# Patient Record
Sex: MALE | Race: WHITE | NOT HISPANIC OR LATINO | Employment: OTHER | URBAN - METROPOLITAN AREA
[De-identification: names, ages, dates, MRNs, and addresses within clinical notes are randomized per-mention and may not be internally consistent; named-entity substitution may affect disease eponyms.]

---

## 2017-01-26 ENCOUNTER — TELEPHONE (OUTPATIENT)
Dept: HEMATOLOGY/ONCOLOGY | Facility: CLINIC | Age: 52
End: 2017-01-26

## 2017-01-26 NOTE — TELEPHONE ENCOUNTER
Talked to John Heron this morning regarding following up with his primary oncologist at [facility]. He's been receiving his chemotherapy at the hospital there. He had no other questions at this time.

## 2017-01-26 NOTE — TELEPHONE ENCOUNTER
----- Message from Blake Buck MD sent at 1/25/2017  5:17 PM CST -----  Contact: self  Please find out if this patient is still following up with his primary oncologist.  He had been treated there.    Thanks,    -Onur  ----- Message -----     From: Britni Mishra RN     Sent: 1/25/2017   9:42 AM       To: Blake Buck MD    I do not see any future appointments with this patient. There are no currently plan for chemotherapy. I read your last note that they were to follow up with possible bone marrow transplant but that is where it was left off (8/2016). Please let me know what you want to do.     Britni  ----- Message -----     From: Nanci Bryant     Sent: 1/24/2017   1:05 PM       To: Cielo Lozano Staff    Pt states he has 2 rounds of chemo remaining and would like to know what  next plan of care will be .  Contact number [phone]

## 2017-05-11 ENCOUNTER — TELEPHONE (OUTPATIENT)
Dept: HEMATOLOGY/ONCOLOGY | Facility: CLINIC | Age: 52
End: 2017-05-11

## 2017-05-22 ENCOUNTER — EDUCATION (OUTPATIENT)
Dept: HEMATOLOGY/ONCOLOGY | Facility: CLINIC | Age: 52
End: 2017-05-22

## 2017-05-22 ENCOUNTER — LAB VISIT (OUTPATIENT)
Dept: LAB | Facility: HOSPITAL | Age: 52
End: 2017-05-22
Attending: INTERNAL MEDICINE
Payer: MEDICARE

## 2017-05-22 ENCOUNTER — OFFICE VISIT (OUTPATIENT)
Dept: HEMATOLOGY/ONCOLOGY | Facility: CLINIC | Age: 52
End: 2017-05-22
Payer: MEDICARE

## 2017-05-22 VITALS
TEMPERATURE: 98 F | BODY MASS INDEX: 39.13 KG/M2 | HEIGHT: 68 IN | WEIGHT: 258.19 LBS | HEART RATE: 55 BPM | SYSTOLIC BLOOD PRESSURE: 134 MMHG | DIASTOLIC BLOOD PRESSURE: 77 MMHG

## 2017-05-22 DIAGNOSIS — C90.00 MULTIPLE MYELOMA, REMISSION STATUS UNSPECIFIED: Primary | ICD-10-CM

## 2017-05-22 DIAGNOSIS — C90.00 MULTIPLE MYELOMA, REMISSION STATUS UNSPECIFIED: ICD-10-CM

## 2017-05-22 LAB
ALBUMIN SERPL BCP-MCNC: 3.6 G/DL
ALP SERPL-CCNC: 83 U/L
ALT SERPL W/O P-5'-P-CCNC: 23 U/L
ANION GAP SERPL CALC-SCNC: 7 MMOL/L
AST SERPL-CCNC: 16 U/L
BASOPHILS # BLD AUTO: 0.01 K/UL
BASOPHILS NFR BLD: 0.3 %
BILIRUB SERPL-MCNC: 0.5 MG/DL
BUN SERPL-MCNC: 13 MG/DL
CALCIUM SERPL-MCNC: 9 MG/DL
CHLORIDE SERPL-SCNC: 103 MMOL/L
CO2 SERPL-SCNC: 27 MMOL/L
CREAT SERPL-MCNC: 1 MG/DL
DIFFERENTIAL METHOD: ABNORMAL
EOSINOPHIL # BLD AUTO: 0.1 K/UL
EOSINOPHIL NFR BLD: 2.4 %
ERYTHROCYTE [DISTWIDTH] IN BLOOD BY AUTOMATED COUNT: 14.4 %
EST. GFR  (AFRICAN AMERICAN): >60 ML/MIN/1.73 M^2
EST. GFR  (NON AFRICAN AMERICAN): >60 ML/MIN/1.73 M^2
GLUCOSE SERPL-MCNC: 91 MG/DL
HCT VFR BLD AUTO: 36.4 %
HGB BLD-MCNC: 12.4 G/DL
LYMPHOCYTES # BLD AUTO: 0.8 K/UL
LYMPHOCYTES NFR BLD: 25.1 %
MCH RBC QN AUTO: 31.6 PG
MCHC RBC AUTO-ENTMCNC: 34.1 %
MCV RBC AUTO: 93 FL
MONOCYTES # BLD AUTO: 0.2 K/UL
MONOCYTES NFR BLD: 6.7 %
NEUTROPHILS # BLD AUTO: 2.1 K/UL
NEUTROPHILS NFR BLD: 64.6 %
PLATELET # BLD AUTO: 165 K/UL
PMV BLD AUTO: 10 FL
POTASSIUM SERPL-SCNC: 4.3 MMOL/L
PROT SERPL-MCNC: 7.5 G/DL
RBC # BLD AUTO: 3.92 M/UL
SODIUM SERPL-SCNC: 137 MMOL/L
WBC # BLD AUTO: 3.27 K/UL

## 2017-05-22 PROCEDURE — 36415 COLL VENOUS BLD VENIPUNCTURE: CPT

## 2017-05-22 PROCEDURE — 84165 PROTEIN E-PHORESIS SERUM: CPT

## 2017-05-22 PROCEDURE — 80053 COMPREHEN METABOLIC PANEL: CPT

## 2017-05-22 PROCEDURE — 83520 IMMUNOASSAY QUANT NOS NONAB: CPT

## 2017-05-22 PROCEDURE — 99213 OFFICE O/P EST LOW 20 MIN: CPT | Mod: S$PBB,,, | Performed by: INTERNAL MEDICINE

## 2017-05-22 PROCEDURE — 99999 PR PBB SHADOW E&M-EST. PATIENT-LVL II: CPT | Mod: PBBFAC,,, | Performed by: INTERNAL MEDICINE

## 2017-05-22 PROCEDURE — 85025 COMPLETE CBC W/AUTO DIFF WBC: CPT

## 2017-05-22 PROCEDURE — 84165 PROTEIN E-PHORESIS SERUM: CPT | Mod: 26,,, | Performed by: PATHOLOGY

## 2017-05-22 RX ORDER — LISINOPRIL 20 MG/1
40 TABLET ORAL DAILY
Status: ON HOLD | COMMUNITY
End: 2017-10-10

## 2017-05-22 RX ORDER — AMLODIPINE BESYLATE 5 MG/1
10 TABLET ORAL DAILY
COMMUNITY
End: 2021-03-03

## 2017-05-22 NOTE — PROGRESS NOTES
Subjective:       Patient ID: Arik Bobo is a 51 y.o. male.    Chief Complaint: No chief complaint on file.    HPI  Mr. Bobo is here with his wife for follow up of IgG kappa smoldering multiple and 2 weeks out from left hip replacement for arthritis first known in 2008.   Since his last visit in 8/2016, he has been in therapy with Dr. Zepeda with steady improvement.  He is now here for a discussion about PBSCT consolidation.    Myeloma History: He was noted to develop a progressive anemia in early 2015.  In retrospect, his total protein has been at the upper end of normal with hematocrit of 39 in 10/2013.  At the time of a total hip arthroplasty 11/2014 the marrow resected from his right femoral head did not show abnormalities. During that hospital stay he underwent an anemia workup showing iron 20, TIBC 234 with 12% saturation and normal ferritin at 103.  Subsequent colonoscopy showed multiple benign polyps and one at the appejndiceal lumen that resulted in appendectomy in 1/2015.    SPEP showed a monoclonal protein 2.9 g/dL with IgG elevated at 5677 and suppressed IgA and IgM, calcium 9, creatinine 0.2.  Bone survey did not find lytic lesions and kappa:lambda light chain ratio was over 100.  Albumin 3.2 (5/1) and beta-2 microglobin was elevated, though I do not know the value.  Marrow 4/14/15 at which time his hemoglobin was 9.6 with MCV 87 showed a 50% cellular marrow with 10% plasma cell infiltrated with atypical forms and a left shifted granulocyte population.   staining showed up to 50% plasma cells in some areas but kappa and lambda staining showed a mixture of cells without clear clonality.  Plasma cells were kappa restricted on flow cytometry (5.7% population).  No stainable iron was present.  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  He was tried on IV iron with some improvement but not resolution of his anemia.    Given elevated light  chain ratio over 100 he was formally diagnosed with myeloma and started on lenalidomide/dexamethasone in late 6/2015 which he continued until 12/2015 when a repeat bone marrow 12/3/15 showed a 60-70% cellular marrow with 20% kappa restricted plasma cells.  His protein levels were under good control with free kappa light chain 2.98 mg/dL and IgG kappa M-spike 1 g/dL.  Repeat marrow done 4/5/16 showed a 30-40% cellular marrow with a 17% kappa-restricted plasma cell population and no storage iron.  Hence, he was started on iron with resolution of his anemia, which had been the only feature qualifying him for the diagnosis of myeloma.  Hence, he was reclassified smoldering myeloma and has been followed since that time.    He developed two episodes of DVT in 7/2015 and 2/2017 and is now on apixaban.    Review of Systems   Constitutional: Positive for fatigue (improved). Negative for activity change, appetite change, diaphoresis, fever and unexpected weight change.   HENT: Negative for mouth sores, postnasal drip and sore throat.    Eyes: Negative for visual disturbance.   Respiratory: Negative for cough, chest tightness and shortness of breath.    Cardiovascular: Negative for chest pain and leg swelling.   Gastrointestinal: Negative for anal bleeding, blood in stool, constipation, diarrhea and rectal pain.   Genitourinary: Negative for dysuria, frequency and hematuria.   Musculoskeletal: Positive for arthralgias (Left hip pain, degenerative disease). Negative for back pain and neck pain.   Skin: Negative for rash.   Neurological: Positive for light-headedness (twice weekly). Negative for tremors, weakness and numbness.   Hematological: Negative for adenopathy.   Psychiatric/Behavioral: Positive for sleep disturbance. Negative for confusion.       Objective:      Physical Exam   Constitutional: He is oriented to person, place, and time. He appears well-developed and well-nourished. No distress.   HENT:   Head:  Normocephalic and atraumatic.   Mouth/Throat: Oropharynx is clear and moist. No oropharyngeal exudate.   Eyes: Conjunctivae are normal. Pupils are equal, round, and reactive to light.   Neck: Neck supple.   Cardiovascular: Normal rate and regular rhythm.    Pulmonary/Chest: Effort normal and breath sounds normal. He has no rales.   Abdominal: Soft. Bowel sounds are normal. He exhibits no mass. There is no splenomegaly. There is no tenderness.   Musculoskeletal: Normal range of motion. He exhibits no edema.   No spinal or rib tenderness   Lymphadenopathy:     He has no cervical adenopathy.     He has no axillary adenopathy.        Right: No inguinal adenopathy present.        Left: No inguinal adenopathy present.   Neurological: He is alert and oriented to person, place, and time.   Skin: Skin is warm and dry. No rash noted.   Psychiatric: He has a normal mood and affect. Thought content normal.       Assessment:       1. Multiple myeloma, remission status unspecified        Plan:       Mr. Bobo had and abnormal plasma cell population in his marrow along with anemia without lytic bony lesions, hypercalcemia or renal disease.  Though iron resolved his previous anemia, his anemia has recurred and worsened with hemoglobin 9.7 today.  In addition, his albumin had fallen to 3.0 with total protein elevated to 8.9 and his SPEP showed a rising M-spike to 2.2 g/dL with significant rise in his kappa light chain to 33 mg/dL and ratio of 301.      Hence, he had evidence of progressive myeloma and started on CyBorD with improvement.  His hemoglobin has improved to 12.4 with normal creatinine, calcium and albumin.  SPEP and light chains pending.  I discussed the logistics of PBSCT with the patient again today and he also met with our transplant coordinator to discuss logistics.  As he is only 50 years old, there is certainly a possibility that he will live beyond the ability of current therapies and autologous transplant to  control his disease for the long term.  Hence, if we look at the longer event horizon there is also a possibility that we may need to consider an allogeneic transplant which could offer the potential for cure.  This is not an immediate consideration but since he has 4 siblings there is a 68% chance of finding a sibling match.     He also suffers from gout and is not currently on uric acid suppression medication.  This may have contributed to his hip degeneration.    All of his questions were answered in the clinic today and we will plan for restaging marrow soon and likely pursuit of PBSCT.

## 2017-05-22 NOTE — LETTER
May 22, 2017        Avinash Lal MD  [address]  [place] [address]             [facility]  [address] [facility] [address]  Phone: [phone]   Patient: Arik Bobo   MR Number: [id number]   YOB: 1965   Date of Visit: 5/22/2017       Dear Dr. Lal:    Thank you for referring Arik Bobo to me for evaluation. Below are the relevant portions of my assessment and plan of care.        1. Multiple myeloma, remission status unspecified          Mr. Bobo had and abnormal plasma cell population in his marrow along with anemia without lytic bony lesions, hypercalcemia or renal disease.  Though iron resolved his previous anemia, his anemia has recurred and worsened with hemoglobin 9.7 today.  In addition, his albumin had fallen to 3.0 with total protein elevated to 8.9 and his SPEP showed a rising M-spike to 2.2 g/dL with significant rise in his kappa light chain to 33 mg/dL and ratio of 301.      Hence, he had evidence of progressive myeloma and started on CyBorD with improvement.  His hemoglobin has improved to 12.4 with normal creatinine, calcium and albumin.  SPEP and light chains pending.  I discussed the logistics of PBSCT with the patient again today and he also met with our transplant coordinator to discuss logistics.  As he is only 50 years old, there is certainly a possibility that he will live beyond the ability of current therapies and autologous transplant to control his disease for the long term.  Hence, if we look at the longer event horizon there is also a possibility that we may need to consider an allogeneic transplant which could offer the potential for cure.  This is not an immediate consideration but since he has 4 siblings there is a 68% chance of finding a sibling match.     He also suffers from gout and is not currently on uric acid suppression medication.  This may have contributed to his hip degeneration.    All of his  questions were answered in the clinic today and we will plan for restaging marrow soon and likely pursuit of PBSCT.     If you have questions, please do not hesitate to call me. I look forward to following Arik along with you.    Sincerely,      Blake Buck MD           CC  No Recipients

## 2017-05-22 NOTE — PROGRESS NOTES
Mary Ann Mr. Bobo,    It was nice meeting you and your sister today.  Please see attached handouts regarding food safety, visitor guidelines, discharge instructions, and pre transplant eval (I gave you a hard copy of this last one).  Also, the link below is for the video we discussed.        [email or web address]    Just let me know when your dental appointment is scheduled, if you still have your 24 hour urine container, and when your last week of treatment will be.  I look forward to working with you.      Thank you for allowing me to participate in your transplant process.         Your Transplant Coordinator,       Ivone Zarco RN, BMTCN   Sr Clinical Stem Cell Transplant Coordinator   [facility]  [facility] [address], [address] [place] [address]  o: [phone]  f: [phone]  [email or web address]

## 2017-05-23 LAB
ALBUMIN SERPL ELPH-MCNC: 3.9 G/DL
ALPHA1 GLOB SERPL ELPH-MCNC: 0.33 G/DL
ALPHA2 GLOB SERPL ELPH-MCNC: 0.77 G/DL
B-GLOBULIN SERPL ELPH-MCNC: 0.73 G/DL
GAMMA GLOB SERPL ELPH-MCNC: 1.38 G/DL
KAPPA LC SER QL IA: 4.36 MG/DL
KAPPA LC/LAMBDA SER IA: 54.5
LAMBDA LC SER QL IA: 0.08 MG/DL
PATHOLOGIST INTERPRETATION SPE: NORMAL
PROT SERPL-MCNC: 7.1 G/DL

## 2017-05-26 ENCOUNTER — TELEPHONE (OUTPATIENT)
Dept: HEMATOLOGY/ONCOLOGY | Facility: CLINIC | Age: 52
End: 2017-05-26

## 2017-05-26 DIAGNOSIS — D69.6 THROMBOCYTOPENIA: ICD-10-CM

## 2017-05-26 DIAGNOSIS — C90.00 MULTIPLE MYELOMA: Primary | ICD-10-CM

## 2017-05-26 DIAGNOSIS — R06.02 SHORTNESS OF BREATH ON EXERTION: ICD-10-CM

## 2017-05-26 DIAGNOSIS — Z76.82 STEM CELL TRANSPLANT CANDIDATE: ICD-10-CM

## 2017-05-29 DIAGNOSIS — C90.00 MULTIPLE MYELOMA: Primary | ICD-10-CM

## 2017-05-29 DIAGNOSIS — Z76.82 STEM CELL TRANSPLANT CANDIDATE: ICD-10-CM

## 2017-06-05 ENCOUNTER — HOSPITAL ENCOUNTER (OUTPATIENT)
Facility: HOSPITAL | Age: 52
Discharge: HOME | End: 2017-06-05
Attending: INTERNAL MEDICINE | Admitting: INTERNAL MEDICINE
Payer: COMMERCIAL

## 2017-06-05 ENCOUNTER — SURGERY (OUTPATIENT)
Age: 52
End: 2017-06-05

## 2017-06-05 VITALS
WEIGHT: 260 LBS | DIASTOLIC BLOOD PRESSURE: 75 MMHG | BODY MASS INDEX: 39.4 KG/M2 | HEART RATE: 55 BPM | HEIGHT: 68 IN | OXYGEN SATURATION: 97 % | TEMPERATURE: 99 F | SYSTOLIC BLOOD PRESSURE: 120 MMHG | RESPIRATION RATE: 20 BRPM

## 2017-06-05 DIAGNOSIS — C90.00 MULTIPLE MYELOMA: ICD-10-CM

## 2017-06-05 DIAGNOSIS — C90.00 MULTIPLE MYELOMA, REMISSION STATUS UNSPECIFIED: Primary | ICD-10-CM

## 2017-06-05 LAB — BONE MARROW IRON STAIN COMMENT: NORMAL

## 2017-06-05 PROCEDURE — 88271 CYTOGENETICS DNA PROBE: CPT | Mod: 59

## 2017-06-05 PROCEDURE — 88184 FLOWCYTOMETRY/ TC 1 MARKER: CPT | Performed by: PATHOLOGY

## 2017-06-05 PROCEDURE — 85097 BONE MARROW INTERPRETATION: CPT | Mod: ,,, | Performed by: PATHOLOGY

## 2017-06-05 PROCEDURE — 88311 DECALCIFY TISSUE: CPT | Mod: 26,,, | Performed by: PATHOLOGY

## 2017-06-05 PROCEDURE — 88271 CYTOGENETICS DNA PROBE: CPT | Mod: 91

## 2017-06-05 PROCEDURE — 88342 IMHCHEM/IMCYTCHM 1ST ANTB: CPT | Mod: 26,59,, | Performed by: PATHOLOGY

## 2017-06-05 PROCEDURE — 36000704 HC OR TIME LEV I 1ST 15 MIN: Performed by: INTERNAL MEDICINE

## 2017-06-05 PROCEDURE — 88274 CYTOGENETICS 25-99: CPT

## 2017-06-05 PROCEDURE — 88305 TISSUE EXAM BY PATHOLOGIST: CPT | Performed by: PATHOLOGY

## 2017-06-05 PROCEDURE — 25000003 PHARM REV CODE 250: Performed by: INTERNAL MEDICINE

## 2017-06-05 PROCEDURE — 88313 SPECIAL STAINS GROUP 2: CPT | Mod: 26,,, | Performed by: PATHOLOGY

## 2017-06-05 PROCEDURE — 88305 TISSUE EXAM BY PATHOLOGIST: CPT | Mod: 26,,, | Performed by: PATHOLOGY

## 2017-06-05 PROCEDURE — 88274 CYTOGENETICS 25-99: CPT | Mod: 59

## 2017-06-05 PROCEDURE — 37000008 HC ANESTHESIA 1ST 15 MINUTES: Performed by: INTERNAL MEDICINE

## 2017-06-05 PROCEDURE — 88237 TISSUE CULTURE BONE MARROW: CPT

## 2017-06-05 PROCEDURE — 88189 FLOWCYTOMETRY/READ 16 & >: CPT | Mod: ,,, | Performed by: PATHOLOGY

## 2017-06-05 PROCEDURE — 38221 DX BONE MARROW BIOPSIES: CPT | Mod: RT,,, | Performed by: NURSE PRACTITIONER

## 2017-06-05 PROCEDURE — 71000015 HC POSTOP RECOV 1ST HR: Performed by: INTERNAL MEDICINE

## 2017-06-05 PROCEDURE — 88185 FLOWCYTOMETRY/TC ADD-ON: CPT | Mod: 59 | Performed by: PATHOLOGY

## 2017-06-05 PROCEDURE — 88264 CHROMOSOME ANALYSIS 20-25: CPT

## 2017-06-05 PROCEDURE — 88313 SPECIAL STAINS GROUP 2: CPT

## 2017-06-05 RX ORDER — ENOXAPARIN SODIUM 150 MG/ML
120 INJECTION SUBCUTANEOUS
COMMUNITY
End: 2017-08-29 | Stop reason: ALTCHOICE

## 2017-06-05 RX ORDER — LIDOCAINE HYDROCHLORIDE 20 MG/ML
INJECTION, SOLUTION EPIDURAL; INFILTRATION; INTRACAUDAL; PERINEURAL
Status: DISCONTINUED | OUTPATIENT
Start: 2017-06-05 | End: 2017-06-05 | Stop reason: HOSPADM

## 2017-06-05 RX ADMIN — LIDOCAINE HYDROCHLORIDE 5 ML: 20 INJECTION, SOLUTION EPIDURAL; INFILTRATION; INTRACAUDAL; PERINEURAL at 10:06

## 2017-06-05 NOTE — PLAN OF CARE
Patient states they are ready to be discharged. Instructions  given to patient and family. Both verbalize understanding. Patient tolerating PO liquids with no difficulty. Patient states pain is at a tolerable level for them. Anesthesia and surgical consent in chart upon discharge.

## 2017-06-05 NOTE — INTERVAL H&P NOTE
The patient has been examined and the H&P has been reviewed:    I concur with the findings and no changes have occurred since H&P was written. okay to proceed with BMBx.    Anesthesia/Surgery risks, benefits and alternative options discussed and understood by patient/family.          Active Hospital Problems    Diagnosis  POA    Multiple myeloma [C90.00]  Yes      Resolved Hospital Problems    Diagnosis Date Resolved POA   No resolved problems to display.

## 2017-06-05 NOTE — DISCHARGE SUMMARY
[facility]  Hematology/Oncology  Discharge Summary      Patient Name: Arik Bobo  MRN: [id number]  Admission Date: 6/5/2017  Hospital Length of Stay: 0 days  Discharge Date and Time:  06/05/2017 9:58 AM  Attending Physician: Blake Buck MD   Discharging Provider: Kassidy Barba NP  Primary Care Provider: Avinash Lal MD    HPI: BMBx for pre transplant for MM    Procedure(s) (LRB):  BIOPSY-BONE MARROW (Left)     Hospital Course: Patient admitted to pre op today for a bone marrow biopsy. Confirmed that consent was done for a bone marrow biopsy. Patient was not sedated per anesthesia and a bone marrow biopsy and aspiration was performed in the OR. Patient was then transferred to post op and discharged home when after applying pressure to back for 20 minutes.     Pending Diagnostic Studies:     Procedure Component Value Units Date/Time    Bone Marrow Prep and Stain [[id number]] Collected:  06/05/17 0955    Order Status:  Sent Lab Status:  In process Updated:  06/05/17 0956    Specimen:  Bone Marrow from Bone Marrow     Chromosome Analysis, Bone Marrow [[id number]] Collected:  06/05/17 0955    Order Status:  Sent Lab Status:  In process Updated:  06/05/17 0956    Specimen:  Bone Marrow from Bone Marrow     Iron Stain, Bone Marrow [[id number]] Collected:  06/05/17 0955    Order Status:  Sent Lab Status:  In process Updated:  06/05/17 0956    Specimen:  Bone Marrow from Bone Marrow     Leukemia/Lymphoma Screen - Bone Marrow Left Posterior Iliac Crest [[id number]] Collected:  06/05/17 0955    Order Status:  Sent Lab Status:  In process Updated:  06/05/17 0956    Specimen:  Bone Marrow         Final Active Diagnoses:    Diagnosis Date Noted POA    Multiple myeloma [C90.00] 05/18/2015 Yes      Problems Resolved During this Admission:    Diagnosis Date Noted Date Resolved POA      Discharged Condition: stable    Disposition: Home or Self Care    Follow Up: With Dr. Buck in BMT clinic      Patient Instructions:     Diet general     Remove dressing in 24 hours     Call MD for:  increased confusion or weakness     Call MD for:  persistent dizziness, light-headedness, or visual disturbances     Call MD for:  worsening rash     Call MD for:  severe persistent headache     Call MD for:  difficulty breathing or increased cough     Call MD for:  redness, tenderness, or signs of infection (pain, swelling, redness, odor or green/yellow discharge around incision site)     Call MD for:  severe uncontrolled pain     Call MD for:  persistent nausea and vomiting or diarrhea     Call MD for:  temperature >100.4     Activity as tolerated       Medications:  Reconciled Home Medications:   Current Discharge Medication List      CONTINUE these medications which have NOT CHANGED    Details   amlodipine (NORVASC) 5 MG tablet Take 5 mg by mouth once daily.      apixaban (ELIQUIS) 2.5 mg Tab Take 2.5 mg by mouth 2 (two) times daily.      BYSTOLIC 10 mg Tab Refills: 2      citalopram (CELEXA) 40 MG tablet Refills: 6      lisinopril (PRINIVIL,ZESTRIL) 20 MG tablet Take 20 mg by mouth once daily.      lisinopril-hydrochlorothiazide (PRINZIDE,ZESTORETIC) 20-12.5 mg per tablet Refills: 3      simvastatin (ZOCOR) 40 MG tablet Refills: 6             Kassidy Barba NP  Hematology/Oncology  [facility]

## 2017-06-05 NOTE — PROCEDURES
PROCEDURE NOTE:  Bone Marrow Biopsy  Date: 6/5/1Pre transplant for MM  Consent: Informed consent was obtained from patient.  Timeout: Done and documented.  Site: Right posterior illiac crest.  Prep: Betadine.  Needle used: 11 gauge Jamshidi needle.  Anesthetic: 1% lidocaine 5 cc.  Biopsy: The biopsy needle was introduced into the marrow cavity and an aspirate was obtained without complications. Core biopsy obtained and sent for routine histologic examination and cytogenetics.  Complications: None.  Disposition: The patient was discharged home when patient had laid 20 minutes on his back.  Minimal blood loss  TIMOTEO Barba NP

## 2017-06-05 NOTE — DISCHARGE INSTRUCTIONS
Discharge instructions for having a Bone Marrow Aspiration / Biopsy    Keep Bandage in place for 24 hours.  - Do not shower or take a tube bath during this time. (you may sponge bathe).  - Call the nurse or physician for excessive bleeding or pain at biopsy site.  - You may take Tylenol as needed for pain.    You have received medication to sedate you.  - Do not drive a car or operate heavy machinery for the rest of the day.  - You may resume other activities as tolerated.    You can call [phone] for any problems during the hours of 8:00 AM-5:00PM.    For an emergency after 5:00 PM you can call [phone] and have the  page the Hematologist / Oncologist on call.

## 2017-06-06 LAB
BODY SITE - BONE MARROW: NORMAL
BONE MARROW WRIGHT STAIN COMMENT: NORMAL
CLINICAL DIAGNOSIS - BONE MARROW: NORMAL
FLOW CYTOMETRY ANTIBODIES ANALYZED - BONE MARROW: NORMAL
FLOW CYTOMETRY COMMENT - BONE MARROW: NORMAL
FLOW CYTOMETRY INTERPRETATION - BONE MARROW: NORMAL

## 2017-06-08 ENCOUNTER — TELEPHONE (OUTPATIENT)
Dept: HEMATOLOGY/ONCOLOGY | Facility: CLINIC | Age: 52
End: 2017-06-08

## 2017-06-08 NOTE — TELEPHONE ENCOUNTER
Phoned patient to let him know that his bone marrow results were not conducive to transplant at this time.  I let him know that Dr. Buck will be calling Dr. Lal regarding additional therapy.  I also let Mr. Bobo know that we are cancelling eval appts for Monday and Tuesday.  I informed him that I will meet with him again when he returns to Dr. Buck for re staging after his next courses of chemotherapy.    Dr. Buck,  Please call the patient and Dr. Lal.    Thank you    Ivone

## 2017-06-09 LAB
CHROM BANDING METHOD: NORMAL
CHROMOSOME ANALYSIS BM ADDITIONAL INFORMATION: NORMAL
CHROMOSOME ANALYSIS BM RELEASED BY: NORMAL
CHROMOSOME ANALYSIS BM RESULT SUMMARY: NORMAL
CLINICAL CYTOGENETICIST REVIEW: NORMAL
KARYOTYP MAR: NORMAL
REASON FOR REFERRAL (NARRATIVE): NORMAL
REF LAB TEST METHOD: NORMAL
SPECIMEN SOURCE: NORMAL
SPECIMEN: NORMAL

## 2017-06-16 LAB
GENETICIST REVIEW: NORMAL
PLASMA CELL PROLIF RELEASED BY: NORMAL
PLASMA CELL PROLIF RESULT SUMMARY: NORMAL
PLASMA CELL PROLIF RESULT TABLE: NORMAL
REASON FOR REFERRAL, PLASMA CELL PROLIF (PCPD), FISH: NORMAL
REF LAB TEST METHOD: NORMAL
RESULTS, PLASMA CELL PROLIF (PCPD), FISH: NORMAL
SERVICE CMNT-IMP: NORMAL
SERVICE CMNT-IMP: NORMAL
SPECIMEN SOURCE: NORMAL
SPECIMEN, PLASMA CELL PROLIF (PCPD), FISH: NORMAL

## 2017-07-14 ENCOUNTER — TELEPHONE (OUTPATIENT)
Dept: HEMATOLOGY/ONCOLOGY | Facility: CLINIC | Age: 52
End: 2017-07-14

## 2017-07-14 NOTE — TELEPHONE ENCOUNTER
Phone call to patient today to see how he is doing with his chemo in [place].  Patient states he is getting course #2 starting next week.  We discussed that when he has completed therapy he will be re staged and if clear we will move forward to evaluation for transplant.  Patient states he understands.  He is currently seeing his dentist for filling of cavities and 2 crowns.  He is working towards having this complete before his eval here.  I asked the patient to let his MD know that he spoke with me today and to call us if he has any questions.  OKSANA Zarco RN, BMTCN

## 2017-08-07 DIAGNOSIS — C90.00 MULTIPLE MYELOMA: Primary | ICD-10-CM

## 2017-08-07 DIAGNOSIS — Z76.82 STEM CELL TRANSPLANT CANDIDATE: ICD-10-CM

## 2017-08-08 ENCOUNTER — TELEPHONE (OUTPATIENT)
Dept: HEMATOLOGY/ONCOLOGY | Facility: CLINIC | Age: 52
End: 2017-08-08

## 2017-08-08 DIAGNOSIS — C90.00 MULTIPLE MYELOMA, REMISSION STATUS UNSPECIFIED: Primary | ICD-10-CM

## 2017-08-08 NOTE — TELEPHONE ENCOUNTER
Called pt to schedule his BMBX , pt was told to report to the second floor in the main hospital Same Day Surgery Dept. Pt was told to be NPO starting at midnight the day prior to surgery. Pt was advised to hold all blood thinning medications prior to his BMBX & was advised to have a ride home. Pt voiced verbal understanding of these directions. Will also mail instruction sheet to pt's home.  Case request pended and routed to Lea Arevalo.    Leona Aburto

## 2017-08-09 ENCOUNTER — TELEPHONE (OUTPATIENT)
Dept: HEMATOLOGY/ONCOLOGY | Facility: CLINIC | Age: 52
End: 2017-08-09

## 2017-08-09 NOTE — TELEPHONE ENCOUNTER
Spoke with patient via telephone call.  States he has finished his chemo with Dr. Lal.  Discussed upcoming bm bx. And appt with Dr. Buck follow up and to start pre transplant evaluation.  Patient verbalized understand and all of his questions were answered to his satisfaction.  A bone marrow bx and appt with Dr. Buck was set up for 8/21.  Informed the patient that someone will call him on Friday the 18th to instruct him on the prep for the biopsy and where and when to report to the department.  I also explained to the patient that we will do the remainder of his eval on Mon. 8/28 and Tues. 8/29.    OKSANA Zarco RN, BMTCN

## 2017-08-16 ENCOUNTER — TELEPHONE (OUTPATIENT)
Dept: HEMATOLOGY/ONCOLOGY | Facility: CLINIC | Age: 52
End: 2017-08-16

## 2017-08-18 ENCOUNTER — TELEPHONE (OUTPATIENT)
Dept: HEMATOLOGY/ONCOLOGY | Facility: CLINIC | Age: 52
End: 2017-08-18

## 2017-08-18 NOTE — TELEPHONE ENCOUNTER
Returned call to patient. Discussed that if patient wants to do procedure without sedation, he can tell them in the procedure that he does not wish to have sedation. Patient verbalizes understanding.

## 2017-08-18 NOTE — TELEPHONE ENCOUNTER
----- Message from Nicole Villagomez sent at 8/18/2017 11:07 AM CDT -----  Contact: Pt can be reached at [phone]  Pt is calling to speak with the nurse concerning his biopsy procedure. Pt is requesting NO Sedation for the procedure and would like to speak with someone.    Thank you!

## 2017-08-21 ENCOUNTER — HOSPITAL ENCOUNTER (OUTPATIENT)
Facility: HOSPITAL | Age: 52
Discharge: HOME | End: 2017-08-21
Attending: INTERNAL MEDICINE | Admitting: INTERNAL MEDICINE
Payer: COMMERCIAL

## 2017-08-21 ENCOUNTER — SURGERY (OUTPATIENT)
Age: 52
End: 2017-08-21

## 2017-08-21 ENCOUNTER — OFFICE VISIT (OUTPATIENT)
Dept: HEMATOLOGY/ONCOLOGY | Facility: CLINIC | Age: 52
End: 2017-08-21
Payer: COMMERCIAL

## 2017-08-21 VITALS
HEIGHT: 68 IN | TEMPERATURE: 99 F | SYSTOLIC BLOOD PRESSURE: 120 MMHG | OXYGEN SATURATION: 98 % | RESPIRATION RATE: 20 BRPM | WEIGHT: 260 LBS | DIASTOLIC BLOOD PRESSURE: 69 MMHG | HEART RATE: 53 BPM | BODY MASS INDEX: 39.4 KG/M2

## 2017-08-21 VITALS
HEIGHT: 68 IN | TEMPERATURE: 99 F | HEART RATE: 54 BPM | BODY MASS INDEX: 39.92 KG/M2 | SYSTOLIC BLOOD PRESSURE: 122 MMHG | DIASTOLIC BLOOD PRESSURE: 75 MMHG | WEIGHT: 263.44 LBS

## 2017-08-21 DIAGNOSIS — C90.00 MULTIPLE MYELOMA: ICD-10-CM

## 2017-08-21 DIAGNOSIS — C90.00 MULTIPLE MYELOMA, REMISSION STATUS UNSPECIFIED: Primary | ICD-10-CM

## 2017-08-21 PROCEDURE — 88341 IMHCHEM/IMCYTCHM EA ADD ANTB: CPT | Mod: 26,59,, | Performed by: PATHOLOGY

## 2017-08-21 PROCEDURE — 99213 OFFICE O/P EST LOW 20 MIN: CPT | Mod: PBBFAC,25 | Performed by: INTERNAL MEDICINE

## 2017-08-21 PROCEDURE — 88189 FLOWCYTOMETRY/READ 16 & >: CPT | Mod: ,,, | Performed by: PATHOLOGY

## 2017-08-21 PROCEDURE — 88305 TISSUE EXAM BY PATHOLOGIST: CPT | Performed by: PATHOLOGY

## 2017-08-21 PROCEDURE — 88313 SPECIAL STAINS GROUP 2: CPT | Mod: 26,,, | Performed by: PATHOLOGY

## 2017-08-21 PROCEDURE — 99999 PR PBB SHADOW E&M-EST. PATIENT-LVL III: CPT | Mod: PBBFAC,,, | Performed by: INTERNAL MEDICINE

## 2017-08-21 PROCEDURE — 88311 DECALCIFY TISSUE: CPT | Mod: 26,,, | Performed by: PATHOLOGY

## 2017-08-21 PROCEDURE — 38221 DX BONE MARROW BIOPSIES: CPT | Mod: LT,,, | Performed by: NURSE PRACTITIONER

## 2017-08-21 PROCEDURE — 88264 CHROMOSOME ANALYSIS 20-25: CPT

## 2017-08-21 PROCEDURE — 85097 BONE MARROW INTERPRETATION: CPT | Mod: ,,, | Performed by: PATHOLOGY

## 2017-08-21 PROCEDURE — 88342 IMHCHEM/IMCYTCHM 1ST ANTB: CPT | Mod: 26,59,, | Performed by: PATHOLOGY

## 2017-08-21 PROCEDURE — 99214 OFFICE O/P EST MOD 30 MIN: CPT | Mod: S$GLB,,, | Performed by: INTERNAL MEDICINE

## 2017-08-21 PROCEDURE — 88305 TISSUE EXAM BY PATHOLOGIST: CPT | Mod: 26,,, | Performed by: PATHOLOGY

## 2017-08-21 RX ORDER — LIDOCAINE HCL/PF 100 MG/5ML
SYRINGE (ML) INTRAVENOUS
Status: DISCONTINUED | OUTPATIENT
Start: 2017-08-21 | End: 2017-08-21 | Stop reason: HOSPADM

## 2017-08-21 RX ADMIN — LIDOCAINE HYDROCHLORIDE 20 MG: 20 INJECTION, SOLUTION INTRAVENOUS at 07:08

## 2017-08-21 NOTE — LETTER
August 26, 2017        Avinash Lal MD  [address]  [facility]  [facility] [address]             [facility]  [address]  [facility] [address]  Phone: [phone]   Patient: Arik Bobo   MR Number: [id number]   YOB: 1965   Date of Visit: 8/21/2017       Dear Dr. Lal:    Thank you for referring Arik Bobo to me for evaluation. Below are the relevant portions of my assessment and plan of care.        1. Multiple myeloma, remission status unspecified          Mr. Bobo had and abnormal plasma cell population in his marrow along with anemia without lytic bony lesions, hypercalcemia or renal disease.  Though iron resolved his previous anemia, his anemia has recurred and worsened with hemoglobin 9.7 today.  In addition, his albumin had fallen to 3.0 with total protein elevated to 8.9 and his SPEP showed a rising M-spike to 2.2 g/dL with significant rise in his kappa light chain to 33 mg/dL and ratio of 301.      Hence, he had evidence of progressive myeloma and started on CyBorD and has completed 4 cycles with improvement.       His hemoglobin is stable at 11.2 with normal creatinine and calcium.  His albumin is 3.4.  SPEP shows an M-spike of 0.41 g/dL and kappa light chain is minimally elevated at 2.3 mg/dL.  Restaging marrow this morning showed a 10-50% cellular marrow with trilineage hematopoiesis and 5% residual  kappa-restricted plasma cells (6% by morphology).  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  Hence, he has achieved a partial remission (PR1) and is ready to proceed with transplant.  I discussed the logistics of PBSCT with the patient again today and he also met with our transplant coordinator to discuss logistics.      As he is only 52 years old, there is certainly a possibility that he will live beyond the ability of current therapies and autologous transplant to control his disease for the long term.  Hence, if we look at the longer  event horizon there is also a possibility that we may need to consider an allogeneic transplant which could offer the potential for cure.  This is not an immediate consideration but since he has 4 siblings there is a 68% chance of finding a sibling match.     He also suffers from gout and is not currently on uric acid suppression medication.  This may have contributed to his hip degeneration.    All of his questions were answered in the clinic today and we plan for stem cell mobilization soon after further workup for PBSCT.     If you have questions, please do not hesitate to call me. I look forward to following Arik along with you.    Sincerely,      Blake Buck MD           CC  No Recipients

## 2017-08-21 NOTE — H&P
[facility]  Hematology/Oncology  H&P    Patient Name: Arik Bobo  MRN: [id number]  Admission Date: 8/21/2017  Code Status: No Order   Attending Provider: Blake Buck MD  Primary Care Physician: Avinash Lal MD  Principal Problem:<principal problem not specified>    Subjective:     HPI: Pt reports no changes since last visit and that he is done with chemotherapy. He is here for a bone marrow bx without sedation.    From Dr. Buck's last note:  Mr. Bobo is here with his wife for follow up of IgG kappa smoldering multiple and 2 weeks out from left hip replacement for arthritis first known in 2008.   Since his last visit in 8/2016, he has been in therapy with Dr. Zepeda with steady improvement.  He is now here for a discussion about PBSCT consolidation.     Myeloma History: He was noted to develop a progressive anemia in early 2015.  In retrospect, his total protein has been at the upper end of normal with hematocrit of 39 in 10/2013.  At the time of a total hip arthroplasty 11/2014 the marrow resected from his right femoral head did not show abnormalities. During that hospital stay he underwent an anemia workup showing iron 20, TIBC 234 with 12% saturation and normal ferritin at 103.  Subsequent colonoscopy showed multiple benign polyps and one at the appejndiceal lumen that resulted in appendectomy in 1/2015.     SPEP showed a monoclonal protein 2.9 g/dL with IgG elevated at 5677 and suppressed IgA and IgM, calcium 9, creatinine 0.2.  Bone survey did not find lytic lesions and kappa:lambda light chain ratio was over 100.  Albumin 3.2 (5/1) and beta-2 microglobin was elevated, though I do not know the value.  Marrow 4/14/15 at which time his hemoglobin was 9.6 with MCV 87 showed a 50% cellular marrow with 10% plasma cell infiltrated with atypical forms and a left shifted granulocyte population.   staining showed up to 50% plasma cells in some areas but kappa and lambda  staining showed a mixture of cells without clear clonality.  Plasma cells were kappa restricted on flow cytometry (5.7% population).  No stainable iron was present.  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  He was tried on IV iron with some improvement but not resolution of his anemia.     Given elevated light chain ratio over 100 he was formally diagnosed with myeloma and started on lenalidomide/dexamethasone in late 6/2015 which he continued until 12/2015 when a repeat bone marrow 12/3/15 showed a 60-70% cellular marrow with 20% kappa restricted plasma cells.  His protein levels were under good control with free kappa light chain 2.98 mg/dL and IgG kappa M-spike 1 g/dL.  Repeat marrow done 4/5/16 showed a 30-40% cellular marrow with a 17% kappa-restricted plasma cell population and no storage iron.  Hence, he was started on iron with resolution of his anemia, which had been the only feature qualifying him for the diagnosis of myeloma.  Hence, he was reclassified smoldering myeloma and has been followed since that time.     He developed two episodes of DVT in 7/2015 and 2/2017 and is now on apixaban.       Oncology Treatment Plan:   [No treatment plan]    Medications:  Continuous Infusions:  Scheduled Meds:  PRN Meds:     Review of patient's allergies indicates:   Allergen Reactions    Pcn [penicillins]      Unknown last dose as an infant        Past Medical History:   Diagnosis Date    History of hip surgery     Hyperlipidemia     Hypertension     Multiple myeloma      Past Surgical History:   Procedure Laterality Date    HEMORRHOID SURGERY      TOTAL HIP ARTHROPLASTY       Family History     None        Social History Main Topics    Smoking status: Never Smoker    Smokeless tobacco: Current User    Alcohol use No    Drug use: No    Sexual activity: Yes     Partners: Female       Review of Systems   Constitutional: Positive for fatigue  (improved). Negative for activity change, appetite change, diaphoresis, fever and unexpected weight change.   HENT: Negative for mouth sores, postnasal drip and sore throat.    Eyes: Negative for visual disturbance.   Respiratory: Negative for cough, chest tightness and shortness of breath.    Cardiovascular: Negative for chest pain and leg swelling.   Gastrointestinal: Negative for anal bleeding, blood in stool, constipation, diarrhea and rectal pain.   Genitourinary: Negative for dysuria, frequency and hematuria.   Musculoskeletal: Positive for arthralgias (Left hip pain, degenerative disease). Negative for back pain and neck pain.   Skin: Negative for rash.   Neurological: Positive for light-headedness (twice weekly). Negative for tremors, weakness and numbness.   Hematological: Negative for adenopathy.   Psychiatric/Behavioral: Positive for sleep disturbance. Negative for confusion.   Objective:     Vital Signs (Most Recent):  Temp: 97.4 °F (36.3 °C) (08/21/17 0625)  Pulse: (!) 56 (08/21/17 0625)  Resp: 20 (08/21/17 0625)  BP: 133/77 (08/21/17 0630)  SpO2: 96 % (08/21/17 0625) Vital Signs (24h Range):  Temp:  [97.4 °F (36.3 °C)] 97.4 °F (36.3 °C)  Pulse:  [56] 56  Resp:  [20] 20  SpO2:  [96 %] 96 %  BP: (133)/(77) 133/77     Weight: 117.9 kg (260 lb)  Body mass index is 39.53 kg/m².  Body surface area is 2.38 meters squared.    No intake or output data in the 24 hours ending 08/21/17 0649    Physical Exam  Constitutional: He is oriented to person, place, and time. He appears well-developed and well-nourished. No distress.   HENT:   Head: Normocephalic and atraumatic.   Mouth/Throat: Oropharynx is clear and moist. No oropharyngeal exudate.   Eyes: Conjunctivae are normal. Pupils are equal, round, and reactive to light.   Neck: Neck supple.   Cardiovascular: Normal rate and regular rhythm.    Pulmonary/Chest: Effort normal and breath sounds normal. He has no rales.   Abdominal: Soft. Bowel sounds are normal. He  exhibits no mass. There is no splenomegaly. There is no tenderness.   Musculoskeletal: Normal range of motion. He exhibits no edema.   No spinal or rib tenderness   Lymphadenopathy:     He has no cervical adenopathy.     He has no axillary adenopathy.        Right: No inguinal adenopathy present.        Left: No inguinal adenopathy present.   Neurological: He is alert and oriented to person, place, and time.   Skin: Skin is warm and dry. No rash noted.   Psychiatric: He has a normal mood and affect. Thought content normal.       Assessment/Plan:     Active Diagnoses:    Diagnosis Date Noted POA    Multiple myeloma [C90.00] 05/18/2015 Yes      Problems Resolved During this Admission:    Diagnosis Date Noted Date Resolved POA     Pt with multiple myeloma. Okay to proceed with bone marrow bx and aspiration today without sedation.    Magdalena Miller NP  Hematology/Oncology  [facility]

## 2017-08-21 NOTE — DISCHARGE SUMMARY
[facility]  Hematology/Oncology  Discharge Summary      Patient Name: Arik Bobo  MRN: [id number]  Admission Date: 8/21/2017  Hospital Length of Stay: 0 days  Discharge Date and Time:  08/21/2017 7:26 AM  Attending Physician: Blake Buck MD   Discharging Provider: Magdalena Miller NP  Primary Care Provider: Avinash Lal MD    Procedure(s) (LRB):  BIOPSY-BONE MARROW (Left)     Hospital Course: Patient admitted to pre op today for a bone marrow aspiration and biopsy. Pt was consented for a bone marrow biopsy. Patient opted out of sedation and was given local lidocaine, and a bone marrow biopsy and aspiration was performed in the OR (see procedure note). Patient was then transferred to post op and discharged home after laying on back for 15-20 minutes.     Pending Diagnostic Studies:     Procedure Component Value Units Date/Time    Bone Marrow Prep and Stain [[id number]] Collected:  08/21/17 0619    Order Status:  Sent Lab Status:  In process Updated:  08/21/17 0620    Specimen:  Bone Marrow from Bone Marrow     Chromosome Analysis, Bone Marrow [[id number]] Collected:  08/21/17 0619    Order Status:  Sent Lab Status:  In process Updated:  08/21/17 0620    Specimen:  Bone Marrow from Bone Marrow     Iron Stain, Bone Marrow [[id number]] Collected:  08/21/17 0619    Order Status:  Sent Lab Status:  In process Updated:  08/21/17 0620    Specimen:  Bone Marrow from Bone Marrow     Leukemia/Lymphoma Screen - Bone Marrow Left Posterior Iliac Crest [[id number]] Collected:  08/21/17 0619    Order Status:  Sent Lab Status:  In process Updated:  08/21/17 0620    Specimen:  Bone Marrow         Final Active Diagnoses:    Diagnosis Date Noted POA    Multiple myeloma [C90.00] 05/18/2015 Yes      Problems Resolved During this Admission:    Diagnosis Date Noted Date Resolved POA      Discharged Condition: good    Disposition: Home or Self Care    Follow Up: with Dr. Buck in BMT clinic  today.    Patient Instructions:     Diet general     Activity as tolerated     Call MD for:  temperature >100.4     Call MD for:  severe uncontrolled pain     Call MD for:  redness, tenderness, or signs of infection (pain, swelling, redness, odor or green/yellow discharge around incision site)     Remove dressing in 24 hours       Medications:  Reconciled Home Medications:   Current Discharge Medication List      CONTINUE these medications which have NOT CHANGED    Details   amlodipine (NORVASC) 5 MG tablet Take 5 mg by mouth once daily.      apixaban (ELIQUIS) 2.5 mg Tab Take 2.5 mg by mouth 2 (two) times daily.      BYSTOLIC 10 mg Tab Refills: 2      citalopram (CELEXA) 40 MG tablet Refills: 6      enoxaparin (LOVENOX) 150 mg/mL Syrg Inject 120 mg into the skin every 12 (twelve) hours.      lisinopril (PRINIVIL,ZESTRIL) 20 MG tablet Take 40 mg by mouth once daily.       simvastatin (ZOCOR) 40 MG tablet Refills: 6      lisinopril-hydrochlorothiazide (PRINZIDE,ZESTORETIC) 20-12.5 mg per tablet Refills: 3             aMgdalena Miller NP  Hematology/Oncology  [facility]

## 2017-08-21 NOTE — PROCEDURES
PROCEDURE NOTE:  Date of Procedure: 8/21/17  Bone Marrow Biopsy and Aspiration  Indication: MM  Consent: Informed consent was obtained from patient.  Timeout: Done and documented.  Position: Prone  Site: Left posterior illiac crest.  Prep: Betadine.  Needle used: 11 gauge Jamshidi needle.  Anesthetic: 2% lidocaine 5 cc.  Biopsy: The biopsy needle was introduced into the marrow cavity and an aspirate was obtained without complications and sent for flow cytometry and cytogenetics. Core biopsy obtained without difficulty and sent for routine histologic examination.  Complications: None.  Disposition: The patient was discharged home after laying on back for 15-20 minutes.  Blood loss: Minimal.     Magdalena Miller DNP, NP  Hematology/Oncology

## 2017-08-21 NOTE — PLAN OF CARE
Problem: Patient Care Overview  Goal: Plan of Care Review  Outcome: Outcome(s) achieved Date Met: 08/21/17    Discharge instructions  given to patient and sister. Verbalized understanding. Patient stable, tolerating fluids. No complaints at this time. Patient adequate for discharge.

## 2017-08-21 NOTE — PROGRESS NOTES
Subjective:       Patient ID: Arik Bobo is a 52 y.o. male.    Chief Complaint: No chief complaint on file.    HPI  Mr. Bobo is here with his wife for follow up of IgG kappa multiple in preparation for PBSCT consolidation.  He continues to follow up with Dr. Zepeda who has been treating him primarily.  He has completed hip replacement surgery and has healed well.  He is feeling well overall without new complaints.    Myeloma History: He was noted to develop a progressive anemia in early 2015.  In retrospect, his total protein has been at the upper end of normal with hematocrit of 39 in 10/2013.  At the time of a total hip arthroplasty 11/2014 the marrow resected from his right femoral head did not show abnormalities. During that hospital stay he underwent an anemia workup showing iron 20, TIBC 234 with 12% saturation and normal ferritin at 103.  Subsequent colonoscopy showed multiple benign polyps and one at the appejndiceal lumen that resulted in appendectomy in 1/2015.    SPEP showed a monoclonal protein 2.9 g/dL with IgG elevated at 5677 and suppressed IgA and IgM, calcium 9, creatinine 0.2.  Bone survey did not find lytic lesions and kappa:lambda light chain ratio was over 100.  Albumin 3.2 (5/1) and beta-2 microglobin was elevated, though I do not know the value.  Marrow 4/14/15 at which time his hemoglobin was 9.6 with MCV 87 showed a 50% cellular marrow with 10% plasma cell infiltrated with atypical forms and a left shifted granulocyte population.   staining showed up to 50% plasma cells in some areas but kappa and lambda staining showed a mixture of cells without clear clonality.  Plasma cells were kappa restricted on flow cytometry (5.7% population).  No stainable iron was present.  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  He was tried on IV iron with some improvement but not resolution of his anemia.    Given elevated light chain  ratio over 100 he was formally diagnosed with myeloma and started on lenalidomide/dexamethasone in late 6/2015 which he continued until 12/2015 when a repeat bone marrow 12/3/15 showed a 60-70% cellular marrow with 20% kappa restricted plasma cells.  His protein levels were under good control with free kappa light chain 2.98 mg/dL and IgG kappa M-spike 1 g/dL.  Repeat marrow done 4/5/16 showed a 30-40% cellular marrow with a 17% kappa-restricted plasma cell population and no storage iron.  Hence, he was started on iron with resolution of his anemia, which had been the only feature qualifying him for the diagnosis of myeloma.  Hence, he was reclassified smoldering myeloma and has been followed since that time.    He developed two episodes of DVT in 7/2015 and 2/2017 and is now on apixaban.    Review of Systems   Constitutional: Negative for activity change, appetite change, diaphoresis, fatigue (improved), fever and unexpected weight change.   HENT: Negative for mouth sores, postnasal drip and sore throat.    Eyes: Negative for visual disturbance.   Respiratory: Negative for cough, chest tightness and shortness of breath.    Cardiovascular: Negative for chest pain and leg swelling.   Gastrointestinal: Negative for anal bleeding, blood in stool, constipation, diarrhea and rectal pain.   Genitourinary: Negative for dysuria, frequency and hematuria.   Musculoskeletal: Negative for arthralgias, back pain and neck pain.   Skin: Negative for rash.   Neurological: Negative for tremors, weakness, light-headedness and numbness.   Hematological: Negative for adenopathy.   Psychiatric/Behavioral: Positive for sleep disturbance. Negative for confusion.       Objective:      Physical Exam   Constitutional: He is oriented to person, place, and time. He appears well-developed and well-nourished. No distress.   HENT:   Head: Normocephalic and atraumatic.   Mouth/Throat: Oropharynx is clear and moist. No oropharyngeal exudate.    Eyes: Conjunctivae are normal. Pupils are equal, round, and reactive to light.   Neck: Neck supple.   Cardiovascular: Normal rate and regular rhythm.    Pulmonary/Chest: Effort normal and breath sounds normal. He has no rales.   Abdominal: Soft. Bowel sounds are normal. He exhibits no mass. There is no splenomegaly. There is no tenderness.   Musculoskeletal: Normal range of motion. He exhibits no edema.   No spinal or rib tenderness   Lymphadenopathy:     He has no cervical adenopathy.     He has no axillary adenopathy.        Right: No inguinal adenopathy present.        Left: No inguinal adenopathy present.   Neurological: He is alert and oriented to person, place, and time.   Skin: Skin is warm and dry. No rash noted.   Psychiatric: He has a normal mood and affect. Thought content normal.       Assessment:       1. Multiple myeloma, remission status unspecified        Plan:       Mr. Bobo had and abnormal plasma cell population in his marrow along with anemia without lytic bony lesions, hypercalcemia or renal disease.  Though iron resolved his previous anemia, his anemia has recurred and worsened with hemoglobin 9.7 today.  In addition, his albumin had fallen to 3.0 with total protein elevated to 8.9 and his SPEP showed a rising M-spike to 2.2 g/dL with significant rise in his kappa light chain to 33 mg/dL and ratio of 301.      Hence, he had evidence of progressive myeloma and started on CyBorD and has completed 4 cycles with improvement.       His hemoglobin is stable at 11.2 with normal creatinine and calcium.  His albumin is 3.4.  SPEP shows an M-spike of 0.41 g/dL and kappa light chain is minimally elevated at 2.3 mg/dL.  Restaging marrow this morning showed a 10-50% cellular marrow with trilineage hematopoiesis and 5% residual  kappa-restricted plasma cells (6% by morphology).  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  Hence, he has achieved a partial remission (PR1) and is ready to proceed with transplant.  I  discussed the logistics of PBSCT with the patient again today and he also met with our transplant coordinator to discuss logistics.      As he is only 52 years old, there is certainly a possibility that he will live beyond the ability of current therapies and autologous transplant to control his disease for the long term.  Hence, if we look at the longer event horizon there is also a possibility that we may need to consider an allogeneic transplant which could offer the potential for cure.  This is not an immediate consideration but since he has 4 siblings there is a 68% chance of finding a sibling match.     He also suffers from gout and is not currently on uric acid suppression medication.  This may have contributed to his hip degeneration.    All of his questions were answered in the clinic today and we plan for stem cell mobilization soon after further workup for PBSCT.

## 2017-08-21 NOTE — DISCHARGE INSTRUCTIONS
Discharge instructions for having a Bone Marrow Aspiration / Biopsy:    Keep Bandage in place for 24 hours.  - Do not shower or take a tube bath during this time (you may sponge bathe).  - Call the nurse or physician for excessive bleeding or pain at biopsy site.  - You may take Tylenol as needed for pain.    You have received medication to sedate you.  - Do not drive a car or operate heavy machinery for the rest of the day.  - You may resume other activities as tolerated.    You can call [phone] for any problems during the hours of 8:00 AM-5:00PM.    For an emergency after 5:00 PM you can call [phone] and have the  page the Hematologist / Oncologist on call.

## 2017-08-22 LAB — BONE MARROW IRON STAIN COMMENT: NORMAL

## 2017-08-28 ENCOUNTER — OFFICE VISIT (OUTPATIENT)
Dept: PSYCHIATRY | Facility: CLINIC | Age: 52
End: 2017-08-28
Payer: COMMERCIAL

## 2017-08-28 ENCOUNTER — LAB VISIT (OUTPATIENT)
Dept: LAB | Facility: HOSPITAL | Age: 52
End: 2017-08-28
Payer: COMMERCIAL

## 2017-08-28 DIAGNOSIS — Z01.818 PRE-TRANSPLANT EVALUATION FOR STEM CELL TRANSPLANT: Primary | ICD-10-CM

## 2017-08-28 DIAGNOSIS — C90.00 MULTIPLE MYELOMA, REMISSION STATUS UNSPECIFIED: ICD-10-CM

## 2017-08-28 DIAGNOSIS — Z76.82 STEM CELL TRANSPLANT CANDIDATE: ICD-10-CM

## 2017-08-28 DIAGNOSIS — D69.6 THROMBOCYTOPENIA: ICD-10-CM

## 2017-08-28 DIAGNOSIS — C90.00 MULTIPLE MYELOMA: ICD-10-CM

## 2017-08-28 DIAGNOSIS — F32.5 DEPRESSION, MAJOR, IN REMISSION: ICD-10-CM

## 2017-08-28 LAB
ABO + RH BLD: NORMAL
ABO + RH BLD: NORMAL
ALBUMIN SERPL BCP-MCNC: 3.2 G/DL
ALP SERPL-CCNC: 84 U/L
ALT SERPL W/O P-5'-P-CCNC: 15 U/L
ANION GAP SERPL CALC-SCNC: 6 MMOL/L
APTT BLDCRRT: 21 SEC
AST SERPL-CCNC: 14 U/L
B2 MICROGLOB SERPL-MCNC: 2.7 UG/ML
BASOPHILS # BLD AUTO: 0.04 K/UL
BASOPHILS NFR BLD: 0.7 %
BILIRUB SERPL-MCNC: 0.5 MG/DL
BLD GP AB SCN CELLS X3 SERPL QL: NORMAL
BUN SERPL-MCNC: 12 MG/DL
CALCIUM SERPL-MCNC: 8.3 MG/DL
CHLORIDE SERPL-SCNC: 108 MMOL/L
CO2 SERPL-SCNC: 27 MMOL/L
CREAT SERPL-MCNC: 0.9 MG/DL
CREAT SERPL-MCNC: 0.9 MG/DL
DIFFERENTIAL METHOD: ABNORMAL
EOSINOPHIL # BLD AUTO: 0.2 K/UL
EOSINOPHIL NFR BLD: 3.5 %
ERYTHROCYTE [DISTWIDTH] IN BLOOD BY AUTOMATED COUNT: 14.7 %
EST. GFR  (AFRICAN AMERICAN): >60 ML/MIN/1.73 M^2
EST. GFR  (AFRICAN AMERICAN): >60 ML/MIN/1.73 M^2
EST. GFR  (NON AFRICAN AMERICAN): >60 ML/MIN/1.73 M^2
EST. GFR  (NON AFRICAN AMERICAN): >60 ML/MIN/1.73 M^2
GLUCOSE SERPL-MCNC: 122 MG/DL
HCT VFR BLD AUTO: 34.1 %
HGB BLD-MCNC: 11.5 G/DL
IGA SERPL-MCNC: 34 MG/DL
IGG SERPL-MCNC: 728 MG/DL
IGM SERPL-MCNC: 10 MG/DL
INR PPP: 0.9
LDH SERPL L TO P-CCNC: 186 U/L
LYMPHOCYTES # BLD AUTO: 1.9 K/UL
LYMPHOCYTES NFR BLD: 32.5 %
MAGNESIUM SERPL-MCNC: 2.3 MG/DL
MCH RBC QN AUTO: 32.3 PG
MCHC RBC AUTO-ENTMCNC: 33.7 G/DL
MCV RBC AUTO: 96 FL
MONOCYTES # BLD AUTO: 0.4 K/UL
MONOCYTES NFR BLD: 6.2 %
NEUTROPHILS # BLD AUTO: 3.3 K/UL
NEUTROPHILS NFR BLD: 56.8 %
PHOSPHATE SERPL-MCNC: 2.1 MG/DL
PLATELET # BLD AUTO: 171 K/UL
PMV BLD AUTO: 8.7 FL
POTASSIUM SERPL-SCNC: 3.8 MMOL/L
PREALB SERPL-MCNC: 32 MG/DL
PROT SERPL-MCNC: 6.5 G/DL
PROTHROMBIN TIME: 10.2 SEC
RBC # BLD AUTO: 3.56 M/UL
RETICS/RBC NFR AUTO: 4.8 %
SODIUM SERPL-SCNC: 141 MMOL/L
URATE SERPL-MCNC: 7.1 MG/DL
WBC # BLD AUTO: 5.78 K/UL

## 2017-08-28 PROCEDURE — 86334 IMMUNOFIX E-PHORESIS SERUM: CPT

## 2017-08-28 PROCEDURE — 86900 BLOOD TYPING SEROLOGIC ABO: CPT | Mod: 91

## 2017-08-28 PROCEDURE — 84550 ASSAY OF BLOOD/URIC ACID: CPT

## 2017-08-28 PROCEDURE — 90791 PSYCH DIAGNOSTIC EVALUATION: CPT | Mod: S$GLB,,, | Performed by: PSYCHOLOGIST

## 2017-08-28 PROCEDURE — 86334 IMMUNOFIX E-PHORESIS SERUM: CPT | Mod: 26,,, | Performed by: PATHOLOGY

## 2017-08-28 PROCEDURE — 86592 SYPHILIS TEST NON-TREP QUAL: CPT

## 2017-08-28 PROCEDURE — 85610 PROTHROMBIN TIME: CPT

## 2017-08-28 PROCEDURE — 99999 PR PBB SHADOW E&M-EST. PATIENT-LVL II: CPT | Mod: PBBFAC,,, | Performed by: PSYCHOLOGIST

## 2017-08-28 PROCEDURE — 84165 PROTEIN E-PHORESIS SERUM: CPT | Mod: 26,,, | Performed by: PATHOLOGY

## 2017-08-28 PROCEDURE — 85730 THROMBOPLASTIN TIME PARTIAL: CPT

## 2017-08-28 PROCEDURE — 82784 ASSAY IGA/IGD/IGG/IGM EACH: CPT | Mod: 59

## 2017-08-28 PROCEDURE — 83735 ASSAY OF MAGNESIUM: CPT

## 2017-08-28 PROCEDURE — 86901 BLOOD TYPING SEROLOGIC RH(D): CPT | Mod: 91

## 2017-08-28 PROCEDURE — 86787 VARICELLA-ZOSTER ANTIBODY: CPT

## 2017-08-28 PROCEDURE — 85025 COMPLETE CBC W/AUTO DIFF WBC: CPT

## 2017-08-28 PROCEDURE — 83520 IMMUNOASSAY QUANT NOS NONAB: CPT

## 2017-08-28 PROCEDURE — 86665 EPSTEIN-BARR CAPSID VCA: CPT

## 2017-08-28 PROCEDURE — 85045 AUTOMATED RETICULOCYTE COUNT: CPT

## 2017-08-28 PROCEDURE — 36415 COLL VENOUS BLD VENIPUNCTURE: CPT

## 2017-08-28 PROCEDURE — 86900 BLOOD TYPING SEROLOGIC ABO: CPT

## 2017-08-28 PROCEDURE — 86901 BLOOD TYPING SEROLOGIC RH(D): CPT

## 2017-08-28 PROCEDURE — 86695 HERPES SIMPLEX TYPE 1 TEST: CPT

## 2017-08-28 PROCEDURE — 82955 ASSAY OF G6PD ENZYME: CPT

## 2017-08-28 PROCEDURE — 84165 PROTEIN E-PHORESIS SERUM: CPT

## 2017-08-28 PROCEDURE — 80053 COMPREHEN METABOLIC PANEL: CPT

## 2017-08-28 PROCEDURE — 83615 LACTATE (LD) (LDH) ENZYME: CPT

## 2017-08-28 PROCEDURE — 82232 ASSAY OF BETA-2 PROTEIN: CPT

## 2017-08-28 PROCEDURE — 84100 ASSAY OF PHOSPHORUS: CPT

## 2017-08-28 PROCEDURE — 84134 ASSAY OF PREALBUMIN: CPT

## 2017-08-28 NOTE — LETTER
August 28, 2017        Blake Buck MD  [address] [facility] [address]             [place] [facility]  [address] [facility] [address]  Phone: [phone]  Fax: [phone]   Patient: Arik Bobo   MR Number: [id number]   YOB: 1965   Date of Visit: 8/28/2017       Dear Dr. Buck:    Thank you for referring Arik Bobo to me for evaluation. Below are the relevant portions of my assessment and plan of care.     Arik Bobo is a  52 y.o. male referred by Dr. Buck for psychological evaluation prior to stem cell transplantation.   The patient appears absent of disabling psychopathology or disabilities which would prevent understanding and compliance with medical treatment.  There is no evidence of suicidality. His history of affective disturbance is well-managed on his current antidepressant regimen.   The patient has adequate knowledge about PBSCT, appropriate expectations for health and illness following transplantation, moderate understanding of the possible risks and complications of this treatment option (with additional teaching to be conducted later today), and a high willingness to sustain effort for lifestyle changes and health adaptations which will be required of him. He is aware of the 30 day 1 hour residence requirement.   He reports adequate compliance with recent medical treatment.   Arik Bobo has good social support from his wife, sister, and best freind. Caregivers are engaged and aware of post-PBSCT demands.  He was asked to bring all of his potential caregivers to the appointment with CLAYTON Vega LCSW to sign caregiver contracts.   The patient exhibits a high degree of social stability.   The patient has experience using coping mechanisms to deal with stress.  The patient acknowledges no stressors expected to limit his ability to cope with the demands of PBSCT and recovery.     The patient reports  ongoing efforts to abstain from tobacco use, long-term abstinence from alcohol use, no illicit drug use, and minimal caffeine consumption     Impressions:  Arik Bobo is an acceptable PBSCT candidate from a psychological perspective. The patient has reasonable expectations for the procedure, good social support, and has already begun making appropriate life plans in anticipation of the procedure. The patient has verbalized appropriate awareness and commitment to the necessary behavioral changes associated with PBSCT (although he is still working on tobacco cessation) and appears willing to adjust to long-term lifestyle challenges and medical follow-up. There are no overt psychological contraindications for proceeding with the procedure. His history of affective disturbance is well-managed on his current antidepressant regimen. His clinicians should be alert to possible mood decline if GI distress or mucositis prevent him from continuing this regimen. There are no recommendations for psychological treatment at this time. The patient is aware of resources available should his needs change in the future.    If you have questions, please do not hesitate to call me. I look forward to following Arik along with you.    Sincerely,      Venkata Dean, PhD           CC  VERONICA Padron, Margot Zarco, TANISHA
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
## 2017-08-29 ENCOUNTER — HOSPITAL ENCOUNTER (OUTPATIENT)
Dept: RADIOLOGY | Facility: HOSPITAL | Age: 52
Discharge: HOME | End: 2017-08-29
Attending: INTERNAL MEDICINE
Payer: COMMERCIAL

## 2017-08-29 ENCOUNTER — HOSPITAL ENCOUNTER (OUTPATIENT)
Dept: PULMONOLOGY | Facility: CLINIC | Age: 52
Discharge: HOME | End: 2017-08-29
Payer: COMMERCIAL

## 2017-08-29 ENCOUNTER — HOSPITAL ENCOUNTER (OUTPATIENT)
Dept: CARDIOLOGY | Facility: CLINIC | Age: 52
Discharge: HOME | End: 2017-08-29
Payer: COMMERCIAL

## 2017-08-29 ENCOUNTER — CLINICAL SUPPORT (OUTPATIENT)
Dept: HEMATOLOGY/ONCOLOGY | Facility: CLINIC | Age: 52
End: 2017-08-29
Payer: COMMERCIAL

## 2017-08-29 ENCOUNTER — CLINICAL SUPPORT (OUTPATIENT)
Dept: HEMATOLOGY/ONCOLOGY | Facility: CLINIC | Age: 52
End: 2017-08-29
Payer: MEDICARE

## 2017-08-29 ENCOUNTER — SOCIAL WORK (OUTPATIENT)
Dept: HEMATOLOGY/ONCOLOGY | Facility: CLINIC | Age: 52
End: 2017-08-29
Payer: COMMERCIAL

## 2017-08-29 VITALS — WEIGHT: 263.44 LBS | BODY MASS INDEX: 39.92 KG/M2 | HEIGHT: 68 IN

## 2017-08-29 DIAGNOSIS — Z76.82 STEM CELL TRANSPLANT CANDIDATE: ICD-10-CM

## 2017-08-29 DIAGNOSIS — C90.00 MULTIPLE MYELOMA: ICD-10-CM

## 2017-08-29 DIAGNOSIS — Z76.82 STEM CELL TRANSPLANT CANDIDATE: Primary | ICD-10-CM

## 2017-08-29 DIAGNOSIS — C90.00 MULTIPLE MYELOMA, REMISSION STATUS UNSPECIFIED: ICD-10-CM

## 2017-08-29 DIAGNOSIS — I51.7 CARDIOMEGALY: ICD-10-CM

## 2017-08-29 DIAGNOSIS — Z71.3 NUTRITIONAL COUNSELING: Primary | ICD-10-CM

## 2017-08-29 LAB
ALBUMIN SERPL ELPH-MCNC: 3.81 G/DL
ALPHA1 GLOB SERPL ELPH-MCNC: 0.33 G/DL
ALPHA2 GLOB SERPL ELPH-MCNC: 0.68 G/DL
B-GLOBULIN SERPL ELPH-MCNC: 0.63 G/DL
ESTIMATED PA SYSTOLIC PRESSURE: 39.24
GAMMA GLOB SERPL ELPH-MCNC: 0.65 G/DL
HSV1 IGG SERPL QL IA: POSITIVE
HSV2 IGG SERPL QL IA: NEGATIVE
INTERPRETATION SERPL IFE-IMP: NORMAL
KAPPA LC SER QL IA: 2.23 MG/DL
KAPPA LC/LAMBDA SER IA: 13.94
LAMBDA LC SER QL IA: 0.16 MG/DL
PATHOLOGIST INTERPRETATION IFE: NORMAL
PATHOLOGIST INTERPRETATION SPE: NORMAL
PRE FEV1 FVC: 83
PRE FEV1: 3.69
PRE FVC: 4.45
PREDICTED FEV1 FVC: 81
PREDICTED FEV1: 3.43
PREDICTED FVC: 4.21
PROT SERPL-MCNC: 6.1 G/DL
RETIRED EF AND QEF - SEE NOTES: 65 (ref 55–65)
RPR SER QL: NORMAL
TRICUSPID VALVE REGURGITATION: NORMAL

## 2017-08-29 PROCEDURE — 99212 OFFICE O/P EST SF 10 MIN: CPT | Mod: PBBFAC,25

## 2017-08-29 PROCEDURE — 93005 ELECTROCARDIOGRAM TRACING: CPT | Mod: PBBFAC | Performed by: INTERNAL MEDICINE

## 2017-08-29 PROCEDURE — 94729 DIFFUSING CAPACITY: CPT | Mod: S$GLB,,, | Performed by: INTERNAL MEDICINE

## 2017-08-29 PROCEDURE — 0399T 2D ECHO WITH COLOR FLOW DOPPLER: CPT | Mod: S$GLB,,, | Performed by: INTERNAL MEDICINE

## 2017-08-29 PROCEDURE — 77075 RADEX OSSEOUS SURVEY COMPL: CPT | Mod: TC

## 2017-08-29 PROCEDURE — 97802 MEDICAL NUTRITION INDIV IN: CPT | Mod: PBBFAC | Performed by: DIETITIAN, REGISTERED

## 2017-08-29 PROCEDURE — 71020 XR CHEST PA AND LATERAL: CPT | Mod: TC,59

## 2017-08-29 PROCEDURE — 99999 PR PBB SHADOW E&M-EST. PATIENT-LVL I: CPT | Mod: PBBFAC,,,

## 2017-08-29 PROCEDURE — 99999 PR PBB SHADOW E&M-EST. PATIENT-LVL II: CPT | Mod: PBBFAC,,,

## 2017-08-29 PROCEDURE — 77075 RADEX OSSEOUS SURVEY COMPL: CPT | Mod: 26,,, | Performed by: RADIOLOGY

## 2017-08-29 PROCEDURE — 94010 BREATHING CAPACITY TEST: CPT | Mod: PBBFAC | Performed by: INTERNAL MEDICINE

## 2017-08-29 PROCEDURE — 71020 XR CHEST PA AND LATERAL: CPT | Mod: 26,,, | Performed by: RADIOLOGY

## 2017-08-29 PROCEDURE — 93306 TTE W/DOPPLER COMPLETE: CPT | Mod: PBBFAC | Performed by: INTERNAL MEDICINE

## 2017-08-29 PROCEDURE — 94010 BREATHING CAPACITY TEST: CPT | Mod: S$GLB,,, | Performed by: INTERNAL MEDICINE

## 2017-08-29 PROCEDURE — 99211 OFF/OP EST MAY X REQ PHY/QHP: CPT | Mod: PBBFAC,25,27

## 2017-08-29 PROCEDURE — 93000 ELECTROCARDIOGRAM COMPLETE: CPT | Mod: S$GLB,,, | Performed by: INTERNAL MEDICINE

## 2017-08-29 PROCEDURE — 94729 DIFFUSING CAPACITY: CPT | Mod: PBBFAC | Performed by: INTERNAL MEDICINE

## 2017-08-29 NOTE — PROGRESS NOTES
"Referring Physician:Blake Buck MD     Reason for visit:  Chief Complaint   Patient presents with    Multiple Myeloma    Nutrition Counseling        :1965     Allergies Reviewed  Meds Reviewed    Anthropometrics  Weight:119.5 kg (263 lb 7.2 oz)  Height:5' 8" (1.727 m)  BMI:Body mass index is 40.06 kg/m².   IBW: 154#    Meds:  Outpatient Medications Prior to Visit   Medication Sig Dispense Refill    amlodipine (NORVASC) 5 MG tablet Take 5 mg by mouth once daily.      BYSTOLIC 10 mg Tab   2    citalopram (CELEXA) 40 MG tablet   6    enoxaparin (LOVENOX) 150 mg/mL Syrg Inject 120 mg into the skin every 12 (twelve) hours.      lisinopril (PRINIVIL,ZESTRIL) 20 MG tablet Take 40 mg by mouth once daily.       simvastatin (ZOCOR) 40 MG tablet   6     No facility-administered medications prior to visit.         Estimated Nutrition Needs: 1214-1117 Kcals/day( 30-34kcal/kg),  120g protein( 1.5g/kg)     Diet Hx: Pt here with wife for pre-transplant eval. Pt reports good appetite and stable weight. Pt eats 2 meals/day with snacks. Pt staying at [facility] or [facility] at discharge.      Assessment: Provided pt with handout on nutrition therapy for bone marrow transplant patients. Reviewed foods recommended and not recommended in each food group. Reviewed food safety and proper cooking temperatures. Discussed importance of adequate intake during admit for transplant. Answered questions.    Nutrition Diagnosis: Increased nutrient needs related to increased demand for nutrients as evidenced by bone marrow transplant    Recommendations: Encouraged pt to eat 6 small meals/day with a variety of fruits, vegetables, whole grains, lean meat, and dairy following BMT nutrition therapy and safety guidelines. Encouraged adequate intake to maintain weight throughout treatment.      Consultation Time:15 minutes.    Follow Up: PRN        "

## 2017-08-29 NOTE — PROGRESS NOTES
BMT Pharmacist Evaluation    Current Outpatient Prescriptions   Medication Sig    amlodipine (NORVASC) 5 MG tablet Take 5 mg by mouth once daily.    BYSTOLIC 10 mg Tab 10 mg once daily.     citalopram (CELEXA) 40 MG tablet 40 mg once daily.     lisinopril (PRINIVIL,ZESTRIL) 20 MG tablet Take 40 mg by mouth once daily.     simvastatin (ZOCOR) 40 MG tablet Take 40 mg by mouth every evening.      No current facility-administered medications for this visit.        Review of patient's allergies indicates:   Allergen Reactions    Pcn [penicillins]      Unknown last dose as an infant           CrCl =  101 ml/min (based on 24 hour urine collection from 8/28/17)    Medication Adherence: good      Medication-Related Problem(s) Identified:  None    Pharmacy: [facility] (post transplant [facility])     Post-Transplant Discharge Plan: Local stay through Day + 30 (pt and family aware)    Planned Conditioning Regimen: Melphalan        Notes:   Reviewed medication list with patient and his sister- patient was able to list medications from memory (with schedules, indications). Patient demonstrated excellent grasp of medication schedule and indications; demonstrates excellent medication adherence. We thoroughly reviewed and reconciled the medication regimen.      Reviewed planned high-dose chemotherapy regimen including schedule and possible side effects. Provided patient with written schedule of chemotherapy and drug information handouts. Reviewed possible side effects of transplant including:  infection, infusion reactions, nausea/vomiting, mucositis, loss of appetite, diarrhea, hair loss,liver and/or renal failure. Reviewed prophylactic antibiotics and  antiemetics, both prophylactic and prn; encouraged patient to report all possible side effects/new symptoms and to ask for supportive care medication if needed.  Patient verbalized understanding of plan and asked appropriate and insightful  questions.    Proposed Recommendations:   Patient demonstrates  good medication adherence and understanding of the chemotherapy/transplant plan. Inpatient PharmD will continue to follow the patient while admitted to the inpatient unit.    Lea Arevalo PharmD, [facility]  BMT Clinical   Department of Hematology and Stem Cell Transplant

## 2017-08-30 LAB
EBV EA AB TITR SER: <5 U/ML
EBV NA IGG SER QL: <3 U/ML
EBV VCA IGG SER QL: <10 U/ML
EBV VCA IGM SER-ACNC: <10 U/ML
G6PD RBC-CCNT: 14.4 U/G HGB (ref 7–20.5)
VARICELLA INTERPRETATION: POSITIVE
VARICELLA ZOSTER IGG: 2.93 ISR

## 2017-08-30 NOTE — PROGRESS NOTES
[facility]   Bone Marrow Transplant Psychosocial Assessment   Date: 2017       Demographic Information     Name: Arik Bobo    : 1965    Age: 52 y.o.    Sex: male    Race: White    Marital Status:    SS #:     Phone Number(s): [phone] (home)     Home Address: [address] [place] [address]    Mailing Address: [address] [place] [address]    Are you a [place] Citizen? Yes   If no, please explain:        Contact Information     Next of Kin: Anna Bobo   Relationship: spouse   Phone Number(s): [phone]   Emergency Contact: Extended Emergency Contact Information  Primary Emergency Contact: Anna Bobo  Address: [address] [place] [address] [facility]  Home Phone: [phone]  Relation: Spouse        Living Arrangements   Household Composition:  Patient currently resides with: Spouse and 3 children   If patient resides with spouse, please explain the marital relationship: Unable to assess, wife did not attend assessment.   Does the patient currently own his/her own home? Yes   Does the patient currently rent home/apartment: No   Are current living arrangements permanent? Yes   If no, please explain:        Children's Names     Name Sex Age   1. Young (away in college) male 19   2. Adalberto female 16   3. Chaley female 13   4. Kaitlynn female 13   5.       Who will be the primary caregiver for the children when patient is admitted to the hospital?    Name:    Phone Number:         Support System   Primary Caregiver:     Name: Anna Bobo   Relationship: spouse   Cell #: [phone]   Address: Same as the patient   Home #:    City:    Street:    ZIP:        Secondary Caregiver:     Name: Talia Dwyer   Relationship: sister   Cell #: [phone]   Address:    Home #:    City:    Street:    ZIP:      Will patient's caregiver be available full-time? Yes   What is the patient's Uatsdin? Rastafarian   Is patient  currently practicing or non-practicing? No      Does patient have any other sources for support? Yes      If yes, please explain: Family and Friends       Post BMT Plans      Does patient have full understanding of recovery from BMT? Yes   Does patient understand risk associated with BMT? Yes   What are patient's housing plans post BMT? [facility]   Does patient have a Living Will? No   Does patient have a Power of ?  No   If yes, please give name of POA:  Name:     Phone #:        Employment Information     Is patient currently employed? No   Employer: [employer] reference to record EEP    Phone #: Data Unavailable   Position: disabled   Full Time/Part Time?    YRS:        Secondary Employment Information     Employer:    Phone #:    Position:    Full Time/Part Time?    YRS:        Significant Other Employment Information     Employer: Teacher   Phone #:    Position:    Full Time/Part Time? full time   YRS:          Financial Information     Monthly Income: $1800 + wife's income (Pt. Did not disclose wife's income)   Yearly Income:    Source of Income:  salary   Do you have any financial concerns? No   If yes, please explain:         Insurance Information      Do you have health insurance?  Yes   Insurance Carrier BCBS   Policy #: [id number]   Group #: [id number]   Policy Calder[place]: spouse   Medicare: Yes   Medicare Part D: Yes   Medicaid: No   Do you have Disability Insurance? No      Do you have a Cancer Policy? No   Do you have medication/prescription coverage? Yes   Are you a ? No       Medical Information     Diagnosis: Multiple Myeloma   Date of Diagnosis: 1/2015   Is this a new diagnosis? No         If no, please explain: Pt. Previously treated by Dr. Winston in [place]   Past Medical History: Past Medical History:   Diagnosis Date    Anxiety     Depression     History of hip surgery     Hx of psychiatric care     Hyperlipidemia     Hypertension     Multiple myeloma      Psychiatric problem       Infusion Services: none   Home Health: none   Durable Medical Equipment: none   Activities of Daily Living: independent   Patient's Family Cancer History: Cancer-related family history is not on file.       Cognitive Functioning     Cognitive State: alert, oriented   Does patient have any concerns that may affect medical follow up and full understanding of treatment? No   Does patient have any concerns that may impact medication compliance? No   Education Level: technical college   Does the patient have any learning disabilities? No   If yes, please explain:    Can the patient read English? Yes   Can the patient write in English? Yes      Is the patient Literate? Yes   What is the patient's primary language? English   Does the patient need interpretation services? No        Psychosocial History     Does patient have any emotional issues? No   If, yes please explain:     Does patient have a psychiatric history? Yes   If, yes please explain:  depression   Is patient currently taking any psychiatric medication? Yes   If yes, please list medications: celeteprem (prescribed by PCP   Is patient currently in therapy or attending support groups? No   Where is the patient currently in therapy?    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 Pt. States that he is doing well. He is very ready to move forward with transplant, he states that he has waited along time to get to transplant and he is very optimistic about recovery. Pt. With very supportive family.    Is the patient open/receptive to psychosocial intervention? Yes   Has  the patient experienced any significant losses in his/her life? Yes      If yes, please explain: Mom passed away in May 2017   What are the patient's identified needs? None noted   Goals: Successful transplant and hopefully back to work    Interview Behavior: appropriate   Suitability for Transplant: yes   Additional Comments: Discussed need for 24 hr caregiver with pt.and his sister (Talia). Pt. Reports that his wife and sister will be caregivers post transplant with his best friend helping as needed. Pt. And sister express understanding.  Caregiver agreement signed.

## 2017-09-06 ENCOUNTER — TELEPHONE (OUTPATIENT)
Dept: HEMATOLOGY/ONCOLOGY | Facility: CLINIC | Age: 52
End: 2017-09-06

## 2017-09-06 NOTE — TELEPHONE ENCOUNTER
Phoned patient to discuss moving forward with Dr. portillo cell transplant per DPciarra Buck.  Patient verbalized understanding.  Discussed upcoming appt for consent signing next week.  Patient given the opportunity to ask questions.    OKSANA Zarco RN, BMTCN

## 2017-09-12 ENCOUNTER — DOCUMENTATION ONLY (OUTPATIENT)
Dept: HEMATOLOGY/ONCOLOGY | Facility: CLINIC | Age: 52
End: 2017-09-12

## 2017-09-12 ENCOUNTER — OFFICE VISIT (OUTPATIENT)
Dept: HEMATOLOGY/ONCOLOGY | Facility: CLINIC | Age: 52
End: 2017-09-12
Payer: COMMERCIAL

## 2017-09-12 VITALS
OXYGEN SATURATION: 98 % | WEIGHT: 265 LBS | SYSTOLIC BLOOD PRESSURE: 169 MMHG | HEART RATE: 51 BPM | BODY MASS INDEX: 40.16 KG/M2 | DIASTOLIC BLOOD PRESSURE: 90 MMHG | TEMPERATURE: 99 F | HEIGHT: 68 IN | RESPIRATION RATE: 16 BRPM

## 2017-09-12 DIAGNOSIS — Z76.82 STEM CELL TRANSPLANT CANDIDATE: ICD-10-CM

## 2017-09-12 DIAGNOSIS — C90.01 MULTIPLE MYELOMA IN REMISSION: Primary | ICD-10-CM

## 2017-09-12 PROCEDURE — 99213 OFFICE O/P EST LOW 20 MIN: CPT | Mod: PBBFAC | Performed by: INTERNAL MEDICINE

## 2017-09-12 PROCEDURE — 99214 OFFICE O/P EST MOD 30 MIN: CPT | Mod: S$GLB,,, | Performed by: INTERNAL MEDICINE

## 2017-09-12 PROCEDURE — 99999 PR PBB SHADOW E&M-EST. PATIENT-LVL III: CPT | Mod: PBBFAC,,, | Performed by: INTERNAL MEDICINE

## 2017-09-12 NOTE — LETTER
September 12, 2017        Avinash Lal MD  [address]  [facility]  [facility] [address]             [facility]  [address]  [facility] [address]  Phone: [phone]   Patient: Arik Bobo   MR Number: [id number]   YOB: 1965   Date of Visit: 9/12/2017       Dear Dr. Lal:    Thank you for referring Arik Bobo to me for evaluation. Below are the relevant portions of my assessment and plan of care.        1. Multiple myeloma in remission    2. Stem cell transplant candidate          Mr. Bobo had and abnormal plasma cell population in his marrow along with anemia without lytic bony lesions, hypercalcemia or renal disease.  Though iron resolved his previous anemia, his anemia has recurred and worsened evolving anemia, M-spike to 2.2 g/dL with significant rise in his kappa light chain to 33 mg/dL and ratio of 301.  Hence, he had evidence of progressive myeloma and started on CyBorD and has completed 4 cycles with PR1.       His hemoglobin is stable at 11.5 with normal creatinine and calcium.  His albumin is 3.2.  Restaging marrow this morning showed a 10-50% cellular marrow with trilineage hematopoiesis and 5% residual  kappa-restricted plasma cells (6% by morphology).  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  Hence, he has achieved a partial remission (PR1) and is ready to proceed with transplant.  I discussed the logistics of PBSCT with the patient and his wife and reviewed all the risks of stem cell collection and transplant.  He has no major organ system issues that increase his risk and I estimate an under 3% of major morbidity or mortality.  He signed all documents pertaining to his transplant today.    As he is only 52 years old, there is certainly a possibility that he will live beyond the ability of current therapies and autologous transplant to control his disease for the long term.  Hence, if we look at the longer event horizon there is  also a possibility that we may need to consider an allogeneic transplant which could offer the potential for cure.  This is not an immediate consideration but since he has 4 siblings there is a 68% chance of finding a sibling match.     All of his questions were answered in the clinic today and we plan for stem cell mobilization soon.     If you have questions, please do not hesitate to call me. I look forward to following Arik along with you.    Sincerely,      Blake Buck MD           CC  No Recipients

## 2017-09-12 NOTE — PROGRESS NOTES
Arranged for stay at [facility] from 9/19-9/22/17. Conf # [id number]. Pt. Aware

## 2017-09-12 NOTE — PROGRESS NOTES
Subjective:       Patient ID: Arik Bobo is a 52 y.o. male.    Chief Complaint: Multiple myeloma, remission status unspecified    HPI  KPS: 90%  Mr. Bobo is here with his wife to sign consent for PBSCT to treat IgG kappa multiple in remission. Dr. Zepeda who has been treating him primarily.  He is feeling well overall without new complaints.    Restaging studies from 8/28/17 include SPEP with IgG kappa M-spike of 0.4 g/dL, kappa light chain level 2.2 mg/dL with ration 14 and marrow done 8/21/17 showing a 10-50% cellular marrow with about 5%  staining plasma cells (6% by morphology).  This is down from 17% as of 4/2016.  Hence, he has achieved a OH (PR1) and is considered low risk by CIBMTR criteria.    Myeloma History: He was noted to develop a progressive anemia in early 2015.  In retrospect, his total protein has been at the upper end of normal with hematocrit of 39 in 10/2013.  At the time of a total hip arthroplasty 11/2014 the marrow resected from his right femoral head did not show abnormalities. During that hospital stay he underwent an anemia workup showing iron 20, TIBC 234 with 12% saturation and normal ferritin at 103.  Subsequent colonoscopy showed multiple benign polyps and one at the appejndiceal lumen that resulted in appendectomy in 1/2015.    SPEP showed a monoclonal protein 2.9 g/dL with IgG elevated at 5677 and suppressed IgA and IgM, calcium 9, creatinine 0.2.  Bone survey did not find lytic lesions and kappa:lambda light chain ratio was over 100.  Albumin 3.2 (5/1) and beta-2 microglobin was elevated, though I do not know the value.  Marrow 4/14/15 at which time his hemoglobin was 9.6 with MCV 87 showed a 50% cellular marrow with 10% plasma cell infiltrated with atypical forms and a left shifted granulocyte population.   staining showed up to 50% plasma cells in some areas but kappa and lambda staining showed a mixture of cells without clear clonality.  Plasma cells were  kappa restricted on flow cytometry (5.7% population).  No stainable iron was present.  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  He was tried on IV iron with some improvement but not resolution of his anemia.    Given elevated light chain ratio over 100 he was formally diagnosed with myeloma and started on lenalidomide/dexamethasone in late 6/2015 which he continued until 12/2015 when a repeat bone marrow 12/3/15 showed a 60-70% cellular marrow with 20% kappa restricted plasma cells.  His protein levels were under good control with free kappa light chain 2.98 mg/dL and IgG kappa M-spike 1 g/dL.  Repeat marrow done 4/5/16 showed a 30-40% cellular marrow with a 17% kappa-restricted plasma cell population and no storage iron.  Hence, he was started on iron with resolution of his anemia, which had been the only feature qualifying him for the diagnosis of myeloma.  Hence, he was reclassified smoldering myeloma and has been followed since that time.    He developed two episodes of DVT in 7/2015 and 2/2017 and is now on apixaban.    Review of Systems   Constitutional: Negative for activity change, appetite change, diaphoresis, fatigue (improved), fever and unexpected weight change.   HENT: Negative for mouth sores, postnasal drip and sore throat.    Eyes: Negative for visual disturbance.   Respiratory: Negative for cough, chest tightness and shortness of breath.    Cardiovascular: Negative for chest pain and leg swelling.   Gastrointestinal: Negative for anal bleeding, blood in stool, constipation, diarrhea and rectal pain.   Genitourinary: Negative for dysuria, frequency and hematuria.   Musculoskeletal: Negative for arthralgias, back pain and neck pain.   Skin: Negative for rash.   Neurological: Negative for tremors, weakness, light-headedness and numbness.   Hematological: Negative for adenopathy.   Psychiatric/Behavioral: Positive for sleep disturbance. Negative  for confusion.       Objective:      Physical Exam   Constitutional: He is oriented to person, place, and time. He appears well-developed and well-nourished. No distress.   HENT:   Head: Normocephalic and atraumatic.   Mouth/Throat: Oropharynx is clear and moist. No oropharyngeal exudate.   Eyes: Conjunctivae are normal. Pupils are equal, round, and reactive to light.   Neck: Neck supple.   Cardiovascular: Normal rate and regular rhythm.    Pulmonary/Chest: Effort normal and breath sounds normal. He has no rales.   Abdominal: Soft. Bowel sounds are normal. He exhibits no mass. There is no splenomegaly. There is no tenderness.   Musculoskeletal: Normal range of motion. He exhibits no edema.   No spinal or rib tenderness   Lymphadenopathy:     He has no cervical adenopathy.     He has no axillary adenopathy.        Right: No inguinal adenopathy present.        Left: No inguinal adenopathy present.   Neurological: He is alert and oriented to person, place, and time.   Skin: Skin is warm and dry. No rash noted.   Psychiatric: He has a normal mood and affect. Thought content normal.       Assessment:       1. Multiple myeloma in remission    2. Stem cell transplant candidate        Plan:       Mr. Bobo had and abnormal plasma cell population in his marrow along with anemia without lytic bony lesions, hypercalcemia or renal disease.  Though iron resolved his previous anemia, his anemia has recurred and worsened evolving anemia, M-spike to 2.2 g/dL with significant rise in his kappa light chain to 33 mg/dL and ratio of 301.  Hence, he had evidence of progressive myeloma and started on CyBorD and has completed 4 cycles with PR1.       His hemoglobin is stable at 11.5 with normal creatinine and calcium.  His albumin is 3.2.  Restaging marrow this morning showed a 10-50% cellular marrow with trilineage hematopoiesis and 5% residual  kappa-restricted plasma cells (6% by morphology).  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  Hence, he  has achieved a partial remission (PR1) and is ready to proceed with transplant.  I discussed the logistics of PBSCT with the patient and his wife and reviewed all the risks of stem cell collection and transplant.  He has no major organ system issues that increase his risk and I estimate an under 3% of major morbidity or mortality.  He signed all documents pertaining to his transplant today.    As he is only 52 years old, there is certainly a possibility that he will live beyond the ability of current therapies and autologous transplant to control his disease for the long term.  Hence, if we look at the longer event horizon there is also a possibility that we may need to consider an allogeneic transplant which could offer the potential for cure.  This is not an immediate consideration but since he has 4 siblings there is a 68% chance of finding a sibling match.     All of his questions were answered in the clinic today and we plan for stem cell mobilization soon.

## 2017-09-17 ENCOUNTER — INFUSION (OUTPATIENT)
Dept: INFUSION THERAPY | Facility: HOSPITAL | Age: 52
End: 2017-09-17
Attending: INTERNAL MEDICINE
Payer: COMMERCIAL

## 2017-09-17 VITALS
TEMPERATURE: 99 F | DIASTOLIC BLOOD PRESSURE: 80 MMHG | SYSTOLIC BLOOD PRESSURE: 152 MMHG | HEART RATE: 52 BPM | RESPIRATION RATE: 17 BRPM

## 2017-09-17 DIAGNOSIS — C90.00 MULTIPLE MYELOMA, REMISSION STATUS UNSPECIFIED: ICD-10-CM

## 2017-09-17 DIAGNOSIS — Z76.82 STEM CELL TRANSPLANT CANDIDATE: Primary | ICD-10-CM

## 2017-09-17 PROCEDURE — 63600175 PHARM REV CODE 636 W HCPCS: Performed by: NURSE PRACTITIONER

## 2017-09-17 PROCEDURE — 96372 THER/PROPH/DIAG INJ SC/IM: CPT

## 2017-09-17 RX ADMIN — FILGRASTIM 1260 MCG: 480 INJECTION, SOLUTION INTRAVENOUS; SUBCUTANEOUS at 07:09

## 2017-09-17 NOTE — PLAN OF CARE
Problem: Patient Care Overview  Goal: Plan of Care Review  Outcome: Ongoing (interventions implemented as appropriate)  Pt here to receive neupogen injections 1260mcg sq, 2 injections to left abdomen, 1 injection to right abdomen, pt tolerated without issue, no distress noted upon d/c to home

## 2017-09-18 ENCOUNTER — INFUSION (OUTPATIENT)
Dept: INFUSION THERAPY | Facility: HOSPITAL | Age: 52
End: 2017-09-18
Attending: INTERNAL MEDICINE
Payer: COMMERCIAL

## 2017-09-18 DIAGNOSIS — Z76.82 STEM CELL TRANSPLANT CANDIDATE: Primary | ICD-10-CM

## 2017-09-18 DIAGNOSIS — C90.00 MULTIPLE MYELOMA, REMISSION STATUS UNSPECIFIED: ICD-10-CM

## 2017-09-18 PROCEDURE — 96372 THER/PROPH/DIAG INJ SC/IM: CPT

## 2017-09-18 PROCEDURE — 63600175 PHARM REV CODE 636 W HCPCS: Performed by: NURSE PRACTITIONER

## 2017-09-18 RX ADMIN — FILGRASTIM 1260 MCG: 480 INJECTION, SOLUTION INTRAVENOUS; SUBCUTANEOUS at 08:09

## 2017-09-19 ENCOUNTER — INFUSION (OUTPATIENT)
Dept: INFUSION THERAPY | Facility: HOSPITAL | Age: 52
End: 2017-09-19
Attending: INTERNAL MEDICINE
Payer: COMMERCIAL

## 2017-09-19 DIAGNOSIS — Z76.82 STEM CELL TRANSPLANT CANDIDATE: Primary | ICD-10-CM

## 2017-09-19 DIAGNOSIS — C90.00 MULTIPLE MYELOMA, REMISSION STATUS UNSPECIFIED: ICD-10-CM

## 2017-09-19 PROCEDURE — 63600175 PHARM REV CODE 636 W HCPCS: Performed by: NURSE PRACTITIONER

## 2017-09-19 PROCEDURE — 96372 THER/PROPH/DIAG INJ SC/IM: CPT

## 2017-09-19 RX ADMIN — FILGRASTIM 1260 MCG: 480 INJECTION, SOLUTION INTRAVENOUS; SUBCUTANEOUS at 07:09

## 2017-09-19 NOTE — NURSING
Patient here for neupogen injections-given in 2 divided doses-states went to ER last night for pain in sternum-was concerned for heart attack-ekg done showing no problem-attributed to neupogen injections-tolerated injections well.

## 2017-09-20 ENCOUNTER — TELEPHONE (OUTPATIENT)
Dept: HEMATOLOGY/ONCOLOGY | Facility: CLINIC | Age: 52
End: 2017-09-20

## 2017-09-20 ENCOUNTER — HOSPITAL ENCOUNTER (OUTPATIENT)
Facility: HOSPITAL | Age: 52
Discharge: HOME | End: 2017-09-20
Attending: INTERNAL MEDICINE | Admitting: INTERNAL MEDICINE
Payer: COMMERCIAL

## 2017-09-20 ENCOUNTER — INFUSION (OUTPATIENT)
Dept: INFUSION THERAPY | Facility: HOSPITAL | Age: 52
End: 2017-09-20
Attending: INTERNAL MEDICINE
Payer: COMMERCIAL

## 2017-09-20 VITALS
RESPIRATION RATE: 18 BRPM | OXYGEN SATURATION: 94 % | BODY MASS INDEX: 39.4 KG/M2 | DIASTOLIC BLOOD PRESSURE: 73 MMHG | SYSTOLIC BLOOD PRESSURE: 131 MMHG | TEMPERATURE: 98 F | WEIGHT: 260 LBS | HEIGHT: 68 IN | HEART RATE: 68 BPM

## 2017-09-20 DIAGNOSIS — Z76.82 STEM CELL TRANSPLANT CANDIDATE: Primary | ICD-10-CM

## 2017-09-20 DIAGNOSIS — C90.00 MULTIPLE MYELOMA, REMISSION STATUS UNSPECIFIED: ICD-10-CM

## 2017-09-20 DIAGNOSIS — C90.01 MULTIPLE MYELOMA IN REMISSION: Primary | ICD-10-CM

## 2017-09-20 DIAGNOSIS — C90.00 MULTIPLE MYELOMA: ICD-10-CM

## 2017-09-20 PROCEDURE — 96372 THER/PROPH/DIAG INJ SC/IM: CPT

## 2017-09-20 PROCEDURE — 63600175 PHARM REV CODE 636 W HCPCS: Performed by: NURSE PRACTITIONER

## 2017-09-20 PROCEDURE — 63600175 PHARM REV CODE 636 W HCPCS

## 2017-09-20 PROCEDURE — 76937 US GUIDE VASCULAR ACCESS: CPT | Mod: 26,,, | Performed by: INTERNAL MEDICINE

## 2017-09-20 PROCEDURE — 76937 US GUIDE VASCULAR ACCESS: CPT

## 2017-09-20 PROCEDURE — 77001 FLUOROGUIDE FOR VEIN DEVICE: CPT | Mod: 26,,, | Performed by: INTERNAL MEDICINE

## 2017-09-20 PROCEDURE — 36558 INSERT TUNNELED CV CATH: CPT | Mod: ,,, | Performed by: INTERNAL MEDICINE

## 2017-09-20 PROCEDURE — 25000003 PHARM REV CODE 250

## 2017-09-20 PROCEDURE — 77001 FLUOROGUIDE FOR VEIN DEVICE: CPT

## 2017-09-20 RX ADMIN — FILGRASTIM 1260 MCG: 480 INJECTION, SOLUTION INTRAVENOUS; SUBCUTANEOUS at 07:09

## 2017-09-20 NOTE — H&P
Nephrology H&P      Consult Requested By: Babar Schmid MD  Reason for Consult: permacath    SUBJECTIVE:     History of Present Illness:  Patient is a 52 y.o. male presents for permacath placement for the treatment of multiple myeloma. Patient is doing well and has no complaints.     PTA Medications   Medication Sig    amlodipine (NORVASC) 5 MG tablet Take 5 mg by mouth once daily.    BYSTOLIC 10 mg Tab 10 mg once daily.     citalopram (CELEXA) 40 MG tablet 40 mg once daily.     lisinopril (PRINIVIL,ZESTRIL) 20 MG tablet Take 40 mg by mouth once daily.     simvastatin (ZOCOR) 40 MG tablet Take 40 mg by mouth every evening.        Review of patient's allergies indicates:   Allergen Reactions    Pcn [penicillins]      Unknown last dose as an infant        Past Medical History:   Diagnosis Date    Anxiety     Arthritis     Depression     History of hip surgery     Hx of psychiatric care     Hyperlipidemia     Hypertension     Multiple myeloma     Psychiatric problem      Past Surgical History:   Procedure Laterality Date    HEMORRHOID SURGERY      TOTAL HIP ARTHROPLASTY       Family History   Problem Relation Age of Onset    Alcohol abuse Father     Anxiety disorder Sister     Depression Brother      Social History   Substance Use Topics    Smoking status: Never Smoker    Smokeless tobacco: Current User     Types: Chew    Alcohol use No      Comment: former excessive use; 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   Eyes: Negative for discharge.   Respiratory: Negative for cough, shortness of breath and wheezing.   Cardiovascular: Negative for chest pain and palpitations.   Gastrointestinal: Negative for nausea, vomiting, abdominal pain and diarrhea.   Genitourinary: Negative for dysuria, urgency, frequency and hematuria.   Skin: Negative for color change and rash.   Psychiatric/Behavioral: Negative for confusion.      OBJECTIVE:     Vital Signs (Most  Recent)  Temp: 98 °F (36.7 °C) (09/20/17 0920)  Pulse: 68 (09/20/17 0920)  Resp: 18 (09/20/17 0920)  BP: 131/73 (09/20/17 0921)  SpO2: (!) 94 % (09/20/17 0920)       No intake or output data in the 24 hours ending 09/20/17 0948    Physical Exam:  Gen: AAOx3, NAD  HEENT: mmm  Neck: no bruit, no JVD  CV: RRR, no m/r  Resp: CTAx2, normal effort  GI: soft, ND, NTTP, +BS  Extr: no LE edema  Neuro: normal reflexes, no focal deficits    Laboratory:  CBC with Diff:     Recent Labs  Lab 09/20/17 0730   WBC 23.32*   HGB 12.0*   HCT 36.1*      LYMPH 9.0*  CANCELED   MONO 2.0*  CANCELED   EOSINOPHIL 0.0     COAG:    Recent Labs  Lab 09/20/17 0730   APTT 21.6   INR 1.0       CMP:   Recent Labs  Lab 09/20/17 0730   GLU 92   CALCIUM 9.1   ALBUMIN 3.6   PROT 6.5      K 4.0   CO2 24      BUN 8   CREATININE 0.9   ALKPHOS 156*   ALT 17   AST 18   BILITOT 0.6   MG 1.8   PHOS 3.3         Lab Results   Component Value Date    LABPROT 10.4 09/20/2017     Lab Results   Component Value Date    CALCIUM 9.1 09/20/2017    PHOS 3.3 09/20/2017     Lab Results   Component Value Date    FERRITIN 165 08/02/2016         Diagnostic Results:  Labs: Reviewed      Scheduled Meds:  Continuous Infusions:          ASSESSMENT/PLAN:     Patient Active Problem List   Diagnosis    Multiple myeloma    Stem cell transplant candidate       Plan:     Will place tunneled HD catheter for the treatment of multiple myeloma.

## 2017-09-20 NOTE — PROCEDURES
Procedure Date: 9/20/17    (s) and Role:   Babar Schmid MD    Indication: Cell collection    Pre-op Diagnosis:    Multiple Myeloma     Post-op Diagnosis;  Multiple Myeloma    Procedure: Insertion Tunneled HD Catheter with Fluoro     Anesthesia: IV Sedation + Local     Findings/Key Components:   Catheter placed in RA. Good flush and draw through each port.  Estimated Blood Loss: 5mL     Specimens     None         PROCEDURE: After obtaining consent, the patient was taken to the ALEXA suite. Sedation was administered. The right neck and chest were prepped and draped in usual sterile fashion. We began using ultrasound guidance to examine the right internal jugular vein. It appeared to be widely patent and was free of thrombus that appeared suitable for access for HD catheter. We accessed the internal jugular vein using a Seldinger technique with an micropunture needle without difficulty, then the thin wire was passed into the superior vena cava through the heart into the inferior vena cava. The wire position was confirmed with intraoperative fluoroscopy, then a 5Fr sheath was introduced over the wire. The wire was removed and the the guidewire was passed into the IVC under fluoroscopic guidance. Counterincision was made at the venotomy and on the chest wall just below the clavicle. Local anesthesia was used to anesthetize the track and a tunneler was used to pass the 23cm GlidePath catheter underneath the chest wall until the felt cuff was just underneath the counter incision. The 5fr sheath was removed and the vein was dilated using serial dilators. Then the large peel-away sheath was then inserted over the guidewire under fluoroscopic guidance. The dilator and wire were removed. The catheter was placed through the peel-away sheath and positioned appropriately at center of RA. Fluoroscopy was used to confirm that the catheter tip was in appropriate position and that there was no kinking at the apex of the  catheter. A permanent recording of the catheter position was also taken with fluoroscopy. Both lumens had excellent draw and flush. The catheter was then secured in place with 3-0 Prolene. The counterincision in the neck was closed with a 3-0 Vicryl. There were no immediate complications. Patient tolerated the procedure well and discharged in stable condition.     Anesthesia:  Conscious sedation was achieved with 50 mcg of Fentanyl and 2 mg of Midazolam (Versed). Local anesthesia was achieved with 20 ml of Lidocaine 2%. Moderate conscious sedation was performed and cardiorespiratory functions were monitored the entire procedure by me and Pete Lewis RN. Sedation began at 1021am and concluded at 1051am, totaling 30 minutes.

## 2017-09-20 NOTE — NURSING
Patient here for neupogen injections in 2 divided doses-complains of severe bone pain-tolerated injections well.

## 2017-09-20 NOTE — PLAN OF CARE
Received report from TANISHA Chapin. Patient s/p permcath, AAOx3. VSS, no c/o pain or discomfort at this time, resp even and unlabored. Gauze/tegaderm dressing to r ij is CDI. No active bleeding. No hematoma noted. Post procedure protocol reviewed with patient and patient's family. Understanding verbalized. Family members at bedside. Nurse call bell within reach. Will continue to monitor per post procedure protocol.

## 2017-09-20 NOTE — DISCHARGE SUMMARY
[facility]  Discharge Note  Short Stay    Admit Date: 9/20/2017    Discharge Date and Time: 9/20/17    Attending Physician: Babar Schmid MD     Discharge Provider: Babar Schmid    Diagnoses:  Active Hospital Problems    Diagnosis  POA    Multiple myeloma [C90.00]  Yes      Resolved Hospital Problems    Diagnosis Date Resolved POA   No resolved problems to display.       Discharged Condition: stable    Hospital Course: Patient was admitted for an outpatient procedure and tolerated the procedure well with no complications.    Final Diagnoses: Same as principal problem.    Disposition: Home or Self Care    Follow up/Patient Instructions:  Instruction given. F/u with H/O.   Medications:  Reconciled Home Medications:   Current Discharge Medication List      CONTINUE these medications which have NOT CHANGED    Details   amlodipine (NORVASC) 5 MG tablet Take 5 mg by mouth once daily.      BYSTOLIC 10 mg Tab 10 mg once daily.   Refills: 2      citalopram (CELEXA) 40 MG tablet 40 mg once daily.   Refills: 6      lisinopril (PRINIVIL,ZESTRIL) 20 MG tablet Take 40 mg by mouth once daily.       simvastatin (ZOCOR) 40 MG tablet Take 40 mg by mouth every evening.   Refills: 6           No discharge procedures on file.      Discharge Procedure Orders: Resume previous diet and activity.

## 2017-09-21 ENCOUNTER — INFUSION (OUTPATIENT)
Dept: INFUSION THERAPY | Facility: HOSPITAL | Age: 52
End: 2017-09-21
Attending: INTERNAL MEDICINE
Payer: COMMERCIAL

## 2017-09-21 ENCOUNTER — DOCUMENTATION ONLY (OUTPATIENT)
Dept: HEMATOLOGY/ONCOLOGY | Facility: CLINIC | Age: 52
End: 2017-09-21

## 2017-09-21 ENCOUNTER — HOSPITAL ENCOUNTER (OUTPATIENT)
Dept: TRANSFUSION MEDICINE | Facility: HOSPITAL | Age: 52
Discharge: HOME | End: 2017-09-21
Attending: INTERNAL MEDICINE
Payer: COMMERCIAL

## 2017-09-21 VITALS
HEART RATE: 70 BPM | HEIGHT: 68 IN | SYSTOLIC BLOOD PRESSURE: 168 MMHG | BODY MASS INDEX: 39.86 KG/M2 | DIASTOLIC BLOOD PRESSURE: 97 MMHG | WEIGHT: 263 LBS | TEMPERATURE: 99 F

## 2017-09-21 DIAGNOSIS — C90.00 MULTIPLE MYELOMA, REMISSION STATUS UNSPECIFIED: ICD-10-CM

## 2017-09-21 DIAGNOSIS — Z76.82 STEM CELL TRANSPLANT CANDIDATE: Primary | ICD-10-CM

## 2017-09-21 DIAGNOSIS — Z76.82 STEM CELL TRANSPLANT CANDIDATE: ICD-10-CM

## 2017-09-21 DIAGNOSIS — C90.00 MULTIPLE MYELOMA: Primary | ICD-10-CM

## 2017-09-21 LAB
ALBUMIN SERPL BCP-MCNC: 3 G/DL
ALBUMIN SERPL BCP-MCNC: 3.1 G/DL
ALBUMIN SERPL BCP-MCNC: 3.3 G/DL
ALP SERPL-CCNC: 181 U/L
ALP SERPL-CCNC: 182 U/L
ALP SERPL-CCNC: 188 U/L
ALT SERPL W/O P-5'-P-CCNC: 13 U/L
ALT SERPL W/O P-5'-P-CCNC: 14 U/L
ALT SERPL W/O P-5'-P-CCNC: 16 U/L
ANION GAP SERPL CALC-SCNC: 10 MMOL/L
ANION GAP SERPL CALC-SCNC: 10 MMOL/L
ANION GAP SERPL CALC-SCNC: 7 MMOL/L
ANISOCYTOSIS BLD QL SMEAR: SLIGHT
AST SERPL-CCNC: 17 U/L
AST SERPL-CCNC: 17 U/L
AST SERPL-CCNC: 19 U/L
BASOPHILS # BLD AUTO: ABNORMAL K/UL
BASOPHILS # BLD AUTO: ABNORMAL K/UL
BASOPHILS NFR BLD: 0 %
BASOPHILS NFR BLD: 0 %
BASOPHILS NFR BLD: 1 %
BILIRUB SERPL-MCNC: 0.5 MG/DL
BILIRUB SERPL-MCNC: 0.5 MG/DL
BILIRUB SERPL-MCNC: 0.6 MG/DL
BUN SERPL-MCNC: 10 MG/DL
BUN SERPL-MCNC: 6 MG/DL
BUN SERPL-MCNC: 8 MG/DL
CA-I BLDV-SCNC: 1.03 MMOL/L
CA-I BLDV-SCNC: 1.05 MMOL/L
CA-I BLDV-SCNC: 1.17 MMOL/L
CALCIUM SERPL-MCNC: 8.4 MG/DL
CALCIUM SERPL-MCNC: 8.7 MG/DL
CALCIUM SERPL-MCNC: 8.8 MG/DL
CHLORIDE SERPL-SCNC: 106 MMOL/L
CHLORIDE SERPL-SCNC: 107 MMOL/L
CHLORIDE SERPL-SCNC: 107 MMOL/L
CO2 SERPL-SCNC: 27 MMOL/L
CO2 SERPL-SCNC: 28 MMOL/L
CO2 SERPL-SCNC: 30 MMOL/L
CREAT SERPL-MCNC: 0.8 MG/DL
CREAT SERPL-MCNC: 0.8 MG/DL
CREAT SERPL-MCNC: 0.9 MG/DL
DIFFERENTIAL METHOD: ABNORMAL
DOHLE BOD BLD QL SMEAR: PRESENT
EOSINOPHIL # BLD AUTO: ABNORMAL K/UL
EOSINOPHIL # BLD AUTO: ABNORMAL K/UL
EOSINOPHIL NFR BLD: 0 %
EOSINOPHIL NFR BLD: 0 %
EOSINOPHIL NFR BLD: 1 %
ERYTHROCYTE [DISTWIDTH] IN BLOOD BY AUTOMATED COUNT: 13.5 %
ERYTHROCYTE [DISTWIDTH] IN BLOOD BY AUTOMATED COUNT: 13.6 %
ERYTHROCYTE [DISTWIDTH] IN BLOOD BY AUTOMATED COUNT: 13.7 %
EST. GFR  (AFRICAN AMERICAN): >60 ML/MIN/1.73 M^2
EST. GFR  (NON AFRICAN AMERICAN): >60 ML/MIN/1.73 M^2
GLUCOSE SERPL-MCNC: 102 MG/DL
GLUCOSE SERPL-MCNC: 122 MG/DL
GLUCOSE SERPL-MCNC: 82 MG/DL
HCT VFR BLD AUTO: 31 %
HCT VFR BLD AUTO: 32.3 %
HCT VFR BLD AUTO: 34.8 %
HGB BLD-MCNC: 10.5 G/DL
HGB BLD-MCNC: 11.1 G/DL
HGB BLD-MCNC: 11.3 G/DL
HYPOCHROMIA BLD QL SMEAR: ABNORMAL
LYMPHOCYTES # BLD AUTO: ABNORMAL K/UL
LYMPHOCYTES # BLD AUTO: ABNORMAL K/UL
LYMPHOCYTES NFR BLD: 5 %
LYMPHOCYTES NFR BLD: 6 %
LYMPHOCYTES NFR BLD: 9 %
MAGNESIUM SERPL-MCNC: 1.5 MG/DL
MAGNESIUM SERPL-MCNC: 1.6 MG/DL
MAGNESIUM SERPL-MCNC: 1.7 MG/DL
MCH RBC QN AUTO: 30.3 PG
MCH RBC QN AUTO: 30.7 PG
MCH RBC QN AUTO: 31 PG
MCHC RBC AUTO-ENTMCNC: 32.5 G/DL
MCHC RBC AUTO-ENTMCNC: 33.9 G/DL
MCHC RBC AUTO-ENTMCNC: 34.4 G/DL
MCV RBC AUTO: 90 FL
MCV RBC AUTO: 91 FL
MCV RBC AUTO: 93 FL
METAMYELOCYTES NFR BLD MANUAL: 1 %
METAMYELOCYTES NFR BLD MANUAL: 1 %
METAMYELOCYTES NFR BLD MANUAL: 2 %
MONOCYTES # BLD AUTO: ABNORMAL K/UL
MONOCYTES # BLD AUTO: ABNORMAL K/UL
MONOCYTES NFR BLD: 1 %
MONOCYTES NFR BLD: 3 %
MONOCYTES NFR BLD: 4 %
MYELOCYTES NFR BLD MANUAL: 2 %
MYELOCYTES NFR BLD MANUAL: 2 %
NEUTROPHILS NFR BLD: 78 %
NEUTROPHILS NFR BLD: 80 %
NEUTROPHILS NFR BLD: 84 %
NEUTS BAND NFR BLD MANUAL: 11 %
NEUTS BAND NFR BLD MANUAL: 2 %
NEUTS BAND NFR BLD MANUAL: 7 %
OVALOCYTES BLD QL SMEAR: ABNORMAL
PHOSPHATE SERPL-MCNC: 2.4 MG/DL
PHOSPHATE SERPL-MCNC: 2.4 MG/DL
PHOSPHATE SERPL-MCNC: 2.6 MG/DL
PLATELET # BLD AUTO: 133 K/UL
PLATELET # BLD AUTO: 175 K/UL
PLATELET # BLD AUTO: 98 K/UL
PLATELET BLD QL SMEAR: ABNORMAL
PLATELET BLD QL SMEAR: ABNORMAL
PMV BLD AUTO: 9 FL
PMV BLD AUTO: 9.3 FL
PMV BLD AUTO: 9.9 FL
POIKILOCYTOSIS BLD QL SMEAR: SLIGHT
POIKILOCYTOSIS BLD QL SMEAR: SLIGHT
POLYCHROMASIA BLD QL SMEAR: ABNORMAL
POTASSIUM SERPL-SCNC: 3.2 MMOL/L
POTASSIUM SERPL-SCNC: 3.3 MMOL/L
POTASSIUM SERPL-SCNC: 3.3 MMOL/L
PROT SERPL-MCNC: 5.4 G/DL
PROT SERPL-MCNC: 5.4 G/DL
PROT SERPL-MCNC: 6.1 G/DL
RBC # BLD AUTO: 3.42 M/UL
RBC # BLD AUTO: 3.58 M/UL
RBC # BLD AUTO: 3.73 M/UL
SODIUM SERPL-SCNC: 141 MMOL/L
SODIUM SERPL-SCNC: 145 MMOL/L
SODIUM SERPL-SCNC: 146 MMOL/L
TOXIC GRANULES BLD QL SMEAR: PRESENT
WBC # BLD AUTO: 17.69 K/UL
WBC # BLD AUTO: 20.97 K/UL
WBC # BLD AUTO: 21.2 K/UL

## 2017-09-21 PROCEDURE — 85027 COMPLETE CBC AUTOMATED: CPT | Mod: 91

## 2017-09-21 PROCEDURE — 96372 THER/PROPH/DIAG INJ SC/IM: CPT

## 2017-09-21 PROCEDURE — 38214 VOLUME DEPLETE OF HARVEST: CPT

## 2017-09-21 PROCEDURE — 38206 HARVEST AUTO STEM CELLS: CPT | Mod: ,,, | Performed by: PATHOLOGY

## 2017-09-21 PROCEDURE — 96372 THER/PROPH/DIAG INJ SC/IM: CPT | Mod: 59

## 2017-09-21 PROCEDURE — 25000003 PHARM REV CODE 250: Performed by: NURSE PRACTITIONER

## 2017-09-21 PROCEDURE — 80053 COMPREHEN METABOLIC PANEL: CPT | Mod: 91

## 2017-09-21 PROCEDURE — 63600175 PHARM REV CODE 636 W HCPCS: Performed by: NURSE PRACTITIONER

## 2017-09-21 PROCEDURE — 85007 BL SMEAR W/DIFF WBC COUNT: CPT

## 2017-09-21 PROCEDURE — 84100 ASSAY OF PHOSPHORUS: CPT | Mod: 91

## 2017-09-21 PROCEDURE — 63600175 PHARM REV CODE 636 W HCPCS: Mod: JW | Performed by: NURSE PRACTITIONER

## 2017-09-21 PROCEDURE — 63600175 PHARM REV CODE 636 W HCPCS: Performed by: PATHOLOGY

## 2017-09-21 PROCEDURE — 25000003 PHARM REV CODE 250: Performed by: PATHOLOGY

## 2017-09-21 PROCEDURE — 83735 ASSAY OF MAGNESIUM: CPT | Mod: 91

## 2017-09-21 PROCEDURE — 82330 ASSAY OF CALCIUM: CPT | Mod: 91

## 2017-09-21 PROCEDURE — 38206 HARVEST AUTO STEM CELLS: CPT

## 2017-09-21 PROCEDURE — 25000003 PHARM REV CODE 250: Performed by: INTERNAL MEDICINE

## 2017-09-21 PROCEDURE — 36415 COLL VENOUS BLD VENIPUNCTURE: CPT

## 2017-09-21 PROCEDURE — 38207 CRYOPRESERVE STEM CELLS: CPT

## 2017-09-21 RX ORDER — POTASSIUM CHLORIDE 20 MEQ/1
20 TABLET, EXTENDED RELEASE ORAL ONCE
Status: COMPLETED | OUTPATIENT
Start: 2017-09-21 | End: 2017-09-21

## 2017-09-21 RX ORDER — PLERIXAFOR 24 MG/1.2ML
0.24 SOLUTION SUBCUTANEOUS ONCE AS NEEDED
Status: CANCELLED | OUTPATIENT
Start: 2017-09-21 | End: 2017-09-21

## 2017-09-21 RX ORDER — HEPARIN SODIUM 1000 [USP'U]/ML
1000 INJECTION, SOLUTION INTRAVENOUS; SUBCUTANEOUS ONCE
Status: DISCONTINUED | OUTPATIENT
Start: 2017-09-21 | End: 2017-09-22 | Stop reason: HOSPADM

## 2017-09-21 RX ORDER — LANOLIN ALCOHOL/MO/W.PET/CERES
800 CREAM (GRAM) TOPICAL
Status: COMPLETED | OUTPATIENT
Start: 2017-09-21 | End: 2017-09-21

## 2017-09-21 RX ORDER — POTASSIUM CHLORIDE 20 MEQ/1
40 TABLET, EXTENDED RELEASE ORAL ONCE
Status: COMPLETED | OUTPATIENT
Start: 2017-09-21 | End: 2017-09-21

## 2017-09-21 RX ORDER — PLERIXAFOR 24 MG/1.2ML
0.24 SOLUTION SUBCUTANEOUS ONCE AS NEEDED
Status: COMPLETED | OUTPATIENT
Start: 2017-09-21 | End: 2017-09-21

## 2017-09-21 RX ORDER — SODIUM,POTASSIUM PHOSPHATES 280-250MG
2 POWDER IN PACKET (EA) ORAL ONCE
Status: COMPLETED | OUTPATIENT
Start: 2017-09-21 | End: 2017-09-21

## 2017-09-21 RX ORDER — SODIUM,POTASSIUM PHOSPHATES 280-250MG
1 POWDER IN PACKET (EA) ORAL ONCE
Status: COMPLETED | OUTPATIENT
Start: 2017-09-21 | End: 2017-09-21

## 2017-09-21 RX ORDER — LANOLIN ALCOHOL/MO/W.PET/CERES
400 CREAM (GRAM) TOPICAL
Status: COMPLETED | OUTPATIENT
Start: 2017-09-21 | End: 2017-09-21

## 2017-09-21 RX ADMIN — MAGNESIUM OXIDE TAB 400 MG (241.3 MG ELEMENTAL MG) 400 MG: 400 (241.3 MG) TAB at 06:09

## 2017-09-21 RX ADMIN — MAGNESIUM OXIDE TAB 400 MG (241.3 MG ELEMENTAL MG) 800 MG: 400 (241.3 MG) TAB at 10:09

## 2017-09-21 RX ADMIN — FILGRASTIM 1260 MCG: 480 INJECTION, SOLUTION INTRAVENOUS; SUBCUTANEOUS at 08:09

## 2017-09-21 RX ADMIN — POTASSIUM & SODIUM PHOSPHATES POWDER PACK 280-160-250 MG 1 PACKET: 280-160-250 PACK at 10:09

## 2017-09-21 RX ADMIN — PLERIXAFOR 28 MG: 24 SOLUTION SUBCUTANEOUS at 05:09

## 2017-09-21 RX ADMIN — POTASSIUM CHLORIDE 40 MEQ: 1500 TABLET, EXTENDED RELEASE ORAL at 01:09

## 2017-09-21 RX ADMIN — POTASSIUM CHLORIDE 20 MEQ: 1500 TABLET, EXTENDED RELEASE ORAL at 06:09

## 2017-09-21 RX ADMIN — MAGNESIUM OXIDE TAB 400 MG (241.3 MG ELEMENTAL MG) 800 MG: 400 (241.3 MG) TAB at 01:09

## 2017-09-21 RX ADMIN — CALCIUM GLUCONATE 4000 MG: 94 INJECTION, SOLUTION INTRAVENOUS at 09:09

## 2017-09-21 RX ADMIN — POTASSIUM & SODIUM PHOSPHATES POWDER PACK 280-160-250 MG 2 PACKET: 280-160-250 PACK at 01:09

## 2017-09-21 RX ADMIN — POTASSIUM CHLORIDE 40 MEQ: 1500 TABLET, EXTENDED RELEASE ORAL at 10:09

## 2017-09-21 NOTE — NURSING
Patient last labs reviewed by Dr. Buck, mag ox 400 mg and kcl 20 meq given po. Ok to be discharged. Plan to rtc for collection at 0800.

## 2017-09-21 NOTE — PROGRESS NOTES
Went to see patient on 5th floor today during stem cell collection.  CD34 count is 2.08 x 10^6/kg. Discussed with the patient that we will collect until 5pm today and collect again tomorrow per Dr. Johnson.  BMT team notified.  Patient verbalizes understanding and was given the opportunity to ask questions.  OKSANA Zarco RN, BMTCN

## 2017-09-21 NOTE — PROGRESS NOTES
Pt ambulatory per self to Apheresis for autologous stem cell collection, day 1.  Voiced no complaints.  A&O x4.  Timeout performed per 2 Apheresis nurses.  Apheresis RN accessed R perm cath without difficulty.  Cath patent. Dressing CDI.  HPC collection  started at 0830.  Pt stated R perm cath insertion site more tender than actual pain, 1 on pain scale.  Meds 4 g Ca Glu IVPB per Apheresis RN.  HPC collection stopped at 1715.  Cath flushed and locked per Apheresis RN.  Pt tolerated well.  Next collection 09/22/2017.  Pt discharged per Chemo RN..

## 2017-09-22 ENCOUNTER — HOSPITAL ENCOUNTER (OUTPATIENT)
Dept: TRANSFUSION MEDICINE | Facility: HOSPITAL | Age: 52
Discharge: HOME | End: 2017-09-22
Attending: INTERNAL MEDICINE
Payer: COMMERCIAL

## 2017-09-22 ENCOUNTER — INFUSION (OUTPATIENT)
Dept: INFUSION THERAPY | Facility: HOSPITAL | Age: 52
End: 2017-09-22
Attending: INTERNAL MEDICINE
Payer: COMMERCIAL

## 2017-09-22 VITALS
HEIGHT: 68 IN | SYSTOLIC BLOOD PRESSURE: 131 MMHG | BODY MASS INDEX: 39.86 KG/M2 | HEART RATE: 58 BPM | DIASTOLIC BLOOD PRESSURE: 70 MMHG | RESPIRATION RATE: 18 BRPM | TEMPERATURE: 99 F | WEIGHT: 263 LBS | OXYGEN SATURATION: 95 %

## 2017-09-22 VITALS
WEIGHT: 263 LBS | SYSTOLIC BLOOD PRESSURE: 147 MMHG | BODY MASS INDEX: 39.86 KG/M2 | DIASTOLIC BLOOD PRESSURE: 81 MMHG | TEMPERATURE: 99 F | HEIGHT: 68 IN | HEART RATE: 62 BPM

## 2017-09-22 DIAGNOSIS — C90.01 MULTIPLE MYELOMA IN REMISSION: ICD-10-CM

## 2017-09-22 DIAGNOSIS — C90.00 MULTIPLE MYELOMA, REMISSION STATUS UNSPECIFIED: ICD-10-CM

## 2017-09-22 DIAGNOSIS — C90.00 MULTIPLE MYELOMA: Primary | ICD-10-CM

## 2017-09-22 DIAGNOSIS — Z76.82 STEM CELL TRANSPLANT CANDIDATE: Primary | ICD-10-CM

## 2017-09-22 DIAGNOSIS — Z76.82 STEM CELL TRANSPLANT CANDIDATE: ICD-10-CM

## 2017-09-22 LAB
ALBUMIN SERPL BCP-MCNC: 2.7 G/DL
ALBUMIN SERPL BCP-MCNC: 3 G/DL
ALBUMIN SERPL BCP-MCNC: 3.5 G/DL
ALP SERPL-CCNC: 190 U/L
ALP SERPL-CCNC: 202 U/L
ALP SERPL-CCNC: 238 U/L
ALT SERPL W/O P-5'-P-CCNC: 11 U/L
ALT SERPL W/O P-5'-P-CCNC: 14 U/L
ALT SERPL W/O P-5'-P-CCNC: 15 U/L
ANION GAP SERPL CALC-SCNC: 8 MMOL/L
ANION GAP SERPL CALC-SCNC: 9 MMOL/L
ANION GAP SERPL CALC-SCNC: 9 MMOL/L
ANISOCYTOSIS BLD QL SMEAR: SLIGHT
AST SERPL-CCNC: 16 U/L
AST SERPL-CCNC: 17 U/L
AST SERPL-CCNC: 20 U/L
BASOPHILS # BLD AUTO: ABNORMAL K/UL
BASOPHILS NFR BLD: 0 %
BASOPHILS NFR BLD: 0 %
BASOPHILS NFR BLD: 1 %
BILIRUB SERPL-MCNC: 0.4 MG/DL
BILIRUB SERPL-MCNC: 0.4 MG/DL
BILIRUB SERPL-MCNC: 0.5 MG/DL
BUN SERPL-MCNC: 6 MG/DL
BUN SERPL-MCNC: 6 MG/DL
BUN SERPL-MCNC: 8 MG/DL
CA-I BLDV-SCNC: 1.08 MMOL/L
CA-I BLDV-SCNC: 1.11 MMOL/L
CA-I BLDV-SCNC: 1.19 MMOL/L
CALCIUM SERPL-MCNC: 8.6 MG/DL
CALCIUM SERPL-MCNC: 8.8 MG/DL
CALCIUM SERPL-MCNC: 9 MG/DL
CHLORIDE SERPL-SCNC: 105 MMOL/L
CHLORIDE SERPL-SCNC: 105 MMOL/L
CHLORIDE SERPL-SCNC: 106 MMOL/L
CO2 SERPL-SCNC: 27 MMOL/L
CO2 SERPL-SCNC: 30 MMOL/L
CO2 SERPL-SCNC: 31 MMOL/L
CREAT SERPL-MCNC: 0.8 MG/DL
CREAT SERPL-MCNC: 0.8 MG/DL
CREAT SERPL-MCNC: 0.9 MG/DL
DIFFERENTIAL METHOD: ABNORMAL
EOSINOPHIL # BLD AUTO: ABNORMAL K/UL
EOSINOPHIL NFR BLD: 0 %
EOSINOPHIL NFR BLD: 2 %
EOSINOPHIL NFR BLD: 3 %
ERYTHROCYTE [DISTWIDTH] IN BLOOD BY AUTOMATED COUNT: 13.6 %
ERYTHROCYTE [DISTWIDTH] IN BLOOD BY AUTOMATED COUNT: 13.6 %
ERYTHROCYTE [DISTWIDTH] IN BLOOD BY AUTOMATED COUNT: 13.7 %
EST. GFR  (AFRICAN AMERICAN): >60 ML/MIN/1.73 M^2
EST. GFR  (NON AFRICAN AMERICAN): >60 ML/MIN/1.73 M^2
GLUCOSE SERPL-MCNC: 104 MG/DL
GLUCOSE SERPL-MCNC: 131 MG/DL
GLUCOSE SERPL-MCNC: 93 MG/DL
HCT VFR BLD AUTO: 30.8 %
HCT VFR BLD AUTO: 31.5 %
HCT VFR BLD AUTO: 34.7 %
HGB BLD-MCNC: 10.4 G/DL
HGB BLD-MCNC: 10.4 G/DL
HGB BLD-MCNC: 11.4 G/DL
LYMPHOCYTES # BLD AUTO: ABNORMAL K/UL
LYMPHOCYTES NFR BLD: 2 %
LYMPHOCYTES NFR BLD: 6 %
LYMPHOCYTES NFR BLD: 8 %
MAGNESIUM SERPL-MCNC: 1.5 MG/DL
MAGNESIUM SERPL-MCNC: 1.7 MG/DL
MAGNESIUM SERPL-MCNC: 1.8 MG/DL
MCH RBC QN AUTO: 30.4 PG
MCH RBC QN AUTO: 30.5 PG
MCH RBC QN AUTO: 30.7 PG
MCHC RBC AUTO-ENTMCNC: 32.9 G/DL
MCHC RBC AUTO-ENTMCNC: 33 G/DL
MCHC RBC AUTO-ENTMCNC: 33.8 G/DL
MCV RBC AUTO: 90 FL
MCV RBC AUTO: 93 FL
MCV RBC AUTO: 93 FL
METAMYELOCYTES NFR BLD MANUAL: 1 %
METAMYELOCYTES NFR BLD MANUAL: 1 %
MONOCYTES # BLD AUTO: ABNORMAL K/UL
MONOCYTES NFR BLD: 2 %
MONOCYTES NFR BLD: 4 %
MONOCYTES NFR BLD: 6 %
MYELOCYTES NFR BLD MANUAL: 1 %
NEUTROPHILS NFR BLD: 72 %
NEUTROPHILS NFR BLD: 80 %
NEUTROPHILS NFR BLD: 83 %
NEUTS BAND NFR BLD MANUAL: 10 %
NEUTS BAND NFR BLD MANUAL: 9 %
NEUTS BAND NFR BLD MANUAL: 9 %
OVALOCYTES BLD QL SMEAR: ABNORMAL
OVALOCYTES BLD QL SMEAR: ABNORMAL
PHOSPHATE SERPL-MCNC: 2.5 MG/DL
PHOSPHATE SERPL-MCNC: 2.8 MG/DL
PHOSPHATE SERPL-MCNC: 3.5 MG/DL
PLATELET # BLD AUTO: 115 K/UL
PLATELET # BLD AUTO: 70 K/UL
PLATELET # BLD AUTO: 73 K/UL
PLATELET BLD QL SMEAR: ABNORMAL
PMV BLD AUTO: 8.9 FL
PMV BLD AUTO: 9 FL
PMV BLD AUTO: 9.1 FL
POIKILOCYTOSIS BLD QL SMEAR: SLIGHT
POIKILOCYTOSIS BLD QL SMEAR: SLIGHT
POLYCHROMASIA BLD QL SMEAR: ABNORMAL
POLYCHROMASIA BLD QL SMEAR: ABNORMAL
POTASSIUM SERPL-SCNC: 3.1 MMOL/L
POTASSIUM SERPL-SCNC: 3.1 MMOL/L
POTASSIUM SERPL-SCNC: 3.6 MMOL/L
PROT SERPL-MCNC: 5.1 G/DL
PROT SERPL-MCNC: 5.3 G/DL
PROT SERPL-MCNC: 6.2 G/DL
RBC # BLD AUTO: 3.39 M/UL
RBC # BLD AUTO: 3.42 M/UL
RBC # BLD AUTO: 3.74 M/UL
SODIUM SERPL-SCNC: 142 MMOL/L
SODIUM SERPL-SCNC: 144 MMOL/L
SODIUM SERPL-SCNC: 144 MMOL/L
WBC # BLD AUTO: 27.7 K/UL
WBC # BLD AUTO: 27.91 K/UL
WBC # BLD AUTO: 33.45 K/UL

## 2017-09-22 PROCEDURE — 84100 ASSAY OF PHOSPHORUS: CPT

## 2017-09-22 PROCEDURE — 25000003 PHARM REV CODE 250: Performed by: NURSE PRACTITIONER

## 2017-09-22 PROCEDURE — 96372 THER/PROPH/DIAG INJ SC/IM: CPT

## 2017-09-22 PROCEDURE — 25000003 PHARM REV CODE 250: Performed by: PATHOLOGY

## 2017-09-22 PROCEDURE — 25000003 PHARM REV CODE 250: Performed by: INTERNAL MEDICINE

## 2017-09-22 PROCEDURE — 82330 ASSAY OF CALCIUM: CPT | Mod: 91

## 2017-09-22 PROCEDURE — 83735 ASSAY OF MAGNESIUM: CPT | Mod: 91

## 2017-09-22 PROCEDURE — 80053 COMPREHEN METABOLIC PANEL: CPT | Mod: 91

## 2017-09-22 PROCEDURE — 85007 BL SMEAR W/DIFF WBC COUNT: CPT | Mod: 91

## 2017-09-22 PROCEDURE — 38206 HARVEST AUTO STEM CELLS: CPT | Mod: ICN,,, | Performed by: PATHOLOGY

## 2017-09-22 PROCEDURE — 63600175 PHARM REV CODE 636 W HCPCS: Performed by: NURSE PRACTITIONER

## 2017-09-22 PROCEDURE — 85027 COMPLETE CBC AUTOMATED: CPT | Mod: 91

## 2017-09-22 PROCEDURE — 36415 COLL VENOUS BLD VENIPUNCTURE: CPT

## 2017-09-22 PROCEDURE — 38207 CRYOPRESERVE STEM CELLS: CPT

## 2017-09-22 PROCEDURE — 63600175 PHARM REV CODE 636 W HCPCS: Performed by: PATHOLOGY

## 2017-09-22 PROCEDURE — 38214 VOLUME DEPLETE OF HARVEST: CPT

## 2017-09-22 RX ORDER — HEPARIN SODIUM 1000 [USP'U]/ML
3600 INJECTION, SOLUTION INTRAVENOUS; SUBCUTANEOUS ONCE
Status: COMPLETED | OUTPATIENT
Start: 2017-09-22 | End: 2017-09-22

## 2017-09-22 RX ORDER — LANOLIN ALCOHOL/MO/W.PET/CERES
400 CREAM (GRAM) TOPICAL
Status: COMPLETED | OUTPATIENT
Start: 2017-09-22 | End: 2017-09-22

## 2017-09-22 RX ORDER — POTASSIUM CHLORIDE 20 MEQ/1
60 TABLET, EXTENDED RELEASE ORAL ONCE
Status: COMPLETED | OUTPATIENT
Start: 2017-09-22 | End: 2017-09-22

## 2017-09-22 RX ADMIN — POTASSIUM CHLORIDE 60 MEQ: 1500 TABLET, EXTENDED RELEASE ORAL at 01:09

## 2017-09-22 RX ADMIN — HEPARIN SODIUM 3600 UNITS: 1000 INJECTION, SOLUTION INTRAVENOUS; SUBCUTANEOUS at 02:09

## 2017-09-22 RX ADMIN — FILGRASTIM 1260 MCG: 480 INJECTION, SOLUTION INTRAVENOUS; SUBCUTANEOUS at 08:09

## 2017-09-22 RX ADMIN — POTASSIUM CHLORIDE 60 MEQ: 1500 TABLET, EXTENDED RELEASE ORAL at 04:09

## 2017-09-22 RX ADMIN — MAGNESIUM OXIDE TAB 400 MG (241.3 MG ELEMENTAL MG) 400 MG: 400 (241.3 MG) TAB at 05:09

## 2017-09-22 RX ADMIN — CALCIUM GLUCONATE 4000 MG: 98 INJECTION, SOLUTION INTRAVENOUS at 09:09

## 2017-09-22 NOTE — PROCEDURES
[facility]  Transfusion Medicine  Procedure Note    SUMMARY   Procedures  Date of Procedure: 9/21/17    Procedure: Hematopoietic Progenitor Cell Collection    Provider: Rosina Johnson MD     Assisting Provider: None    Pre-Procedure Diagnosis: Multiple Myeloma  Post-Procedure Diagnosis: Multiple Myeloma  Follow-up Assessment: This is day 1 of autologous stem cell collection in preparation for a stem cell transplant. We collected 3.61 million CD34 cells per kg of his body weight over approximately 8.75 hours (including break times). Based on the mid run count of approximately 2 million/kg, we scheduled a second collection day (for 9/22), since we were not going to be able to reach the goal of 5-10 million in one day.    Pertinent Laboratory Data:   Complete Blood Count:   Lab Results   Component Value Date    HGB 10.4 (L) 09/22/2017    HCT 31.5 (L) 09/22/2017    PLT 70 (L) 09/22/2017    WBC 27.70 (H) 09/22/2017     Comprehensive Metabolic Panel:   Lab Results   Component Value Date     09/22/2017    K 3.1 (L) 09/22/2017     09/22/2017    CO2 31 (H) 09/22/2017     (H) 09/22/2017    BUN 6 09/22/2017    CREATININE 0.8 09/22/2017    CALCIUM 9.0 09/22/2017    PROT 5.3 (L) 09/22/2017    ALBUMIN 3.0 (L) 09/22/2017    BILITOT 0.4 09/22/2017    ALKPHOS 202 (H) 09/22/2017    AST 17 09/22/2017    ALT 14 09/22/2017    ANIONGAP 8 09/22/2017    EGFRNONAA >60.0 09/22/2017     CD34 - quantitative:   Lab Results   Component Value Date    CD34 0.11 09/20/2017    HR80JIWPFWJQ 23.38 09/20/2017    RA26CZWRSOJC 95.7 09/20/2017       Pertinent Medications: None contraindicated for collection. He was pretreated with GCSF    Review of patient's allergies indicates:   Allergen Reactions    Pcn [penicillins]      Unknown last dose as an infant       Anesthesia: None     Technical Procedures Used: Hematopoietic Progenitor Cell collection: The patient presented to the 5th floor Chemotherapy unit,  details about the procedure reviewed. Any interim clinical changes from previous clinic visit - No. All pertinent labs reviewed. Human Progenitor (Stem) Cell Collection initiated by Apheresis Nurse. Current plan is to perform the procedure for approximately 8.75 hours. Initial laboratory tests collected, results to Oncology Nurse. Mid-run laboratory tests collected, results to Oncology Nurse. Included CD34 count on collection bag - results to Stem Cell Lab and BMT clinical team. Final laboratory tests collected, results to Oncology Nurse. Red blood cell or platelet transfusion - No.  Date of next procedure 9/22/17.    Description of the Findings of the Procedure:     Please see Apheresis Nurse flowsheet for details.    The patient was evaluated and all clinical and laboratory data relevant to the treatment was reviewed, and a decision was made to proceed with the Apheresis procedure.    I was available to the clinical staff throughout the procedure.    Significant Surgical Tasks Conducted by the Assistant(s): Not applicable    Complications: None    Estimated Blood Loss (EBL): None    Implants: None     Specimens: None

## 2017-09-22 NOTE — PROCEDURES
[facility]  Transfusion Medicine  Procedure Note    SUMMARY   Stem Cell Collection Autologous  Date/Time: 9/22/2017 2:18 PM  Performed by: EUNICE NEVES  Authorized by: EUNICE NEVES       Date of Procedure: 9/22/2017     Procedure: Hematopoietic Progenitor Cell Collection    Provider: Eunice Neves MD     Pre-Procedure Diagnosis: Multiple myeloma  Post-Procedure Diagnosis: Multiple myeloma    Follow-up Assessment:  52 y.o. year old male with Multiple myeloma presents for hematopoietic stem cell collection following G-CSF and plerixafor (Mozobil) mobilization. A mid run count of 4.85 x 10^6 CD34 cells/kg guided the collection to end at 2pm, which suggests we have exceeded the collection goal of 5 x 10^6 CD34 cells/kg. A grand total of 3.61 x 106^ CD34 cells/kg were collected yesterday. Final counts to follow tomorrow. No additional collection days are planned.    Today's procedure was well tolerated and without complication.    FINAL COUNTS FOR TODAY: TNC - 5.21 x 10^6/kg & CD34 - 5.46 x 10^6/kg    Total collected counts are: TNC - 12.88 x 10^8/kg & CD34 - 9.07 x 10^6/kg    Pertinent Laboratory Data:   Complete Blood Count:   Lab Results   Component Value Date    HGB 10.4 (L) 09/22/2017    HCT 30.8 (L) 09/22/2017    PLT 73 (L) 09/22/2017    WBC 27.91 (H) 09/22/2017     CD34 - quantitative:   Lab Results   Component Value Date    CD34 0.11 09/20/2017    ED42TAJVTHNZ 23.38 09/20/2017    IQ44IYANAWEG 95.7 09/20/2017     Pertinent Medications: ACE inhibitor was taken this morning but patient was closely monitored throughout procedure    Review of patient's allergies indicates:   Allergen Reactions    Pcn [penicillins]      Unknown last dose as an infant     Anesthesia: None     Technical Procedures Used: Hematopoietic Progenitor Cell collection: The patient presented to the 5th floor Chemotherapy unit, details about the procedure reviewed. Any interim clinical changes from  previous clinic visit - No. All pertinent labs reviewed. Human Progenitor (Stem) Cell Collection initiated by Apheresis Nurse. Current plan is to perform the procedure for 6 hours. Initial laboratory tests collected, results to Oncology Nurse. Mid-run laboratory tests collected, results to Oncology Nurse. Included CD34 count on collection bag - results to Stem Cell Lab and BMT clinical team. Final laboratory tests collected, results to Oncology Nurse. Red blood cell or platelet transfusion - No.  Date of next procedure N/A.    Description of the Findings of the Procedure:   Please see Apheresis Nurse flowsheet for details.    The patient was evaluated and all clinical and laboratory data relevant to the treatment was reviewed, and a decision was made to proceed with the Apheresis procedure.    I was available to the clinical staff throughout the procedure.    Significant Surgical Tasks Conducted by the Assistant(s): Not applicable  Complications: None  Estimated Blood Loss (EBL): None  Implants: None   Specimens: None    Eunice Pisano MD, PhD  Section of Transfusion Medicine & Histocompatibility  Department of Pathology and Laboratory Medicine  [facility]  [phone] (HLA & Blood Bank Offices)  09/22/2017

## 2017-09-22 NOTE — NURSING
1600: afternoon labs resulted, SALIMA Barajas RN notified of plts 70, K+ 3.1, Ca+ 8.6, Mg+ 1.5, Phos 2.5, awaiting for addtl orders prior to pt's D/C home

## 2017-09-22 NOTE — NURSING
0937: lab results from AM lab collect: H/H 11.4/34.7, plts 115, K+ 3.6, Mg+ 1.8, Phos 3.5, Ca+ 8.8 all WNL

## 2017-09-22 NOTE — PROGRESS NOTES
Pt arrived ambulatory to Apheresis for day 2 autologous stem cell collection, per self.   Voiced no complaints.  Timeout performed by 2 Apheresis nurses.  R perm cath accessed per Apheresis RN without difficulty.  HPC collection started at 0830.   Pt oriented x4.  R perm cath patent, dressing CDI.  Pt stated no  pain.  0 on pain scale.  Meds 4g Ca Glu IVPB per Apheresis RN.  Tx ended at 1354.  Cath flushed and locked per Apheresis RN.  Pt tolerated tx well. Target goal achieved per Dr Pisano. Pt awaiting in collection room for post labs to be resulted, and to be discharged per Apheresis RN.

## 2017-09-25 ENCOUNTER — HOSPITAL ENCOUNTER (INPATIENT)
Facility: HOSPITAL | Age: 52
LOS: 15 days | Discharge: HOME | DRG: 016 | End: 2017-10-10
Attending: INTERNAL MEDICINE | Admitting: INTERNAL MEDICINE
Payer: COMMERCIAL

## 2017-09-25 ENCOUNTER — OFFICE VISIT (OUTPATIENT)
Dept: HEMATOLOGY/ONCOLOGY | Facility: CLINIC | Age: 52
DRG: 016 | End: 2017-09-25
Payer: COMMERCIAL

## 2017-09-25 ENCOUNTER — INFUSION (OUTPATIENT)
Dept: INFUSION THERAPY | Facility: HOSPITAL | Age: 52
End: 2017-09-25
Attending: INTERNAL MEDICINE
Payer: COMMERCIAL

## 2017-09-25 VITALS
DIASTOLIC BLOOD PRESSURE: 79 MMHG | WEIGHT: 260.13 LBS | HEART RATE: 69 BPM | BODY MASS INDEX: 39.55 KG/M2 | TEMPERATURE: 100 F | SYSTOLIC BLOOD PRESSURE: 135 MMHG

## 2017-09-25 DIAGNOSIS — C90.00 MULTIPLE MYELOMA: Primary | ICD-10-CM

## 2017-09-25 DIAGNOSIS — Z76.82 STEM CELL TRANSPLANT CANDIDATE: ICD-10-CM

## 2017-09-25 DIAGNOSIS — C90.00 MULTIPLE MYELOMA: ICD-10-CM

## 2017-09-25 DIAGNOSIS — I10 HYPERTENSION, ESSENTIAL: ICD-10-CM

## 2017-09-25 DIAGNOSIS — Z87.39 HISTORY OF GOUT: ICD-10-CM

## 2017-09-25 DIAGNOSIS — C90.01 MULTIPLE MYELOMA IN REMISSION: Primary | ICD-10-CM

## 2017-09-25 DIAGNOSIS — N17.9 AKI (ACUTE KIDNEY INJURY): ICD-10-CM

## 2017-09-25 DIAGNOSIS — E78.5 HYPERLIPIDEMIA, UNSPECIFIED HYPERLIPIDEMIA TYPE: ICD-10-CM

## 2017-09-25 DIAGNOSIS — F32.A DEPRESSION, UNSPECIFIED DEPRESSION TYPE: ICD-10-CM

## 2017-09-25 DIAGNOSIS — Z94.84 H/O AUTOLOGOUS STEM CELL TRANSPLANT: ICD-10-CM

## 2017-09-25 PROBLEM — D75.9 CYTOPENIA: Status: ACTIVE | Noted: 2017-09-25

## 2017-09-25 LAB
ALBUMIN SERPL BCP-MCNC: 3.3 G/DL
ALP SERPL-CCNC: 178 U/L
ALT SERPL W/O P-5'-P-CCNC: 19 U/L
ANION GAP SERPL CALC-SCNC: 8 MMOL/L
AST SERPL-CCNC: 19 U/L
BASOPHILS # BLD AUTO: 0.01 K/UL
BASOPHILS NFR BLD: 0.2 %
BILIRUB SERPL-MCNC: 0.4 MG/DL
BUN SERPL-MCNC: 14 MG/DL
CALCIUM SERPL-MCNC: 8.8 MG/DL
CHLORIDE SERPL-SCNC: 108 MMOL/L
CO2 SERPL-SCNC: 24 MMOL/L
CREAT SERPL-MCNC: 0.9 MG/DL
DIFFERENTIAL METHOD: ABNORMAL
EOSINOPHIL # BLD AUTO: 0.1 K/UL
EOSINOPHIL NFR BLD: 1.9 %
ERYTHROCYTE [DISTWIDTH] IN BLOOD BY AUTOMATED COUNT: 13.7 %
EST. GFR  (AFRICAN AMERICAN): >60 ML/MIN/1.73 M^2
EST. GFR  (NON AFRICAN AMERICAN): >60 ML/MIN/1.73 M^2
GLUCOSE SERPL-MCNC: 126 MG/DL
HCT VFR BLD AUTO: 34 %
HGB BLD-MCNC: 11.2 G/DL
LYMPHOCYTES # BLD AUTO: 0.5 K/UL
LYMPHOCYTES NFR BLD: 9.2 %
MCH RBC QN AUTO: 30.2 PG
MCHC RBC AUTO-ENTMCNC: 32.9 G/DL
MCV RBC AUTO: 92 FL
MONOCYTES # BLD AUTO: 0.5 K/UL
MONOCYTES NFR BLD: 8 %
NEUTROPHILS # BLD AUTO: 4.6 K/UL
NEUTROPHILS NFR BLD: 79 %
PLATELET # BLD AUTO: 93 K/UL
PMV BLD AUTO: 9.9 FL
POTASSIUM SERPL-SCNC: 3.9 MMOL/L
PROT SERPL-MCNC: 6.2 G/DL
RBC # BLD AUTO: 3.71 M/UL
SODIUM SERPL-SCNC: 140 MMOL/L
WBC # BLD AUTO: 5.77 K/UL

## 2017-09-25 PROCEDURE — 80053 COMPREHEN METABOLIC PANEL: CPT

## 2017-09-25 PROCEDURE — 25000003 PHARM REV CODE 250: Performed by: INTERNAL MEDICINE

## 2017-09-25 PROCEDURE — 99223 1ST HOSP IP/OBS HIGH 75: CPT | Mod: ICN,,, | Performed by: INTERNAL MEDICINE

## 2017-09-25 PROCEDURE — 20600001 HC STEP DOWN PRIVATE ROOM

## 2017-09-25 PROCEDURE — A9155 ARTIFICIAL SALIVA: HCPCS | Performed by: NURSE PRACTITIONER

## 2017-09-25 PROCEDURE — 36592 COLLECT BLOOD FROM PICC: CPT

## 2017-09-25 PROCEDURE — 63600175 PHARM REV CODE 636 W HCPCS: Performed by: INTERNAL MEDICINE

## 2017-09-25 PROCEDURE — 94799 UNLISTED PULMONARY SVC/PX: CPT

## 2017-09-25 PROCEDURE — 3008F BODY MASS INDEX DOCD: CPT | Mod: S$GLB,ICN,, | Performed by: INTERNAL MEDICINE

## 2017-09-25 PROCEDURE — A4216 STERILE WATER/SALINE, 10 ML: HCPCS | Performed by: INTERNAL MEDICINE

## 2017-09-25 PROCEDURE — 99213 OFFICE O/P EST LOW 20 MIN: CPT | Mod: PBBFAC,25 | Performed by: INTERNAL MEDICINE

## 2017-09-25 PROCEDURE — 85025 COMPLETE CBC W/AUTO DIFF WBC: CPT

## 2017-09-25 PROCEDURE — 99900035 HC TECH TIME PER 15 MIN (STAT)

## 2017-09-25 PROCEDURE — 63600175 PHARM REV CODE 636 W HCPCS: Performed by: NURSE PRACTITIONER

## 2017-09-25 PROCEDURE — 99999 PR PBB SHADOW E&M-EST. PATIENT-LVL III: CPT | Mod: PBBFAC,,, | Performed by: INTERNAL MEDICINE

## 2017-09-25 RX ORDER — HEPARIN SODIUM 1000 [USP'U]/ML
1600 INJECTION, SOLUTION INTRAVENOUS; SUBCUTANEOUS
Status: CANCELLED | OUTPATIENT
Start: 2017-09-25

## 2017-09-25 RX ORDER — DIPHENHYDRAMINE HYDROCHLORIDE 50 MG/ML
50 INJECTION INTRAMUSCULAR; INTRAVENOUS ONCE
Status: COMPLETED | OUTPATIENT
Start: 2017-09-27 | End: 2017-09-27

## 2017-09-25 RX ORDER — CIPROFLOXACIN 500 MG/1
500 TABLET ORAL 2 TIMES DAILY
Status: DISCONTINUED | OUTPATIENT
Start: 2017-09-26 | End: 2017-10-03 | Stop reason: ALTCHOICE

## 2017-09-25 RX ORDER — LORAZEPAM 2 MG/ML
1 INJECTION INTRAMUSCULAR ONCE
Status: DISCONTINUED | OUTPATIENT
Start: 2017-09-27 | End: 2017-09-25

## 2017-09-25 RX ORDER — HEPARIN SODIUM 1000 [USP'U]/ML
1000 INJECTION, SOLUTION INTRAVENOUS; SUBCUTANEOUS
Status: DISCONTINUED | OUTPATIENT
Start: 2017-09-25 | End: 2017-09-25 | Stop reason: HOSPADM

## 2017-09-25 RX ORDER — LANOLIN ALCOHOL/MO/W.PET/CERES
400 CREAM (GRAM) TOPICAL EVERY 4 HOURS PRN
Status: DISCONTINUED | OUTPATIENT
Start: 2017-09-25 | End: 2017-10-10 | Stop reason: HOSPADM

## 2017-09-25 RX ORDER — ONDANSETRON 8 MG/1
16 TABLET, ORALLY DISINTEGRATING ORAL
Status: COMPLETED | OUTPATIENT
Start: 2017-09-26 | End: 2017-09-26

## 2017-09-25 RX ORDER — SODIUM CHLORIDE AND POTASSIUM CHLORIDE 150; 900 MG/100ML; MG/100ML
INJECTION, SOLUTION INTRAVENOUS CONTINUOUS
Status: CANCELLED | OUTPATIENT
Start: 2017-09-25

## 2017-09-25 RX ORDER — FLUCONAZOLE 50 MG/1
400 TABLET ORAL DAILY
Status: CANCELLED | OUTPATIENT
Start: 2017-09-26

## 2017-09-25 RX ORDER — LISINOPRIL 20 MG/1
40 TABLET ORAL DAILY
Status: DISCONTINUED | OUTPATIENT
Start: 2017-09-25 | End: 2017-10-07

## 2017-09-25 RX ORDER — SODIUM CHLORIDE 0.9 % (FLUSH) 0.9 %
10 SYRINGE (ML) INJECTION
Status: COMPLETED | OUTPATIENT
Start: 2017-09-25 | End: 2017-09-25

## 2017-09-25 RX ORDER — PROMETHAZINE HYDROCHLORIDE 25 MG/ML
12.5 INJECTION, SOLUTION INTRAMUSCULAR; INTRAVENOUS EVERY 4 HOURS PRN
Status: CANCELLED | OUTPATIENT
Start: 2017-09-25

## 2017-09-25 RX ORDER — DIPHENHYDRAMINE HYDROCHLORIDE 50 MG/ML
25 INJECTION INTRAMUSCULAR; INTRAVENOUS
Status: DISCONTINUED | OUTPATIENT
Start: 2017-09-25 | End: 2017-10-06

## 2017-09-25 RX ORDER — SODIUM CHLORIDE 0.9 % (FLUSH) 0.9 %
10 SYRINGE (ML) INJECTION
Status: DISCONTINUED | OUTPATIENT
Start: 2017-09-25 | End: 2017-10-10 | Stop reason: HOSPADM

## 2017-09-25 RX ORDER — NITROGLYCERIN 0.4 MG/1
0.4 TABLET SUBLINGUAL ONCE AS NEEDED
Status: DISCONTINUED | OUTPATIENT
Start: 2017-09-27 | End: 2017-09-25

## 2017-09-25 RX ORDER — SODIUM CHLORIDE AND POTASSIUM CHLORIDE 150; 900 MG/100ML; MG/100ML
INJECTION, SOLUTION INTRAVENOUS CONTINUOUS
Status: DISCONTINUED | OUTPATIENT
Start: 2017-09-27 | End: 2017-10-07

## 2017-09-25 RX ORDER — ACYCLOVIR 200 MG/1
800 CAPSULE ORAL 2 TIMES DAILY
Status: DISCONTINUED | OUTPATIENT
Start: 2017-09-26 | End: 2017-10-10 | Stop reason: HOSPADM

## 2017-09-25 RX ORDER — CITALOPRAM 40 MG/1
40 TABLET, FILM COATED ORAL
COMMUNITY

## 2017-09-25 RX ORDER — DIPHENHYDRAMINE HYDROCHLORIDE 50 MG/ML
50 INJECTION INTRAMUSCULAR; INTRAVENOUS ONCE AS NEEDED
Status: DISCONTINUED | OUTPATIENT
Start: 2017-09-27 | End: 2017-09-25

## 2017-09-25 RX ORDER — SODIUM,POTASSIUM PHOSPHATES 280-250MG
1 POWDER IN PACKET (EA) ORAL EVERY 4 HOURS PRN
Status: DISCONTINUED | OUTPATIENT
Start: 2017-09-25 | End: 2017-10-10 | Stop reason: HOSPADM

## 2017-09-25 RX ORDER — LORAZEPAM 2 MG/ML
1 INJECTION INTRAMUSCULAR EVERY 4 HOURS PRN
Status: DISCONTINUED | OUTPATIENT
Start: 2017-09-25 | End: 2017-10-10 | Stop reason: HOSPADM

## 2017-09-25 RX ORDER — HYDROCODONE BITARTRATE AND ACETAMINOPHEN 500; 5 MG/1; MG/1
TABLET ORAL
Status: DISCONTINUED | OUTPATIENT
Start: 2017-09-27 | End: 2017-10-05

## 2017-09-25 RX ORDER — POTASSIUM CHLORIDE 750 MG/1
20 CAPSULE, EXTENDED RELEASE ORAL
Status: DISCONTINUED | OUTPATIENT
Start: 2017-09-25 | End: 2017-10-10 | Stop reason: HOSPADM

## 2017-09-25 RX ORDER — AMLODIPINE BESYLATE 5 MG/1
5 TABLET ORAL DAILY
Status: DISCONTINUED | OUTPATIENT
Start: 2017-09-25 | End: 2017-10-07

## 2017-09-25 RX ORDER — PROMETHAZINE HYDROCHLORIDE 25 MG/ML
12.5 INJECTION, SOLUTION INTRAMUSCULAR; INTRAVENOUS EVERY 4 HOURS PRN
Status: DISCONTINUED | OUTPATIENT
Start: 2017-09-25 | End: 2017-09-25

## 2017-09-25 RX ORDER — DIPHENHYDRAMINE HYDROCHLORIDE 50 MG/ML
50 INJECTION INTRAMUSCULAR; INTRAVENOUS ONCE
Status: DISCONTINUED | OUTPATIENT
Start: 2017-09-27 | End: 2017-09-25

## 2017-09-25 RX ORDER — CIPROFLOXACIN 250 MG/1
500 TABLET, FILM COATED ORAL 2 TIMES DAILY
Status: CANCELLED | OUTPATIENT
Start: 2017-09-26

## 2017-09-25 RX ORDER — HEPARIN SODIUM 1000 [USP'U]/ML
1600 INJECTION, SOLUTION INTRAVENOUS; SUBCUTANEOUS
Status: DISCONTINUED | OUTPATIENT
Start: 2017-09-25 | End: 2017-10-10 | Stop reason: HOSPADM

## 2017-09-25 RX ORDER — CITALOPRAM 20 MG/1
40 TABLET, FILM COATED ORAL DAILY
Status: DISCONTINUED | OUTPATIENT
Start: 2017-09-25 | End: 2017-10-10 | Stop reason: HOSPADM

## 2017-09-25 RX ORDER — LORAZEPAM 2 MG/ML
1 INJECTION INTRAMUSCULAR ONCE
Status: COMPLETED | OUTPATIENT
Start: 2017-09-27 | End: 2017-09-27

## 2017-09-25 RX ORDER — FLUCONAZOLE 200 MG/1
400 TABLET ORAL DAILY
Status: DISCONTINUED | OUTPATIENT
Start: 2017-09-26 | End: 2017-10-05

## 2017-09-25 RX ORDER — LANOLIN ALCOHOL/MO/W.PET/CERES
800 CREAM (GRAM) TOPICAL EVERY 4 HOURS PRN
Status: DISCONTINUED | OUTPATIENT
Start: 2017-09-25 | End: 2017-10-10 | Stop reason: HOSPADM

## 2017-09-25 RX ORDER — OXYCODONE HYDROCHLORIDE 5 MG/1
5 TABLET ORAL EVERY 6 HOURS PRN
Status: DISCONTINUED | OUTPATIENT
Start: 2017-09-25 | End: 2017-10-10 | Stop reason: HOSPADM

## 2017-09-25 RX ORDER — DEXAMETHASONE 0.5 MG/1
8 TABLET ORAL
Status: CANCELLED | OUTPATIENT
Start: 2017-09-26

## 2017-09-25 RX ORDER — SODIUM CHLORIDE 0.9 % (FLUSH) 0.9 %
10 SYRINGE (ML) INJECTION
Status: CANCELLED | OUTPATIENT
Start: 2017-09-25

## 2017-09-25 RX ORDER — ONDANSETRON 8 MG/1
8 TABLET, ORALLY DISINTEGRATING ORAL EVERY 6 HOURS PRN
Status: DISCONTINUED | OUTPATIENT
Start: 2017-09-25 | End: 2017-10-10 | Stop reason: HOSPADM

## 2017-09-25 RX ORDER — DIPHENHYDRAMINE HYDROCHLORIDE 50 MG/ML
50 INJECTION INTRAMUSCULAR; INTRAVENOUS ONCE AS NEEDED
Status: COMPLETED | OUTPATIENT
Start: 2017-09-27 | End: 2017-09-27

## 2017-09-25 RX ORDER — ENOXAPARIN SODIUM 100 MG/ML
40 INJECTION SUBCUTANEOUS EVERY 24 HOURS
Status: DISCONTINUED | OUTPATIENT
Start: 2017-09-25 | End: 2017-10-02

## 2017-09-25 RX ORDER — MAG HYDROX/ALUMINUM HYD/SIMETH 200-200-20
30 SUSPENSION, ORAL (FINAL DOSE FORM) ORAL EVERY 6 HOURS PRN
Status: DISCONTINUED | OUTPATIENT
Start: 2017-09-25 | End: 2017-10-10 | Stop reason: HOSPADM

## 2017-09-25 RX ORDER — SODIUM,POTASSIUM PHOSPHATES 280-250MG
2 POWDER IN PACKET (EA) ORAL EVERY 4 HOURS PRN
Status: DISCONTINUED | OUTPATIENT
Start: 2017-09-25 | End: 2017-10-10 | Stop reason: HOSPADM

## 2017-09-25 RX ORDER — FUROSEMIDE 10 MG/ML
100 INJECTION INTRAMUSCULAR; INTRAVENOUS ONCE AS NEEDED
Status: DISCONTINUED | OUTPATIENT
Start: 2017-09-27 | End: 2017-09-25

## 2017-09-25 RX ORDER — MEPERIDINE HYDROCHLORIDE 50 MG/ML
50 INJECTION INTRAMUSCULAR; INTRAVENOUS; SUBCUTANEOUS ONCE AS NEEDED
Status: COMPLETED | OUTPATIENT
Start: 2017-09-27 | End: 2017-09-27

## 2017-09-25 RX ORDER — DEXAMETHASONE 4 MG/1
8 TABLET ORAL
Status: COMPLETED | OUTPATIENT
Start: 2017-09-26 | End: 2017-09-26

## 2017-09-25 RX ORDER — SODIUM CHLORIDE AND POTASSIUM CHLORIDE 150; 900 MG/100ML; MG/100ML
INJECTION, SOLUTION INTRAVENOUS CONTINUOUS
Status: DISCONTINUED | OUTPATIENT
Start: 2017-09-25 | End: 2017-10-07

## 2017-09-25 RX ORDER — NITROGLYCERIN 0.4 MG/1
0.4 TABLET SUBLINGUAL ONCE AS NEEDED
Status: COMPLETED | OUTPATIENT
Start: 2017-09-27 | End: 2017-09-27

## 2017-09-25 RX ORDER — ONDANSETRON 4 MG/1
16 TABLET, ORALLY DISINTEGRATING ORAL
Status: CANCELLED | OUTPATIENT
Start: 2017-09-26

## 2017-09-25 RX ORDER — ONDANSETRON 4 MG/1
8 TABLET, ORALLY DISINTEGRATING ORAL EVERY 6 HOURS PRN
Status: CANCELLED | OUTPATIENT
Start: 2017-09-25

## 2017-09-25 RX ORDER — MEPERIDINE HYDROCHLORIDE 50 MG/ML
50 INJECTION INTRAMUSCULAR; INTRAVENOUS; SUBCUTANEOUS ONCE AS NEEDED
Status: DISCONTINUED | OUTPATIENT
Start: 2017-09-27 | End: 2017-09-25

## 2017-09-25 RX ORDER — EPINEPHRINE 1 MG/ML
0.5 INJECTION, SOLUTION INTRACARDIAC; INTRAMUSCULAR; INTRAVENOUS; SUBCUTANEOUS ONCE AS NEEDED
Status: DISCONTINUED | OUTPATIENT
Start: 2017-09-27 | End: 2017-09-25

## 2017-09-25 RX ORDER — LORAZEPAM 2 MG/ML
1 INJECTION INTRAMUSCULAR EVERY 4 HOURS PRN
Status: CANCELLED | OUTPATIENT
Start: 2017-09-25

## 2017-09-25 RX ORDER — NEBIVOLOL 5 MG/1
10 TABLET ORAL DAILY
Status: DISCONTINUED | OUTPATIENT
Start: 2017-09-25 | End: 2017-10-07

## 2017-09-25 RX ORDER — EPINEPHRINE 1 MG/ML
0.5 INJECTION, SOLUTION INTRACARDIAC; INTRAMUSCULAR; INTRAVENOUS; SUBCUTANEOUS ONCE AS NEEDED
Status: COMPLETED | OUTPATIENT
Start: 2017-09-27 | End: 2017-09-27

## 2017-09-25 RX ORDER — CLONIDINE HYDROCHLORIDE 0.1 MG/1
0.1 TABLET ORAL ONCE AS NEEDED
Status: DISCONTINUED | OUTPATIENT
Start: 2017-09-27 | End: 2017-09-25

## 2017-09-25 RX ORDER — HEPARIN SODIUM 1000 [USP'U]/ML
1000 INJECTION, SOLUTION INTRAVENOUS; SUBCUTANEOUS
Status: CANCELLED
Start: 2017-09-25

## 2017-09-25 RX ORDER — CLONIDINE HYDROCHLORIDE 0.1 MG/1
0.1 TABLET ORAL ONCE AS NEEDED
Status: COMPLETED | OUTPATIENT
Start: 2017-09-27 | End: 2017-09-27

## 2017-09-25 RX ORDER — ACYCLOVIR 200 MG/1
800 CAPSULE ORAL 2 TIMES DAILY
Status: CANCELLED | OUTPATIENT
Start: 2017-09-26

## 2017-09-25 RX ORDER — PANTOPRAZOLE SODIUM 40 MG/1
40 TABLET, DELAYED RELEASE ORAL DAILY
Status: DISCONTINUED | OUTPATIENT
Start: 2017-09-25 | End: 2017-10-10 | Stop reason: HOSPADM

## 2017-09-25 RX ORDER — FUROSEMIDE 10 MG/ML
100 INJECTION INTRAMUSCULAR; INTRAVENOUS ONCE AS NEEDED
Status: COMPLETED | OUTPATIENT
Start: 2017-09-27 | End: 2017-09-27

## 2017-09-25 RX ADMIN — HEPARIN SODIUM 1000 UNITS: 1000 INJECTION, SOLUTION INTRAVENOUS; SUBCUTANEOUS at 11:09

## 2017-09-25 RX ADMIN — SODIUM CHLORIDE, PRESERVATIVE FREE 10 ML: 5 INJECTION INTRAVENOUS at 11:09

## 2017-09-25 RX ADMIN — Medication 30 ML: at 06:09

## 2017-09-25 RX ADMIN — SODIUM CHLORIDE AND POTASSIUM CHLORIDE: 9; 1.49 INJECTION, SOLUTION INTRAVENOUS at 10:09

## 2017-09-25 RX ADMIN — Medication 30 ML: at 11:09

## 2017-09-25 RX ADMIN — ENOXAPARIN SODIUM 40 MG: 100 INJECTION SUBCUTANEOUS at 04:09

## 2017-09-25 NOTE — NURSING
Pt arrived for labs from [facility].  Red lumen with good blood return and flushes easily, labs sent.  Heparin 1000 units, 1.8 ml used.  Blue lumen not flushed.  Dressing changed.  Pt now going to MD appt with sister and will be admitted for transplant.

## 2017-09-25 NOTE — ASSESSMENT & PLAN NOTE
Pt suffers from gout and is not currently on uric acid suppression medication.  This may have contributed to his hip degeneration.

## 2017-09-25 NOTE — PLAN OF CARE
Problem: Patient Care Overview  Goal: Plan of Care Review  Outcome: Ongoing (interventions implemented as appropriate)  Plan of care reviewed with pt upon admission. Verbalized understanding. Did not have any questions or concerns at this time. Day - 7 of autologous transplant. Central line dsg performed in clinic. IVF to start tonight. Chemo regimen reviewed with patient. No c/o pain or any other discomfort.

## 2017-09-25 NOTE — ASSESSMENT & PLAN NOTE
- Anemia and thrombocytopenia secondary to chemotherapy  - WBC 5.77; hgb 11.2; plt 93K  - No indication to transfuse   - D/C Lovenox once plt fall <50K

## 2017-09-25 NOTE — HPI
"KPS 90%. Mr. Bobo is here with his sister for follow up of IgG kappa multiple in preparation for PBSCT consolidation with admit to inpatient today. He reports a slight "tickle in throat" and his temp is 99.6. He is feeling well overall and is ready for transplant.    Myeloma History: He was noted to develop a progressive anemia in early 2015.  In retrospect, his total protein has been at the upper end of normal with hematocrit of 39 in 10/2013.  At the time of a total hip arthroplasty 11/2014 the marrow resected from his right femoral head did not show abnormalities. During that hospital stay he underwent an anemia workup showing iron 20, TIBC 234 with 12% saturation and normal ferritin at 103.  Subsequent colonoscopy showed multiple benign polyps and one at the appejndiceal lumen that resulted in appendectomy in 1/2015.    SPEP showed a monoclonal protein 2.9 g/dL with IgG elevated at 5677 and suppressed IgA and IgM, calcium 9, creatinine 0.2.  Bone survey did not find lytic lesions and kappa:lambda light chain ratio was over 100.  Albumin 3.2 (5/1) and beta-2 microglobin was elevated, though I do not know the value.  Marrow 4/14/15 at which time his hemoglobin was 9.6 with MCV 87 showed a 50% cellular marrow with 10% plasma cell infiltrated with atypical forms and a left shifted granulocyte population.   staining showed up to 50% plasma cells in some areas but kappa and lambda staining showed a mixture of cells without clear clonality.  Plasma cells were kappa restricted on flow cytometry (5.7% population).  No stainable iron was present.  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  He was tried on IV iron with some improvement but not resolution of his anemia.    Given elevated light chain ratio over 100 he was formally diagnosed with myeloma and started on lenalidomide/dexamethasone in late 6/2015 which he continued until 12/2015 when a repeat bone " marrow 12/3/15 showed a 60-70% cellular marrow with 20% kappa restricted plasma cells.  His protein levels were under good control with free kappa light chain 2.98 mg/dL and IgG kappa M-spike 1 g/dL.  Repeat marrow done 4/5/16 showed a 30-40% cellular marrow with a 17% kappa-restricted plasma cell population and no storage iron.  Hence, he was started on iron with resolution of his anemia, which had been the only feature qualifying him for the diagnosis of myeloma.  Hence, he was reclassified smoldering myeloma and has been followed since that time.    He developed two episodes of DVT in 7/2015 and 2/2017 was on apixaban.    He was seeing Dr. Zepeda who had been treating him primarily.  He completed hip replacement surgery and has healed well.

## 2017-09-25 NOTE — ASSESSMENT & PLAN NOTE
Completed 4 cycles of CyBorD   Was treated with lenalidomide and dex  Restaging marrow 8/21/17 showed a 10-50% cellular marrow with trilineage hematopoiesis and 5% residual  kappa-restricted plasma cells (6% by morphology).  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  Hence, he has achieved a partial remission (PR1) and is ready to proceed with transplant.    65% EF on 8/2/17   Possible need to consider an allogeneic transplant as a cure in future as pt is young. This is not an immediate consideration but since he has 4 siblings there is a 68% chance of finding a sibling match.

## 2017-09-25 NOTE — PROGRESS NOTES
"Subjective:       Patient ID: Arik Bobo is a 52 y.o. male.    Chief Complaint: Multiple Myeloma (admission for auto SCT)    HPI  KPS 90%. Mr. Bobo is here with his wife for follow up of IgG kappa multiple in preparation for PBSCT consolidation with admit to inpatient today. He reports a slight "tickle in throat" and his temp is 99.6. He is feeling well overall and is ready for transplant.    Myeloma History: He was noted to develop a progressive anemia in early 2015.  In retrospect, his total protein has been at the upper end of normal with hematocrit of 39 in 10/2013.  At the time of a total hip arthroplasty 11/2014 the marrow resected from his right femoral head did not show abnormalities. During that hospital stay he underwent an anemia workup showing iron 20, TIBC 234 with 12% saturation and normal ferritin at 103.  Subsequent colonoscopy showed multiple benign polyps and one at the appejndiceal lumen that resulted in appendectomy in 1/2015.    SPEP showed a monoclonal protein 2.9 g/dL with IgG elevated at 5677 and suppressed IgA and IgM, calcium 9, creatinine 0.2.  Bone survey did not find lytic lesions and kappa:lambda light chain ratio was over 100.  Albumin 3.2 (5/1) and beta-2 microglobin was elevated, though I do not know the value.  Marrow 4/14/15 at which time his hemoglobin was 9.6 with MCV 87 showed a 50% cellular marrow with 10% plasma cell infiltrated with atypical forms and a left shifted granulocyte population.   staining showed up to 50% plasma cells in some areas but kappa and lambda staining showed a mixture of cells without clear clonality.  Plasma cells were kappa restricted on flow cytometry (5.7% population).  No stainable iron was present.  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  He was tried on IV iron with some improvement but not resolution of his anemia.    Given elevated light chain ratio over 100 he was " "formally diagnosed with myeloma and started on lenalidomide/dexamethasone in late 6/2015 which he continued until 12/2015 when a repeat bone marrow 12/3/15 showed a 60-70% cellular marrow with 20% kappa restricted plasma cells.  His protein levels were under good control with free kappa light chain 2.98 mg/dL and IgG kappa M-spike 1 g/dL.  Repeat marrow done 4/5/16 showed a 30-40% cellular marrow with a 17% kappa-restricted plasma cell population and no storage iron.  Hence, he was started on iron with resolution of his anemia, which had been the only feature qualifying him for the diagnosis of myeloma.  Hence, he was reclassified smoldering myeloma and has been followed since that time.    He developed two episodes of DVT in 7/2015 and 2/2017 was on apixaban.    He was seeing Dr. Zepeda who had been treating him primarily.  He completed hip replacement surgery and has healed well.     Review of Systems   Constitutional: Negative for activity change, appetite change, diaphoresis, fatigue (improved), fever and unexpected weight change.   HENT: Negative for mouth sores, postnasal drip and sore throat.         Slight "tickle in throat"   Eyes: Negative for visual disturbance.   Respiratory: Negative for cough, chest tightness and shortness of breath.    Cardiovascular: Negative for chest pain and leg swelling.   Gastrointestinal: Negative for anal bleeding, blood in stool, constipation, diarrhea and rectal pain.   Genitourinary: Negative for dysuria, frequency and hematuria.   Musculoskeletal: Negative for arthralgias, back pain and neck pain.   Skin: Negative for rash.   Neurological: Negative for tremors, weakness, light-headedness and numbness.   Hematological: Negative for adenopathy.   Psychiatric/Behavioral: Negative for confusion.       Objective:      Physical Exam   Constitutional: He is oriented to person, place, and time. He appears well-developed and well-nourished. No distress.   HENT:   Head: " Normocephalic and atraumatic.   Mouth/Throat: Oropharynx is clear and moist. No oropharyngeal exudate.   Eyes: Conjunctivae are normal. Pupils are equal, round, and reactive to light.   Neck: Neck supple.   Cardiovascular: Normal rate and regular rhythm.    Pulmonary/Chest: Effort normal and breath sounds normal. He has no rales.   Abdominal: Soft. Bowel sounds are normal. He exhibits no mass. There is no splenomegaly. There is no tenderness.   Musculoskeletal: Normal range of motion. He exhibits no edema.   No spinal or rib tenderness   Lymphadenopathy:     He has no cervical adenopathy.     He has no axillary adenopathy.        Right: No inguinal adenopathy present.        Left: No inguinal adenopathy present.   Neurological: He is alert and oriented to person, place, and time.   Skin: Skin is warm and dry. No rash noted.   Central line C/D/I   Psychiatric: He has a normal mood and affect. Thought content normal.       Assessment:       1. Multiple myeloma in remission    2. Stem cell transplant candidate    3. Hypertension, essential    4. Hyperlipidemia, unspecified hyperlipidemia type    5. Depression, unspecified depression type    6. History of gout        Plan:       Multiple myeloma in partial remission  Completed 4 cycles of CyBorD   Was treated with lenalidomide and dex  Restaging marrow 8/21/17 showed a 10-50% cellular marrow with trilineage hematopoiesis and 5% residual  kappa-restricted plasma cells (6% by morphology).  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  Hence, he has achieved a partial remission (PR1) and is ready to proceed with transplant.    65% EF on 8/29/17  Possible need to consider an allogeneic transplant as a cure in future as pt is young. This is not an immediate consideration but since he has 4 siblings there is a 68% chance of finding a sibling match.     Stem Cell Transplant Candidate  Today is day -2 (9/25/17), IVFs to begin today with admission to inpatient service  Melphalan to be given on  day -1 (9/26/17)  Auto SCT to be done on day 0 (9/27/17)  Treatment plan sent to Dr. Buck for 2nd signature  Day 0 orders done; stem cells not ordered yet    Heme- Anemia and thrombocytopenia   Secondary to chemotherapy   WBC 5.77; hgb 11.2; plt 93K  No indication to transfuse   D/C Lovenox once plt fall <50K    Hypertension  Continue home amlodipine, bystolic and lisinopril     Hyperlipidemia  Will hold statin with admission and may resume at discharge     Depression  Continue home Celexa     Hx of Gout  Pt suffers from gout and is not currently on uric acid suppression medication.  This may have contributed to his hip degeneration.    All questions answered in clinic today. Admit to inpatient for SCT.    Pt seen and examined with collaborating physician, Dr. Blake Buck.    Magdalena Miller, MARIA R, NP  Hematology/Oncology    Attending: Patient seen and examined with NP and agree with assessment and plan above.  Now admitted for Tonia 200 PBSCT for myeloma in PR1.  No new issues and standard protocol.  RVBE

## 2017-09-25 NOTE — ASSESSMENT & PLAN NOTE
- Today is day -2 (9/25/17), IVFs to begin at 10 PM  - Melphalan to be given on day -1 (9/26/17)  - Auto SCT to be done on day 0 (9/27/17)  - ppx antimicrobials to start per protocol  - Neupogen to start day + 3

## 2017-09-25 NOTE — ASSESSMENT & PLAN NOTE
- Completed 4 cycles of CyBorD   - Was treated with lenalidomide and dex  - Restaging marrow 8/21/17 showed a 10-50% cellular marrow with trilineage hematopoiesis and 5% residual  kappa-restricted plasma cells (6% by morphology).  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  Hence, he has achieved a partial remission (PR1) and is ready to proceed with transplant.    65% EF on 8/2/17   - Possible need to consider an allogeneic transplant as a cure in future as pt is young. This is not an immediate consideration but since he has 4 siblings there is a 68% chance of finding a sibling match.

## 2017-09-25 NOTE — HOSPITAL COURSE
- 9/25/17: Admitted (day -2). IVFs to begin with admission. No acute issues on admission  - 9/26/17: High dose Melphalan given Day -1. Complained of chills and stuffy nose last night, but was afebrile with stable VS.   - 9./27/17: Auto SCT to be done on day 0 at 13:30. 5 bags with a CD 34 count of 4.71 x 10^6/kg. Afebrile VSS  9/28/17: Day +1 Auto SCT for MM. Tolerated transplant well. Complains of a productive cough after transplant and had some nausea this morning which was relived by zofran.   - 10/3/17: Day +6, with some burning with urination, UA done with 1+ occult blood, negative nitrites and leukocytes. With continued n/v controlled with ativan and states diarrhea improved (cdiff negative, on imodium prn). Started [place] for possible mucositis. Pt spiked temp of 101. Blood cx x 2, chest xray, urine cx all ordered. Cefepime started.   - 10/4/17: Day +7, with continued fever, added vanc. Ordered pa/lateral chest xray   - 10/5/17: Day + 8 HD Melphalan after auto SCT. Remains febrile over the last 24 hours with T Max 103.5. CXR appears to show a bilateral pneumonia. Remains on vancomycin and cefepime per Dr. Brennan with ID okay to keep same antibiotics as fever curve has improved today. 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 Patient complained of sore throat. C. Diff negative from 10/4. Transfused platelets  - 10/6/17: day +9; tmax 101.9 last night; re-pan-cultured. Now on zosyn and donnell; stopped vanc. Feeling much better and breathing better. Diarrhea continues and tenderness with swallowing due to mouth sore continues. Crypto negative, other fungal markers in process. Transfused platelets   - 10/9/17: Had increase in creatinine over the weekend. Nephrology was consulted. Unsure of reason. Spontaneously trending down from 2.6 to 2.2. Fungal markers negative. Will ween off O2 today. ANC 1199, engrafted. Will d/c neupogen. Zosyn will stop after today's dose. Donnell d/c'd. Will likely [facility] to  [place] [facility] tomorrow or Wednesday.   - 10/10/17: Day +13, day of discharge. Afebrile since 10/5/17. Diarrhea improved, stools now soft/semi formed--transitioned to prn imodium. Dyspnea only on exertion. Sats high 90s on RA. Restarted home amlodipine and nebivolol due to HTN and as creatinine spontaneously improving. Off IVFs. Mucositis improving. ANC 2070. Will f/u with Dr. Buck in clinic on 10/13/17. Discharge today after line removed by nephro access (platelet transfusion to be given first).

## 2017-09-25 NOTE — SUBJECTIVE & OBJECTIVE
Patient information was obtained from patient and past medical records.     Oncology History:    Myeloma History: He was noted to develop a progressive anemia in early 2015.  In retrospect, his total protein has been at the upper end of normal with hematocrit of 39 in 10/2013.  At the time of a total hip arthroplasty 11/2014 the marrow resected from his right femoral head did not show abnormalities. During that hospital stay he underwent an anemia workup showing iron 20, TIBC 234 with 12% saturation and normal ferritin at 103.  Subsequent colonoscopy showed multiple benign polyps and one at the appejndiceal lumen that resulted in appendectomy in 1/2015.    SPEP showed a monoclonal protein 2.9 g/dL with IgG elevated at 5677 and suppressed IgA and IgM, calcium 9, creatinine 0.2.  Bone survey did not find lytic lesions and kappa:lambda light chain ratio was over 100.  Albumin 3.2 (5/1) and beta-2 microglobin was elevated, though I do not know the value.  Marrow 4/14/15 at which time his hemoglobin was 9.6 with MCV 87 showed a 50% cellular marrow with 10% plasma cell infiltrated with atypical forms and a left shifted granulocyte population.   staining showed up to 50% plasma cells in some areas but kappa and lambda staining showed a mixture of cells without clear clonality.  Plasma cells were kappa restricted on flow cytometry (5.7% population).  No stainable iron was present.  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  He was tried on IV iron with some improvement but not resolution of his anemia.    Given elevated light chain ratio over 100 he was formally diagnosed with myeloma and started on lenalidomide/dexamethasone in late 6/2015 which he continued until 12/2015 when a repeat bone marrow 12/3/15 showed a 60-70% cellular marrow with 20% kappa restricted plasma cells.  His protein levels were under good control with free kappa light chain 2.98 mg/dL and  IgG kappa M-spike 1 g/dL.  Repeat marrow done 4/5/16 showed a 30-40% cellular marrow with a 17% kappa-restricted plasma cell population and no storage iron.  Hence, he was started on iron with resolution of his anemia, which had been the only feature qualifying him for the diagnosis of myeloma.  Hence, he was reclassified smoldering myeloma and has been followed since that time.    Prescriptions Prior to Admission   Medication Sig Dispense Refill Last Dose    amlodipine (NORVASC) 5 MG tablet Take 5 mg by mouth once daily.   Taking    BYSTOLIC 10 mg Tab 10 mg once daily.   2 Taking    citalopram (CELEXA) 40 MG tablet Take 40 mg by mouth.   Taking    lisinopril (PRINIVIL,ZESTRIL) 20 MG tablet Take 40 mg by mouth once daily.    Taking    NEBIVOLOL HCL (NEBIVOLOL ORAL) Take by mouth.   Taking    SIMVASTATIN ORAL Take by mouth.   Taking       Pcn [penicillins]     Past Medical History:   Diagnosis Date    Anxiety     Arthritis     Depression     History of hip surgery     Hx of psychiatric care     Hyperlipidemia     Hypertension     Multiple myeloma     Psychiatric problem      Past Surgical History:   Procedure Laterality Date    HEMORRHOID SURGERY      TOTAL HIP ARTHROPLASTY       Family History     Problem Relation (Age of Onset)    Alcohol abuse Father    Anxiety disorder Sister    Depression Brother        Social History Main Topics    Smoking status: Never Smoker    Smokeless tobacco: Current User     Types: Chew    Alcohol use No      Comment: former excessive use; 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   HENT: Negative for mouth sores, nosebleeds and trouble swallowing.         Reports tickle in throat    Respiratory: Negative for cough and shortness of breath.    Cardiovascular: Negative for chest  pain, palpitations and leg swelling.   Gastrointestinal: Negative for abdominal pain, constipation, diarrhea, nausea and vomiting.   Genitourinary: Negative for hematuria.   Skin: Negative for pallor and rash.   Allergic/Immunologic: Negative for immunocompromised state.   Neurological: Negative for dizziness, weakness, light-headedness, numbness and headaches.   Hematological: Negative for adenopathy. Does not bruise/bleed easily.   Psychiatric/Behavioral: Negative for confusion. The patient is not nervous/anxious.      Objective:     Vital Signs (Most Recent):  Temp: 99.3 °F (37.4 °C) (09/25/17 1307)  Pulse: 72 (09/25/17 1307)  Resp: 18 (09/25/17 1307)  BP: 135/82 (09/25/17 1307)  SpO2: 95 % (09/25/17 1307) Vital Signs (24h Range):  Temp:  [99.3 °F (37.4 °C)-99.6 °F (37.6 °C)] 99.3 °F (37.4 °C)  Pulse:  [69-72] 72  Resp:  [18] 18  SpO2:  [95 %] 95 %  BP: (135)/(79-82) 135/82     Weight: 117.8 kg (259 lb 9.5 oz)  Body mass index is 39.47 kg/m².  Body surface area is 2.38 meters squared.    ECOG SCORE         [unfilled]    Lines/Drains/Airways     Central Venous Catheter Line                 Percutaneous Central Line Insertion/Assessment - double lumen  09/20/17 1030 right internal jugular 5 days                Physical Exam   Constitutional: He is oriented to person, place, and time. He appears well-developed and well-nourished. No distress.   HENT:   Head: Normocephalic.   Right Ear: External ear normal.   Left Ear: External ear normal.   Nose: Nose normal.   Mouth/Throat: Oropharynx is clear and moist and mucous membranes are normal. No oropharyngeal exudate.   Eyes: Conjunctivae are normal. Pupils are equal, round, and reactive to light. No scleral icterus.   Neck: Neck supple.   Cardiovascular: Normal rate, regular rhythm and normal heart sounds.    Pulmonary/Chest: Effort normal and breath sounds normal.   Abdominal: Soft. Normal appearance and bowel sounds are normal. He exhibits no distension. There is no  tenderness.   Musculoskeletal: He exhibits no edema.   Neurological: He is alert and oriented to person, place, and time.   Skin: Skin is warm, dry and intact. No cyanosis. Nails show no clubbing.   Psychiatric: He has a normal mood and affect. His behavior is normal. Thought content normal. His mood appears not anxious.   Vitals reviewed.      Significant Labs:   CBC:   Recent Labs  Lab 09/25/17  1115   WBC 5.77   HGB 11.2*   HCT 34.0*   PLT 93*    and CMP:   Recent Labs  Lab 09/25/17  1115      K 3.9      CO2 24   *   BUN 14   CREATININE 0.9   CALCIUM 8.8   PROT 6.2   ALBUMIN 3.3*   BILITOT 0.4   ALKPHOS 178*   AST 19   ALT 19   ANIONGAP 8   EGFRNONAA >60.0       Diagnostic Results:  None

## 2017-09-26 PROBLEM — D69.59 CHEMOTHERAPY-INDUCED THROMBOCYTOPENIA: Status: ACTIVE | Noted: 2017-09-26

## 2017-09-26 PROBLEM — D64.81 ANEMIA DUE TO CHEMOTHERAPY: Status: ACTIVE | Noted: 2017-09-26

## 2017-09-26 PROBLEM — T45.1X5A CHEMOTHERAPY-INDUCED THROMBOCYTOPENIA: Status: ACTIVE | Noted: 2017-09-26

## 2017-09-26 PROBLEM — T45.1X5A ANEMIA DUE TO CHEMOTHERAPY: Status: ACTIVE | Noted: 2017-09-26

## 2017-09-26 PROBLEM — D75.9 CYTOPENIA: Status: RESOLVED | Noted: 2017-09-25 | Resolved: 2017-09-26

## 2017-09-26 LAB
ABO + RH BLD: NORMAL
ALBUMIN SERPL BCP-MCNC: 3.1 G/DL
ALP SERPL-CCNC: 148 U/L
ALT SERPL W/O P-5'-P-CCNC: 17 U/L
ANION GAP SERPL CALC-SCNC: 7 MMOL/L
AST SERPL-CCNC: 18 U/L
BASOPHILS # BLD AUTO: 0.01 K/UL
BASOPHILS NFR BLD: 0.2 %
BILIRUB SERPL-MCNC: 0.4 MG/DL
BLD GP AB SCN CELLS X3 SERPL QL: NORMAL
BUN SERPL-MCNC: 12 MG/DL
CALCIUM SERPL-MCNC: 8.2 MG/DL
CHLORIDE SERPL-SCNC: 107 MMOL/L
CO2 SERPL-SCNC: 25 MMOL/L
CREAT SERPL-MCNC: 0.8 MG/DL
DIFFERENTIAL METHOD: ABNORMAL
EOSINOPHIL # BLD AUTO: 0.1 K/UL
EOSINOPHIL NFR BLD: 2.7 %
ERYTHROCYTE [DISTWIDTH] IN BLOOD BY AUTOMATED COUNT: 13.6 %
EST. GFR  (AFRICAN AMERICAN): >60 ML/MIN/1.73 M^2
EST. GFR  (NON AFRICAN AMERICAN): >60 ML/MIN/1.73 M^2
GLUCOSE SERPL-MCNC: 91 MG/DL
HCT VFR BLD AUTO: 32.2 %
HGB BLD-MCNC: 10.7 G/DL
LYMPHOCYTES # BLD AUTO: 0.5 K/UL
LYMPHOCYTES NFR BLD: 12.5 %
MAGNESIUM SERPL-MCNC: 1.9 MG/DL
MCH RBC QN AUTO: 30.4 PG
MCHC RBC AUTO-ENTMCNC: 33.2 G/DL
MCV RBC AUTO: 92 FL
MONOCYTES # BLD AUTO: 0.6 K/UL
MONOCYTES NFR BLD: 15 %
NEUTROPHILS # BLD AUTO: 2.8 K/UL
NEUTROPHILS NFR BLD: 67.6 %
PHOSPHATE SERPL-MCNC: 3.3 MG/DL
PLATELET # BLD AUTO: 92 K/UL
PMV BLD AUTO: 10.2 FL
POTASSIUM SERPL-SCNC: 3.8 MMOL/L
PROT SERPL-MCNC: 5.8 G/DL
RBC # BLD AUTO: 3.52 M/UL
SODIUM SERPL-SCNC: 139 MMOL/L
WBC # BLD AUTO: 4.08 K/UL

## 2017-09-26 PROCEDURE — A9155 ARTIFICIAL SALIVA: HCPCS | Performed by: NURSE PRACTITIONER

## 2017-09-26 PROCEDURE — 84100 ASSAY OF PHOSPHORUS: CPT

## 2017-09-26 PROCEDURE — 20600001 HC STEP DOWN PRIVATE ROOM

## 2017-09-26 PROCEDURE — 80053 COMPREHEN METABOLIC PANEL: CPT

## 2017-09-26 PROCEDURE — 85025 COMPLETE CBC W/AUTO DIFF WBC: CPT

## 2017-09-26 PROCEDURE — 97161 PT EVAL LOW COMPLEX 20 MIN: CPT

## 2017-09-26 PROCEDURE — 83735 ASSAY OF MAGNESIUM: CPT

## 2017-09-26 PROCEDURE — 86900 BLOOD TYPING SEROLOGIC ABO: CPT

## 2017-09-26 PROCEDURE — 3E04305 INTRODUCTION OF OTHER ANTINEOPLASTIC INTO CENTRAL VEIN, PERCUTANEOUS APPROACH: ICD-10-PCS | Performed by: INTERNAL MEDICINE

## 2017-09-26 PROCEDURE — 25000003 PHARM REV CODE 250: Performed by: NURSE PRACTITIONER

## 2017-09-26 PROCEDURE — 99233 SBSQ HOSP IP/OBS HIGH 50: CPT | Mod: ICN,,, | Performed by: INTERNAL MEDICINE

## 2017-09-26 PROCEDURE — 97802 MEDICAL NUTRITION INDIV IN: CPT

## 2017-09-26 PROCEDURE — 63600175 PHARM REV CODE 636 W HCPCS: Performed by: NURSE PRACTITIONER

## 2017-09-26 PROCEDURE — 86901 BLOOD TYPING SEROLOGIC RH(D): CPT

## 2017-09-26 RX ADMIN — POTASSIUM CHLORIDE 20 MEQ: 750 CAPSULE, EXTENDED RELEASE ORAL at 07:09

## 2017-09-26 RX ADMIN — MAGNESIUM OXIDE TAB 400 MG (241.3 MG ELEMENTAL MG) 400 MG: 400 (241.3 MG) TAB at 11:09

## 2017-09-26 RX ADMIN — CIPROFLOXACIN HYDROCHLORIDE 500 MG: 500 TABLET, FILM COATED ORAL at 08:09

## 2017-09-26 RX ADMIN — LISINOPRIL 40 MG: 20 TABLET ORAL at 08:09

## 2017-09-26 RX ADMIN — SODIUM CHLORIDE AND POTASSIUM CHLORIDE: 9; 1.49 INJECTION, SOLUTION INTRAVENOUS at 04:09

## 2017-09-26 RX ADMIN — SODIUM CHLORIDE AND POTASSIUM CHLORIDE: 9; 1.49 INJECTION, SOLUTION INTRAVENOUS at 08:09

## 2017-09-26 RX ADMIN — ACYCLOVIR 800 MG: 200 CAPSULE ORAL at 08:09

## 2017-09-26 RX ADMIN — MAGNESIUM OXIDE TAB 400 MG (241.3 MG ELEMENTAL MG) 400 MG: 400 (241.3 MG) TAB at 07:09

## 2017-09-26 RX ADMIN — PANTOPRAZOLE SODIUM 40 MG: 40 TABLET, DELAYED RELEASE ORAL at 08:09

## 2017-09-26 RX ADMIN — AMLODIPINE BESYLATE 5 MG: 5 TABLET ORAL at 08:09

## 2017-09-26 RX ADMIN — NEBIVOLOL HYDROCHLORIDE 10 MG: 5 TABLET ORAL at 08:09

## 2017-09-26 RX ADMIN — ONDANSETRON 16 MG: 8 TABLET, ORALLY DISINTEGRATING ORAL at 07:09

## 2017-09-26 RX ADMIN — SODIUM CHLORIDE AND POTASSIUM CHLORIDE: 9; 1.49 INJECTION, SOLUTION INTRAVENOUS at 05:09

## 2017-09-26 RX ADMIN — FLUCONAZOLE 400 MG: 200 TABLET ORAL at 08:09

## 2017-09-26 RX ADMIN — CITALOPRAM HYDROBROMIDE 40 MG: 20 TABLET ORAL at 08:09

## 2017-09-26 RX ADMIN — MELPHALAN 475 MG: 50 INJECTION, POWDER, LYOPHILIZED, FOR SOLUTION INTRAVENOUS at 09:09

## 2017-09-26 RX ADMIN — SODIUM CHLORIDE AND POTASSIUM CHLORIDE: 9; 1.49 INJECTION, SOLUTION INTRAVENOUS at 09:09

## 2017-09-26 RX ADMIN — Medication 30 ML: at 05:09

## 2017-09-26 RX ADMIN — Medication 30 ML: at 11:09

## 2017-09-26 RX ADMIN — ENOXAPARIN SODIUM 40 MG: 100 INJECTION SUBCUTANEOUS at 05:09

## 2017-09-26 RX ADMIN — Medication 30 ML: at 06:09

## 2017-09-26 RX ADMIN — DEXAMETHASONE 8 MG: 4 TABLET ORAL at 07:09

## 2017-09-26 NOTE — ASSESSMENT & PLAN NOTE
- Today is day -1   - Melphalan to be given on 9/26/17  - Auto SCT to be done on day 0 (9/27/17); to receive 5 bags with a CD 34 count of 4.71 x 10^6/kg   - ppx antimicrobials to start per protocol  - Neupogen to start day + 3

## 2017-09-26 NOTE — PT/OT/SLP EVAL
Physical Therapy  Evaluation    Arik Bobo   MRN: [id number]   Admitting Diagnosis: Stem cell transplant candidate    PT Received On: 09/26/17  PT Start Time: 0900     PT Stop Time: 0910    PT Total Time (min): 10 min       Billable Minutes:  Evaluation  10 minutes    Diagnosis: Stem cell transplant candidate    Past Medical History:   Diagnosis Date    Anxiety     Arthritis     Depression     History of hip surgery     Hx of psychiatric care     Hyperlipidemia     Hypertension     Multiple myeloma     Psychiatric problem       Past Surgical History:   Procedure Laterality Date    HEMORRHOID SURGERY      TOTAL HIP ARTHROPLASTY         Referring physician: Magdalena Miller NP  Date referred to PT: 9/25/17    General Precautions: Standard, fall  Orthopedic Precautions: N/A   Braces: N/A       Do you have any cultural, spiritual, Baptist conflicts, given your current situation?: None stated     Patient History:  Lives With: spouse, child(lew), dependent  Living Arrangements: house  Home Accessibility: stairs to enter home  Home Layout: Able to live on 1st floor  Number of Stairs to Enter Home: 3  Stair Railings at Home: outside, present on right side  Transportation Available: family or friend will provide  Living Environment Comment: Pt reports living with wife and children (18 yo and twin 12 yo girls) in [facility] with 3 LOLY; R handrail. PTA, pt was (I) with mobility, ADLs, and driving. Pt does not work since B hip replacements (Nov 2015 and Mid 2016). Pt has good support upon DC.   Equipment Currently Used at Home: none    Previous Level of Function:  Ambulation Skills: independent  Transfer Skills: independent  ADL Skills: independent  Work/Leisure Activity: independent    Subjective:  Communicated with RN prior to session.  Pt agreeable to PT session   Chief Complaint: none stated   Patient goals: maintain mobility and return home     Pain/Comfort  Pain Rating 1: 0/10  Pain Rating Post-Intervention 1:  0/10      Objective:   Patient found with: peripheral IV     Cognitive Exam:  Oriented to: Person, Place, Time and Situation    Follows Commands/attention: Follows multistep  commands  Communication: clear/fluent  Safety awareness/insight to disability: intact    Physical Exam:  Postural examination/scapula alignment: No postural abnormalities identified    Skin integrity: Visible skin intact  Edema: None noted in BLE    Sensation:   Intact    Lower Extremity Range of Motion:  Right Lower Extremity: WFL  Left Lower Extremity: WFL    Lower Extremity Strength:  Right Lower Extremity: WFL  Left Lower Extremity: WFL     Fine motor coordination:  Intact    Gross motor coordination: WFL    Functional Mobility:  Bed Mobility: Pt found UIC     Transfers:  Sit <> Stand Assistance: Independent  Sit <> Stand Assistive Device: No Assistive Device    Gait:   Gait Distance: 400'   Assistance 1: Independent  Gait Assistive Device: No device  Gait Pattern: reciprocal  Gait Deviation(s):  (No deficits)    Stairs:  Pt ascended/descend 3 stair(s) with No Assistive Device with left with Modified Independent.     Balance:   Static Sit: NORMAL: No deviations seen in posture held statically  Dynamic Sit: NORMAL: No deviations seen in posture held dynamically  Static Stand: NORMAL: No deviations seen in posture held statically  Dynamic stand: NORMAL: No deviations seen in posture held dynamically    Therapeutic Activities and Exercises:  Education on neutropenic/chemotherapy precautions (don on mask and gloves when ambulating in hallway). Pt verbalized precautions and demonstrated appropriately.  Pt educated pt on incr OOB activity including sitting in bedside chair majority of day and ambulate in hallway 3x/day with family in order to maintain strength and endurance.     AM-PAC 6 CLICK MOBILITY  How much help from another person does this patient currently need?   1 = Unable, Total/Dependent Assistance  2 = A lot, Maximum/Moderate  Assistance  3 = A little, Minimum/Contact Guard/Supervision  4 = None, Modified [place]/Independent    Turning over in bed (including adjusting bedclothes, sheets and blankets)?: 4  Sitting down on and standing up from a chair with arms (e.g., wheelchair, bedside commode, etc.): 4  Moving from lying on back to sitting on the side of the bed?: 4  Moving to and from a bed to a chair (including a wheelchair)?: 4  Need to walk in hospital room?: 4  Climbing 3-5 steps with a railing?: 4  Total Score: 24     AM-PAC Raw Score CMS G-Code Modifier Level of Impairment Assistance   6 % Total / Unable   7 - 9 CM 80 - 100% Maximal Assist   10 - 14 CL 60 - 80% Moderate Assist   15 - 19 CK 40 - 60% Moderate Assist   20 - 22 CJ 20 - 40% Minimal Assist   23 CI 1-20% SBA / CGA   24 CH 0% Independent/ Mod I     Patient left up in chair with all lines intact and RN and family present.    Assessment:   Arik Bobo is a 52 y.o. male with a medical diagnosis of Stem cell transplant candidate. Pt tolerated session well and required no assistance with any aspect of mobility. No significant issue with mobility was noted throughout session. Acute PT will continue to assess pt weekly for possible signs of functional decline. No equipment or further PT services needed upon discharge from acute PT.    Rehab identified problem list/impairments: Rehab identified problem list/impairments:  (No deficits on this date)    Rehab potential is good.    Activity tolerance: Good    Discharge recommendations: Discharge Facility/Level Of Care Needs: home     Barriers to discharge: Barriers to Discharge: None    Equipment recommendations: Equipment Needed After Discharge: none     GOALS:    Physical Therapy Goals        Problem: Physical Therapy Goal    Goal Priority Disciplines Outcome Goal Variances Interventions   Physical Therapy Goal     PT/OT, PT Ongoing (interventions implemented as appropriate)     Description:  Goals to be met  by: 10/26/17     Patient will increase functional independence with mobility by performin. Sit to stand transfer with [place]  2. Bed to chair transfer with [place]   3. Gait  x 500 feet with [place]   4. Ascend/descend 3 stair with right Handrails Modified [place]                    PLAN:    Patient to be seen 1 x/week to address the above listed problems via gait training, therapeutic activities, therapeutic exercises  Plan of Care expires: 10/26/17  Plan of Care reviewed with: patient    Luana Delatorre, PT  2017

## 2017-09-26 NOTE — SUBJECTIVE & OBJECTIVE
Subjective:     Interval History:   - day -1 HD Melphalan for Multiple Myeloma followed by autologous stem cell transplant  - Complained of stuffy nose and chills, but VSS and afebrile.     Objective:     Vital Signs (Most Recent):  Temp: 99 °F (37.2 °C) (09/26/17 1131)  Pulse: 67 (09/26/17 1131)  Resp: 18 (09/26/17 1131)  BP: 131/72 (09/26/17 1131)  SpO2: 95 % (09/26/17 1131) Vital Signs (24h Range):  Temp:  [98.1 °F (36.7 °C)-99.7 °F (37.6 °C)] 99 °F (37.2 °C)  Pulse:  [62-76] 67  Resp:  [16-18] 18  SpO2:  [95 %-96 %] 95 %  BP: (131-178)/(72-96) 131/72     Weight: 117.6 kg (259 lb 4.2 oz)  Body mass index is 39.42 kg/m².  Body surface area is 2.38 meters squared.    ECOG SCORE         [unfilled]    Intake/Output - Last 3 Shifts       09/24 0700 - 09/25 0659 09/25 0700 - 09/26 0659 09/26 0700 - 09/27 0659    P.O.  2700     I.V. (mL/kg)  794.2 (6.8) 554 (4.7)    Total Intake(mL/kg)  3494.2 (29.7) 554 (4.7)    Urine (mL/kg/hr)  2650     Total Output   2650      Net   +844.2 +554                 Physical Exam   Constitutional: He is oriented to person, place, and time. He appears well-developed and well-nourished. No distress.   HENT:   Head: Normocephalic.   Right Ear: External ear normal.   Left Ear: External ear normal.   Nose: Nose normal.   Mouth/Throat: Oropharynx is clear and moist and mucous membranes are normal. No oropharyngeal exudate.   Eyes: Conjunctivae and EOM are normal. Pupils are equal, round, and reactive to light. No scleral icterus.   Cardiovascular: Normal rate, regular rhythm and normal heart sounds.    Pulmonary/Chest: Effort normal and breath sounds normal.   Abdominal: Soft. Normal appearance and bowel sounds are normal. He exhibits no distension. There is no tenderness.   Musculoskeletal: He exhibits no edema.   Neurological: He is alert and oriented to person, place, and time.   Skin: Skin is warm, dry and intact. No cyanosis. Nails show no clubbing.   Left chest vas cath in place no  redness, tenderness of drainage   Psychiatric: He has a normal mood and affect. His behavior is normal. Thought content normal. His mood appears not anxious.   Vitals reviewed.      Significant Labs:   CBC:   Recent Labs  Lab 09/25/17  1115 09/26/17  0347   WBC 5.77 4.08   HGB 11.2* 10.7*   HCT 34.0* 32.2*   PLT 93* 92*   , CMP:   Recent Labs  Lab 09/25/17  1115 09/26/17  0347    139   K 3.9 3.8    107   CO2 24 25   * 91   BUN 14 12   CREATININE 0.9 0.8   CALCIUM 8.8 8.2*   PROT 6.2 5.8*   ALBUMIN 3.3* 3.1*   BILITOT 0.4 0.4   ALKPHOS 178* 148*   AST 19 18   ALT 19 17   ANIONGAP 8 7*   EGFRNONAA >60.0 >60.0   ,   Recent Lab Results       09/26/17 0347      Albumin 3.1(L)     Alkaline Phosphatase 148(H)     ALT 17     Anion Gap 7(L)     AST 18     Baso # 0.01     Basophil% 0.2     Total Bilirubin 0.4  Comment:  For infants and newborns, interpretation of results should be based  on gestational age, weight and in agreement with clinical  observations.  Premature Infant recommended reference ranges:  Up to 24 hours.............<8.0 mg/dL  Up to 48 hours............<12.0 mg/dL  3-5 days..................<15.0 mg/dL  6-29 days.................<15.0 mg/dL       BUN, Bld 12     Calcium 8.2(L)     Chloride 107     CO2 25     Creatinine 0.8     Differential Method Automated     eGFR if African American >60.0     eGFR if non  >60.0  Comment:  Calculation used to obtain the estimated glomerular filtration  rate (eGFR) is the CKD-EPI equation. Since race is unknown   in our information system, the eGFR values for   -American and Non--American patients are given   for each creatinine result.       Eos # 0.1     Eosinophil% 2.7     Glucose 91     Gran # 2.8     Gran% 67.6     Group & Rh A NEG     Hematocrit 32.2(L)     Hemoglobin 10.7(L)     INDIRECT DONAVON NEG     Lymph # 0.5(L)     Lymph% 12.5(L)     Magnesium 1.9     MCH 30.4     MCHC 33.2     MCV 92     Mono # 0.6     Mono%  15.0     MPV 10.2     Phosphorus 3.3     Platelets 92(L)     Potassium 3.8     Total Protein 5.8(L)     RBC 3.52(L)     RDW 13.6     Sodium 139     WBC 4.08        and All pertinent labs from the last 24 hours have been reviewed.    Diagnostic Results:  None

## 2017-09-26 NOTE — PROGRESS NOTES
Admit Assessment    Patient Identification  Arik Bobo   :  1965  Admit Date:  2017  Attending Provider:  Sandy Smith MD              Referral:   Pt was admitted to  with a diagnosis of multiple myeloma, and was admitted this hospital stay due to Multiple myeloma [C90.00].   is involved was referred to the Social Work Department via routine referral.  Patient presents as a 52 y.o.   male.    Persons interviewed: patient, sister (Talia) and brother (Nasim)    Living Situation: pt. Currently resides at home with his wife (Anna [phone]) and 4 children. He states that he has been independent with all adl's prior to admission. He also lists his sister Talia ([phone]) as emergency contact.    Lives With: spouse, child(lew), dependent Resides at [address] [place] [address] [place] [address], phone: [phone] (home).      Functional Status Prior  Ambulation: 0-->independent  Transferrin-->independent  Toiletin-->independent  Bathin-->independent  Dressin-->independent  Eatin-->independent  Communication: 0-->understands/communicates without difficulty  Swallowin-->swallows foods/liquids without difficulty  Prior Functional Level Comment: 0    Current or Past Agencies and Description of Services/Supplies    DME  Agency Name: none  Agency Phone Number: n/a  Equipment Currently Used at Home: none    Home Health  Agency Name: none  Agency Phone Number: n/a  Services: n/a    IV Infusion  Agency Name: none  Agency Phone Number: n/a    Nutrition: oral    Outpatient Pharmacy:     [facility] - [facility] [place] - [address]  [address]  [address]  [place] [address]  Phone: [phone] Fax: [phone]      Patient Preference of agencies include: none noted    Patient/Caregiver informed of right to choose providers or agencies.  Patient provides permission to release any necessary information to [facility]  and to Non-[facility] agencies as needed to facilitate patient care, treatment planning, and patient discharge planning.  Written and verbal resources provided.      Coping: pt. States that he is doing well. He is very optimistic about transplant and has a very supportive family.           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 Ins. ID Effective Group Num   1. BLUE CROSS BL* JOHN MONTALVO 1969 Female  [id number]                                   [address] [address]   2. MEDICARE - [place]* FERNANDO MONTALVO STEFANIE* 1965 Male  [id number]                                    [address]                            Other identified concerns/needs: none noted    Plan: to [facility] or [facility] for [facility] post transplant    Interventions/Referrals: TBD  Patient/caregiver engaged in treatment planning process.     providing psychosocial and supportive counseling, resources, education, assistance and discharge planning as appropriate.  Patient/caregiver state understanding of  available resources,  following, remains available. Provided pt. With 'er phone # and encouraged him/family to call if needed. Will follow.

## 2017-09-26 NOTE — PLAN OF CARE
Problem: Physical Therapy Goal  Goal: Physical Therapy Goal  Goals to be met by: 10/26/17     Patient will increase functional independence with mobility by performin. Sit to stand transfer with [place]  2. Bed to chair transfer with [place]   3. Gait  x 500 feet with [place]   4. Ascend/descend 3 stair with right Handrails Modified [place]  Outcome: Ongoing (interventions implemented as appropriate)  Pt tolerated session well and required no assistance with any aspect of mobility. No significant issue with mobility was noted throughout session. Acute PT will continue to assess pt weekly for possible signs of functional decline. No equipment or further PT services needed upon discharge from acute PT.    Luana Delatorre DPT, PT  2017

## 2017-09-26 NOTE — NURSING
Melphalan 475mg in 500cc NS administered to run over 30 minutes per chemo protocol to chest wall vascath.  Excellent blood return noted.  Chemotherapy consent on chart. Drug, 2 pt identifiers, BSA and chemo calculation checked with second certified RN. Instructed pt to call with any problems/discomfort. Verbalized understanding. Call light within reach.  Chemotherapy precautions maintained.

## 2017-09-26 NOTE — PROGRESS NOTES
[facility]  Adult Nutrition  Consult Note    SUMMARY     Recommendations    Recommendation/Intervention:   1. RD to monitor pt after SCT for symptoms of diet intolerance & provide further recs prn  2. Encourage adequate meal intake daily  3. Diet educ completed re: post-SCT food safety  Goals: Pt will continue to meet at least 75% of nutritional needs PO  Nutrition Goal Status: new  Communication of RD Recs: reviewed with RN    Reason for Assessment    Reason for Assessment: physician consult  Diagnosis: SCT candidate (multiple myeloma in remission)  Relevent Medical History: HTN, high cholesterol or triglycerides, multiple myeloma, depression   Interdisciplinary Rounds: did not attend     General Information Comments: Pt was in good spirits; reported good appetite and no recent weight loss; was given information pertaining to nutrition and food safety for after SCT    Nutrition Discharge Planning: Pt will meet at least 75% of nutritional needs PO    Nutrition Prescription Ordered    Current Diet Order: Regular    Evaluation of Received Nutrients/Fluid Intake      Intake/Output Summary (Last 24 hours) at 09/26/17 1322  Last data filed at 09/26/17 1135   Gross per 24 hour   Intake           6507.2 ml   Output             3650 ml   Net           2857.2 ml     I/O: reviewed      Comments: LBM 9/25     % Intake of Estimated Energy Needs: 75 - 100 %  % Meal Intake: 100%     Nutrition Risk Screen     Nutrition Risk Screen: no indicators present    Nutrition/Diet History    Patient Reported Diet/Restrictions/Preferences: general  Typical Food/Fluid Intake: FRANCHESKA  Food Preferences: No cultural or relgious preferences reported    Labs/Tests/Procedures/Meds    Pertinent Labs Reviewed: reviewed  Pertinent Labs Comments: H/H 10.7/32.2, Platelets 92, Ca 8.2, Albumin 3.1  Pertinent Medications Reviewed: reviewed  Pertinent Medications Comments: amlodipine, Enoxaparin, pantoprazole, saliva  "substitute    Physical Findings    Overall Physical Appearance: nourished, overweight     Oral/Mouth Cavity:  (FRANCHESKA)  Skin: intact    Anthropometrics    Temp: 99 °F (37.2 °C)     Height: 5' 8" (172.7 cm)  Weight Method: Standard Scale  Weight: 117.6 kg (259 lb 4.2 oz)  Ideal Body Weight (IBW), Male: 154 lb     % Ideal Body Weight, Male (lb): 168.56 lb     BMI (Calculated): 39.6  BMI Grade: 35 - 39.9 - obesity - grade II    Estimated/Assessed Needs    Weight Used For Calorie Calculations: 117.6 kg (259 lb 4.2 oz)   Height (cm): 172.7 cm  Energy Calorie Requirements (kcal): 2495  Energy Need Method: [place]-St Jeor (x 1.25)        RMR ([place] Clair-St. Jeor Equation): 1995.5        Weight Used For Protein Calculations: 117.6 kg (259 lb 4.2 oz)  Protein Requirements: 118-141 g (1.0-1.2)    Fluid Requirements (mL): 20-25 ml/kg  Fluid Need Method: RDA Method (1 ml/kcal or per MD)        RDA Method (mL): 2495    Assessment and Plan  No nutrition-related diagnosis present at this time.    Monitor and Evaluation    Food and Nutrient Intake: energy intake, food and beverage intake  Food and Nutrient Adminstration: diet order     Physical Activity and Function: nutrition-related ADLs and IADLs  Anthropometric Measurements: weight, weight change, body mass index  Biochemical Data, Medical Tests and Procedures:  (All labs)  Nutrition-Focused Physical Findings: overall appearance    Nutrition Risk    Level of Risk:  (RD f/u 1x wk)    Nutrition Follow-Up    RD Follow-up?: Yes      "

## 2017-09-26 NOTE — PROGRESS NOTES
[facility]  Hematology  Bone Marrow Transplant  Progress Note    Patient Name: Arik Bobo  Admission Date: 9/25/2017  Hospital Length of Stay: 1 days  Code Status: Full Code    Subjective:     Interval History:   - day -1 HD Melphalan for Multiple Myeloma followed by autologous stem cell transplant  - Complained of stuffy nose and chills, but VSS and afebrile.     Objective:     Vital Signs (Most Recent):  Temp: 99 °F (37.2 °C) (09/26/17 1131)  Pulse: 67 (09/26/17 1131)  Resp: 18 (09/26/17 1131)  BP: 131/72 (09/26/17 1131)  SpO2: 95 % (09/26/17 1131) Vital Signs (24h Range):  Temp:  [98.1 °F (36.7 °C)-99.7 °F (37.6 °C)] 99 °F (37.2 °C)  Pulse:  [62-76] 67  Resp:  [16-18] 18  SpO2:  [95 %-96 %] 95 %  BP: (131-178)/(72-96) 131/72     Weight: 117.6 kg (259 lb 4.2 oz)  Body mass index is 39.42 kg/m².  Body surface area is 2.38 meters squared.    ECOG SCORE         [unfilled]    Intake/Output - Last 3 Shifts       09/24 0700 - 09/25 0659 09/25 0700 - 09/26 0659 09/26 0700 - 09/27 0659    P.O.  2700     I.V. (mL/kg)  794.2 (6.8) 554 (4.7)    Total Intake(mL/kg)  3494.2 (29.7) 554 (4.7)    Urine (mL/kg/hr)  2650     Total Output   2650      Net   +844.2 +554                 Physical Exam   Constitutional: He is oriented to person, place, and time. He appears well-developed and well-nourished. No distress.   HENT:   Head: Normocephalic.   Right Ear: External ear normal.   Left Ear: External ear normal.   Nose: Nose normal.   Mouth/Throat: Oropharynx is clear and moist and mucous membranes are normal. No oropharyngeal exudate.   Eyes: Conjunctivae and EOM are normal. Pupils are equal, round, and reactive to light. No scleral icterus.   Cardiovascular: Normal rate, regular rhythm and normal heart sounds.    Pulmonary/Chest: Effort normal and breath sounds normal.   Abdominal: Soft. Normal appearance and bowel sounds are normal. He exhibits no distension. There is no tenderness.   Musculoskeletal: He  exhibits no edema.   Neurological: He is alert and oriented to person, place, and time.   Skin: Skin is warm, dry and intact. No cyanosis. Nails show no clubbing.   Left chest vas cath in place no redness, tenderness of drainage   Psychiatric: He has a normal mood and affect. His behavior is normal. Thought content normal. His mood appears not anxious.   Vitals reviewed.      Significant Labs:   CBC:   Recent Labs  Lab 09/25/17  1115 09/26/17  0347   WBC 5.77 4.08   HGB 11.2* 10.7*   HCT 34.0* 32.2*   PLT 93* 92*   , CMP:   Recent Labs  Lab 09/25/17  1115 09/26/17  0347    139   K 3.9 3.8    107   CO2 24 25   * 91   BUN 14 12   CREATININE 0.9 0.8   CALCIUM 8.8 8.2*   PROT 6.2 5.8*   ALBUMIN 3.3* 3.1*   BILITOT 0.4 0.4   ALKPHOS 178* 148*   AST 19 18   ALT 19 17   ANIONGAP 8 7*   EGFRNONAA >60.0 >60.0   ,   Recent Lab Results       09/26/17  0347      Albumin 3.1(L)     Alkaline Phosphatase 148(H)     ALT 17     Anion Gap 7(L)     AST 18     Baso # 0.01     Basophil% 0.2     Total Bilirubin 0.4  Comment:  For infants and newborns, interpretation of results should be based  on gestational age, weight and in agreement with clinical  observations.  Premature Infant recommended reference ranges:  Up to 24 hours.............<8.0 mg/dL  Up to 48 hours............<12.0 mg/dL  3-5 days..................<15.0 mg/dL  6-29 days.................<15.0 mg/dL       BUN, Bld 12     Calcium 8.2(L)     Chloride 107     CO2 25     Creatinine 0.8     Differential Method Automated     eGFR if African American >60.0     eGFR if non  >60.0  Comment:  Calculation used to obtain the estimated glomerular filtration  rate (eGFR) is the CKD-EPI equation. Since race is unknown   in our information system, the eGFR values for   -American and Non--American patients are given   for each creatinine result.       Eos # 0.1     Eosinophil% 2.7     Glucose 91     Gran # 2.8     Gran% 67.6     Group & Rh  A NEG     Hematocrit 32.2(L)     Hemoglobin 10.7(L)     INDIRECT DONAVON NEG     Lymph # 0.5(L)     Lymph% 12.5(L)     Magnesium 1.9     MCH 30.4     MCHC 33.2     MCV 92     Mono # 0.6     Mono% 15.0     MPV 10.2     Phosphorus 3.3     Platelets 92(L)     Potassium 3.8     Total Protein 5.8(L)     RBC 3.52(L)     RDW 13.6     Sodium 139     WBC 4.08        and All pertinent labs from the last 24 hours have been reviewed.    Diagnostic Results:  None    Assessment/Plan:     * Stem cell transplant candidate    - Today is day -1   - Melphalan to be given on 9/26/17  - Auto SCT to be done on day 0 (9/27/17); to receive 5 bags with a CD 34 count of 4.71 x 10^6/kg   - ppx antimicrobials to start per protocol  - Neupogen to start day + 3           Multiple myeloma in remission    - Completed 4 cycles of CyBorD   - Was treated with lenalidomide and dex  - Restaging marrow 8/21/17 showed a 10-50% cellular marrow with trilineage hematopoiesis and 5% residual  kappa-restricted plasma cells (6% by morphology).  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  Hence, he has achieved a partial remission (PR1) and is ready to proceed with transplant.    65% EF on 8/2/17   - Possible need to consider an allogeneic transplant as a cure in future as pt is young. This is not an immediate consideration but since he has 4 siblings there is a 68% chance of finding a sibling match.         Chemotherapy-induced thrombocytopenia    - platelets 92,000  - transfuse for platelets < 10,000         Anemia due to chemotherapy    - hemoglobin 10.7 grams  - transfuse for hemoglobin <7         History of gout    Pt suffers from gout and is not currently on uric acid suppression medication.  This may have contributed to his hip degeneration.        Depression    - Continue home Celexa         Hyperlipemia    - Will hold statin with admission and may resume at discharge        Hypertension, essential    - Continue home amlodipine, bystolic and lisinopril             VTE  Risk Mitigation         Ordered     enoxaparin injection 40 mg  Daily     Route:  Subcutaneous        09/25/17 1257     High Risk of VTE  Once      09/25/17 1257          Disposition: Pending stem cell transplant and count recovery.     Kassidy Barba NP  Bone Marrow Transplant  [facility]

## 2017-09-26 NOTE — PLAN OF CARE
Problem: Patient Care Overview  Goal: Plan of Care Review  Outcome: Ongoing (interventions implemented as appropriate)  Day -1 of an Auto Tx. Afebrile. Free from falls or injury. No complaints of pain. NS20K infusing at 150ml/hr.  Bed locked in lowest position, non skid socks on, call light within reach. Pt instructed to call if any assistance is needed. Vitals stable.  Will cont to bety pt.

## 2017-09-26 NOTE — PLAN OF CARE
MDR's with Dr Buck.  Patient with a hx of MM, is now admitted for a melphalan autoSCT.  Transplant is planned for tomorrow.  D/c pending count recovery.  Will continue to follow.  D/c plan:  Concepcion White

## 2017-09-26 NOTE — PLAN OF CARE
Problem: Patient Care Overview  Goal: Plan of Care Review  Recommendations     Recommendation/Intervention:   1. RD to monitor pt after SCT for symptoms of diet intolerance & provide further recs prn  2. Encourage adequate meal intake daily  3. Diet educ completed re: post-SCT food safety  Goals: Pt will continue to meet at least 75% of nutritional needs PO  Nutrition Goal Status: new  Communication of RD Recs: reviewed with RN

## 2017-09-26 NOTE — PLAN OF CARE
Problem: Patient Care Overview  Goal: Plan of Care Review  Patient remains free of fall or injury this shift.  No complaints of pain, N/V or diarrhea.  Tolerated Melphalan without any adverse effects.  Maintains IVF as well as good oral intake.  Understands plan of care and is ready for stem cell transplant tomorrow.  No questions at this time.

## 2017-09-27 PROBLEM — D69.59 CHEMOTHERAPY-INDUCED THROMBOCYTOPENIA: Status: RESOLVED | Noted: 2017-09-26 | Resolved: 2017-09-27

## 2017-09-27 PROBLEM — T45.1X5A CHEMOTHERAPY-INDUCED THROMBOCYTOPENIA: Status: RESOLVED | Noted: 2017-09-26 | Resolved: 2017-09-27

## 2017-09-27 PROBLEM — D61.810 PANCYTOPENIA DUE TO CHEMOTHERAPY: Status: ACTIVE | Noted: 2017-09-27

## 2017-09-27 PROBLEM — D64.81 ANEMIA DUE TO CHEMOTHERAPY: Status: RESOLVED | Noted: 2017-09-26 | Resolved: 2017-09-27

## 2017-09-27 PROBLEM — T45.1X5A ANEMIA DUE TO CHEMOTHERAPY: Status: RESOLVED | Noted: 2017-09-26 | Resolved: 2017-09-27

## 2017-09-27 LAB
ALBUMIN SERPL BCP-MCNC: 3.1 G/DL
ALP SERPL-CCNC: 136 U/L
ALT SERPL W/O P-5'-P-CCNC: 15 U/L
ANION GAP SERPL CALC-SCNC: 7 MMOL/L
ANISOCYTOSIS BLD QL SMEAR: SLIGHT
AST SERPL-CCNC: 16 U/L
BASOPHILS NFR BLD: 0 %
BILIRUB SERPL-MCNC: 0.4 MG/DL
BLD PROD TYP BPU: NORMAL
BLOOD UNIT EXPIRATION DATE: NORMAL
BLOOD UNIT TYPE CODE: 600
BLOOD UNIT TYPE: NORMAL
BUN SERPL-MCNC: 14 MG/DL
CALCIUM SERPL-MCNC: 8.1 MG/DL
CHLORIDE SERPL-SCNC: 109 MMOL/L
CO2 SERPL-SCNC: 23 MMOL/L
CODING SYSTEM: NORMAL
CREAT SERPL-MCNC: 0.8 MG/DL
DIFFERENTIAL METHOD: ABNORMAL
DISPENSE STATUS: NORMAL
EOSINOPHIL NFR BLD: 0 %
ERYTHROCYTE [DISTWIDTH] IN BLOOD BY AUTOMATED COUNT: 13.5 %
EST. GFR  (AFRICAN AMERICAN): >60 ML/MIN/1.73 M^2
EST. GFR  (NON AFRICAN AMERICAN): >60 ML/MIN/1.73 M^2
GLUCOSE SERPL-MCNC: 119 MG/DL
HCT VFR BLD AUTO: 31.7 %
HGB BLD-MCNC: 10.5 G/DL
HYPOCHROMIA BLD QL SMEAR: ABNORMAL
LYMPHOCYTES NFR BLD: 24 %
MAGNESIUM SERPL-MCNC: 2 MG/DL
MCH RBC QN AUTO: 30 PG
MCHC RBC AUTO-ENTMCNC: 33.1 G/DL
MCV RBC AUTO: 91 FL
MONOCYTES NFR BLD: 10 %
NEUTROPHILS NFR BLD: 66 %
PHOSPHATE SERPL-MCNC: 3 MG/DL
PLATELET # BLD AUTO: 113 K/UL
PLATELET BLD QL SMEAR: ABNORMAL
PMV BLD AUTO: 9.9 FL
POIKILOCYTOSIS BLD QL SMEAR: ABNORMAL
POLYCHROMASIA BLD QL SMEAR: ABNORMAL
POTASSIUM SERPL-SCNC: 4 MMOL/L
PROT SERPL-MCNC: 6 G/DL
RBC # BLD AUTO: 3.5 M/UL
SODIUM SERPL-SCNC: 139 MMOL/L
UNIT NUMBER: NORMAL
WBC # BLD AUTO: 2.43 K/UL

## 2017-09-27 PROCEDURE — A9155 ARTIFICIAL SALIVA: HCPCS | Performed by: NURSE PRACTITIONER

## 2017-09-27 PROCEDURE — 99233 SBSQ HOSP IP/OBS HIGH 50: CPT | Mod: ICN,,, | Performed by: INTERNAL MEDICINE

## 2017-09-27 PROCEDURE — 63600175 PHARM REV CODE 636 W HCPCS: Performed by: NURSE PRACTITIONER

## 2017-09-27 PROCEDURE — 85007 BL SMEAR W/DIFF WBC COUNT: CPT

## 2017-09-27 PROCEDURE — 38208 THAW PRESERVED STEM CELLS: CPT

## 2017-09-27 PROCEDURE — 85027 COMPLETE CBC AUTOMATED: CPT

## 2017-09-27 PROCEDURE — 30243Y0 TRANSFUSION OF AUTOLOGOUS HEMATOPOIETIC STEM CELLS INTO CENTRAL VEIN, PERCUTANEOUS APPROACH: ICD-10-PCS | Performed by: INTERNAL MEDICINE

## 2017-09-27 PROCEDURE — 97165 OT EVAL LOW COMPLEX 30 MIN: CPT

## 2017-09-27 PROCEDURE — 25000003 PHARM REV CODE 250: Performed by: NURSE PRACTITIONER

## 2017-09-27 PROCEDURE — 38241 TRANSPLT AUTOL HCT/DONOR: CPT

## 2017-09-27 PROCEDURE — 84100 ASSAY OF PHOSPHORUS: CPT

## 2017-09-27 PROCEDURE — 83735 ASSAY OF MAGNESIUM: CPT

## 2017-09-27 PROCEDURE — 20600001 HC STEP DOWN PRIVATE ROOM

## 2017-09-27 PROCEDURE — 80053 COMPREHEN METABOLIC PANEL: CPT

## 2017-09-27 RX ADMIN — ACYCLOVIR 800 MG: 200 CAPSULE ORAL at 08:09

## 2017-09-27 RX ADMIN — SODIUM CHLORIDE AND POTASSIUM CHLORIDE: 9; 1.49 INJECTION, SOLUTION INTRAVENOUS at 11:09

## 2017-09-27 RX ADMIN — CITALOPRAM HYDROBROMIDE 40 MG: 20 TABLET ORAL at 08:09

## 2017-09-27 RX ADMIN — Medication 30 ML: at 06:09

## 2017-09-27 RX ADMIN — Medication 30 ML: at 12:09

## 2017-09-27 RX ADMIN — AMLODIPINE BESYLATE 5 MG: 5 TABLET ORAL at 05:09

## 2017-09-27 RX ADMIN — CIPROFLOXACIN HYDROCHLORIDE 500 MG: 500 TABLET, FILM COATED ORAL at 08:09

## 2017-09-27 RX ADMIN — FLUCONAZOLE 400 MG: 200 TABLET ORAL at 08:09

## 2017-09-27 RX ADMIN — HYDROCORTISONE SODIUM SUCCINATE 250 MG: 100 INJECTION, POWDER, FOR SOLUTION INTRAMUSCULAR; INTRAVENOUS at 01:09

## 2017-09-27 RX ADMIN — DIPHENHYDRAMINE HYDROCHLORIDE 50 MG: 50 INJECTION, SOLUTION INTRAMUSCULAR; INTRAVENOUS at 01:09

## 2017-09-27 RX ADMIN — HEPARIN SODIUM 1600 UNITS: 1000 INJECTION, SOLUTION INTRAVENOUS; SUBCUTANEOUS at 03:09

## 2017-09-27 RX ADMIN — SODIUM CHLORIDE AND POTASSIUM CHLORIDE: 9; 1.49 INJECTION, SOLUTION INTRAVENOUS at 08:09

## 2017-09-27 RX ADMIN — ENOXAPARIN SODIUM 40 MG: 100 INJECTION SUBCUTANEOUS at 05:09

## 2017-09-27 RX ADMIN — PANTOPRAZOLE SODIUM 40 MG: 40 TABLET, DELAYED RELEASE ORAL at 08:09

## 2017-09-27 RX ADMIN — LORAZEPAM 1 MG: 2 INJECTION, SOLUTION INTRAMUSCULAR; INTRAVENOUS at 01:09

## 2017-09-27 RX ADMIN — Medication 30 ML: at 05:09

## 2017-09-27 RX ADMIN — SODIUM CHLORIDE AND POTASSIUM CHLORIDE: 9; 1.49 INJECTION, SOLUTION INTRAVENOUS at 06:09

## 2017-09-27 NOTE — ASSESSMENT & PLAN NOTE
- WBC 2.43 k/uL  - Hemoglobin 10.5 grams  - PLatelets 113,000  - Transfuse for hemoglobin <7 and platelets <10,000

## 2017-09-27 NOTE — SUBJECTIVE & OBJECTIVE
Subjective:     Interval History:   - Day 0 of high dose melphalan followed by autologous stem cell transplant for multiple myeloma  - No acute issues overnight. VSS. Afebrile.     Objective:     Vital Signs (Most Recent):  Temp: 98.5 °F (36.9 °C) (09/27/17 0803)  Pulse: 60 (09/27/17 0803)  Resp: 16 (09/27/17 0803)  BP: (!) 165/87 (09/27/17 0803)  SpO2: (!) 94 % (09/27/17 0803) Vital Signs (24h Range):  Temp:  [97.5 °F (36.4 °C)-99 °F (37.2 °C)] 98.5 °F (36.9 °C)  Pulse:  [55-69] 60  Resp:  [16-18] 16  SpO2:  [94 %-96 %] 94 %  BP: (131-165)/(72-87) 165/87     Weight: 118.6 kg (261 lb 5.7 oz)  Body mass index is 39.74 kg/m².  Body surface area is 2.39 meters squared.    ECOG SCORE         [unfilled]    Intake/Output - Last 3 Shifts       09/25 0700 - 09/26 0659 09/26 0700 - 09/27 0659 09/27 0700 - 09/28 0659    P.O. 2700 4320     I.V. (mL/kg) 794.2 (6.8) 3809 (32.1)     IV Piggyback  500     Total Intake(mL/kg) 3494.2 (29.7) 8629 (72.8)     Urine (mL/kg/hr) 2650 7210 (2.5)     Stool  0 (0)     Total Output 2650 7210      Net +844.2 +1419             Stool Occurrence  2 x           Physical Exam   Constitutional: He is oriented to person, place, and time. He appears well-developed and well-nourished. No distress.   HENT:   Head: Normocephalic.   Right Ear: External ear normal.   Left Ear: External ear normal.   Nose: Nose normal.   Mouth/Throat: Oropharynx is clear and moist and mucous membranes are normal. No oropharyngeal exudate.   Eyes: Conjunctivae and EOM are normal. Pupils are equal, round, and reactive to light. No scleral icterus.   Neck: Neck supple.   Cardiovascular: Normal rate, regular rhythm and normal heart sounds.    Pulmonary/Chest: Effort normal and breath sounds normal.   Abdominal: Soft. Normal appearance and bowel sounds are normal. He exhibits no distension. There is no tenderness.   Musculoskeletal: He exhibits no edema.   Lymphadenopathy:     He has no cervical adenopathy.   Neurological: He is  alert and oriented to person, place, and time.   Skin: Skin is warm, dry and intact. No cyanosis. Nails show no clubbing.   Left chest wall vas cath in place with no redness, tenderness or drainage noted   Psychiatric: He has a normal mood and affect. His behavior is normal. Thought content normal. His mood appears not anxious.   Nursing note and vitals reviewed.      Significant Labs:   CBC:   Recent Labs  Lab 09/25/17  1115 09/26/17  0347 09/27/17  0245   WBC 5.77 4.08 2.43*   HGB 11.2* 10.7* 10.5*   HCT 34.0* 32.2* 31.7*   PLT 93* 92* 113*    and CMP:   Recent Labs  Lab 09/25/17  1115 09/26/17  0347 09/27/17  0245    139 139   K 3.9 3.8 4.0    107 109   CO2 24 25 23   * 91 119*   BUN 14 12 14   CREATININE 0.9 0.8 0.8   CALCIUM 8.8 8.2* 8.1*   PROT 6.2 5.8* 6.0   ALBUMIN 3.3* 3.1* 3.1*   BILITOT 0.4 0.4 0.4   ALKPHOS 178* 148* 136*   AST 19 18 16   ALT 19 17 15   ANIONGAP 8 7* 7*   EGFRNONAA >60.0 >60.0 >60.0       Diagnostic Results:  None

## 2017-09-27 NOTE — PT/OT/SLP EVAL
Occupational Therapy  Evaluation    Arik Bobo   MRN: [id number]   Admitting Diagnosis: Stem cell transplant candidate    OT Date of Treatment: 09/27/17   OT Start Time: 0813  OT Stop Time: 0823  OT Total Time (min): 10 min    Billable Minutes:  Evaluation 10    Diagnosis: Stem cell transplant candidate       Past Medical History:   Diagnosis Date    Anxiety     Arthritis     Depression     History of hip surgery     Hx of psychiatric care     Hyperlipidemia     Hypertension     Multiple myeloma     Psychiatric problem       Past Surgical History:   Procedure Laterality Date    HEMORRHOID SURGERY      TOTAL HIP ARTHROPLASTY         Referring physician: Magdalena Miller NP  Date referred to OT: 9/25/17    General Precautions: Standard, fall  Orthopedic Precautions: N/A  Braces: N/A    Do you have any cultural, spiritual, Evangelical conflicts, given your current situation?: none stated      Patient History:  Living Environment  Lives With: child(lew), dependent, spouse  Living Arrangements: house  Home Accessibility: stairs to enter home  Home Layout: Able to live on 1st floor  Number of Stairs to Enter Home: 3  Stair Railings at Home: outside, present on right side  Transportation Available: family or friend will provide  Living Environment Comment: Pt reports living with wife and children (16 yo and twin 14 yo girls) in [facility] with 3 LOLY; R handrail. PTA, pt was (I) with mobility, ADLs and driving. Pt does not work since B hip replacements ( Nov 2015 and Mid 2016). Pt has good family support upon d/c.   Equipment Currently Used at Home: none (Pt owns BSC, RW, sock aid, and reacher - does not use)    Prior level of function:   Bed Mobility/Transfers: independent  Grooming: independent  Bathing: independent  Upper Body Dressing: independent  Lower Body Dressing: independent  Toileting: independent  Driving License: Yes  Mode of Transportation: Car, Family     Dominant hand:  right    Subjective:  Communicated with RN prior to session.  Pt agreeable to OT eval   Chief Complaint: none stated   Patient/Family stated goals: to go home    Pain/Comfort  Pain Rating 1: 0/10  Pain Rating Post-Intervention 1: 0/10    Objective:  Patient found with: peripheral IV    Cognitive Exam:  Oriented to: Person, Place, Time and Situation  Follows Commands/attention: Follows multistep  commands  Communication: clear/fluent  Memory:  No Deficits noted  Safety awareness/insight to disability: intact  Coping skills/emotional control: Appropriate to situation    Visual/perceptual:  Intact    Physical Exam:  Postural examination/scapula alignment: No postural abnormalities identified  Skin integrity: Visible skin intact  Edema: None noted     Sensation:   Intact    Upper Extremity Range of Motion:  Right Upper Extremity: WFL  Left Upper Extremity: WFL    Upper Extremity Strength:  Right Upper Extremity: WFL  Left Upper Extremity: WFL   Strength: good    Fine motor coordination:   Intact    Gross motor coordination: WFL    Functional Mobility:  Bed Mobility:  Rolling/Turning Right: Independent  Scooting/Bridging: Independent  Supine to Sit: Independent  Sit to Supine: Independent    Transfers:  Sit <> Stand Assistance: Independent  Sit <> Stand Assistive Device: No Assistive Device  Toilet Transfer Technique: Stand Pivot  Toilet Transfer Assistance: Independent    Functional Ambulation: Pt performed functional ambulation within room (I)'d navigating IV pole, no LOB, no SOB.     Activities of Daily Living:    UE Dressing Level of Assistance: Independent (donned pull over shirt )    LE Dressing Level of Assistance: Independent (donned slip on shoes )    Grooming Position: Standing at sink  Grooming Level of Assistance: Independent     Toileting Where Assessed: Toilet  Toileting Level of Assistance: Independent (simulated )     Balance:   Static Sit: NORMAL: No deviations seen in posture held  "statically  Dynamic Sit: NORMAL: No deviations seen in posture held dynamically  Static Stand: NORMAL: No deviations seen in posture held statically  Dynamic stand: NORMAL: No deviations seen in posture held dynamically    Therapeutic Activities and Exercises:  Pt educated by therapist on:   - Pt educated on role of OT, POC, and goals for therapy.     - Pt educated on importance of functional mobility and ambulating throughout the day.   - Pt completed ADLs and functional mobility for treatment session as noted above   -Pt verbalized understanding. Pt expressed no further concerns/questions.  -whiteboard updated    AM-PAC 6 CLICK ADL  How much help from another person does this patient currently need?  1 = Unable, Total/Dependent Assistance  2 = A lot, Maximum/Moderate Assistance  3 = A little, Minimum/Contact Guard/Supervision  4 = None, Modified [place]/Independent    Putting on and taking off regular lower body clothing? : 4  Bathing (including washing, rinsing, drying)?: 4  Toileting, which includes using toilet, bedpan, or urinal? : 4  Putting on and taking off regular upper body clothing?: 4  Taking care of personal grooming such as brushing teeth?: 4  Eating meals?: 4  Total Score: 24    AM-PAC Raw Score CMS "G-Code Modifier Level of Impairment Assistance   6 % Total / Unable   7 - 9 CM 80 - 100% Maximal Assist   10-14 CL 60 - 80% Moderate Assist   15 - 19 CK 40 - 60% Moderate Assist   20 - 22 CJ 20 - 40% Minimal Assist   23 CI 1-20% SBA / CGA   24 CH 0% Independent/ Mod I       Patient left sitting EOB  with all lines intact, call button in reach, RN notified and wife  present    Assessment:  Arik Bobo is a 52 y.o. male with a medical diagnosis of Stem cell transplant candidate.  Pt presented near functional baseline with minimal deficits noted in overall  (I), endurance, stability and safety for ADLs, self-care and functional mobility. Pt will benefit from OT monitoring in order to " ensure maximal (I) and safety for functional tasks while receiving treatment.    Rehab identified problem list/impairments: Rehab identified problem list/impairments:  (no deficts this date)    Rehab potential is good.    Activity tolerance: Good    Discharge recommendations: Discharge Facility/Level Of Care Needs: home     Barriers to discharge: Barriers to Discharge: None    Equipment recommendations: none     GOALS:    Occupational Therapy Goals        Problem: Occupational Therapy Goal    Goal Priority Disciplines Outcome Interventions   Occupational Therapy Goal     OT, PT/OT Ongoing (interventions implemented as appropriate)    Description:  Goals to be met by: 10/11/17     Patient will increase functional independence with ADLs by performing:    UE Dressing with [place].  LE Dressing with [place].  Grooming while standing at sink with [place].  Toileting from toilet with [place] for hygiene and clothing management.   Toilet transfer to toilet with [place].  Upper extremity exercise program x20 reps per handout, with independence.                      PLAN:  Patient to be seen 1 x/week to address the above listed problems via self-care/home management, therapeutic activities, therapeutic exercises  Plan of Care expires: 10/27/17  Plan of Care reviewed with: patient, spouse         Lachelle Davenport, OT  09/27/2017

## 2017-09-27 NOTE — PLAN OF CARE
Problem: Patient Care Overview  Goal: Plan of Care Review  Outcome: Ongoing (interventions implemented as appropriate)  Pt is day zero, day of autologous transplant. continued on IVF. Educated pt on transplant process.     Plan of care reviewed with pt. Educated pt on any medications, and procedures that the patient would be receiving. Discussed vital sign and I&O routine. Allowed time for pt to ask any questions, and told the patient to call for any assistance. Bed low & locked, call light and personal belongings within reach.  Progressing towards discharge.

## 2017-09-27 NOTE — PROGRESS NOTES
[facility]  Hematology  Bone Marrow Transplant  Progress Note    Patient Name: Arik oBbo  Admission Date: 9/25/2017  Hospital Length of Stay: 2 days  Code Status: Full Code    Subjective:     Interval History:   - Day 0 of high dose melphalan followed by autologous stem cell transplant for multiple myeloma  - No acute issues overnight. VSS. Afebrile.     Objective:     Vital Signs (Most Recent):  Temp: 98.5 °F (36.9 °C) (09/27/17 0803)  Pulse: 60 (09/27/17 0803)  Resp: 16 (09/27/17 0803)  BP: (!) 165/87 (09/27/17 0803)  SpO2: (!) 94 % (09/27/17 0803) Vital Signs (24h Range):  Temp:  [97.5 °F (36.4 °C)-99 °F (37.2 °C)] 98.5 °F (36.9 °C)  Pulse:  [55-69] 60  Resp:  [16-18] 16  SpO2:  [94 %-96 %] 94 %  BP: (131-165)/(72-87) 165/87     Weight: 118.6 kg (261 lb 5.7 oz)  Body mass index is 39.74 kg/m².  Body surface area is 2.39 meters squared.    ECOG SCORE         [unfilled]    Intake/Output - Last 3 Shifts       09/25 0700 - 09/26 0659 09/26 0700 - 09/27 0659 09/27 0700 - 09/28 0659    P.O. 2700 4320     I.V. (mL/kg) 794.2 (6.8) 3809 (32.1)     IV Piggyback  500     Total Intake(mL/kg) 3494.2 (29.7) 8629 (72.8)     Urine (mL/kg/hr) 2650 7210 (2.5)     Stool  0 (0)     Total Output 2650 7210      Net +844.2 +1419             Stool Occurrence  2 x           Physical Exam   Constitutional: He is oriented to person, place, and time. He appears well-developed and well-nourished. No distress.   HENT:   Head: Normocephalic.   Right Ear: External ear normal.   Left Ear: External ear normal.   Nose: Nose normal.   Mouth/Throat: Oropharynx is clear and moist and mucous membranes are normal. No oropharyngeal exudate.   Eyes: Conjunctivae and EOM are normal. Pupils are equal, round, and reactive to light. No scleral icterus.   Neck: Neck supple.   Cardiovascular: Normal rate, regular rhythm and normal heart sounds.    Pulmonary/Chest: Effort normal and breath sounds normal.   Abdominal: Soft. Normal  appearance and bowel sounds are normal. He exhibits no distension. There is no tenderness.   Musculoskeletal: He exhibits no edema.   Lymphadenopathy:     He has no cervical adenopathy.   Neurological: He is alert and oriented to person, place, and time.   Skin: Skin is warm, dry and intact. No cyanosis. Nails show no clubbing.   Left chest wall vas cath in place with no redness, tenderness or drainage noted   Psychiatric: He has a normal mood and affect. His behavior is normal. Thought content normal. His mood appears not anxious.   Nursing note and vitals reviewed.      Significant Labs:   CBC:   Recent Labs  Lab 09/25/17  1115 09/26/17  0347 09/27/17  0245   WBC 5.77 4.08 2.43*   HGB 11.2* 10.7* 10.5*   HCT 34.0* 32.2* 31.7*   PLT 93* 92* 113*    and CMP:   Recent Labs  Lab 09/25/17  1115 09/26/17  0347 09/27/17  0245    139 139   K 3.9 3.8 4.0    107 109   CO2 24 25 23   * 91 119*   BUN 14 12 14   CREATININE 0.9 0.8 0.8   CALCIUM 8.8 8.2* 8.1*   PROT 6.2 5.8* 6.0   ALBUMIN 3.3* 3.1* 3.1*   BILITOT 0.4 0.4 0.4   ALKPHOS 178* 148* 136*   AST 19 18 16   ALT 19 17 15   ANIONGAP 8 7* 7*   EGFRNONAA >60.0 >60.0 >60.0       Diagnostic Results:  None    Assessment/Plan:     * Stem cell transplant candidate    - Today is day 0   - Melphalan given on 9/26/17 without complications  - Auto SCT day 0 on 9/27/17; to receive 5 bags with a CD 34 count of 4.71 x 10^6/kg   - ppx antimicrobials to start per protocol  - Neupogen to start day + 3           Multiple myeloma in remission    - Completed 4 cycles of CyBorD   - Was treated with lenalidomide and dex  - Restaging marrow 8/21/17 showed a 10-50% cellular marrow with trilineage hematopoiesis and 5% residual  kappa-restricted plasma cells (6% by morphology).  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  Hence, he has achieved a partial remission (PR1) and is ready to proceed with transplant.    65% EF on 8/2/17   - Possible need to consider an allogeneic transplant as a  cure in future as pt is young. This is not an immediate consideration but since he has 4 siblings there is a 68% chance of finding a sibling match.         Pancytopenia due to chemotherapy    - WBC 2.43 k/uL  - Hemoglobin 10.5 grams  - PLatelets 113,000  - Transfuse for hemoglobin <7 and platelets <10,000         History of gout    Pt suffers from gout and is not currently on uric acid suppression medication.  This may have contributed to his hip degeneration.        Depression    - Continue home Celexa         Hyperlipemia    - Will hold statin with admission and may resume at discharge        Hypertension, essential    - Continue home amlodipine, bystolic and lisinopril             VTE Risk Mitigation         Ordered     enoxaparin injection 40 mg  Daily     Route:  Subcutaneous        09/25/17 1257     High Risk of VTE  Once      09/25/17 1257          Disposition: Pending transplant and count recovery.     Kassidy Barba NP  Bone Marrow Transplant  [facility]

## 2017-09-27 NOTE — ASSESSMENT & PLAN NOTE
- Today is day 0   - Melphalan given on 9/26/17 without complications  - Auto SCT day 0 on 9/27/17; to receive 5 bags with a CD 34 count of 4.71 x 10^6/kg   - ppx antimicrobials to start per protocol  - Neupogen to start day + 3

## 2017-09-27 NOTE — PROGRESS NOTES
Transplant note: Pt premedicated with ativan, benadryl and hydrocortisone as per MD order. Pre-hydration completed. Consent verified. Telemetry and vital signs monitored throughout infusion. See Doc Flowsheet for vital signs. Maintenance IVF lowered to 20ml/hr. Five bags of autologous stem cells administered via gravity to Vas Cath. Each bag checked with secondary nurse Kiah Valverde RN against arm band and tag. Pt tolerated infusion well. Line and catheter flushed. Post hydration completed. Patient stable, will continue to monitor.

## 2017-09-27 NOTE — PLAN OF CARE
Problem: Occupational Therapy Goal  Goal: Occupational Therapy Goal  Goals to be met by: 10/11/17     Patient will increase functional independence with ADLs by performing:    UE Dressing with Gosper.  LE Dressing with Gosper.  Grooming while standing at sink with Gosper.  Toileting from toilet with Gosper for hygiene and clothing management.   Toilet transfer to toilet with Gosper.  Upper extremity exercise program x20 reps per handout, with independence.    Outcome: Ongoing (interventions implemented as appropriate)  Initial eval completed  POC implemented and goals set     Lachelle Davenport OT  9/27/2017

## 2017-09-27 NOTE — PLAN OF CARE
Problem: Patient Care Overview  Goal: Plan of Care Review  Outcome: Ongoing (interventions implemented as appropriate)  Fall, pressure ulcer, and infection preventions continued. Autologous stem cell transplant completed. Patient stable, will continue to monitor.

## 2017-09-27 NOTE — PROGRESS NOTES
09/27/17 1724   Vital Signs   BP (!) 167/90   Dr. Hsu notified of asymptomatic hypertension post transplant. Amlodipine administered per Dr. Hsu. Patient stable, will continue to monitor.

## 2017-09-28 PROBLEM — Z94.84 HISTORY OF AUTO STEM CELL TRANSPLANT: Status: ACTIVE | Noted: 2017-09-28

## 2017-09-28 LAB
ALBUMIN SERPL BCP-MCNC: 3 G/DL
ALP SERPL-CCNC: 117 U/L
ALT SERPL W/O P-5'-P-CCNC: 16 U/L
ANION GAP SERPL CALC-SCNC: 5 MMOL/L
AST SERPL-CCNC: 37 U/L
BASOPHILS # BLD AUTO: 0 K/UL
BASOPHILS NFR BLD: 0 %
BILIRUB SERPL-MCNC: 0.4 MG/DL
BUN SERPL-MCNC: 12 MG/DL
CALCIUM SERPL-MCNC: 7.4 MG/DL
CHLORIDE SERPL-SCNC: 111 MMOL/L
CO2 SERPL-SCNC: 24 MMOL/L
CREAT SERPL-MCNC: 0.7 MG/DL
DIFFERENTIAL METHOD: ABNORMAL
EOSINOPHIL # BLD AUTO: 0 K/UL
EOSINOPHIL NFR BLD: 0 %
ERYTHROCYTE [DISTWIDTH] IN BLOOD BY AUTOMATED COUNT: 13.7 %
EST. GFR  (AFRICAN AMERICAN): >60 ML/MIN/1.73 M^2
EST. GFR  (NON AFRICAN AMERICAN): >60 ML/MIN/1.73 M^2
GLUCOSE SERPL-MCNC: 92 MG/DL
HCT VFR BLD AUTO: 30.3 %
HGB BLD-MCNC: 10.2 G/DL
LYMPHOCYTES # BLD AUTO: 0.2 K/UL
LYMPHOCYTES NFR BLD: 6.2 %
MAGNESIUM SERPL-MCNC: 2 MG/DL
MCH RBC QN AUTO: 30.5 PG
MCHC RBC AUTO-ENTMCNC: 33.7 G/DL
MCV RBC AUTO: 91 FL
MONOCYTES # BLD AUTO: 0.1 K/UL
MONOCYTES NFR BLD: 1.8 %
NEUTROPHILS # BLD AUTO: 3.1 K/UL
NEUTROPHILS NFR BLD: 90.5 %
PHOSPHATE SERPL-MCNC: 2.7 MG/DL
PLATELET # BLD AUTO: 127 K/UL
PMV BLD AUTO: 9.6 FL
POTASSIUM SERPL-SCNC: 3.5 MMOL/L
PROT SERPL-MCNC: 5.5 G/DL
RBC # BLD AUTO: 3.34 M/UL
SODIUM SERPL-SCNC: 140 MMOL/L
WBC # BLD AUTO: 3.38 K/UL

## 2017-09-28 PROCEDURE — 25000003 PHARM REV CODE 250: Performed by: NURSE PRACTITIONER

## 2017-09-28 PROCEDURE — 84100 ASSAY OF PHOSPHORUS: CPT

## 2017-09-28 PROCEDURE — 99232 SBSQ HOSP IP/OBS MODERATE 35: CPT | Mod: ,,, | Performed by: INTERNAL MEDICINE

## 2017-09-28 PROCEDURE — 63600175 PHARM REV CODE 636 W HCPCS: Performed by: NURSE PRACTITIONER

## 2017-09-28 PROCEDURE — 83735 ASSAY OF MAGNESIUM: CPT

## 2017-09-28 PROCEDURE — A9155 ARTIFICIAL SALIVA: HCPCS | Performed by: NURSE PRACTITIONER

## 2017-09-28 PROCEDURE — 20600001 HC STEP DOWN PRIVATE ROOM

## 2017-09-28 PROCEDURE — 80053 COMPREHEN METABOLIC PANEL: CPT

## 2017-09-28 PROCEDURE — 85025 COMPLETE CBC W/AUTO DIFF WBC: CPT

## 2017-09-28 RX ADMIN — SODIUM CHLORIDE AND POTASSIUM CHLORIDE: 9; 1.49 INJECTION, SOLUTION INTRAVENOUS at 10:09

## 2017-09-28 RX ADMIN — Medication 30 ML: at 11:09

## 2017-09-28 RX ADMIN — SODIUM CHLORIDE AND POTASSIUM CHLORIDE: 9; 1.49 INJECTION, SOLUTION INTRAVENOUS at 12:09

## 2017-09-28 RX ADMIN — NEBIVOLOL HYDROCHLORIDE 10 MG: 5 TABLET ORAL at 08:09

## 2017-09-28 RX ADMIN — ENOXAPARIN SODIUM 40 MG: 100 INJECTION SUBCUTANEOUS at 05:09

## 2017-09-28 RX ADMIN — ONDANSETRON 8 MG: 8 TABLET, ORALLY DISINTEGRATING ORAL at 05:09

## 2017-09-28 RX ADMIN — SODIUM CHLORIDE AND POTASSIUM CHLORIDE: 9; 1.49 INJECTION, SOLUTION INTRAVENOUS at 11:09

## 2017-09-28 RX ADMIN — CITALOPRAM HYDROBROMIDE 40 MG: 20 TABLET ORAL at 08:09

## 2017-09-28 RX ADMIN — SODIUM CHLORIDE AND POTASSIUM CHLORIDE 75 ML/HR: 9; 1.49 INJECTION, SOLUTION INTRAVENOUS at 11:09

## 2017-09-28 RX ADMIN — ACYCLOVIR 800 MG: 200 CAPSULE ORAL at 09:09

## 2017-09-28 RX ADMIN — LORAZEPAM 1 MG: 2 INJECTION, SOLUTION INTRAMUSCULAR; INTRAVENOUS at 09:09

## 2017-09-28 RX ADMIN — ACYCLOVIR 800 MG: 200 CAPSULE ORAL at 08:09

## 2017-09-28 RX ADMIN — AMLODIPINE BESYLATE 5 MG: 5 TABLET ORAL at 08:09

## 2017-09-28 RX ADMIN — Medication 30 ML: at 06:09

## 2017-09-28 RX ADMIN — Medication 30 ML: at 12:09

## 2017-09-28 RX ADMIN — CIPROFLOXACIN HYDROCHLORIDE 500 MG: 500 TABLET, FILM COATED ORAL at 08:09

## 2017-09-28 RX ADMIN — Medication 30 ML: at 05:09

## 2017-09-28 RX ADMIN — LISINOPRIL 40 MG: 20 TABLET ORAL at 08:09

## 2017-09-28 RX ADMIN — CIPROFLOXACIN HYDROCHLORIDE 500 MG: 500 TABLET, FILM COATED ORAL at 09:09

## 2017-09-28 RX ADMIN — PANTOPRAZOLE SODIUM 40 MG: 40 TABLET, DELAYED RELEASE ORAL at 08:09

## 2017-09-28 RX ADMIN — ONDANSETRON 8 MG: 8 TABLET, ORALLY DISINTEGRATING ORAL at 07:09

## 2017-09-28 RX ADMIN — FLUCONAZOLE 400 MG: 200 TABLET ORAL at 08:09

## 2017-09-28 RX ADMIN — SODIUM CHLORIDE AND POTASSIUM CHLORIDE: 9; 1.49 INJECTION, SOLUTION INTRAVENOUS at 03:09

## 2017-09-28 NOTE — ASSESSMENT & PLAN NOTE
- Continue home amlodipine, bystolic and lisinopril   - HTN after transplant on 9/27 - resolved this morning

## 2017-09-28 NOTE — SUBJECTIVE & OBJECTIVE
Subjective:     Interval History:   - Day +1 Auto SCT for MM. Tolerated transplant well yesterday  - No acute issues overnight. Afebrile. VSS, Weight stable + 3.8 L for hospital stay  - Complained of nausea, which was relived by zofran and productive cough that started after transplant.     Objective:     Vital Signs (Most Recent):  Temp: 98.3 °F (36.8 °C) (09/28/17 1100)  Pulse: 61 (09/28/17 1100)  Resp: 16 (09/28/17 1100)  BP: 119/67 (09/28/17 1100)  SpO2: 95 % (09/28/17 1100) Vital Signs (24h Range):  Temp:  [98 °F (36.7 °C)-99.3 °F (37.4 °C)] 98.3 °F (36.8 °C)  Pulse:  [59-77] 61  Resp:  [16-20] 16  SpO2:  [94 %-100 %] 95 %  BP: (119-188)/() 119/67     Weight: 118.9 kg (262 lb 0.3 oz)  Body mass index is 39.84 kg/m².  Body surface area is 2.39 meters squared.    ECOG SCORE         [unfilled]    Intake/Output - Last 3 Shifts       09/26 0700 - 09/27 0659 09/27 0700 - 09/28 0659 09/28 0700 - 09/29 0659    P.O. 4320 1700 960    I.V. (mL/kg) 3809 (32.1) 3578 (30.1) 612.5 (5.2)    Blood  300     IV Piggyback 500      Total Intake(mL/kg) 8629 (72.8) 5578 (47) 1572.5 (13.2)    Urine (mL/kg/hr) 7210 (2.5) 4800 (1.7) 750 (1.5)    Stool 0 (0)      Total Output 7210 4800 750    Net +1419 +778 +822.5           Stool Occurrence 2 x            Physical Exam   Constitutional: He is oriented to person, place, and time. He appears well-developed and well-nourished. No distress.   HENT:   Head: Normocephalic.   Right Ear: External ear normal.   Left Ear: External ear normal.   Nose: Nose normal.   Mouth/Throat: Oropharynx is clear and moist and mucous membranes are normal. No oropharyngeal exudate.   Eyes: Conjunctivae and EOM are normal. Pupils are equal, round, and reactive to light. No scleral icterus.   Neck: Neck supple.   Cardiovascular: Normal rate, regular rhythm and normal heart sounds.    Pulmonary/Chest: Effort normal and breath sounds normal.   Abdominal: Soft. Normal appearance and bowel sounds are normal. He  exhibits no distension. There is no tenderness.   Musculoskeletal: He exhibits no edema.   Neurological: He is alert and oriented to person, place, and time.   Skin: Skin is warm, dry and intact. No cyanosis. Nails show no clubbing.   Right chest wall tierney in place with no redness, tenderness or drainage noted    Psychiatric: He has a normal mood and affect. His behavior is normal. Thought content normal. His mood appears not anxious.   Nursing note and vitals reviewed.      Significant Labs:   CBC:   Recent Labs  Lab 09/27/17 0245 09/28/17 0329   WBC 2.43* 3.38*   HGB 10.5* 10.2*   HCT 31.7* 30.3*   * 127*    and CMP:   Recent Labs  Lab 09/27/17 0245 09/28/17 0329    140   K 4.0 3.5    111*   CO2 23 24   * 92   BUN 14 12   CREATININE 0.8 0.7   CALCIUM 8.1* 7.4*   PROT 6.0 5.5*   ALBUMIN 3.1* 3.0*   BILITOT 0.4 0.4   ALKPHOS 136* 117   AST 16 37   ALT 15 16   ANIONGAP 7* 5*   EGFRNONAA >60.0 >60.0       Diagnostic Results:  None

## 2017-09-28 NOTE — PROGRESS NOTES
[facility]  Hematology  Bone Marrow Transplant  Progress Note    Patient Name: Arik Bobo  Admission Date: 9/25/2017  Hospital Length of Stay: 3 days  Code Status: Full Code    Subjective:     Interval History:   - Day +1 Auto SCT for MM. Tolerated transplant well yesterday  - No acute issues overnight. Afebrile. VSS, Weight stable + 3.8 L for hospital stay  - Complained of nausea, which was relived by zofran and productive cough that started after transplant.     Objective:     Vital Signs (Most Recent):  Temp: 98.3 °F (36.8 °C) (09/28/17 1100)  Pulse: 61 (09/28/17 1100)  Resp: 16 (09/28/17 1100)  BP: 119/67 (09/28/17 1100)  SpO2: 95 % (09/28/17 1100) Vital Signs (24h Range):  Temp:  [98 °F (36.7 °C)-99.3 °F (37.4 °C)] 98.3 °F (36.8 °C)  Pulse:  [59-77] 61  Resp:  [16-20] 16  SpO2:  [94 %-100 %] 95 %  BP: (119-188)/() 119/67     Weight: 118.9 kg (262 lb 0.3 oz)  Body mass index is 39.84 kg/m².  Body surface area is 2.39 meters squared.    ECOG SCORE         [unfilled]    Intake/Output - Last 3 Shifts       09/26 0700 - 09/27 0659 09/27 0700 - 09/28 0659 09/28 0700 - 09/29 0659    P.O. 4320 1700 960    I.V. (mL/kg) 3809 (32.1) 3578 (30.1) 612.5 (5.2)    Blood  300     IV Piggyback 500      Total Intake(mL/kg) 8629 (72.8) 5578 (47) 1572.5 (13.2)    Urine (mL/kg/hr) 7210 (2.5) 4800 (1.7) 750 (1.5)    Stool 0 (0)      Total Output 7210 4800 750    Net +1419 +778 +822.5           Stool Occurrence 2 x            Physical Exam   Constitutional: He is oriented to person, place, and time. He appears well-developed and well-nourished. No distress.   HENT:   Head: Normocephalic.   Right Ear: External ear normal.   Left Ear: External ear normal.   Nose: Nose normal.   Mouth/Throat: Oropharynx is clear and moist and mucous membranes are normal. No oropharyngeal exudate.   Eyes: Conjunctivae and EOM are normal. Pupils are equal, round, and reactive to light. No scleral icterus.   Neck: Neck  supple.   Cardiovascular: Normal rate, regular rhythm and normal heart sounds.    Pulmonary/Chest: Effort normal and breath sounds normal.   Abdominal: Soft. Normal appearance and bowel sounds are normal. He exhibits no distension. There is no tenderness.   Musculoskeletal: He exhibits no edema.   Neurological: He is alert and oriented to person, place, and time.   Skin: Skin is warm, dry and intact. No cyanosis. Nails show no clubbing.   Right chest wall tierney in place with no redness, tenderness or drainage noted    Psychiatric: He has a normal mood and affect. His behavior is normal. Thought content normal. His mood appears not anxious.   Nursing note and vitals reviewed.      Significant Labs:   CBC:   Recent Labs  Lab 09/27/17  0245 09/28/17  0329   WBC 2.43* 3.38*   HGB 10.5* 10.2*   HCT 31.7* 30.3*   * 127*    and CMP:   Recent Labs  Lab 09/27/17  0245 09/28/17  0329    140   K 4.0 3.5    111*   CO2 23 24   * 92   BUN 14 12   CREATININE 0.8 0.7   CALCIUM 8.1* 7.4*   PROT 6.0 5.5*   ALBUMIN 3.1* 3.0*   BILITOT 0.4 0.4   ALKPHOS 136* 117   AST 16 37   ALT 15 16   ANIONGAP 7* 5*   EGFRNONAA >60.0 >60.0       Diagnostic Results:  None    Assessment/Plan:     History of auto stem cell transplant    - Today is day +1   - Melphalan given on 9/26/17 without complications  - Auto SCT day 0 on 9/27/17; received 5 bags with a CD 34 count of 4.71 x 10^6/kg - tolerated well   - ppx antimicrobials to start per protocol  - Neupogen to start day + 3         Stem cell transplant candidate    - Today is day 0   - Melphalan given on 9/26/17 without complications  - Auto SCT day 0 on 9/27/17; to receive 5 bags with a CD 34 count of 4.71 x 10^6/kg   - ppx antimicrobials to start per protocol  - Neupogen to start day + 3           Multiple myeloma in remission    - Completed 4 cycles of CyBorD   - Was treated with lenalidomide and dex  - Restaging marrow 8/21/17 showed a 10-50% cellular marrow with  trilineage hematopoiesis and 5% residual  kappa-restricted plasma cells (6% by morphology).  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  Hence, he has achieved a partial remission (PR1) and is ready to proceed with transplant.    65% EF on 8/2/17   - Possible need to consider an allogeneic transplant as a cure in future as pt is young. This is not an immediate consideration but since he has 4 siblings there is a 68% chance of finding a sibling match.         Pancytopenia due to chemotherapy    - WBC 3.38 k/uL  - Hemoglobin 10.2 grams  - PLatelets 127,000  - Transfuse for hemoglobin <7 and platelets <10,000         History of gout    Pt suffers from gout and is not currently on uric acid suppression medication.  This may have contributed to his hip degeneration.        Depression    - Continue home Celexa         Hyperlipemia    - Will hold statin with admission and may resume at discharge        Hypertension, essential    - Continue home amlodipine, bystolic and lisinopril   - HTN after transplant on 9/27 - resolved this morning             VTE Risk Mitigation         Ordered     enoxaparin injection 40 mg  Daily     Route:  Subcutaneous        09/25/17 1257     High Risk of VTE  Once      09/25/17 1257          Disposition: Pending count recovery.     Kassidy Barba, NP  Bone Marrow Transplant  [facility]

## 2017-09-28 NOTE — PLAN OF CARE
Problem: Patient Care Overview  Goal: Plan of Care Review  Outcome: Ongoing (interventions implemented as appropriate)  Pt is day 0, transplant completed. Afebrile. IVF continued. Denies any complaints. Up ad diana.    Plan of care reviewed with pt. Educated pt on any medications, and procedures that the patient would be receiving. Discussed vital sign and I&O routine. Allowed time for pt to ask any questions, and told the patient to call for any assistance. Bed low & locked, call light and personal belongings within reach.  Progressing towards discharge.

## 2017-09-28 NOTE — ASSESSMENT & PLAN NOTE
- WBC 3.38 k/uL  - Hemoglobin 10.2 grams  - PLatelets 127,000  - Transfuse for hemoglobin <7 and platelets <10,000

## 2017-09-28 NOTE — ASSESSMENT & PLAN NOTE
- Today is day +1   - Melphalan given on 9/26/17 without complications  - Auto SCT day 0 on 9/27/17; received 5 bags with a CD 34 count of 4.71 x 10^6/kg - tolerated well   - ppx antimicrobials to start per protocol  - Neupogen to start day + 3

## 2017-09-28 NOTE — PLAN OF CARE
Problem: Patient Care Overview  Goal: Plan of Care Review  Outcome: Ongoing (interventions implemented as appropriate)  Fall, pressure ulcer, and infection preventions continued. Patient day +1 following autologous PBSCT. Nausea managed with PRN medication. IVF continued. Patient stable, will continue to monitor.

## 2017-09-29 PROBLEM — R19.7 DIARRHEA OF PRESUMED INFECTIOUS ORIGIN: Status: ACTIVE | Noted: 2017-09-29

## 2017-09-29 LAB
ABO + RH BLD: NORMAL
ALBUMIN SERPL BCP-MCNC: 2.8 G/DL
ALP SERPL-CCNC: 104 U/L
ALT SERPL W/O P-5'-P-CCNC: 13 U/L
ANION GAP SERPL CALC-SCNC: 6 MMOL/L
ANISOCYTOSIS BLD QL SMEAR: SLIGHT
AST SERPL-CCNC: 24 U/L
BASOPHILS # BLD AUTO: ABNORMAL K/UL
BASOPHILS NFR BLD: 1 %
BILIRUB SERPL-MCNC: 0.4 MG/DL
BLD GP AB SCN CELLS X3 SERPL QL: NORMAL
BUN SERPL-MCNC: 12 MG/DL
C DIFF GDH STL QL: NEGATIVE
C DIFF TOX A+B STL QL IA: NEGATIVE
CALCIUM SERPL-MCNC: 7.3 MG/DL
CHLORIDE SERPL-SCNC: 109 MMOL/L
CO2 SERPL-SCNC: 24 MMOL/L
CREAT SERPL-MCNC: 0.7 MG/DL
DIFFERENTIAL METHOD: ABNORMAL
EOSINOPHIL # BLD AUTO: ABNORMAL K/UL
EOSINOPHIL NFR BLD: 0 %
ERYTHROCYTE [DISTWIDTH] IN BLOOD BY AUTOMATED COUNT: 13.9 %
EST. GFR  (AFRICAN AMERICAN): >60 ML/MIN/1.73 M^2
EST. GFR  (NON AFRICAN AMERICAN): >60 ML/MIN/1.73 M^2
GLUCOSE SERPL-MCNC: 85 MG/DL
HCT VFR BLD AUTO: 29.8 %
HGB BLD-MCNC: 9.9 G/DL
LYMPHOCYTES # BLD AUTO: ABNORMAL K/UL
LYMPHOCYTES NFR BLD: 1 %
MAGNESIUM SERPL-MCNC: 2 MG/DL
MCH RBC QN AUTO: 30.3 PG
MCHC RBC AUTO-ENTMCNC: 33.2 G/DL
MCV RBC AUTO: 91 FL
MONOCYTES # BLD AUTO: ABNORMAL K/UL
MONOCYTES NFR BLD: 1 %
NEUTROPHILS NFR BLD: 94 %
NEUTS BAND NFR BLD MANUAL: 3 %
PHOSPHATE SERPL-MCNC: 2.6 MG/DL
PLATELET # BLD AUTO: 113 K/UL
PLATELET BLD QL SMEAR: ABNORMAL
PMV BLD AUTO: 9.7 FL
POTASSIUM SERPL-SCNC: 3.7 MMOL/L
PROT SERPL-MCNC: 5.2 G/DL
RBC # BLD AUTO: 3.27 M/UL
SODIUM SERPL-SCNC: 139 MMOL/L
WBC # BLD AUTO: 1.73 K/UL

## 2017-09-29 PROCEDURE — A9155 ARTIFICIAL SALIVA: HCPCS | Performed by: NURSE PRACTITIONER

## 2017-09-29 PROCEDURE — 99232 SBSQ HOSP IP/OBS MODERATE 35: CPT | Mod: ,,, | Performed by: INTERNAL MEDICINE

## 2017-09-29 PROCEDURE — 80053 COMPREHEN METABOLIC PANEL: CPT

## 2017-09-29 PROCEDURE — 85027 COMPLETE CBC AUTOMATED: CPT

## 2017-09-29 PROCEDURE — 85007 BL SMEAR W/DIFF WBC COUNT: CPT

## 2017-09-29 PROCEDURE — 20600001 HC STEP DOWN PRIVATE ROOM

## 2017-09-29 PROCEDURE — 63600175 PHARM REV CODE 636 W HCPCS: Performed by: NURSE PRACTITIONER

## 2017-09-29 PROCEDURE — 25000003 PHARM REV CODE 250: Performed by: NURSE PRACTITIONER

## 2017-09-29 PROCEDURE — 84100 ASSAY OF PHOSPHORUS: CPT

## 2017-09-29 PROCEDURE — 83735 ASSAY OF MAGNESIUM: CPT

## 2017-09-29 PROCEDURE — 86900 BLOOD TYPING SEROLOGIC ABO: CPT

## 2017-09-29 PROCEDURE — 87449 NOS EACH ORGANISM AG IA: CPT

## 2017-09-29 PROCEDURE — 86850 RBC ANTIBODY SCREEN: CPT

## 2017-09-29 RX ADMIN — Medication 30 ML: at 11:09

## 2017-09-29 RX ADMIN — PANTOPRAZOLE SODIUM 40 MG: 40 TABLET, DELAYED RELEASE ORAL at 08:09

## 2017-09-29 RX ADMIN — POTASSIUM & SODIUM PHOSPHATES POWDER PACK 280-160-250 MG 1 PACKET: 280-160-250 PACK at 06:09

## 2017-09-29 RX ADMIN — ACYCLOVIR 800 MG: 200 CAPSULE ORAL at 08:09

## 2017-09-29 RX ADMIN — ENOXAPARIN SODIUM 40 MG: 100 INJECTION SUBCUTANEOUS at 05:09

## 2017-09-29 RX ADMIN — CIPROFLOXACIN HYDROCHLORIDE 500 MG: 500 TABLET, FILM COATED ORAL at 08:09

## 2017-09-29 RX ADMIN — POTASSIUM CHLORIDE 20 MEQ: 750 CAPSULE, EXTENDED RELEASE ORAL at 08:09

## 2017-09-29 RX ADMIN — FLUCONAZOLE 400 MG: 200 TABLET ORAL at 08:09

## 2017-09-29 RX ADMIN — ONDANSETRON 8 MG: 8 TABLET, ORALLY DISINTEGRATING ORAL at 08:09

## 2017-09-29 RX ADMIN — CITALOPRAM HYDROBROMIDE 40 MG: 20 TABLET ORAL at 08:09

## 2017-09-29 RX ADMIN — SODIUM CHLORIDE AND POTASSIUM CHLORIDE: 9; 1.49 INJECTION, SOLUTION INTRAVENOUS at 12:09

## 2017-09-29 RX ADMIN — AMLODIPINE BESYLATE 5 MG: 5 TABLET ORAL at 08:09

## 2017-09-29 RX ADMIN — LISINOPRIL 40 MG: 20 TABLET ORAL at 08:09

## 2017-09-29 RX ADMIN — NEBIVOLOL HYDROCHLORIDE 10 MG: 5 TABLET ORAL at 08:09

## 2017-09-29 RX ADMIN — Medication 30 ML: at 05:09

## 2017-09-29 RX ADMIN — POTASSIUM & SODIUM PHOSPHATES POWDER PACK 280-160-250 MG 1 PACKET: 280-160-250 PACK at 03:09

## 2017-09-29 RX ADMIN — Medication 30 ML: at 06:09

## 2017-09-29 RX ADMIN — POTASSIUM & SODIUM PHOSPHATES POWDER PACK 280-160-250 MG 1 PACKET: 280-160-250 PACK at 11:09

## 2017-09-29 RX ADMIN — Medication 30 ML: at 12:09

## 2017-09-29 NOTE — PLAN OF CARE
Problem: Patient Care Overview  Goal: Plan of Care Review  Outcome: Ongoing (interventions implemented as appropriate)  Pt has remained free from injury this shift. Bed in low locked position. Call light and personal belongings within reach. Side rails up x2. Nonskid socks in place. Pt ambulates in room independently. Shower and linen changed this shift. Pt c/o nausea and dry heaving this AM. Pt having episodes of diarrhea throughout shift. C.diff collected this AM. Pt remains afebrile. All questions and concerns addressed at this time. Will continue to monitor.

## 2017-09-29 NOTE — PLAN OF CARE
Problem: Patient Care Overview  Goal: Plan of Care Review  Outcome: Ongoing (interventions implemented as appropriate)  Patient day +2 following autologous PBSCT.  Frequent rounds made to assess pain and safety. Pt communicating needs throughout shift. Up ad diana. Tolerating diet, voiding without difficulty. No c/o pain, Nausea controlled with PRN meds. IV fluids continued.  All vitals stable; no acute events this shift. Pt. Remaining free from falls or injury throughout shift; bed in lowest position; side rails up X2; call light within reach; pt instructed to call for assistance as needed - verbalized understanding. Q2h rounding on patient. Will continue to monitor.

## 2017-09-29 NOTE — ASSESSMENT & PLAN NOTE
- WBC 1.73 k/uL  - Hemoglobin 9.9 grams  - PLatelets 113,000  - Transfuse for hemoglobin <7 and platelets <10,000

## 2017-09-29 NOTE — ASSESSMENT & PLAN NOTE
- Today is day +2   - Melphalan given on 9/26/17 without complications  - Auto SCT day 0 on 9/27/17; received 5 bags with a CD 34 count of 4.71 x 10^6/kg - tolerated well   - ppx antimicrobials to start per protocol  - Neupogen to start day + 3

## 2017-09-29 NOTE — PLAN OF CARE
MDR's with Dr Bryant.  Patient is day+2 post cyrus autoSCT.  He has tolerated the transplant without major complication.  D/c pending count recovery.  Will continue to follow.    D/c plan:  Concepcion White

## 2017-09-29 NOTE — SUBJECTIVE & OBJECTIVE
Subjective:     Interval History:   - Day +2 Auto SCT for MM. Tolerated transplant well.   - No acute issues overnight. Afebrile. VSS, Weight stable + 5 L for hospital stay  - Complained of nausea, which was relived by PRN medications and diarrhea; C. Diff sent    Objective:     Vital Signs (Most Recent):  Temp: 98.9 °F (37.2 °C) (09/29/17 0318)  Pulse: 65 (09/29/17 0318)  Resp: 16 (09/29/17 0318)  BP: (!) 157/87 (09/29/17 0318)  SpO2: 95 % (09/29/17 0318) Vital Signs (24h Range):  Temp:  [98.3 °F (36.8 °C)-98.9 °F (37.2 °C)] 98.9 °F (37.2 °C)  Pulse:  [61-65] 65  Resp:  [16] 16  SpO2:  [94 %-96 %] 95 %  BP: (119-164)/(67-87) 157/87     Weight: 118.8 kg (261 lb 12.7 oz)  Body mass index is 39.81 kg/m².  Body surface area is 2.39 meters squared.    ECOG SCORE         [unfilled]    Intake/Output - Last 3 Shifts       09/27 0700 - 09/28 0659 09/28 0700 - 09/29 0659 09/29 0700 - 09/30 0659    P.O. 1700 3000     I.V. (mL/kg) 3578 (30.1) 1847.5 (15.6)     Blood 300      IV Piggyback       Total Intake(mL/kg) 5578 (47) 4847.5 (40.8)     Urine (mL/kg/hr) 4800 (1.7) 2675 (0.9)     Stool       Total Output 4800 2675      Net +778 +2172.5                   Physical Exam   Constitutional: He is oriented to person, place, and time. He appears well-developed and well-nourished. No distress.   HENT:   Head: Normocephalic.   Right Ear: External ear normal.   Left Ear: External ear normal.   Nose: Nose normal.   Mouth/Throat: Oropharynx is clear and moist and mucous membranes are normal. No oropharyngeal exudate.   Eyes: Conjunctivae and EOM are normal. Pupils are equal, round, and reactive to light. No scleral icterus.   Neck: Neck supple.   Cardiovascular: Normal rate, regular rhythm and normal heart sounds.    Pulmonary/Chest: Effort normal and breath sounds normal.   Abdominal: Soft. Normal appearance and bowel sounds are normal. He exhibits no distension. There is no tenderness.   Musculoskeletal: He exhibits no edema.    Lymphadenopathy:     He has no cervical adenopathy.   Neurological: He is alert and oriented to person, place, and time.   Skin: Skin is warm, dry and intact. No cyanosis. Nails show no clubbing.   Right chest wall vas cath in place with dressing clean, dry and intact   Psychiatric: He has a normal mood and affect. His behavior is normal. Thought content normal. His mood appears not anxious.   Nursing note and vitals reviewed.      Significant Labs:   CBC:   Recent Labs  Lab 09/28/17  0329 09/29/17  0400   WBC 3.38* 1.73*   HGB 10.2* 9.9*   HCT 30.3* 29.8*   * 113*    and CMP:   Recent Labs  Lab 09/28/17 0329 09/29/17  0400    139   K 3.5 3.7   * 109   CO2 24 24   GLU 92 85   BUN 12 12   CREATININE 0.7 0.7   CALCIUM 7.4* 7.3*   PROT 5.5* 5.2*   ALBUMIN 3.0* 2.8*   BILITOT 0.4 0.4   ALKPHOS 117 104   AST 37 24   ALT 16 13   ANIONGAP 5* 6*   EGFRNONAA >60.0 >60.0       Diagnostic Results:  None

## 2017-09-29 NOTE — PROGRESS NOTES
[facility]  Hematology  Bone Marrow Transplant  Progress Note    Patient Name: Arik Bobo  Admission Date: 9/25/2017  Hospital Length of Stay: 4 days  Code Status: Full Code    Subjective:     Interval History:   - Day +2 Auto SCT for MM. Tolerated transplant well.   - No acute issues overnight. Afebrile. VSS, Weight stable + 5 L for hospital stay  - Complained of nausea, which was relived by PRN medications and diarrhea; C. Diff sent    Objective:     Vital Signs (Most Recent):  Temp: 98.9 °F (37.2 °C) (09/29/17 0318)  Pulse: 65 (09/29/17 0318)  Resp: 16 (09/29/17 0318)  BP: (!) 157/87 (09/29/17 0318)  SpO2: 95 % (09/29/17 0318) Vital Signs (24h Range):  Temp:  [98.3 °F (36.8 °C)-98.9 °F (37.2 °C)] 98.9 °F (37.2 °C)  Pulse:  [61-65] 65  Resp:  [16] 16  SpO2:  [94 %-96 %] 95 %  BP: (119-164)/(67-87) 157/87     Weight: 118.8 kg (261 lb 12.7 oz)  Body mass index is 39.81 kg/m².  Body surface area is 2.39 meters squared.    ECOG SCORE         [unfilled]    Intake/Output - Last 3 Shifts       09/27 0700 - 09/28 0659 09/28 0700 - 09/29 0659 09/29 0700 - 09/30 0659    P.O. 1700 3000     I.V. (mL/kg) 3578 (30.1) 1847.5 (15.6)     Blood 300      IV Piggyback       Total Intake(mL/kg) 5578 (47) 4847.5 (40.8)     Urine (mL/kg/hr) 4800 (1.7) 2675 (0.9)     Stool       Total Output 4800 2675      Net +778 +2172.5                   Physical Exam   Constitutional: He is oriented to person, place, and time. He appears well-developed and well-nourished. No distress.   HENT:   Head: Normocephalic.   Right Ear: External ear normal.   Left Ear: External ear normal.   Nose: Nose normal.   Mouth/Throat: Oropharynx is clear and moist and mucous membranes are normal. No oropharyngeal exudate.   Eyes: Conjunctivae and EOM are normal. Pupils are equal, round, and reactive to light. No scleral icterus.   Neck: Neck supple.   Cardiovascular: Normal rate, regular rhythm and normal heart sounds.    Pulmonary/Chest:  Effort normal and breath sounds normal.   Abdominal: Soft. Normal appearance and bowel sounds are normal. He exhibits no distension. There is no tenderness.   Musculoskeletal: He exhibits no edema.   Lymphadenopathy:     He has no cervical adenopathy.   Neurological: He is alert and oriented to person, place, and time.   Skin: Skin is warm, dry and intact. No cyanosis. Nails show no clubbing.   Right chest wall vas cath in place with dressing clean, dry and intact   Psychiatric: He has a normal mood and affect. His behavior is normal. Thought content normal. His mood appears not anxious.   Nursing note and vitals reviewed.      Significant Labs:   CBC:   Recent Labs  Lab 09/28/17  0329 09/29/17  0400   WBC 3.38* 1.73*   HGB 10.2* 9.9*   HCT 30.3* 29.8*   * 113*    and CMP:   Recent Labs  Lab 09/28/17  0329 09/29/17  0400    139   K 3.5 3.7   * 109   CO2 24 24   GLU 92 85   BUN 12 12   CREATININE 0.7 0.7   CALCIUM 7.4* 7.3*   PROT 5.5* 5.2*   ALBUMIN 3.0* 2.8*   BILITOT 0.4 0.4   ALKPHOS 117 104   AST 37 24   ALT 16 13   ANIONGAP 5* 6*   EGFRNONAA >60.0 >60.0       Diagnostic Results:  None    Assessment/Plan:     History of auto stem cell transplant    - Today is day +2   - Melphalan given on 9/26/17 without complications  - Auto SCT day 0 on 9/27/17; received 5 bags with a CD 34 count of 4.71 x 10^6/kg - tolerated well   - ppx antimicrobials to start per protocol  - Neupogen to start day + 3         Stem cell transplant candidate    - Today is day 0   - Melphalan given on 9/26/17 without complications  - Auto SCT day 0 on 9/27/17; to receive 5 bags with a CD 34 count of 4.71 x 10^6/kg   - ppx antimicrobials to start per protocol  - Neupogen to start day + 3           Multiple myeloma in remission    - Completed 4 cycles of CyBorD   - Was treated with lenalidomide and dex  - Restaging marrow 8/21/17 showed a 10-50% cellular marrow with trilineage hematopoiesis and 5% residual   kappa-restricted plasma cells (6% by morphology).  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  Hence, he has achieved a partial remission (PR1) and is ready to proceed with transplant.    65% EF on 8/2/17   - Possible need to consider an allogeneic transplant as a cure in future as pt is young. This is not an immediate consideration but since he has 4 siblings there is a 68% chance of finding a sibling match.         Diarrhea of presumed infectious origin    - diarrhea noted this morning  - C. Diff culture sent         Pancytopenia due to chemotherapy    - WBC 1.73 k/uL  - Hemoglobin 9.9 grams  - PLatelets 113,000  - Transfuse for hemoglobin <7 and platelets <10,000         History of gout    Pt suffers from gout and is not currently on uric acid suppression medication.  This may have contributed to his hip degeneration.        Depression    - Continue home Celexa         Hyperlipemia    - Will hold statin with admission and may resume at discharge        Hypertension, essential    - Continue home amlodipine, bystolic and lisinopril               VTE Risk Mitigation         Ordered     heparin (porcine) injection 1,600 Units  As needed (PRN)     Route:  Intravenous        09/25/17 1733     enoxaparin injection 40 mg  Daily     Route:  Subcutaneous        09/25/17 1257     High Risk of VTE  Once      09/25/17 1257          Disposition: Pending count recovery.     Kassidy Barba NP  Bone Marrow Transplant  [facility]

## 2017-09-30 LAB
ALBUMIN SERPL BCP-MCNC: 2.9 G/DL
ALP SERPL-CCNC: 102 U/L
ALT SERPL W/O P-5'-P-CCNC: 14 U/L
ANION GAP SERPL CALC-SCNC: 7 MMOL/L
AST SERPL-CCNC: 24 U/L
BASOPHILS # BLD AUTO: 0 K/UL
BASOPHILS NFR BLD: 0 %
BILIRUB SERPL-MCNC: 0.3 MG/DL
BUN SERPL-MCNC: 10 MG/DL
CALCIUM SERPL-MCNC: 7.7 MG/DL
CHLORIDE SERPL-SCNC: 108 MMOL/L
CO2 SERPL-SCNC: 24 MMOL/L
CREAT SERPL-MCNC: 0.7 MG/DL
DIFFERENTIAL METHOD: ABNORMAL
EOSINOPHIL # BLD AUTO: 0 K/UL
EOSINOPHIL NFR BLD: 1.6 %
ERYTHROCYTE [DISTWIDTH] IN BLOOD BY AUTOMATED COUNT: 13.7 %
EST. GFR  (AFRICAN AMERICAN): >60 ML/MIN/1.73 M^2
EST. GFR  (NON AFRICAN AMERICAN): >60 ML/MIN/1.73 M^2
GLUCOSE SERPL-MCNC: 91 MG/DL
HCT VFR BLD AUTO: 30.8 %
HGB BLD-MCNC: 10.4 G/DL
LYMPHOCYTES # BLD AUTO: 0 K/UL
LYMPHOCYTES NFR BLD: 2.2 %
MAGNESIUM SERPL-MCNC: 2 MG/DL
MCH RBC QN AUTO: 30.6 PG
MCHC RBC AUTO-ENTMCNC: 33.8 G/DL
MCV RBC AUTO: 91 FL
MONOCYTES # BLD AUTO: 0 K/UL
MONOCYTES NFR BLD: 1.6 %
NEUTROPHILS # BLD AUTO: 1.7 K/UL
NEUTROPHILS NFR BLD: 94.6 %
PHOSPHATE SERPL-MCNC: 2.4 MG/DL
PLATELET # BLD AUTO: 108 K/UL
PMV BLD AUTO: 9.6 FL
POTASSIUM SERPL-SCNC: 3.6 MMOL/L
PROT SERPL-MCNC: 5.5 G/DL
RBC # BLD AUTO: 3.4 M/UL
SODIUM SERPL-SCNC: 139 MMOL/L
WBC # BLD AUTO: 1.84 K/UL

## 2017-09-30 PROCEDURE — 85025 COMPLETE CBC W/AUTO DIFF WBC: CPT

## 2017-09-30 PROCEDURE — 80053 COMPREHEN METABOLIC PANEL: CPT

## 2017-09-30 PROCEDURE — A9155 ARTIFICIAL SALIVA: HCPCS | Performed by: NURSE PRACTITIONER

## 2017-09-30 PROCEDURE — 25000003 PHARM REV CODE 250: Performed by: INTERNAL MEDICINE

## 2017-09-30 PROCEDURE — 20600001 HC STEP DOWN PRIVATE ROOM

## 2017-09-30 PROCEDURE — 99232 SBSQ HOSP IP/OBS MODERATE 35: CPT | Mod: ,,, | Performed by: INTERNAL MEDICINE

## 2017-09-30 PROCEDURE — 63600175 PHARM REV CODE 636 W HCPCS: Performed by: NURSE PRACTITIONER

## 2017-09-30 PROCEDURE — 84100 ASSAY OF PHOSPHORUS: CPT

## 2017-09-30 PROCEDURE — 83735 ASSAY OF MAGNESIUM: CPT

## 2017-09-30 PROCEDURE — 25000003 PHARM REV CODE 250: Performed by: NURSE PRACTITIONER

## 2017-09-30 RX ORDER — LOPERAMIDE HYDROCHLORIDE 2 MG/1
2 CAPSULE ORAL 4 TIMES DAILY PRN
Status: DISCONTINUED | OUTPATIENT
Start: 2017-09-30 | End: 2017-10-04

## 2017-09-30 RX ADMIN — LOPERAMIDE HYDROCHLORIDE 2 MG: 2 CAPSULE ORAL at 11:09

## 2017-09-30 RX ADMIN — ENOXAPARIN SODIUM 40 MG: 100 INJECTION SUBCUTANEOUS at 05:09

## 2017-09-30 RX ADMIN — Medication 30 ML: at 11:09

## 2017-09-30 RX ADMIN — Medication 30 ML: at 06:09

## 2017-09-30 RX ADMIN — FILGRASTIM 480 MCG: 480 INJECTION, SOLUTION INTRAVENOUS; SUBCUTANEOUS at 08:09

## 2017-09-30 RX ADMIN — LORAZEPAM 1 MG: 2 INJECTION, SOLUTION INTRAMUSCULAR; INTRAVENOUS at 11:09

## 2017-09-30 RX ADMIN — ACYCLOVIR 800 MG: 200 CAPSULE ORAL at 08:09

## 2017-09-30 RX ADMIN — POTASSIUM CHLORIDE 20 MEQ: 750 CAPSULE, EXTENDED RELEASE ORAL at 08:09

## 2017-09-30 RX ADMIN — POTASSIUM & SODIUM PHOSPHATES POWDER PACK 280-160-250 MG 1 PACKET: 280-160-250 PACK at 11:09

## 2017-09-30 RX ADMIN — Medication 30 ML: at 01:09

## 2017-09-30 RX ADMIN — POTASSIUM & SODIUM PHOSPHATES POWDER PACK 280-160-250 MG 1 PACKET: 280-160-250 PACK at 03:09

## 2017-09-30 RX ADMIN — CIPROFLOXACIN HYDROCHLORIDE 500 MG: 500 TABLET, FILM COATED ORAL at 08:09

## 2017-09-30 RX ADMIN — ONDANSETRON 8 MG: 8 TABLET, ORALLY DISINTEGRATING ORAL at 05:09

## 2017-09-30 RX ADMIN — NEBIVOLOL HYDROCHLORIDE 10 MG: 5 TABLET ORAL at 08:09

## 2017-09-30 RX ADMIN — CITALOPRAM HYDROBROMIDE 40 MG: 20 TABLET ORAL at 08:09

## 2017-09-30 RX ADMIN — SODIUM CHLORIDE AND POTASSIUM CHLORIDE: 9; 1.49 INJECTION, SOLUTION INTRAVENOUS at 02:09

## 2017-09-30 RX ADMIN — PANTOPRAZOLE SODIUM 40 MG: 40 TABLET, DELAYED RELEASE ORAL at 08:09

## 2017-09-30 RX ADMIN — LISINOPRIL 40 MG: 20 TABLET ORAL at 08:09

## 2017-09-30 RX ADMIN — FLUCONAZOLE 400 MG: 200 TABLET ORAL at 08:09

## 2017-09-30 RX ADMIN — SODIUM CHLORIDE AND POTASSIUM CHLORIDE: 9; 1.49 INJECTION, SOLUTION INTRAVENOUS at 03:09

## 2017-09-30 RX ADMIN — LOPERAMIDE HYDROCHLORIDE 2 MG: 2 CAPSULE ORAL at 06:09

## 2017-09-30 RX ADMIN — AMLODIPINE BESYLATE 5 MG: 5 TABLET ORAL at 08:09

## 2017-09-30 RX ADMIN — ONDANSETRON 8 MG: 8 TABLET, ORALLY DISINTEGRATING ORAL at 07:09

## 2017-09-30 RX ADMIN — LOPERAMIDE HYDROCHLORIDE 2 MG: 2 CAPSULE ORAL at 08:09

## 2017-09-30 RX ADMIN — POTASSIUM & SODIUM PHOSPHATES POWDER PACK 280-160-250 MG 1 PACKET: 280-160-250 PACK at 06:09

## 2017-09-30 RX ADMIN — Medication 30 ML: at 05:09

## 2017-09-30 NOTE — ASSESSMENT & PLAN NOTE
- Today is day +3   - Melphalan given on 9/26/17 without complications  - Auto SCT day 0 on 9/27/17; received 5 bags with a CD 34 count of 4.71 x 10^6/kg - tolerated well   - ppx antimicrobials to start per protocol  - Neupogen to start day + 3

## 2017-09-30 NOTE — PLAN OF CARE
Problem: Patient Care Overview  Goal: Plan of Care Review  Outcome: Ongoing (interventions implemented as appropriate)  Patient day +3 following autologous PBSCT.  Frequent rounds made to assess pain and safety. Pt communicating needs throughout shift. Up ad diana. Tolerating diet, voiding without difficulty. C-diff negative, continues to have diarrhea. Will request imodium.  No c/o pain, Nausea controlled with PRN meds. IV fluids continued.  All vitals stable; no acute events this shift. Pt. Remaining free from falls or injury throughout shift; bed in lowest position; side rails up X2; call light within reach; pt instructed to call for assistance as needed - verbalized understanding. Q2h rounding on patient. Will continue to monitor.

## 2017-09-30 NOTE — SUBJECTIVE & OBJECTIVE
Subjective:     Interval History: reports diarrhea improved with imodium.    Objective:     Vital Signs (Most Recent):  Temp: 98.5 °F (36.9 °C) (09/30/17 1134)  Pulse: 60 (09/30/17 1135)  Resp: 19 (09/30/17 0726)  BP: (!) 155/82 (09/30/17 1135)  SpO2: 95 % (09/30/17 1135) Vital Signs (24h Range):  Temp:  [98.2 °F (36.8 °C)-98.5 °F (36.9 °C)] 98.5 °F (36.9 °C)  Pulse:  [59-74] 60  Resp:  [18-20] 19  SpO2:  [95 %-98 %] 95 %  BP: (139-159)/(81-83) 155/82     Weight: 115.9 kg (255 lb 6.5 oz)  Body mass index is 38.83 kg/m².  Body surface area is 2.36 meters squared.    ECOG SCORE         [unfilled]    Intake/Output - Last 3 Shifts       09/28 0700 - 09/29 0659 09/29 0700 - 09/30 0659 09/30 0700 - 10/01 0659    P.O. 3000 1680 480    I.V. (mL/kg) 1847.5 (15.6) 1507.5 (13) 916.8 (7.9)    Blood       Total Intake(mL/kg) 4847.5 (40.8) 3187.5 (27.5) 1396.8 (12.1)    Urine (mL/kg/hr) 2675 (0.9) 2875 (1) 950 (1.1)    Stool  0 (0) 0 (0)    Total Output 2675 2875 950    Net +2172.5 +312.5 +446.8           Urine Occurrence  4 x     Stool Occurrence  7 x 1 x          Physical Exam   Constitutional: He is oriented to person, place, and time. He appears well-developed and well-nourished.   HENT:   Head: Normocephalic and atraumatic.   Eyes: EOM are normal. Pupils are equal, round, and reactive to light.   Neck: Normal range of motion.   Cardiovascular: Intact distal pulses.    Pulmonary/Chest: Breath sounds normal.   Abdominal: Soft. Bowel sounds are normal.   Musculoskeletal: Normal range of motion.   Neurological: He is alert and oriented to person, place, and time.   Skin: Skin is warm and dry.       Significant Labs:   CBC:   Recent Labs  Lab 09/29/17 0400 09/30/17 0400   WBC 1.73* 1.84*   HGB 9.9* 10.4*   HCT 29.8* 30.8*   * 108*    and CMP:   Recent Labs  Lab 09/29/17 0400 09/30/17 0400    139   K 3.7 3.6    108   CO2 24 24   GLU 85 91   BUN 12 10   CREATININE 0.7 0.7   CALCIUM 7.3* 7.7*   PROT 5.2* 5.5*    ALBUMIN 2.8* 2.9*   BILITOT 0.4 0.3   ALKPHOS 104 102   AST 24 24   ALT 13 14   ANIONGAP 6* 7*   EGFRNONAA >60.0 >60.0

## 2017-09-30 NOTE — PLAN OF CARE
Problem: Patient Care Overview  Goal: Plan of Care Review  Outcome: Ongoing (interventions implemented as appropriate)  Pt has remained free from injury this shift. Bed in low locked position. Call light and personal belongings within reach. Side rails up x2. Nonskid socks in place. Pt ambulates in room independently. Shower and linen changed this shift. Pt c/o nausea this AM; prn zofran and ativan given. Pt having episodes of diarrhea throughout shift; imodium given this shift. Potassium and phos replacements given as needed. Pt remains afebrile. All questions and concerns addressed at this time. Will continue to monitor.

## 2017-09-30 NOTE — PROGRESS NOTES
[facility]  Hematology  Bone Marrow Transplant  Progress Note    Patient Name: Arik Bobo  Admission Date: 9/25/2017  Hospital Length of Stay: 5 days  Code Status: Full Code    Subjective:     Interval History: reports diarrhea improved with imodium.    Objective:     Vital Signs (Most Recent):  Temp: 98.5 °F (36.9 °C) (09/30/17 1134)  Pulse: 60 (09/30/17 1135)  Resp: 19 (09/30/17 0726)  BP: (!) 155/82 (09/30/17 1135)  SpO2: 95 % (09/30/17 1135) Vital Signs (24h Range):  Temp:  [98.2 °F (36.8 °C)-98.5 °F (36.9 °C)] 98.5 °F (36.9 °C)  Pulse:  [59-74] 60  Resp:  [18-20] 19  SpO2:  [95 %-98 %] 95 %  BP: (139-159)/(81-83) 155/82     Weight: 115.9 kg (255 lb 6.5 oz)  Body mass index is 38.83 kg/m².  Body surface area is 2.36 meters squared.    ECOG SCORE         [unfilled]    Intake/Output - Last 3 Shifts       09/28 0700 - 09/29 0659 09/29 0700 - 09/30 0659 09/30 0700 - 10/01 0659    P.O. 3000 1680 480    I.V. (mL/kg) 1847.5 (15.6) 1507.5 (13) 916.8 (7.9)    Blood       Total Intake(mL/kg) 4847.5 (40.8) 3187.5 (27.5) 1396.8 (12.1)    Urine (mL/kg/hr) 2675 (0.9) 2875 (1) 950 (1.1)    Stool  0 (0) 0 (0)    Total Output 2675 2875 950    Net +2172.5 +312.5 +446.8           Urine Occurrence  4 x     Stool Occurrence  7 x 1 x          Physical Exam   Constitutional: He is oriented to person, place, and time. He appears well-developed and well-nourished.   HENT:   Head: Normocephalic and atraumatic.   Eyes: EOM are normal. Pupils are equal, round, and reactive to light.   Neck: Normal range of motion.   Cardiovascular: Intact distal pulses.    Pulmonary/Chest: Breath sounds normal.   Abdominal: Soft. Bowel sounds are normal.   Musculoskeletal: Normal range of motion.   Neurological: He is alert and oriented to person, place, and time.   Skin: Skin is warm and dry.       Significant Labs:   CBC:   Recent Labs  Lab 09/29/17  0400 09/30/17  0400   WBC 1.73* 1.84*   HGB 9.9* 10.4*   HCT 29.8* 30.8*   PLT  113* 108*    and CMP:   Recent Labs  Lab 09/29/17  0400 09/30/17  0400    139   K 3.7 3.6    108   CO2 24 24   GLU 85 91   BUN 12 10   CREATININE 0.7 0.7   CALCIUM 7.3* 7.7*   PROT 5.2* 5.5*   ALBUMIN 2.8* 2.9*   BILITOT 0.4 0.3   ALKPHOS 104 102   AST 24 24   ALT 13 14   ANIONGAP 6* 7*   EGFRNONAA >60.0 >60.0     Assessment/Plan:     Diarrhea of presumed infectious origin    - diarrhea noted this morning  - C. Diff culture sent         History of auto stem cell transplant    - Today is day +3   - Melphalan given on 9/26/17 without complications  - Auto SCT day 0 on 9/27/17; received 5 bags with a CD 34 count of 4.71 x 10^6/kg - tolerated well   - ppx antimicrobials to start per protocol  - Neupogen to start day + 3         Pancytopenia due to chemotherapy    - WBC 1.84 k/uL  - Hemoglobin 10.4 grams  - Plts 108k  - Transfuse for hemoglobin <7 and platelets <10,000         History of gout    Pt suffers from gout and is not currently on uric acid suppression medication.  This may have contributed to his hip degeneration.        Depression    - Continue home Celexa         Hyperlipemia    - Will hold statin with admission and may resume at discharge        Hypertension, essential    - Continue home amlodipine, bystolic and lisinopril           Stem cell transplant candidate    - Melphalan given on 9/26/17 without complications  - Auto SCT day 0 on 9/27/17; to receive 5 bags with a CD 34 count of 4.71 x 10^6/kg   - ppx antimicrobials to start per protocol  - Neupogen to start day + 3           Multiple myeloma in remission    - Completed 4 cycles of CyBorD   - Was treated with lenalidomide and dex  - Restaging marrow 8/21/17 showed a 10-50% cellular marrow with trilineage hematopoiesis and 5% residual  kappa-restricted plasma cells (6% by morphology).  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  Hence, he has achieved a partial remission (PR1) and is ready to proceed with transplant.    65% EF on 8/2/17   - Possible need  to consider an allogeneic transplant as a cure in future as pt is young. This is not an immediate consideration but since he has 4 siblings there is a 68% chance of finding a sibling match.             VTE Risk Mitigation         Ordered     heparin (porcine) injection 1,600 Units  As needed (PRN)     Route:  Intravenous        09/25/17 1733     enoxaparin injection 40 mg  Daily     Route:  Subcutaneous        09/25/17 1257     High Risk of VTE  Once      09/25/17 1257          Disposition: Engraftment, Day 14 marrow.    Liban Keene, PGY-VI on 9/30/2017 2:55 PM  \[facility] [place] [facility]\"" Hematology/Oncology Fellow  Pager: [phone]  [email or web address]

## 2017-09-30 NOTE — ASSESSMENT & PLAN NOTE
- WBC 1.84 k/uL  - Hemoglobin 10.4 grams  - Plts 108k  - Transfuse for hemoglobin <7 and platelets <10,000

## 2017-10-01 PROBLEM — T45.1X5A CHEMOTHERAPY INDUCED DIARRHEA: Status: ACTIVE | Noted: 2017-10-01

## 2017-10-01 PROBLEM — R19.7 DIARRHEA OF PRESUMED INFECTIOUS ORIGIN: Status: RESOLVED | Noted: 2017-09-29 | Resolved: 2017-10-01

## 2017-10-01 PROBLEM — K52.1 CHEMOTHERAPY INDUCED DIARRHEA: Status: ACTIVE | Noted: 2017-10-01

## 2017-10-01 LAB
ALBUMIN SERPL BCP-MCNC: 2.9 G/DL
ALP SERPL-CCNC: 105 U/L
ALT SERPL W/O P-5'-P-CCNC: 27 U/L
ANION GAP SERPL CALC-SCNC: 5 MMOL/L
AST SERPL-CCNC: 33 U/L
BASOPHILS # BLD AUTO: 0.01 K/UL
BASOPHILS NFR BLD: 0.1 %
BILIRUB SERPL-MCNC: 0.4 MG/DL
BUN SERPL-MCNC: 7 MG/DL
CALCIUM SERPL-MCNC: 7.5 MG/DL
CHLORIDE SERPL-SCNC: 109 MMOL/L
CO2 SERPL-SCNC: 24 MMOL/L
CREAT SERPL-MCNC: 0.7 MG/DL
DIFFERENTIAL METHOD: ABNORMAL
EOSINOPHIL # BLD AUTO: 0.1 K/UL
EOSINOPHIL NFR BLD: 0.7 %
ERYTHROCYTE [DISTWIDTH] IN BLOOD BY AUTOMATED COUNT: 13.8 %
EST. GFR  (AFRICAN AMERICAN): >60 ML/MIN/1.73 M^2
EST. GFR  (NON AFRICAN AMERICAN): >60 ML/MIN/1.73 M^2
GLUCOSE SERPL-MCNC: 92 MG/DL
HCT VFR BLD AUTO: 31.1 %
HGB BLD-MCNC: 10.4 G/DL
LYMPHOCYTES # BLD AUTO: 0.1 K/UL
LYMPHOCYTES NFR BLD: 0.9 %
MAGNESIUM SERPL-MCNC: 2.1 MG/DL
MCH RBC QN AUTO: 30.1 PG
MCHC RBC AUTO-ENTMCNC: 33.4 G/DL
MCV RBC AUTO: 90 FL
MONOCYTES # BLD AUTO: 0 K/UL
MONOCYTES NFR BLD: 0.1 %
NEUTROPHILS # BLD AUTO: 6.7 K/UL
NEUTROPHILS NFR BLD: 97.8 %
PHOSPHATE SERPL-MCNC: 2.3 MG/DL
PLATELET # BLD AUTO: 74 K/UL
PMV BLD AUTO: 9.2 FL
POTASSIUM SERPL-SCNC: 3.8 MMOL/L
PROT SERPL-MCNC: 5.5 G/DL
RBC # BLD AUTO: 3.46 M/UL
SODIUM SERPL-SCNC: 138 MMOL/L
WBC # BLD AUTO: 6.88 K/UL

## 2017-10-01 PROCEDURE — 99232 SBSQ HOSP IP/OBS MODERATE 35: CPT | Mod: ,,, | Performed by: INTERNAL MEDICINE

## 2017-10-01 PROCEDURE — 85025 COMPLETE CBC W/AUTO DIFF WBC: CPT

## 2017-10-01 PROCEDURE — 25000003 PHARM REV CODE 250: Performed by: INTERNAL MEDICINE

## 2017-10-01 PROCEDURE — 80053 COMPREHEN METABOLIC PANEL: CPT

## 2017-10-01 PROCEDURE — 84100 ASSAY OF PHOSPHORUS: CPT

## 2017-10-01 PROCEDURE — 83735 ASSAY OF MAGNESIUM: CPT

## 2017-10-01 PROCEDURE — A9155 ARTIFICIAL SALIVA: HCPCS | Performed by: NURSE PRACTITIONER

## 2017-10-01 PROCEDURE — 25000003 PHARM REV CODE 250: Performed by: NURSE PRACTITIONER

## 2017-10-01 PROCEDURE — 63600175 PHARM REV CODE 636 W HCPCS: Performed by: NURSE PRACTITIONER

## 2017-10-01 PROCEDURE — 20600001 HC STEP DOWN PRIVATE ROOM

## 2017-10-01 RX ADMIN — ACYCLOVIR 800 MG: 200 CAPSULE ORAL at 08:10

## 2017-10-01 RX ADMIN — LOPERAMIDE HYDROCHLORIDE 2 MG: 2 CAPSULE ORAL at 07:10

## 2017-10-01 RX ADMIN — LOPERAMIDE HYDROCHLORIDE 2 MG: 2 CAPSULE ORAL at 08:10

## 2017-10-01 RX ADMIN — ENOXAPARIN SODIUM 40 MG: 100 INJECTION SUBCUTANEOUS at 05:10

## 2017-10-01 RX ADMIN — NEBIVOLOL HYDROCHLORIDE 10 MG: 5 TABLET ORAL at 08:10

## 2017-10-01 RX ADMIN — POTASSIUM PHOSPHATE, MONOBASIC AND POTASSIUM PHOSPHATE, DIBASIC 30 MMOL: 224; 236 INJECTION, SOLUTION, CONCENTRATE INTRAVENOUS at 12:10

## 2017-10-01 RX ADMIN — LORAZEPAM 1 MG: 2 INJECTION, SOLUTION INTRAMUSCULAR; INTRAVENOUS at 10:10

## 2017-10-01 RX ADMIN — FILGRASTIM 480 MCG: 480 INJECTION, SOLUTION INTRAVENOUS; SUBCUTANEOUS at 08:10

## 2017-10-01 RX ADMIN — Medication 30 ML: at 05:10

## 2017-10-01 RX ADMIN — ONDANSETRON 8 MG: 8 TABLET, ORALLY DISINTEGRATING ORAL at 07:10

## 2017-10-01 RX ADMIN — SODIUM CHLORIDE AND POTASSIUM CHLORIDE: 9; 1.49 INJECTION, SOLUTION INTRAVENOUS at 05:10

## 2017-10-01 RX ADMIN — CIPROFLOXACIN HYDROCHLORIDE 500 MG: 500 TABLET, FILM COATED ORAL at 08:10

## 2017-10-01 RX ADMIN — CITALOPRAM HYDROBROMIDE 40 MG: 20 TABLET ORAL at 08:10

## 2017-10-01 RX ADMIN — LISINOPRIL 40 MG: 20 TABLET ORAL at 08:10

## 2017-10-01 RX ADMIN — POTASSIUM & SODIUM PHOSPHATES POWDER PACK 280-160-250 MG 1 PACKET: 280-160-250 PACK at 05:10

## 2017-10-01 RX ADMIN — FLUCONAZOLE 400 MG: 200 TABLET ORAL at 08:10

## 2017-10-01 RX ADMIN — PANTOPRAZOLE SODIUM 40 MG: 40 TABLET, DELAYED RELEASE ORAL at 08:10

## 2017-10-01 RX ADMIN — Medication 30 ML: at 11:10

## 2017-10-01 RX ADMIN — AMLODIPINE BESYLATE 5 MG: 5 TABLET ORAL at 08:10

## 2017-10-01 NOTE — SUBJECTIVE & OBJECTIVE
Subjective:     Interval History: Denies any new issues.    Objective:     Vital Signs (Most Recent):  Temp: 98.8 °F (37.1 °C) (10/01/17 1139)  Pulse: 62 (10/01/17 1139)  Resp: 16 (10/01/17 1139)  BP: (!) 144/80 (10/01/17 1139)  SpO2: 97 % (10/01/17 1139) Vital Signs (24h Range):  Temp:  [98.4 °F (36.9 °C)-99.2 °F (37.3 °C)] 98.8 °F (37.1 °C)  Pulse:  [62-72] 62  Resp:  [16-19] 16  SpO2:  [95 %-99 %] 97 %  BP: (137-157)/(79-85) 144/80     Weight: 116 kg (255 lb 11.7 oz)  Body mass index is 38.88 kg/m².  Body surface area is 2.36 meters squared.    ECOG SCORE         [unfilled]    Intake/Output - Last 3 Shifts       09/29 0700 - 09/30 0659 09/30 0700 - 10/01 0659 10/01 0700 - 10/02 0659    P.O. 1680 1560 120    I.V. (mL/kg) 1507.5 (13) 2507.4 (21.6) 260.3 (2.2)    IV Piggyback   500    Total Intake(mL/kg) 3187.5 (27.5) 4067.4 (35.1) 880.3 (7.6)    Urine (mL/kg/hr) 2875 (1) 3600 (1.3) 1200 (1.6)    Stool 0 (0) 0 (0) 0 (0)    Total Output 2875 3600 1200    Net +312.5 +467.4 -319.7           Urine Occurrence 4 x      Stool Occurrence 7 x 3 x 1 x          Physical Exam   Constitutional: He is oriented to person, place, and time. He appears well-developed and well-nourished.   HENT:   Head: Normocephalic and atraumatic.   Eyes: EOM are normal. Pupils are equal, round, and reactive to light.   Neck: Normal range of motion.   Cardiovascular: Intact distal pulses.    Pulmonary/Chest: Breath sounds normal.   Abdominal: Soft. Bowel sounds are normal.   Musculoskeletal: Normal range of motion.   Neurological: He is alert and oriented to person, place, and time.   Skin: Skin is warm and dry.       Significant Labs:   CBC:     Recent Labs  Lab 09/30/17  0400 10/01/17  0359   WBC 1.84* 6.88   HGB 10.4* 10.4*   HCT 30.8* 31.1*   * 74*    and CMP:     Recent Labs  Lab 09/30/17  0400 10/01/17  0359    138   K 3.6 3.8    109   CO2 24 24   GLU 91 92   BUN 10 7   CREATININE 0.7 0.7   CALCIUM 7.7* 7.5*   PROT 5.5* 5.5*    ALBUMIN 2.9* 2.9*   BILITOT 0.3 0.4   ALKPHOS 102 105   AST 24 33   ALT 14 27   ANIONGAP 7* 5*   EGFRNONAA >60.0 >60.0

## 2017-10-01 NOTE — PLAN OF CARE
Problem: Patient Care Overview  Goal: Plan of Care Review  Outcome: Ongoing (interventions implemented as appropriate)  Patient day +4 following autologous PBSCT.  Frequent rounds made to assess pain and safety. Pt communicating needs throughout shift. Up ad diana. Tolerating diet, voiding without difficulty. Imodium given for diarrhea X1. No c/o pain this shift. IV fluids continued.  All vitals stable; no acute events this shift. Pt. Remaining free from falls or injury throughout shift; bed in lowest position; side rails up X2; call light within reach; pt instructed to call for assistance as needed - verbalized understanding. Q2h rounding on patient. Will continue to monitor.

## 2017-10-01 NOTE — PLAN OF CARE
Problem: Patient Care Overview  Goal: Plan of Care Review  Outcome: Ongoing (interventions implemented as appropriate)  Pt has remained free from injury this shift. Bed in low locked position. Call light and personal belongings within reach. Side rails up x2. Nonskid socks in place. Pt ambulates in room independently. Shower and linen changed this shift. Pt c/o nausea this AM; prn zofran and ativan given. Pt having episodes of diarrhea throughout shift; imodium given this shift. IV phos replacements given as ordered. Pt remains afebrile. All questions and concerns addressed at this time. Will continue to monitor.

## 2017-10-01 NOTE — ASSESSMENT & PLAN NOTE
- Today is day +4  - Melphalan given on 9/26/17 without complications  - Auto SCT day 0 on 9/27/17; received 5 bags with a CD 34 count of 4.71 x 10^6/kg - tolerated well   - ppx antimicrobials to start per protocol  - Neupogen to start day + 3

## 2017-10-01 NOTE — ASSESSMENT & PLAN NOTE
- WBC 6.88 k/uL, ANC 6728  - Hemoglobin 10.4 grams  - Plts 74k  - Transfuse for hemoglobin <7 and platelets <10,000

## 2017-10-02 PROBLEM — T45.1X5A CHEMOTHERAPY INDUCED DIARRHEA: Status: ACTIVE | Noted: 2017-10-02

## 2017-10-02 PROBLEM — K52.1 CHEMOTHERAPY INDUCED DIARRHEA: Status: ACTIVE | Noted: 2017-10-02

## 2017-10-02 LAB
ABO + RH BLD: NORMAL
ALBUMIN SERPL BCP-MCNC: 2.7 G/DL
ALP SERPL-CCNC: 103 U/L
ALT SERPL W/O P-5'-P-CCNC: 45 U/L
ANION GAP SERPL CALC-SCNC: 6 MMOL/L
ANISOCYTOSIS BLD QL SMEAR: SLIGHT
AST SERPL-CCNC: 45 U/L
BACTERIA #/AREA URNS AUTO: ABNORMAL /HPF
BASOPHILS # BLD AUTO: 0 K/UL
BASOPHILS NFR BLD: 0 %
BILIRUB SERPL-MCNC: 0.4 MG/DL
BILIRUB UR QL STRIP: NEGATIVE
BLD GP AB SCN CELLS X3 SERPL QL: NORMAL
BUN SERPL-MCNC: 7 MG/DL
CALCIUM SERPL-MCNC: 7.4 MG/DL
CHLORIDE SERPL-SCNC: 109 MMOL/L
CLARITY UR REFRACT.AUTO: CLEAR
CO2 SERPL-SCNC: 24 MMOL/L
COLOR UR AUTO: YELLOW
CREAT SERPL-MCNC: 0.6 MG/DL
DIFFERENTIAL METHOD: ABNORMAL
EOSINOPHIL # BLD AUTO: 0 K/UL
EOSINOPHIL NFR BLD: 2.2 %
ERYTHROCYTE [DISTWIDTH] IN BLOOD BY AUTOMATED COUNT: 13.8 %
EST. GFR  (AFRICAN AMERICAN): >60 ML/MIN/1.73 M^2
EST. GFR  (NON AFRICAN AMERICAN): >60 ML/MIN/1.73 M^2
GLUCOSE SERPL-MCNC: 93 MG/DL
GLUCOSE UR QL STRIP: NEGATIVE
HCT VFR BLD AUTO: 29.3 %
HGB BLD-MCNC: 9.9 G/DL
HGB UR QL STRIP: ABNORMAL
KETONES UR QL STRIP: NEGATIVE
LEUKOCYTE ESTERASE UR QL STRIP: NEGATIVE
LYMPHOCYTES # BLD AUTO: 0.1 K/UL
LYMPHOCYTES NFR BLD: 5.5 %
MAGNESIUM SERPL-MCNC: 1.9 MG/DL
MCH RBC QN AUTO: 30.4 PG
MCHC RBC AUTO-ENTMCNC: 33.8 G/DL
MCV RBC AUTO: 90 FL
MICROSCOPIC COMMENT: ABNORMAL
MONOCYTES # BLD AUTO: 0 K/UL
MONOCYTES NFR BLD: 1.1 %
NEUTROPHILS # BLD AUTO: 0.8 K/UL
NEUTROPHILS NFR BLD: 91.2 %
NITRITE UR QL STRIP: NEGATIVE
PH UR STRIP: 6 [PH] (ref 5–8)
PHOSPHATE SERPL-MCNC: 2.3 MG/DL
PLATELET # BLD AUTO: 50 K/UL
PLATELET BLD QL SMEAR: ABNORMAL
PMV BLD AUTO: 9.2 FL
POTASSIUM SERPL-SCNC: 3.7 MMOL/L
PROT SERPL-MCNC: 5.2 G/DL
PROT UR QL STRIP: NEGATIVE
RBC # BLD AUTO: 3.26 M/UL
RBC #/AREA URNS AUTO: 6 /HPF (ref 0–4)
SODIUM SERPL-SCNC: 139 MMOL/L
SP GR UR STRIP: 1.01 (ref 1–1.03)
URN SPEC COLLECT METH UR: ABNORMAL
UROBILINOGEN UR STRIP-ACNC: NEGATIVE EU/DL
WBC # BLD AUTO: 0.91 K/UL
WBC #/AREA URNS AUTO: 1 /HPF (ref 0–5)

## 2017-10-02 PROCEDURE — 97803 MED NUTRITION INDIV SUBSEQ: CPT

## 2017-10-02 PROCEDURE — 86850 RBC ANTIBODY SCREEN: CPT

## 2017-10-02 PROCEDURE — 85025 COMPLETE CBC W/AUTO DIFF WBC: CPT

## 2017-10-02 PROCEDURE — 20600001 HC STEP DOWN PRIVATE ROOM

## 2017-10-02 PROCEDURE — 63600175 PHARM REV CODE 636 W HCPCS: Performed by: NURSE PRACTITIONER

## 2017-10-02 PROCEDURE — 84100 ASSAY OF PHOSPHORUS: CPT

## 2017-10-02 PROCEDURE — 99232 SBSQ HOSP IP/OBS MODERATE 35: CPT | Mod: ,,, | Performed by: INTERNAL MEDICINE

## 2017-10-02 PROCEDURE — A9155 ARTIFICIAL SALIVA: HCPCS | Performed by: NURSE PRACTITIONER

## 2017-10-02 PROCEDURE — 81001 URINALYSIS AUTO W/SCOPE: CPT

## 2017-10-02 PROCEDURE — 25000003 PHARM REV CODE 250: Performed by: INTERNAL MEDICINE

## 2017-10-02 PROCEDURE — 83735 ASSAY OF MAGNESIUM: CPT

## 2017-10-02 PROCEDURE — 25000003 PHARM REV CODE 250: Performed by: NURSE PRACTITIONER

## 2017-10-02 PROCEDURE — 80053 COMPREHEN METABOLIC PANEL: CPT

## 2017-10-02 PROCEDURE — 86900 BLOOD TYPING SEROLOGIC ABO: CPT

## 2017-10-02 RX ADMIN — POTASSIUM PHOSPHATE, MONOBASIC AND POTASSIUM PHOSPHATE, DIBASIC 15 MMOL: 224; 236 INJECTION, SOLUTION, CONCENTRATE INTRAVENOUS at 03:10

## 2017-10-02 RX ADMIN — LISINOPRIL 40 MG: 20 TABLET ORAL at 08:10

## 2017-10-02 RX ADMIN — AMLODIPINE BESYLATE 5 MG: 5 TABLET ORAL at 08:10

## 2017-10-02 RX ADMIN — ACYCLOVIR 800 MG: 200 CAPSULE ORAL at 08:10

## 2017-10-02 RX ADMIN — ACYCLOVIR 800 MG: 200 CAPSULE ORAL at 09:10

## 2017-10-02 RX ADMIN — MAGNESIUM OXIDE TAB 400 MG (241.3 MG ELEMENTAL MG) 400 MG: 400 (241.3 MG) TAB at 08:10

## 2017-10-02 RX ADMIN — NEBIVOLOL HYDROCHLORIDE 10 MG: 5 TABLET ORAL at 08:10

## 2017-10-02 RX ADMIN — Medication 30 ML: at 04:10

## 2017-10-02 RX ADMIN — Medication 30 ML: at 11:10

## 2017-10-02 RX ADMIN — Medication 30 ML: at 05:10

## 2017-10-02 RX ADMIN — LORAZEPAM 1 MG: 2 INJECTION, SOLUTION INTRAMUSCULAR; INTRAVENOUS at 08:10

## 2017-10-02 RX ADMIN — LORAZEPAM 1 MG: 2 INJECTION, SOLUTION INTRAMUSCULAR; INTRAVENOUS at 06:10

## 2017-10-02 RX ADMIN — CIPROFLOXACIN HYDROCHLORIDE 500 MG: 500 TABLET, FILM COATED ORAL at 09:10

## 2017-10-02 RX ADMIN — CIPROFLOXACIN HYDROCHLORIDE 500 MG: 500 TABLET, FILM COATED ORAL at 08:10

## 2017-10-02 RX ADMIN — FLUCONAZOLE 400 MG: 200 TABLET ORAL at 08:10

## 2017-10-02 RX ADMIN — MAGNESIUM OXIDE TAB 400 MG (241.3 MG ELEMENTAL MG) 400 MG: 400 (241.3 MG) TAB at 05:10

## 2017-10-02 RX ADMIN — SODIUM CHLORIDE AND POTASSIUM CHLORIDE: 9; 1.49 INJECTION, SOLUTION INTRAVENOUS at 01:10

## 2017-10-02 RX ADMIN — PANTOPRAZOLE SODIUM 40 MG: 40 TABLET, DELAYED RELEASE ORAL at 08:10

## 2017-10-02 RX ADMIN — CITALOPRAM HYDROBROMIDE 40 MG: 20 TABLET ORAL at 08:10

## 2017-10-02 RX ADMIN — FILGRASTIM 480 MCG: 480 INJECTION, SOLUTION INTRAVENOUS; SUBCUTANEOUS at 08:10

## 2017-10-02 NOTE — ASSESSMENT & PLAN NOTE
- Today is day +6  - Melphalan given on 9/26/17 without complications  - Auto SCT day 0 on 9/27/17; received 5 bags with a CD 34 count of 4.71 x 10^6/kg - tolerated well   - ppx antimicrobials to start per protocol  - Neupogen started day + 3   - with possible mucositis, ordered Clark (10/3/17)  - with n/v, continue ativan

## 2017-10-02 NOTE — ASSESSMENT & PLAN NOTE
Nutrition Problem  inadequate nutrient intake    Related to (etiology):   Physiological needs- BMT 9/27    Signs and Symptoms (as evidenced by):   Caloric/protein needs increased 2/2 BMT     Nutrition Diagnosis Status:   Improving

## 2017-10-02 NOTE — ASSESSMENT & PLAN NOTE
- WBC 0.91 k/uL,   - Hemoglobin 9.9 grams  - Plts 50k  - Transfuse for hemoglobin <7 and platelets <10,000

## 2017-10-02 NOTE — PLAN OF CARE
Problem: Patient Care Overview  Goal: Plan of Care Review  Outcome: Ongoing (interventions implemented as appropriate)  Patient day +5 following autologous PBSCT.  Frequent rounds made to assess pain and safety. Pt communicating needs throughout shift. Up ad diana. Tolerating diet, voiding without difficulty. Imodium given for diarrhea X1. No c/o pain this shift. IV fluids continued.  All vitals stable; no acute events this shift. Pt. Remaining free from falls or injury throughout shift; bed in lowest position; side rails up X2; call light within reach; pt instructed to call for assistance as needed - verbalized understanding. Q2h rounding on patient. Will continue to monitor.

## 2017-10-02 NOTE — PLAN OF CARE
Problem: Patient Care Overview  Goal: Plan of Care Review  Outcome: Ongoing (interventions implemented as appropriate)  Plan of care reviewed with pt at beginning of shift and updated throughout the day.  Pt is day +5 Melphalan auto SCT for multiple myeloma.  He is awake, alert, oriented, afebrile and free of injury and falls this shift.  Safety, fall, neutropenic and thrombocytopenic precautions maintained.  Pt gets Neupogen injections daily. Lovenox injections discontinued today d/t plt count 50,000. IVF maintained at 75 mL/hr. Pt had nausea with one episode of vomiting this morning that was managed by PRN Ativan IV.  He was having diarrhea yesterday but has not had any bowel movements today.  Pt reports decreased appetite, PO intake and fluids encouraged.  No lesions or sores visualized in oral cavity, SS given around the clock and salt and soda mouth rinses promoted. Pt reported that he started having burning upon urination around 1500, UA ordered and collected. Magnesium given per electrolyte sliding scale. 15 mmol kphos IV x 1 dose given, as sliding scale dose is PO and possibly gives pt diarrhea. Bed locked and in lowest position, side rails up x 2, non-skid footwear in place and call light and personal belongings within reach.  Pt and family instructed to call for assistance when needed and they verbalized understanding.  Will monitor.

## 2017-10-02 NOTE — PROGRESS NOTES
[facility]  Hematology  Bone Marrow Transplant  Progress Note    Patient Name: Arik Bobo  Admission Date: 9/25/2017  Hospital Length of Stay: 7 days  Code Status: Full Code    Subjective:     Interval History: Denies any new issues.    Objective:     Vital Signs (Most Recent):  Temp: 98.8 °F (37.1 °C) (10/02/17 1131)  Pulse: 68 (10/02/17 1131)  Resp: 18 (10/02/17 1131)  BP: 132/72 (10/02/17 1131)  SpO2: 98 % (10/02/17 1131) Vital Signs (24h Range):  Temp:  [98.1 °F (36.7 °C)-98.9 °F (37.2 °C)] 98.8 °F (37.1 °C)  Pulse:  [66-69] 68  Resp:  [16-18] 18  SpO2:  [96 %-100 %] 98 %  BP: (119-154)/(69-78) 132/72     Weight: 115.2 kg (253 lb 13.8 oz)  Body mass index is 38.6 kg/m².  Body surface area is 2.35 meters squared.    ECOG SCORE         [unfilled]    Intake/Output - Last 3 Shifts       09/30 0700 - 10/01 0659 10/01 0700 - 10/02 0659 10/02 0700 - 10/03 0659    P.O. 1560 120 720    I.V. (mL/kg) 2507.4 (21.6) 1205.3 (10.5) 431.3 (3.7)    IV Piggyback  500     Total Intake(mL/kg) 4067.4 (35.1) 1825.3 (15.9) 1151.3 (10)    Urine (mL/kg/hr) 3600 (1.3) 3600 (1.3) 300 (0.4)    Emesis/NG output   0 (0)    Stool 0 (0) 0 (0) 0 (0)    Total Output 3600 3600 300    Net +467.4 -1774.7 +851.3           Stool Occurrence 3 x 3 x 0 x    Emesis Occurrence   1 x          Physical Exam   Constitutional: He is oriented to person, place, and time. He appears well-developed and well-nourished.   HENT:   Head: Normocephalic and atraumatic.   Eyes: EOM are normal. Pupils are equal, round, and reactive to light.   Neck: Normal range of motion.   Cardiovascular: Intact distal pulses.    Pulmonary/Chest: Breath sounds normal.   Abdominal: Soft. Bowel sounds are normal.   Musculoskeletal: Normal range of motion.   Neurological: He is alert and oriented to person, place, and time.   Skin: Skin is warm and dry.       Significant Labs:   CBC:     Recent Labs  Lab 10/01/17  0359 10/02/17  0400   WBC 6.88 0.91*   HGB 10.4*  9.9*   HCT 31.1* 29.3*   PLT 74* 50*    and CMP:     Recent Labs  Lab 10/01/17  0359 10/02/17  0400    139   K 3.8 3.7    109   CO2 24 24   GLU 92 93   BUN 7 7   CREATININE 0.7 0.6   CALCIUM 7.5* 7.4*   PROT 5.5* 5.2*   ALBUMIN 2.9* 2.7*   BILITOT 0.4 0.4   ALKPHOS 105 103   AST 33 45*   ALT 27 45*   ANIONGAP 5* 6*   EGFRNONAA >60.0 >60.0     Assessment/Plan:     History of auto stem cell transplant    - Today is day +5  - Melphalan given on 9/26/17 without complications  - Auto SCT day 0 on 9/27/17; received 5 bags with a CD 34 count of 4.71 x 10^6/kg - tolerated well   - ppx antimicrobials to start per protocol  - Neupogen to start day + 3         Pancytopenia due to chemotherapy    - WBC 0.91 k/uL,   - Hemoglobin 9.9 grams  - Plts 50k  - Transfuse for hemoglobin <7 and platelets <10,000         Multiple myeloma in remission    - Completed 4 cycles of CyBorD   - Was treated with lenalidomide and dex  - Restaging marrow 8/21/17 showed a 10-50% cellular marrow with trilineage hematopoiesis and 5% residual  kappa-restricted plasma cells (6% by morphology).  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  Hence, he has achieved a partial remission (PR1) and is ready to proceed with transplant.    65% EF on 8/2/17   - Possible need to consider an allogeneic transplant as a cure in future as pt is young. This is not an immediate consideration but since he has 4 siblings there is a 68% chance of finding a sibling match.         Chemotherapy induced diarrhea    - Cdiff negative  - Improved with imodium  - Will monitor.        History of gout    Pt suffers from gout and is not currently on uric acid suppression medication.  This may have contributed to his hip degeneration.        Depression    - Continue home Celexa         Hyperlipemia    - Will hold statin with admission and may resume at discharge        Hypertension, essential    - Continue home amlodipine, bystolic and lisinopril           Stem cell transplant  candidate    - Melphalan given on 9/26/17 without complications  - Auto SCT day 0 on 9/27/17; to receive 5 bags with a CD 34 count of 4.71 x 10^6/kg   - ppx antimicrobials to start per protocol  - Neupogen to start day + 3               VTE Risk Mitigation         Ordered     heparin (porcine) injection 1,600 Units  As needed (PRN)     Route:  Intravenous        09/25/17 1733     enoxaparin injection 40 mg  Daily     Route:  Subcutaneous        09/25/17 1257     High Risk of VTE  Once      09/25/17 1257          Disposition: pending engraftment    Liban Keene, PGY-VI on 10/2/2017 1:27 PM  [facility] Hematology/Oncology Fellow  Pager: [phone]  [email or web address]

## 2017-10-02 NOTE — SUBJECTIVE & OBJECTIVE
Subjective:     Interval History: Denies any new issues.    Objective:     Vital Signs (Most Recent):  Temp: 98.8 °F (37.1 °C) (10/02/17 1131)  Pulse: 68 (10/02/17 1131)  Resp: 18 (10/02/17 1131)  BP: 132/72 (10/02/17 1131)  SpO2: 98 % (10/02/17 1131) Vital Signs (24h Range):  Temp:  [98.1 °F (36.7 °C)-98.9 °F (37.2 °C)] 98.8 °F (37.1 °C)  Pulse:  [66-69] 68  Resp:  [16-18] 18  SpO2:  [96 %-100 %] 98 %  BP: (119-154)/(69-78) 132/72     Weight: 115.2 kg (253 lb 13.8 oz)  Body mass index is 38.6 kg/m².  Body surface area is 2.35 meters squared.    ECOG SCORE         [unfilled]    Intake/Output - Last 3 Shifts       09/30 0700 - 10/01 0659 10/01 0700 - 10/02 0659 10/02 0700 - 10/03 0659    P.O. 1560 120 720    I.V. (mL/kg) 2507.4 (21.6) 1205.3 (10.5) 431.3 (3.7)    IV Piggyback  500     Total Intake(mL/kg) 4067.4 (35.1) 1825.3 (15.9) 1151.3 (10)    Urine (mL/kg/hr) 3600 (1.3) 3600 (1.3) 300 (0.4)    Emesis/NG output   0 (0)    Stool 0 (0) 0 (0) 0 (0)    Total Output 3600 3600 300    Net +467.4 -1774.7 +851.3           Stool Occurrence 3 x 3 x 0 x    Emesis Occurrence   1 x          Physical Exam   Constitutional: He is oriented to person, place, and time. He appears well-developed and well-nourished.   HENT:   Head: Normocephalic and atraumatic.   Eyes: EOM are normal. Pupils are equal, round, and reactive to light.   Neck: Normal range of motion.   Cardiovascular: Intact distal pulses.    Pulmonary/Chest: Breath sounds normal.   Abdominal: Soft. Bowel sounds are normal.   Musculoskeletal: Normal range of motion.   Neurological: He is alert and oriented to person, place, and time.   Skin: Skin is warm and dry.       Significant Labs:   CBC:     Recent Labs  Lab 10/01/17  0359 10/02/17  0400   WBC 6.88 0.91*   HGB 10.4* 9.9*   HCT 31.1* 29.3*   PLT 74* 50*    and CMP:     Recent Labs  Lab 10/01/17  0359 10/02/17  0400    139   K 3.8 3.7    109   CO2 24 24   GLU 92 93   BUN 7 7   CREATININE 0.7 0.6   CALCIUM  7.5* 7.4*   PROT 5.5* 5.2*   ALBUMIN 2.9* 2.7*   BILITOT 0.4 0.4   ALKPHOS 105 103   AST 33 45*   ALT 27 45*   ANIONGAP 5* 6*   EGFRNONAA >60.0 >60.0

## 2017-10-02 NOTE — PROGRESS NOTES
[facility]  Adult Nutrition  Progress Note    SUMMARY     Recommendations    Recommendation/Intervention: 1. Monitor pt after SCT over next few days 2. Encourage eating  Goals: Pt will meet at least 75% of nutritional needs PO  Nutrition Goal Status: new  Communication of RD Recs: reviewed with RN    Reason for Assessment    Reason for Assessment: RD follow-up  Diagnosis:  (SCT candidate (multiple myeloma in remission))  Relevant Medical History: high blood pressure, high cholesterol or triglycerides, multiple myeloma, depression   Interdisciplinary Rounds: attended  General Information Comments: Pt day + 5 BMT, pt states N/D have improved and controled w/ meds, Appetite starting to comeback today. This AM was able to eat 2x bowls of cereal w/ milk  Nutrition Discharge Planning: regular diet w/po intake >/= 75% EEN/EPN    Nutrition Prescription Ordered    Current Diet Order: Regular     Evaluation of Received Nutrients/Fluid Intake     IV Fluid (mL): 4800   I/O: +4.7L since admit    Intake/Output Summary (Last 24 hours) at 10/02/17 1257  Last data filed at 10/02/17 1000   Gross per 24 hour   Intake          2096.25 ml   Output             2700 ml   Net          -603.75 ml   Comments: LBM:10/1     % Intake of Estimated Energy Needs: 50 - 75 %  % Meal Intake: 50%     Nutrition Risk Screen     Nutrition Risk Screen: no indicators present    Nutrition/Diet History    Patient Reported Diet/Restrictions/Preferences: general  Typical Food/Fluid Intake: Adequate PTA  Food Preferences: No cultural or religous preferences reported       Labs/Tests/Procedures/Meds    Pertinent Labs Reviewed: reviewed  Pertinent Labs Comments: WBC-0.91, H/H-9.9/29.3, Plts-50, Glu-93, Ca-7.4, Phos-2.3, Mg-1.9, Alb-2.7  Pertinent Medications Reviewed: reviewed  Pertinent Medications Comments: IVF, Acyclovir, Cipro, heparin, Mg,     Physical Findings    Overall Physical Appearance: nourished, overweight  Tubes:  (Central  "Line)  Oral/Mouth Cavity:  (FRANCHESKA)  Skin: intact    Anthropometrics    Temp: 98.8 °F (37.1 °C)  Height: 5' 8" (172.7 cm)  Weight Method: Standard Scale  Weight: 115.2 kg (253 lb 13.8 oz)  Ideal Body Weight (IBW), Male: 154 lb  % Ideal Body Weight, Male (lb): 168.56 lb  BMI (Calculated): 39.6  BMI Grade: 35 - 39.9 - obesity - grade II        Estimated/Assessed Needs    Weight Used For Calorie Calculations: 117.6 kg (259 lb 4.2 oz)   Height (cm): 172.7 cm  Energy Calorie Requirements (kcal): 6796-9812  Energy Need Method: Kcal/kg     RMR ([place]-St. Jeor Equation): 1995.5   Weight Used For Protein Calculations: 117.6 kg (259 lb 4.2 oz)  Protein Requirements: 118-141 g (1.0-1.2)  Fluid Requirements (mL): 20-25 ml/kg  Fluid Need Method: RDA Method (1 ml/kcal or per MD)   RDA Method (mL): 2940       Assessment and Plan    Multiple myeloma in remission    Nutrition Problem  inadequate nutrient intake    Related to (etiology):   Physiological needs- BMT 9/27    Signs and Symptoms (as evidenced by):   Caloric/protein needs increased 2/2 BMT     Nutrition Diagnosis Status:   Improving              Monitor and Evaluation    Food and Nutrient Intake: energy intake, food and beverage intake  Food and Nutrient Administration: diet order  Physical Activity and Function: nutrition-related ADLs and IADLs  Anthropometric Measurements: weight, weight change, body mass index  Biochemical Data, Medical Tests and Procedures:  (All labs)  Nutrition-Focused Physical Findings: overall appearance    Nutrition Risk    Level of Risk:  (RD f/u 1x wk)    Nutrition Follow-Up    RD Follow-up?: Yes  "

## 2017-10-02 NOTE — ASSESSMENT & PLAN NOTE
- Today is day +5  - Melphalan given on 9/26/17 without complications  - Auto SCT day 0 on 9/27/17; received 5 bags with a CD 34 count of 4.71 x 10^6/kg - tolerated well   - ppx antimicrobials to start per protocol  - Neupogen to start day + 3

## 2017-10-02 NOTE — ASSESSMENT & PLAN NOTE
- Melphalan given on 9/26/17 without complications  - Auto SCT day 0 on 9/27/17; to receive 5 bags with a CD 34 count of 4.71 x 10^6/kg   - ppx antimicrobials to start per protocol  - Neupogen to start day + 3

## 2017-10-03 PROBLEM — R50.81 NEUTROPENIC FEVER: Status: ACTIVE | Noted: 2017-10-03

## 2017-10-03 PROBLEM — Z94.84 HISTORY OF AUTO STEM CELL TRANSPLANT: Status: RESOLVED | Noted: 2017-09-28 | Resolved: 2017-10-03

## 2017-10-03 PROBLEM — R30.0 BURNING WITH URINATION: Status: ACTIVE | Noted: 2017-10-03

## 2017-10-03 PROBLEM — D70.9 NEUTROPENIC FEVER: Status: ACTIVE | Noted: 2017-10-03

## 2017-10-03 LAB
ALBUMIN SERPL BCP-MCNC: 2.8 G/DL
ALP SERPL-CCNC: 107 U/L
ALT SERPL W/O P-5'-P-CCNC: 45 U/L
ANION GAP SERPL CALC-SCNC: 8 MMOL/L
ANISOCYTOSIS BLD QL SMEAR: SLIGHT
AST SERPL-CCNC: 37 U/L
BASOPHILS # BLD AUTO: ABNORMAL K/UL
BASOPHILS NFR BLD: 0 %
BILIRUB SERPL-MCNC: 0.5 MG/DL
BUN SERPL-MCNC: 7 MG/DL
CALCIUM SERPL-MCNC: 7.5 MG/DL
CHLORIDE SERPL-SCNC: 107 MMOL/L
CO2 SERPL-SCNC: 22 MMOL/L
CREAT SERPL-MCNC: 0.6 MG/DL
DIFFERENTIAL METHOD: ABNORMAL
EOSINOPHIL # BLD AUTO: ABNORMAL K/UL
EOSINOPHIL NFR BLD: 25 %
ERYTHROCYTE [DISTWIDTH] IN BLOOD BY AUTOMATED COUNT: 13.6 %
EST. GFR  (AFRICAN AMERICAN): >60 ML/MIN/1.73 M^2
EST. GFR  (NON AFRICAN AMERICAN): >60 ML/MIN/1.73 M^2
GLUCOSE SERPL-MCNC: 101 MG/DL
HCT VFR BLD AUTO: 29.4 %
HGB BLD-MCNC: 10 G/DL
LYMPHOCYTES # BLD AUTO: ABNORMAL K/UL
LYMPHOCYTES NFR BLD: 50 %
MAGNESIUM SERPL-MCNC: 1.7 MG/DL
MCH RBC QN AUTO: 30.3 PG
MCHC RBC AUTO-ENTMCNC: 34 G/DL
MCV RBC AUTO: 89 FL
MONOCYTES # BLD AUTO: ABNORMAL K/UL
MONOCYTES NFR BLD: 0 %
NEUTROPHILS NFR BLD: 25 %
PHOSPHATE SERPL-MCNC: 2.1 MG/DL
PLATELET # BLD AUTO: 40 K/UL
PLATELET BLD QL SMEAR: ABNORMAL
PMV BLD AUTO: 10.2 FL
POTASSIUM SERPL-SCNC: 3.7 MMOL/L
PROT SERPL-MCNC: 5.4 G/DL
RBC # BLD AUTO: 3.3 M/UL
SODIUM SERPL-SCNC: 137 MMOL/L
WBC # BLD AUTO: 0.05 K/UL

## 2017-10-03 PROCEDURE — 36415 COLL VENOUS BLD VENIPUNCTURE: CPT

## 2017-10-03 PROCEDURE — 85007 BL SMEAR W/DIFF WBC COUNT: CPT

## 2017-10-03 PROCEDURE — A9155 ARTIFICIAL SALIVA: HCPCS | Performed by: NURSE PRACTITIONER

## 2017-10-03 PROCEDURE — 25000003 PHARM REV CODE 250: Performed by: INTERNAL MEDICINE

## 2017-10-03 PROCEDURE — 87086 URINE CULTURE/COLONY COUNT: CPT

## 2017-10-03 PROCEDURE — 80053 COMPREHEN METABOLIC PANEL: CPT

## 2017-10-03 PROCEDURE — 99233 SBSQ HOSP IP/OBS HIGH 50: CPT | Mod: ,,, | Performed by: INTERNAL MEDICINE

## 2017-10-03 PROCEDURE — 25000003 PHARM REV CODE 250: Performed by: NURSE PRACTITIONER

## 2017-10-03 PROCEDURE — 20600001 HC STEP DOWN PRIVATE ROOM

## 2017-10-03 PROCEDURE — 63600175 PHARM REV CODE 636 W HCPCS: Performed by: INTERNAL MEDICINE

## 2017-10-03 PROCEDURE — 83735 ASSAY OF MAGNESIUM: CPT

## 2017-10-03 PROCEDURE — 85027 COMPLETE CBC AUTOMATED: CPT

## 2017-10-03 PROCEDURE — 84100 ASSAY OF PHOSPHORUS: CPT

## 2017-10-03 PROCEDURE — 63600175 PHARM REV CODE 636 W HCPCS: Performed by: NURSE PRACTITIONER

## 2017-10-03 PROCEDURE — 87040 BLOOD CULTURE FOR BACTERIA: CPT

## 2017-10-03 RX ORDER — CEFEPIME HYDROCHLORIDE 2 G/50ML
2 INJECTION, SOLUTION INTRAVENOUS
Status: DISCONTINUED | OUTPATIENT
Start: 2017-10-03 | End: 2017-10-06

## 2017-10-03 RX ORDER — ACETAMINOPHEN 325 MG/1
650 TABLET ORAL EVERY 6 HOURS PRN
Status: DISCONTINUED | OUTPATIENT
Start: 2017-10-03 | End: 2017-10-10 | Stop reason: HOSPADM

## 2017-10-03 RX ORDER — CEFEPIME HYDROCHLORIDE 1 G/50ML
1 INJECTION, SOLUTION INTRAVENOUS
Status: DISCONTINUED | OUTPATIENT
Start: 2017-10-03 | End: 2017-10-03

## 2017-10-03 RX ORDER — GUAIFENESIN/DEXTROMETHORPHAN 100-10MG/5
10 SYRUP ORAL EVERY 4 HOURS PRN
Status: DISCONTINUED | OUTPATIENT
Start: 2017-10-03 | End: 2017-10-10 | Stop reason: HOSPADM

## 2017-10-03 RX ADMIN — LORAZEPAM 1 MG: 2 INJECTION, SOLUTION INTRAMUSCULAR; INTRAVENOUS at 04:10

## 2017-10-03 RX ADMIN — LOPERAMIDE HYDROCHLORIDE 2 MG: 2 CAPSULE ORAL at 03:10

## 2017-10-03 RX ADMIN — POTASSIUM CHLORIDE 20 MEQ: 750 CAPSULE, EXTENDED RELEASE ORAL at 09:10

## 2017-10-03 RX ADMIN — LISINOPRIL 40 MG: 20 TABLET ORAL at 09:10

## 2017-10-03 RX ADMIN — CIPROFLOXACIN HYDROCHLORIDE 500 MG: 500 TABLET, FILM COATED ORAL at 09:10

## 2017-10-03 RX ADMIN — Medication 30 ML: at 05:10

## 2017-10-03 RX ADMIN — Medication 30 ML: at 11:10

## 2017-10-03 RX ADMIN — POTASSIUM PHOSPHATE, MONOBASIC AND POTASSIUM PHOSPHATE, DIBASIC 30 MMOL: 224; 236 INJECTION, SOLUTION, CONCENTRATE INTRAVENOUS at 07:10

## 2017-10-03 RX ADMIN — FILGRASTIM 480 MCG: 480 INJECTION, SOLUTION INTRAVENOUS; SUBCUTANEOUS at 09:10

## 2017-10-03 RX ADMIN — SODIUM CHLORIDE AND POTASSIUM CHLORIDE 75 ML/HR: 9; 1.49 INJECTION, SOLUTION INTRAVENOUS at 07:10

## 2017-10-03 RX ADMIN — CITALOPRAM HYDROBROMIDE 40 MG: 20 TABLET ORAL at 09:10

## 2017-10-03 RX ADMIN — ACETAMINOPHEN 650 MG: 325 TABLET ORAL at 11:10

## 2017-10-03 RX ADMIN — MAGNESIUM OXIDE TAB 400 MG (241.3 MG ELEMENTAL MG) 400 MG: 400 (241.3 MG) TAB at 09:10

## 2017-10-03 RX ADMIN — PANTOPRAZOLE SODIUM 40 MG: 40 TABLET, DELAYED RELEASE ORAL at 09:10

## 2017-10-03 RX ADMIN — LORAZEPAM 1 MG: 2 INJECTION, SOLUTION INTRAMUSCULAR; INTRAVENOUS at 07:10

## 2017-10-03 RX ADMIN — CEFEPIME HYDROCHLORIDE 2 G: 2 INJECTION, SOLUTION INTRAVENOUS at 12:10

## 2017-10-03 RX ADMIN — ACETAMINOPHEN 650 MG: 325 TABLET ORAL at 05:10

## 2017-10-03 RX ADMIN — NEBIVOLOL HYDROCHLORIDE 10 MG: 5 TABLET ORAL at 09:10

## 2017-10-03 RX ADMIN — AMLODIPINE BESYLATE 5 MG: 5 TABLET ORAL at 09:10

## 2017-10-03 RX ADMIN — FLUCONAZOLE 400 MG: 200 TABLET ORAL at 09:10

## 2017-10-03 RX ADMIN — MAGNESIUM OXIDE TAB 400 MG (241.3 MG ELEMENTAL MG) 400 MG: 400 (241.3 MG) TAB at 05:10

## 2017-10-03 RX ADMIN — ACYCLOVIR 800 MG: 200 CAPSULE ORAL at 08:10

## 2017-10-03 RX ADMIN — CEFEPIME HYDROCHLORIDE 2 G: 2 INJECTION, SOLUTION INTRAVENOUS at 08:10

## 2017-10-03 RX ADMIN — ACYCLOVIR 800 MG: 200 CAPSULE ORAL at 09:10

## 2017-10-03 NOTE — PLAN OF CARE
Problem: Patient Care Overview  Goal: Plan of Care Review  Outcome: Ongoing (interventions implemented as appropriate)  Plan of care reviewed with pt at beginning of shift and updated throughout the day. Pt is day +6 Melphalan auto SCT for multiple myeloma. He spiked 101.6 degree fever around noon today--tylenol ordered and administered, blood cultures x 2 (one peripheral and one from vascular catheter) drawn, chest XR performed, urine culture collected and Cefepime IV initiated. He spiked another temperature of 103 degrees at 1700, Dr. Bryant notified and tylenol administered. Pt is awake, alert, oriented, and free of injury and falls this shift.  Safety, fall, neutropenic and thrombocytopenic precautions maintained. Pt gets Neupogen injections daily, ANC today 12.5.  IVF maintained at 75 mL/hr. Pt c/o intermittent nausea which is managed by PRN Ativan IV. He reports having 2 loose bowel movements this afternoon, PRN imodium administered.  Pt reports decreased appetite, PO intake and fluids encouraged. Lesion appears to be forming on right inside cheek, Cuevas's soln and SS given around the clock and salt and soda mouth rinses promoted. Pt still c/o burning upon urination, Dr. Bryant aware. Potassium and magnesium given per electrolyte sliding scale. 30 mmol kphos IV x 1 dose given, as sliding scale dose is PO and possibly gives pt diarrhea. Bed locked and in lowest position, side rails up x 2, non-skid footwear in place and call light and personal belongings within reach.  Pt and family instructed to call for assistance when needed and they verbalized understanding.  Will monitor.

## 2017-10-03 NOTE — PLAN OF CARE
Problem: Patient Care Overview  Goal: Plan of Care Review  Outcome: Ongoing (interventions implemented as appropriate)  Patient day +5 following autologous PBSCT.  Frequent rounds made to assess pain and safety. Pt communicating needs throughout shift. Up ad diana. Tolerating diet, voiding without difficulty. Pt c/o minor burning when urinating. UA sent on day shift.  IV fluids continued.  All vitals stable; no acute events this shift. Pt. Remaining free from falls or injury throughout shift; bed in lowest position; side rails up X2; call light within reach; pt instructed to call for assistance as needed - verbalized understanding. Q2h rounding on patient. Will continue to monitor.

## 2017-10-03 NOTE — PROGRESS NOTES
"[facility]  Hematology  Bone Marrow Transplant  Progress Note    Patient Name: Arik Bobo  Admission Date: 9/25/2017  Hospital Length of Stay: 8 days  Code Status: Full Code    Subjective:     Interval History:   -Today is day +6 from auto SCT  -With some burning with urination yesterday, and continues minimally today. UA was performed yesterday.  -Also reports some stuffiness and slight cough. Denies mouth sores, but states he notices a white film in his mouth in the mornings prior to oral hygiene   -States n/v continue, but ativan helps. Although he reports that this medication may be causing "interesting" dreams  -States diarrhea is improving day by day    Objective:     Vital Signs (Most Recent):  Temp: 100.3 °F (37.9 °C) (10/03/17 0730)  Pulse: 86 (10/03/17 0730)  Resp: 18 (10/03/17 0730)  BP: (!) 144/81 (10/03/17 0730)  SpO2: 96 % (10/03/17 0730) Vital Signs (24h Range):  Temp:  [98.2 °F (36.8 °C)-100.3 °F (37.9 °C)] 100.3 °F (37.9 °C)  Pulse:  [68-86] 86  Resp:  [17-18] 18  SpO2:  [93 %-100 %] 96 %  BP: (132-157)/(70-81) 144/81     Weight: 115.3 kg (254 lb 3.1 oz)  Body mass index is 38.65 kg/m².  Body surface area is 2.35 meters squared.    ECOG SCORE         [unfilled]    Intake/Output - Last 3 Shifts       10/01 0700 - 10/02 0659 10/02 0700 - 10/03 0659 10/03 0700 - 10/04 0659    P.O. 120 1800     I.V. (mL/kg) 1205.3 (10.5) 1417.5 (12.3)     IV Piggyback 500 250     Total Intake(mL/kg) 1825.3 (15.9) 3467.5 (30.1)     Urine (mL/kg/hr) 3600 (1.3) 1450 (0.5)     Emesis/NG output  0 (0)     Stool 0 (0) 0 (0)     Total Output 3600 1450      Net -1774.7 +2017.5             Urine Occurrence  1 x     Stool Occurrence 3 x 1 x     Emesis Occurrence  1 x           Physical Exam   Constitutional: He is oriented to person, place, and time. He appears well-developed and well-nourished.   HENT:   Head: Normocephalic and atraumatic.   No white film noted on exam as pt already performed oral " hygiene. Noted possible mouth sore at roof of mouth. Not causing pt any issues   Eyes: EOM are normal. Pupils are equal, round, and reactive to light.   Neck: Normal range of motion.   Cardiovascular: Intact distal pulses.    Pulmonary/Chest: Breath sounds normal.   Abdominal: Soft. Bowel sounds are normal.   Musculoskeletal: Normal range of motion.   Neurological: He is alert and oriented to person, place, and time.   Skin: Skin is warm and dry.       Significant Labs:   CBC:     Recent Labs  Lab 10/02/17  0400 10/03/17  0400   WBC 0.91* 0.05*   HGB 9.9* 10.0*   HCT 29.3* 29.4*   PLT 50* 40*    and CMP:     Recent Labs  Lab 10/02/17  0400 10/03/17  0400    137   K 3.7 3.7    107   CO2 24 22*   GLU 93 101   BUN 7 7   CREATININE 0.6 0.6   CALCIUM 7.4* 7.5*   PROT 5.2* 5.4*   ALBUMIN 2.7* 2.8*   BILITOT 0.4 0.5   ALKPHOS 103 107   AST 45* 37   ALT 45* 45*   ANIONGAP 6* 8   EGFRNONAA >60.0 >60.0     Urinalysis and microscopic 10/2/17    Specimen UA   Urine, Clean Catch    Color, UA Yellow, Straw, Anna Yellow    Appearance, UA Clear Clear    pH, UA 5.0 - 8.0 6.0    Specific Gravity, UA 1.005 - 1.030 1.010    Protein, UA Negative  Negative    Comments: Recommend a 24 hour urine protein or a urine   protein/creatinine ratio if globulin induced proteinuria is   clinically suspected.    Glucose, UA Negative  Negative    Ketones, UA Negative  Negative    Bilirubin (UA) Negative  Negative    Occult Blood UA Negative 1+     Nitrite, UA Negative  Negative    Urobilinogen, UA <2.0 EU/dL  Negative    Leukocytes, UA Negative  Negative      RBC, UA 0 - 4 /hpf 6     WBC, UA 0 - 5 /hpf 1    Bacteria, UA None-Occ /hpf Rare            Assessment/Plan:     H/O autologous stem cell transplant    - Today is day +6  - Melphalan given on 9/26/17 without complications  - Auto SCT day 0 on 9/27/17; received 5 bags with a CD 34 count of 4.71 x 10^6/kg - tolerated well   - ppx antimicrobials to start per protocol  - Neupogen  started day + 3   - with possible mucositis, ordered [place] (10/3/17)  - with n/v, continue ativan         Multiple myeloma in remission    - Completed 4 cycles of CyBorD   - Was treated with lenalidomide and dex  - Restaging marrow 8/21/17 showed a 10-50% cellular marrow with trilineage hematopoiesis and 5% residual  kappa-restricted plasma cells (6% by morphology).  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  Hence, he has achieved a partial remission (PR1) and is ready to proceed with transplant.    65% EF on 8/2/17   - Possible need to consider an allogeneic transplant as a cure in future as pt is young. This is not an immediate consideration but since he has 4 siblings there is a 68% chance of finding a sibling match.   - s/p auto SCT as above        Pancytopenia due to chemotherapy    - WBC 0.05 k/uL; ANC pending  - Hemoglobin 10 grams  - Plts pending  - Transfuse for hemoglobin <7 and platelets <10,000         Chemotherapy induced diarrhea    - Cdiff negative; has prn imodium  - Improving and will continue to monitor          Hyperlipemia    - Will hold statin with admission and may resume at discharge         Hypertension, essential    - Continue home amlodipine, bystolic and lisinopril         History of gout    Pt suffers from gout and is not currently on uric acid suppression medication.  This may have contributed to his hip degeneration.        Depression    - Continue home Celexa          Burning with urination    - started 10/2/17  - UA done and negative for nitrites and leukocytes; positive for 1+ occult blood   - will continue to monitor            VTE Risk Mitigation         Ordered     heparin (porcine) injection 1,600 Units  As needed (PRN)     Route:  Intravenous        09/25/17 1733     High Risk of VTE  Once      09/25/17 1257          Disposition: pending engraftment    Magdalena Miller, ROMAINE  Bone Marrow Transplant  [facility]

## 2017-10-03 NOTE — PROGRESS NOTES
10/03/17 1800   Vital Signs   Temp (!) 102 °F (38.9 °C)   Temp src Oral   Paged BMT fellow and resident Dr. Choi called back and said that fellow is currently in clinic and is not available.  Notified Dr. Choi that pt's temperature is 102 degrees orally, and that tylenol was administered an hour ago.  Also, told her that pt has a wheezing noise coming from his throat.  Lungs remain clear to auscultation.  Dr. Choi said to put pt on cooling blanket on pt and that she will come assess pt shortly.  Will monitor.

## 2017-10-03 NOTE — PROGRESS NOTES
Pt spiked temp of 101. Blood cx x 2, chest xray, urine cx all ordered. Cefepime started. Will continue to monitor.    Magdalena Miller DNP, NP  Hematology/Oncology

## 2017-10-03 NOTE — ASSESSMENT & PLAN NOTE
- WBC 0.05 k/uL; ANC pending  - Hemoglobin 10 grams  - Plts pending  - Transfuse for hemoglobin <7 and platelets <10,000

## 2017-10-03 NOTE — PLAN OF CARE
MDR's with Dr Bryant.  Patient is day+6 post his cyrus autoSCT.  Now with neutropenic fever.  Infectious workup ordered.  On IV cefepime.  D/c pending count recovery.  A reservation for the [place] to be placed for early next week.  Will continue to follow for d/c needs.

## 2017-10-03 NOTE — PROGRESS NOTES
Requested room at [facility] ([phone]) from 10/9/17 to 10/27/17. Will await response regarding room confirmation. Will follow.

## 2017-10-03 NOTE — PROGRESS NOTES
"Pt has 100.4 degree temperature at this time.  His face is flushed and he states that he feels "cold" and is covered in many blankets.  Notified Dr. Choi and she said to recheck temperature in one hour. Will monitor.  "

## 2017-10-03 NOTE — SUBJECTIVE & OBJECTIVE
"  Subjective:     Interval History:   -Today is day +6 from auto SCT  -With some burning with urination yesterday, and continues minimally today. UA was performed yesterday.  -Also reports some stuffiness and slight cough. Denies mouth sores, but states he notices a white film in his mouth in the mornings prior to oral hygiene   -States n/v continue, but ativan helps. Although he reports that this medication may be causing "interesting" dreams  -States diarrhea is improving day by day    Objective:     Vital Signs (Most Recent):  Temp: 100.3 °F (37.9 °C) (10/03/17 0730)  Pulse: 86 (10/03/17 0730)  Resp: 18 (10/03/17 0730)  BP: (!) 144/81 (10/03/17 0730)  SpO2: 96 % (10/03/17 0730) Vital Signs (24h Range):  Temp:  [98.2 °F (36.8 °C)-100.3 °F (37.9 °C)] 100.3 °F (37.9 °C)  Pulse:  [68-86] 86  Resp:  [17-18] 18  SpO2:  [93 %-100 %] 96 %  BP: (132-157)/(70-81) 144/81     Weight: 115.3 kg (254 lb 3.1 oz)  Body mass index is 38.65 kg/m².  Body surface area is 2.35 meters squared.    ECOG SCORE         [unfilled]    Intake/Output - Last 3 Shifts       10/01 0700 - 10/02 0659 10/02 0700 - 10/03 0659 10/03 0700 - 10/04 0659    P.O. 120 1800     I.V. (mL/kg) 1205.3 (10.5) 1417.5 (12.3)     IV Piggyback 500 250     Total Intake(mL/kg) 1825.3 (15.9) 3467.5 (30.1)     Urine (mL/kg/hr) 3600 (1.3) 1450 (0.5)     Emesis/NG output  0 (0)     Stool 0 (0) 0 (0)     Total Output 3600 1450      Net -1774.7 +2017.5             Urine Occurrence  1 x     Stool Occurrence 3 x 1 x     Emesis Occurrence  1 x           Physical Exam   Constitutional: He is oriented to person, place, and time. He appears well-developed and well-nourished.   HENT:   Head: Normocephalic and atraumatic.   No white film noted on exam as pt already performed oral hygiene. Noted possible mouth sore at roof of mouth. Not causing pt any issues   Eyes: EOM are normal. Pupils are equal, round, and reactive to light.   Neck: Normal range of motion.   Cardiovascular: Intact " distal pulses.    Pulmonary/Chest: Breath sounds normal.   Abdominal: Soft. Bowel sounds are normal.   Musculoskeletal: Normal range of motion.   Neurological: He is alert and oriented to person, place, and time.   Skin: Skin is warm and dry.       Significant Labs:   CBC:     Recent Labs  Lab 10/02/17  0400 10/03/17  0400   WBC 0.91* 0.05*   HGB 9.9* 10.0*   HCT 29.3* 29.4*   PLT 50* 40*    and CMP:     Recent Labs  Lab 10/02/17  0400 10/03/17  0400    137   K 3.7 3.7    107   CO2 24 22*   GLU 93 101   BUN 7 7   CREATININE 0.6 0.6   CALCIUM 7.4* 7.5*   PROT 5.2* 5.4*   ALBUMIN 2.7* 2.8*   BILITOT 0.4 0.5   ALKPHOS 103 107   AST 45* 37   ALT 45* 45*   ANIONGAP 6* 8   EGFRNONAA >60.0 >60.0     Urinalysis and microscopic 10/2/17    Specimen UA   Urine, Clean Catch    Color, UA Yellow, Straw, Anna Yellow    Appearance, UA Clear Clear    pH, UA 5.0 - 8.0 6.0    Specific Gravity, UA 1.005 - 1.030 1.010    Protein, UA Negative  Negative    Comments: Recommend a 24 hour urine protein or a urine   protein/creatinine ratio if globulin induced proteinuria is   clinically suspected.    Glucose, UA Negative  Negative    Ketones, UA Negative  Negative    Bilirubin (UA) Negative  Negative    Occult Blood UA Negative 1+     Nitrite, UA Negative  Negative    Urobilinogen, UA <2.0 EU/dL  Negative    Leukocytes, UA Negative  Negative      RBC, UA 0 - 4 /hpf 6     WBC, UA 0 - 5 /hpf 1    Bacteria, UA None-Occ /hpf Rare

## 2017-10-03 NOTE — PROGRESS NOTES
Pt temperature 103.0 degree at this time.  Pt is laying in bed and is very flushed and shivering.  Notified Dr. Choi and she said she spoke with Dr. Bryatn and they said to give PRN Tylenol now, which is 1 hour early, but that is ok.  Will recheck pt's temperature in 1 hour.

## 2017-10-03 NOTE — ASSESSMENT & PLAN NOTE
-Tmax in 24 hours 103.1 10/4/17  -Blood cx x 2, chest xray, urine cx all ordered on 10/3/17 ngtd and do not give source of infection; ordered chest xray PA/LAT 10/4/17 (not done yet)  -Cefepime started on 10/3/17; vanc started on 10/4/17  -Will continue to monitor and give tylenol/cooling blankets  -Will re culture q 24 hours if continues to be febrile

## 2017-10-03 NOTE — ASSESSMENT & PLAN NOTE
- Completed 4 cycles of CyBorD   - Was treated with lenalidomide and dex  - Restaging marrow 8/21/17 showed a 10-50% cellular marrow with trilineage hematopoiesis and 5% residual  kappa-restricted plasma cells (6% by morphology).  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  Hence, he has achieved a partial remission (PR1) and is ready to proceed with transplant.    65% EF on 8/2/17   - Possible need to consider an allogeneic transplant as a cure in future as pt is young. This is not an immediate consideration but since he has 4 siblings there is a 68% chance of finding a sibling match.   - s/p auto SCT as above

## 2017-10-03 NOTE — ASSESSMENT & PLAN NOTE
- started 10/2/17  - UA done and negative for nitrites and leukocytes; positive for 1+ occult blood   - will continue to monitor

## 2017-10-04 LAB
ALBUMIN SERPL BCP-MCNC: 2.8 G/DL
ALP SERPL-CCNC: 101 U/L
ALT SERPL W/O P-5'-P-CCNC: 39 U/L
ANION GAP SERPL CALC-SCNC: 12 MMOL/L
ANISOCYTOSIS BLD QL SMEAR: SLIGHT
AST SERPL-CCNC: 35 U/L
BACTERIA UR CULT: NO GROWTH
BASOPHILS # BLD AUTO: ABNORMAL K/UL
BASOPHILS NFR BLD: 0 %
BILIRUB SERPL-MCNC: 0.8 MG/DL
BUN SERPL-MCNC: 7 MG/DL
C DIFF GDH STL QL: NEGATIVE
C DIFF TOX A+B STL QL IA: NEGATIVE
CALCIUM SERPL-MCNC: 7.7 MG/DL
CHLORIDE SERPL-SCNC: 102 MMOL/L
CO2 SERPL-SCNC: 18 MMOL/L
CREAT SERPL-MCNC: 0.7 MG/DL
DIFFERENTIAL METHOD: ABNORMAL
EOSINOPHIL # BLD AUTO: ABNORMAL K/UL
EOSINOPHIL NFR BLD: 0 %
ERYTHROCYTE [DISTWIDTH] IN BLOOD BY AUTOMATED COUNT: 13.7 %
EST. GFR  (AFRICAN AMERICAN): >60 ML/MIN/1.73 M^2
EST. GFR  (NON AFRICAN AMERICAN): >60 ML/MIN/1.73 M^2
GLUCOSE SERPL-MCNC: 117 MG/DL
HCT VFR BLD AUTO: 30.2 %
HGB BLD-MCNC: 10.3 G/DL
HYPOCHROMIA BLD QL SMEAR: ABNORMAL
LYMPHOCYTES # BLD AUTO: ABNORMAL K/UL
LYMPHOCYTES NFR BLD: 100 %
MAGNESIUM SERPL-MCNC: 1.5 MG/DL
MCH RBC QN AUTO: 30.5 PG
MCHC RBC AUTO-ENTMCNC: 34.1 G/DL
MCV RBC AUTO: 89 FL
MONOCYTES # BLD AUTO: ABNORMAL K/UL
MONOCYTES NFR BLD: 0 %
NEUTROPHILS NFR BLD: 0 %
OVALOCYTES BLD QL SMEAR: ABNORMAL
PHOSPHATE SERPL-MCNC: 2.3 MG/DL
PLATELET # BLD AUTO: 17 K/UL
PMV BLD AUTO: 9.6 FL
POIKILOCYTOSIS BLD QL SMEAR: SLIGHT
POLYCHROMASIA BLD QL SMEAR: ABNORMAL
POTASSIUM SERPL-SCNC: 3.4 MMOL/L
PROT SERPL-MCNC: 5.7 G/DL
RBC # BLD AUTO: 3.38 M/UL
SODIUM SERPL-SCNC: 132 MMOL/L
WBC # BLD AUTO: 0.05 K/UL

## 2017-10-04 PROCEDURE — 85027 COMPLETE CBC AUTOMATED: CPT

## 2017-10-04 PROCEDURE — 25000003 PHARM REV CODE 250: Performed by: INTERNAL MEDICINE

## 2017-10-04 PROCEDURE — 99233 SBSQ HOSP IP/OBS HIGH 50: CPT | Mod: ,,, | Performed by: INTERNAL MEDICINE

## 2017-10-04 PROCEDURE — 80053 COMPREHEN METABOLIC PANEL: CPT

## 2017-10-04 PROCEDURE — 84100 ASSAY OF PHOSPHORUS: CPT

## 2017-10-04 PROCEDURE — 87449 NOS EACH ORGANISM AG IA: CPT

## 2017-10-04 PROCEDURE — 85007 BL SMEAR W/DIFF WBC COUNT: CPT

## 2017-10-04 PROCEDURE — 20600001 HC STEP DOWN PRIVATE ROOM

## 2017-10-04 PROCEDURE — 87449 NOS EACH ORGANISM AG IA: CPT | Mod: 59

## 2017-10-04 PROCEDURE — 36415 COLL VENOUS BLD VENIPUNCTURE: CPT

## 2017-10-04 PROCEDURE — 63600175 PHARM REV CODE 636 W HCPCS: Performed by: INTERNAL MEDICINE

## 2017-10-04 PROCEDURE — 25000003 PHARM REV CODE 250: Performed by: NURSE PRACTITIONER

## 2017-10-04 PROCEDURE — 87305 ASPERGILLUS AG IA: CPT

## 2017-10-04 PROCEDURE — 97530 THERAPEUTIC ACTIVITIES: CPT

## 2017-10-04 PROCEDURE — A9155 ARTIFICIAL SALIVA: HCPCS | Performed by: NURSE PRACTITIONER

## 2017-10-04 PROCEDURE — 83735 ASSAY OF MAGNESIUM: CPT

## 2017-10-04 PROCEDURE — 63600175 PHARM REV CODE 636 W HCPCS: Performed by: NURSE PRACTITIONER

## 2017-10-04 PROCEDURE — 86403 PARTICLE AGGLUT ANTBDY SCRN: CPT

## 2017-10-04 RX ORDER — LORAZEPAM 2 MG/ML
INJECTION INTRAMUSCULAR
Status: DISPENSED
Start: 2017-10-04 | End: 2017-10-04

## 2017-10-04 RX ORDER — LOPERAMIDE HYDROCHLORIDE 2 MG/1
2 CAPSULE ORAL 4 TIMES DAILY
Status: DISCONTINUED | OUTPATIENT
Start: 2017-10-04 | End: 2017-10-10

## 2017-10-04 RX ORDER — DIPHENOXYLATE HYDROCHLORIDE AND ATROPINE SULFATE 2.5; .025 MG/1; MG/1
1 TABLET ORAL 4 TIMES DAILY PRN
Status: DISCONTINUED | OUTPATIENT
Start: 2017-10-04 | End: 2017-10-10 | Stop reason: HOSPADM

## 2017-10-04 RX ORDER — VANCOMYCIN HCL IN 5 % DEXTROSE 1.5G/250ML
1500 PLASTIC BAG, INJECTION (ML) INTRAVENOUS
Status: DISCONTINUED | OUTPATIENT
Start: 2017-10-04 | End: 2017-10-06

## 2017-10-04 RX ADMIN — LOPERAMIDE HYDROCHLORIDE 2 MG: 2 CAPSULE ORAL at 10:10

## 2017-10-04 RX ADMIN — Medication 1500 MG: at 09:10

## 2017-10-04 RX ADMIN — CEFEPIME HYDROCHLORIDE 2 G: 2 INJECTION, SOLUTION INTRAVENOUS at 11:10

## 2017-10-04 RX ADMIN — Medication 10 ML: at 11:10

## 2017-10-04 RX ADMIN — CITALOPRAM HYDROBROMIDE 40 MG: 20 TABLET ORAL at 08:10

## 2017-10-04 RX ADMIN — ACYCLOVIR 800 MG: 200 CAPSULE ORAL at 08:10

## 2017-10-04 RX ADMIN — POTASSIUM & SODIUM PHOSPHATES POWDER PACK 280-160-250 MG 1 PACKET: 280-160-250 PACK at 06:10

## 2017-10-04 RX ADMIN — Medication 30 ML: at 05:10

## 2017-10-04 RX ADMIN — NEBIVOLOL HYDROCHLORIDE 10 MG: 5 TABLET ORAL at 08:10

## 2017-10-04 RX ADMIN — MAGNESIUM OXIDE TAB 400 MG (241.3 MG ELEMENTAL MG) 400 MG: 400 (241.3 MG) TAB at 08:10

## 2017-10-04 RX ADMIN — ACETAMINOPHEN 650 MG: 325 TABLET ORAL at 11:10

## 2017-10-04 RX ADMIN — SODIUM CHLORIDE AND POTASSIUM CHLORIDE: 9; 1.49 INJECTION, SOLUTION INTRAVENOUS at 02:10

## 2017-10-04 RX ADMIN — CEFEPIME HYDROCHLORIDE 2 G: 2 INJECTION, SOLUTION INTRAVENOUS at 01:10

## 2017-10-04 RX ADMIN — FLUCONAZOLE 400 MG: 200 TABLET ORAL at 08:10

## 2017-10-04 RX ADMIN — Medication 10 ML: at 05:10

## 2017-10-04 RX ADMIN — CEFEPIME HYDROCHLORIDE 2 G: 2 INJECTION, SOLUTION INTRAVENOUS at 05:10

## 2017-10-04 RX ADMIN — POTASSIUM CHLORIDE 20 MEQ: 750 CAPSULE, EXTENDED RELEASE ORAL at 01:10

## 2017-10-04 RX ADMIN — POTASSIUM & SODIUM PHOSPHATES POWDER PACK 280-160-250 MG 1 PACKET: 280-160-250 PACK at 02:10

## 2017-10-04 RX ADMIN — Medication 30 ML: at 11:10

## 2017-10-04 RX ADMIN — ACETAMINOPHEN 650 MG: 325 TABLET ORAL at 07:10

## 2017-10-04 RX ADMIN — DIPHENOXYLATE HYDROCHLORIDE AND ATROPINE SULFATE 1 TABLET: 2.5; .025 TABLET ORAL at 02:10

## 2017-10-04 RX ADMIN — LISINOPRIL 40 MG: 20 TABLET ORAL at 08:10

## 2017-10-04 RX ADMIN — LORAZEPAM 1 MG: 2 INJECTION, SOLUTION INTRAMUSCULAR; INTRAVENOUS at 06:10

## 2017-10-04 RX ADMIN — POTASSIUM CHLORIDE 20 MEQ: 750 CAPSULE, EXTENDED RELEASE ORAL at 08:10

## 2017-10-04 RX ADMIN — FILGRASTIM 480 MCG: 480 INJECTION, SOLUTION INTRAVENOUS; SUBCUTANEOUS at 08:10

## 2017-10-04 RX ADMIN — MAGNESIUM OXIDE TAB 400 MG (241.3 MG ELEMENTAL MG) 400 MG: 400 (241.3 MG) TAB at 05:10

## 2017-10-04 RX ADMIN — MAGNESIUM OXIDE TAB 400 MG (241.3 MG ELEMENTAL MG) 400 MG: 400 (241.3 MG) TAB at 01:10

## 2017-10-04 RX ADMIN — AMLODIPINE BESYLATE 5 MG: 5 TABLET ORAL at 08:10

## 2017-10-04 RX ADMIN — MICAFUNGIN SODIUM 100 MG: 20 INJECTION, POWDER, LYOPHILIZED, FOR SOLUTION INTRAVENOUS at 06:10

## 2017-10-04 RX ADMIN — LOPERAMIDE HYDROCHLORIDE 2 MG: 2 CAPSULE ORAL at 03:10

## 2017-10-04 RX ADMIN — POTASSIUM & SODIUM PHOSPHATES POWDER PACK 280-160-250 MG 1 PACKET: 280-160-250 PACK at 09:10

## 2017-10-04 RX ADMIN — ACETAMINOPHEN 650 MG: 325 TABLET ORAL at 05:10

## 2017-10-04 RX ADMIN — SODIUM CHLORIDE AND POTASSIUM CHLORIDE: 9; 1.49 INJECTION, SOLUTION INTRAVENOUS at 12:10

## 2017-10-04 RX ADMIN — PANTOPRAZOLE SODIUM 40 MG: 40 TABLET, DELAYED RELEASE ORAL at 08:10

## 2017-10-04 RX ADMIN — LORAZEPAM 1 MG: 2 INJECTION, SOLUTION INTRAMUSCULAR; INTRAVENOUS at 04:10

## 2017-10-04 RX ADMIN — LOPERAMIDE HYDROCHLORIDE 2 MG: 2 CAPSULE ORAL at 05:10

## 2017-10-04 RX ADMIN — LOPERAMIDE HYDROCHLORIDE 2 MG: 2 CAPSULE ORAL at 11:10

## 2017-10-04 NOTE — ASSESSMENT & PLAN NOTE
- Today is day +7  - Melphalan given on 9/26/17 without complications  - Auto SCT day 0 on 9/27/17; received 5 bags with a CD 34 count of 4.71 x 10^6/kg - tolerated well   - ppx antimicrobials to start per protocol  - Neupogen started day + 3   - with possible mucositis, ordered [place] (10/3/17)  - with n/v, continue ativan

## 2017-10-04 NOTE — PROGRESS NOTES
Spoke with Dr Nazario about pt's 101.2 fever. He stated if pt had been cultured today and is on ABX he was ok to do what will make the patient comfortable. Will cont to bety webb.

## 2017-10-04 NOTE — PLAN OF CARE
Problem: Physical Therapy Goal  Goal: Physical Therapy Goal  Goals to be met by: 10/26/17     Patient will increase functional independence with mobility by performin. Sit to stand transfer with [place]  2. Bed to chair transfer with [place]   3. Gait  x 500 feet with [place]   4. Ascend/descend 3 stair with right Handrails Modified [place]   Outcome: Ongoing (interventions implemented as appropriate)  All goals remain appropriate. Decline in mobility secondary to fever and fatigue. Increase in frequency to 2x/week.    Luana Delatorre DPT, PT  10/4/2017

## 2017-10-04 NOTE — PLAN OF CARE
Problem: Patient Care Overview  Goal: Plan of Care Review  Outcome: Ongoing (interventions implemented as appropriate)  Tmax 103.1 this shift. Tylenol given once. Ice packs given. Pt's fever came down to 98.8. Free from falls or injury. No complaints of pain. NS20K infusing at 75ml/hr. Cefepime given as scheduled. Bed locked in lowest position, non skid socks on, call light within reach. Pt instructed to call if any assistance is needed. Vitals stable. Neutropenic precautions maintained. Wife at bedside. Will cont to bety pt.

## 2017-10-04 NOTE — PLAN OF CARE
Problem: Patient Care Overview  Goal: Plan of Care Review  Outcome: Ongoing (interventions implemented as appropriate)  Pt involved in plan of care and communicating needs throughout shift. Sister at bedside and involved in care. Up as tolerated. Tolerating diet with fair appetite, burning with voiding- MD aware. T-max 103.5. Tylenol given x2. Currently on the hypothermia blanket. Chest x-ray pending. Expiratory wheezing and crackles noted. Pt on 3L O2. Vanc and cefepime given. Lomotil and imodium given for diarrhea- 5+ episodes thus far. Mg, K, and Phos replacements given. Dukes given for mucositis and reddened throat. no acute events this shift.  Pt remaining free from falls or injury throughout shift; bed in lowest position; call light within reach; pt instructed to call for assistance as needed. Q1h rounding on patient. Will continue to monitor.

## 2017-10-04 NOTE — ASSESSMENT & PLAN NOTE
- Cdiff negative initially; will recheck stool for cdiff 10/4/17 as increased diarrhea after antibx started (pending)  - if repeat cdiff negative, give scheduled loperamide and prn lomotil

## 2017-10-04 NOTE — PT/OT/SLP PROGRESS
"Physical Therapy  Treatment    Arik Bobo   MRN: [id number]   Admitting Diagnosis: H/O autologous stem cell transplant    PT Received On: 10/04/17  PT Start Time: 0858     PT Stop Time: 0908    PT Total Time (min): 10 min       Billable Minutes:  Therapeutic Activity  10 minutes    Treatment Type: Treatment  PT/PTA: PT     PTA Visit Number: 0       General Precautions: Standard, fall  Orthopedic Precautions: N/A   Braces: N/A    Do you have any cultural, spiritual, Oriental orthodox conflicts, given your current situation?: None stated     Subjective:  Communicated with RN prior to session.  Pt agreeable to PT session. Pt states "I've been walking the hallway everyday until about 2 days ago when I started running fever. Now I just feel weak."     Pain/Comfort  Pain Rating 1: 0/10  Pain Rating Post-Intervention 1: 0/10    Objective:   Patient found with: peripheral IV    Functional Mobility:  Bed Mobility:   Rolling/Turning Right: Independent  Scooting/Bridging: Independent  Supine to Sit: Independent  Sit to Supine: Independent    Transfers:  Sit <> Stand Assistance: Independent  Sit <> Stand Assistive Device: No Assistive Device    Gait:   Gait Distance: 190'. SOB and fatigue post ambulation.   · Education on neutropenic/chemotherapy precautions (don on mask and gloves when ambulating in hallway). Pt verbalized precautions and demonstrated appropriately.  Assistance 1: Stand by Assistance  Gait Assistive Device: No device  Gait Pattern: reciprocal  Gait Deviation(s): decreased erickson, decreased step length, decreased stride length    Stairs: did not occur on this date secondary to patient quickly fatigue with ambulation     Therapeutic Activities and Exercises:  · Education provided on the following therapeutic exercises:  · Standing exercises: hip flexion, LAQ, hip abduction, hip adduction   Seated exercises: GS, hip marching, hip adduction with isometric hold x 3 seconds, hip abduction with isometric hold x 3 " seconds, and LAQ  PT provided demonstration on each exercise and pt educated on performing hospital exercise program 2x/day. Written copy provided to patient. Pt verbalized understanding     AM-PAC 6 CLICK MOBILITY  How much help from another person does this patient currently need?   1 = Unable, Total/Dependent Assistance  2 = A lot, Maximum/Moderate Assistance  3 = A little, Minimum/Contact Guard/Supervision  4 = None, Modified [place]/Independent    Turning over in bed (including adjusting bedclothes, sheets and blankets)?: 4  Sitting down on and standing up from a chair with arms (e.g., wheelchair, bedside commode, etc.): 4  Moving from lying on back to sitting on the side of the bed?: 4  Moving to and from a bed to a chair (including a wheelchair)?: 4  Need to walk in hospital room?: 4  Climbing 3-5 steps with a railing?: 3  Total Score: 23    AM-PAC Raw Score CMS G-Code Modifier Level of Impairment Assistance   6 % Total / Unable   7 - 9 CM 80 - 100% Maximal Assist   10 - 14 CL 60 - 80% Moderate Assist   15 - 19 CK 40 - 60% Moderate Assist   20 - 22 CJ 20 - 40% Minimal Assist   23 CI 1-20% SBA / CGA   24 CH 0% Independent/ Mod I     Patient left HOB elevated with all lines intact and call button in reach.    Assessment:  Arik Bobo is a 52 y.o. male with a medical diagnosis of H/O autologous stem cell transplant and presents with problems listed below. Pt ambulated 190' with SBA while maintaining neutropenic precautions increased fatigue and SOB noted on this date. PT will increase frequency to 2x/week in order for patient to return to PLOF.     Rehab identified problem list/impairments: Rehab identified problem list/impairments: weakness, impaired endurance, impaired functional mobilty    Rehab potential is good.    Activity tolerance: Good    Discharge recommendations: Discharge Facility/Level Of Care Needs: home     Barriers to discharge: Barriers to Discharge: None    Equipment  recommendations: Equipment Needed After Discharge: none     GOALS:    Physical Therapy Goals        Problem: Physical Therapy Goal    Goal Priority Disciplines Outcome Goal Variances Interventions   Physical Therapy Goal     PT/OT, PT Ongoing (interventions implemented as appropriate)     Description:  Goals to be met by: 10/26/17     Patient will increase functional independence with mobility by performin. Sit to stand transfer with [place]  2. Bed to chair transfer with [place]   3. Gait  x 500 feet with [place]   4. Ascend/descend 3 stair with right Handrails Modified [place]                    PLAN:    Patient to be seen 2 x/week  to address the above listed problems via gait training, therapeutic activities, therapeutic exercises  Plan of Care expires: 10/26/17  Plan of Care reviewed with: patient, spouse         Luana Delatorre, PT  10/04/2017

## 2017-10-04 NOTE — PROGRESS NOTES
[facility]  Hematology  Bone Marrow Transplant  Progress Note    Patient Name: Arik Bobo  Admission Date: 9/25/2017  Hospital Length of Stay: 9 days  Code Status: Full Code    Subjective:     Interval History:   -Today is day +7 from auto SCT  -Diarrhea now worsening, will repeat cdiff (if negative, add lomotil)   -Continues to spike fever on cef (blood cx, urine cx, chest ray done). Added vanc today. Continuing tylenol     Objective:     Vital Signs (Most Recent):  Temp: 100 °F (37.8 °C) (10/04/17 1341)  Pulse: 90 (10/04/17 1148)  Resp: (!) 22 (10/04/17 1148)  BP: (!) 167/82 (10/04/17 1148)  SpO2: (!) 93 % (10/04/17 1148) Vital Signs (24h Range):  Temp:  [98.9 °F (37.2 °C)-103.5 °F (39.7 °C)] 100 °F (37.8 °C)  Pulse:  [] 90  Resp:  [18-22] 22  SpO2:  [92 %-96 %] 93 %  BP: (155-167)/(61-82) 167/82     Weight: 115.8 kg (255 lb 2.9 oz)  Body mass index is 38.8 kg/m².  Body surface area is 2.36 meters squared.    ECOG SCORE         [unfilled]    Intake/Output - Last 3 Shifts       10/02 0700 - 10/03 0659 10/03 0700 - 10/04 0659 10/04 0700 - 10/05 0659    P.O. 1800 1330 720    I.V. (mL/kg) 1417.5 (12.3) 1899.3 (16.4) 1004 (8.7)    IV Piggyback 250 550 325    Total Intake(mL/kg) 3467.5 (30.1) 3779.3 (32.7) 2049 (17.7)    Urine (mL/kg/hr) 1450 (0.5) 850 (0.3) 575 (0.6)    Emesis/NG output 0 (0)      Stool 0 (0) 0 (0) 0 (0)    Total Output 1450 850 575    Net +2017.5 +2929.3 +1474           Urine Occurrence 1 x      Stool Occurrence 1 x 2 x 4 x    Emesis Occurrence 1 x            Physical Exam   Constitutional: He is oriented to person, place, and time. He appears well-developed and well-nourished.   HENT:   Head: Normocephalic and atraumatic.   No white film noted on exam as pt already performed oral hygiene. Noted possible mouth sore at roof of mouth. Not causing pt any issues   Eyes: EOM are normal. Pupils are equal, round, and reactive to light.   Neck: Normal range of motion.    Cardiovascular: Intact distal pulses.    Pulmonary/Chest: Breath sounds normal.   Abdominal: Soft. Bowel sounds are normal.   Musculoskeletal: Normal range of motion.   Neurological: He is alert and oriented to person, place, and time.   Skin: Skin is warm and dry.       Significant Labs:   CBC:     Recent Labs  Lab 10/03/17  0400 10/04/17  0338   WBC 0.05* 0.05*   HGB 10.0* 10.3*   HCT 29.4* 30.2*   PLT 40* 17*    and CMP:     Recent Labs  Lab 10/03/17  0400 10/04/17  0338    132*   K 3.7 3.4*    102   CO2 22* 18*    117*   BUN 7 7   CREATININE 0.6 0.7   CALCIUM 7.5* 7.7*   PROT 5.4* 5.7*   ALBUMIN 2.8* 2.8*   BILITOT 0.5 0.8   ALKPHOS 107 101   AST 37 35   ALT 45* 39   ANIONGAP 8 12   EGFRNONAA >60.0 >60.0     Urinalysis and microscopic 10/2/17    Specimen UA   Urine, Clean Catch    Color, UA Yellow, Straw, Anna Yellow    Appearance, UA Clear Clear    pH, UA 5.0 - 8.0 6.0    Specific Gravity, UA 1.005 - 1.030 1.010    Protein, UA Negative  Negative    Comments: Recommend a 24 hour urine protein or a urine   protein/creatinine ratio if globulin induced proteinuria is   clinically suspected.    Glucose, UA Negative  Negative    Ketones, UA Negative  Negative    Bilirubin (UA) Negative  Negative    Occult Blood UA Negative 1+     Nitrite, UA Negative  Negative    Urobilinogen, UA <2.0 EU/dL  Negative    Leukocytes, UA Negative  Negative      RBC, UA 0 - 4 /hpf 6     WBC, UA 0 - 5 /hpf 1    Bacteria, UA None-Occ /hpf Rare      10/3/17 chest xray one view:  Findings: Right central venous catheter tip projects over the mid SVC. The cardiomediastinal silhouette is prominent, similar to the previous exam.  There is no pleural effusion.  The trachea is midline.  The lungs are symmetrically expanded bilaterally without increased interstitial and parenchymal attenuation bilaterally, may reflect mild edema versus shallow inspiratory effort. No large focal consolidation seen.  There is no pneumothorax.   The osseous structures are remarkable for degenerative changes of the spine and shoulders.  There is a hiatal hernia.    10/4/17 chest xray PA/Lateral:  Pending    10/4/17 repeat stool for cdiff:  pending    Assessment/Plan:     * H/O autologous stem cell transplant    - Today is day +7  - Melphalan given on 9/26/17 without complications  - Auto SCT day 0 on 9/27/17; received 5 bags with a CD 34 count of 4.71 x 10^6/kg - tolerated well   - ppx antimicrobials to start per protocol  - Neupogen started day + 3   - with possible mucositis, ordered [place] (10/3/17)  - with n/v, continue ativan          Multiple myeloma in remission    - Completed 4 cycles of CyBorD   - Was treated with lenalidomide and dex  - Restaging marrow 8/21/17 showed a 10-50% cellular marrow with trilineage hematopoiesis and 5% residual  kappa-restricted plasma cells (6% by morphology).  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  Hence, he has achieved a partial remission (PR1) and is ready to proceed with transplant.    65% EF on 8/2/17   - Possible need to consider an allogeneic transplant as a cure in future as pt is young. This is not an immediate consideration but since he has 4 siblings there is a 68% chance of finding a sibling match.   - s/p auto SCT as above         Neutropenic fever    -Tmax in 24 hours 103.1 10/4/17  -Blood cx x 2, chest xray, urine cx all ordered on 10/3/17 ngtd and do not give source of infection; ordered chest xray PA/LAT 10/4/17 (not done yet)  -Cefepime started on 10/3/17; vanc started on 10/4/17  -Will continue to monitor and give tylenol/cooling blankets  -Will re culture q 24 hours if continues to be febrile        Pancytopenia due to chemotherapy    - WBC 0.05 k/uL; ANC 0  - Hemoglobin 10.3 grams  - Plts 17K  - Transfuse for hemoglobin <7 and platelets <10,000         Chemotherapy induced diarrhea    - Cdiff negative initially; will recheck stool for cdiff 10/4/17 as increased diarrhea after antibx started (pending)  - if  repeat cdiff negative, give scheduled loperamide and prn lomotil         Hyperlipemia    - Will hold statin with admission and may resume at discharge          Hypertension, essential    - Continue home amlodipine, bystolic and lisinopril          History of gout    Pt suffers from gout and is not currently on uric acid suppression medication.  This may have contributed to his hip degeneration.        Depression    - Continue home Celexa           Burning with urination    - started 10/2/17  - UA done and negative for nitrites and leukocytes; positive for 1+ occult blood   - will continue to monitor             VTE Risk Mitigation         Ordered     heparin (porcine) injection 1,600 Units  As needed (PRN)     Route:  Intravenous        09/25/17 1733     High Risk of VTE  Once      09/25/17 1257          Disposition: pending resolution of fevers and engraftment of cells    Magdalena Miller NP  Bone Marrow Transplant  [facility]

## 2017-10-04 NOTE — SUBJECTIVE & OBJECTIVE
Subjective:     Interval History:   -Today is day +7 from auto SCT  -Diarrhea now worsening, will repeat cdiff (if negative, add lomotil)   -Continues to spike fever on cef (blood cx, urine cx, chest ray done). Added vanc today. Continuing tylenol     Objective:     Vital Signs (Most Recent):  Temp: 100 °F (37.8 °C) (10/04/17 1341)  Pulse: 90 (10/04/17 1148)  Resp: (!) 22 (10/04/17 1148)  BP: (!) 167/82 (10/04/17 1148)  SpO2: (!) 93 % (10/04/17 1148) Vital Signs (24h Range):  Temp:  [98.9 °F (37.2 °C)-103.5 °F (39.7 °C)] 100 °F (37.8 °C)  Pulse:  [] 90  Resp:  [18-22] 22  SpO2:  [92 %-96 %] 93 %  BP: (155-167)/(61-82) 167/82     Weight: 115.8 kg (255 lb 2.9 oz)  Body mass index is 38.8 kg/m².  Body surface area is 2.36 meters squared.    ECOG SCORE         [unfilled]    Intake/Output - Last 3 Shifts       10/02 0700 - 10/03 0659 10/03 0700 - 10/04 0659 10/04 0700 - 10/05 0659    P.O. 1800 1330 720    I.V. (mL/kg) 1417.5 (12.3) 1899.3 (16.4) 1004 (8.7)    IV Piggyback 250 550 325    Total Intake(mL/kg) 3467.5 (30.1) 3779.3 (32.7) 2049 (17.7)    Urine (mL/kg/hr) 1450 (0.5) 850 (0.3) 575 (0.6)    Emesis/NG output 0 (0)      Stool 0 (0) 0 (0) 0 (0)    Total Output 1450 850 575    Net +2017.5 +2929.3 +1474           Urine Occurrence 1 x      Stool Occurrence 1 x 2 x 4 x    Emesis Occurrence 1 x            Physical Exam   Constitutional: He is oriented to person, place, and time. He appears well-developed and well-nourished.   HENT:   Head: Normocephalic and atraumatic.   No white film noted on exam as pt already performed oral hygiene. Noted possible mouth sore at roof of mouth. Not causing pt any issues   Eyes: EOM are normal. Pupils are equal, round, and reactive to light.   Neck: Normal range of motion.   Cardiovascular: Intact distal pulses.    Pulmonary/Chest: Breath sounds normal.   Abdominal: Soft. Bowel sounds are normal.   Musculoskeletal: Normal range of motion.   Neurological: He is alert and oriented to  person, place, and time.   Skin: Skin is warm and dry.       Significant Labs:   CBC:     Recent Labs  Lab 10/03/17  0400 10/04/17  0338   WBC 0.05* 0.05*   HGB 10.0* 10.3*   HCT 29.4* 30.2*   PLT 40* 17*    and CMP:     Recent Labs  Lab 10/03/17  0400 10/04/17  0338    132*   K 3.7 3.4*    102   CO2 22* 18*    117*   BUN 7 7   CREATININE 0.6 0.7   CALCIUM 7.5* 7.7*   PROT 5.4* 5.7*   ALBUMIN 2.8* 2.8*   BILITOT 0.5 0.8   ALKPHOS 107 101   AST 37 35   ALT 45* 39   ANIONGAP 8 12   EGFRNONAA >60.0 >60.0     Urinalysis and microscopic 10/2/17    Specimen UA   Urine, Clean Catch    Color, UA Yellow, Straw, Anna Yellow    Appearance, UA Clear Clear    pH, UA 5.0 - 8.0 6.0    Specific Gravity, UA 1.005 - 1.030 1.010    Protein, UA Negative  Negative    Comments: Recommend a 24 hour urine protein or a urine   protein/creatinine ratio if globulin induced proteinuria is   clinically suspected.    Glucose, UA Negative  Negative    Ketones, UA Negative  Negative    Bilirubin (UA) Negative  Negative    Occult Blood UA Negative 1+     Nitrite, UA Negative  Negative    Urobilinogen, UA <2.0 EU/dL  Negative    Leukocytes, UA Negative  Negative      RBC, UA 0 - 4 /hpf 6     WBC, UA 0 - 5 /hpf 1    Bacteria, UA None-Occ /hpf Rare      10/3/17 chest xray one view:  Findings: Right central venous catheter tip projects over the mid SVC. The cardiomediastinal silhouette is prominent, similar to the previous exam.  There is no pleural effusion.  The trachea is midline.  The lungs are symmetrically expanded bilaterally without increased interstitial and parenchymal attenuation bilaterally, may reflect mild edema versus shallow inspiratory effort. No large focal consolidation seen.  There is no pneumothorax.  The osseous structures are remarkable for degenerative changes of the spine and shoulders.  There is a hiatal hernia.    10/4/17 chest xray PA/Lateral:  Pending    10/4/17 repeat stool for cdiff:  pending

## 2017-10-04 NOTE — ASSESSMENT & PLAN NOTE
- WBC 0.05 k/uL; ANC 0  - Hemoglobin 10.3 grams  - Plts 17K  - Transfuse for hemoglobin <7 and platelets <10,000

## 2017-10-05 LAB
ABO + RH BLD: NORMAL
ALBUMIN SERPL BCP-MCNC: 2.6 G/DL
ALP SERPL-CCNC: 84 U/L
ALT SERPL W/O P-5'-P-CCNC: 30 U/L
ANION GAP SERPL CALC-SCNC: 8 MMOL/L
AST SERPL-CCNC: 33 U/L
BILIRUB SERPL-MCNC: 0.8 MG/DL
BLD GP AB SCN CELLS X3 SERPL QL: NORMAL
BLD PROD TYP BPU: NORMAL
BLOOD UNIT EXPIRATION DATE: NORMAL
BLOOD UNIT TYPE CODE: 6200
BLOOD UNIT TYPE: NORMAL
BUN SERPL-MCNC: 8 MG/DL
CALCIUM SERPL-MCNC: 7.6 MG/DL
CHLORIDE SERPL-SCNC: 103 MMOL/L
CO2 SERPL-SCNC: 21 MMOL/L
CODING SYSTEM: NORMAL
CREAT SERPL-MCNC: 0.7 MG/DL
CRYPTOC AG SER QL LA: NEGATIVE
DISPENSE STATUS: NORMAL
EST. GFR  (AFRICAN AMERICAN): >60 ML/MIN/1.73 M^2
EST. GFR  (NON AFRICAN AMERICAN): >60 ML/MIN/1.73 M^2
GLUCOSE SERPL-MCNC: 112 MG/DL
MAGNESIUM SERPL-MCNC: 1.8 MG/DL
NUM UNITS TRANS WBC-POOR PLATPHERESIS: NORMAL
PHOSPHATE SERPL-MCNC: 1.2 MG/DL
PLATELET # BLD AUTO: 12 K/UL
PMV BLD AUTO: 9.6 FL
POTASSIUM SERPL-SCNC: 3.5 MMOL/L
PROT SERPL-MCNC: 5.3 G/DL
SODIUM SERPL-SCNC: 132 MMOL/L
VANCOMYCIN TROUGH SERPL-MCNC: 12.2 UG/ML

## 2017-10-05 PROCEDURE — 99233 SBSQ HOSP IP/OBS HIGH 50: CPT | Mod: ,,, | Performed by: INTERNAL MEDICINE

## 2017-10-05 PROCEDURE — 20600001 HC STEP DOWN PRIVATE ROOM

## 2017-10-05 PROCEDURE — P9037 PLATE PHERES LEUKOREDU IRRAD: HCPCS

## 2017-10-05 PROCEDURE — 85049 AUTOMATED PLATELET COUNT: CPT

## 2017-10-05 PROCEDURE — 85007 BL SMEAR W/DIFF WBC COUNT: CPT

## 2017-10-05 PROCEDURE — 86850 RBC ANTIBODY SCREEN: CPT

## 2017-10-05 PROCEDURE — 86644 CMV ANTIBODY: CPT

## 2017-10-05 PROCEDURE — 80053 COMPREHEN METABOLIC PANEL: CPT

## 2017-10-05 PROCEDURE — 94760 N-INVAS EAR/PLS OXIMETRY 1: CPT

## 2017-10-05 PROCEDURE — 87385 HISTOPLASMA CAPSUL AG IA: CPT

## 2017-10-05 PROCEDURE — 94640 AIRWAY INHALATION TREATMENT: CPT

## 2017-10-05 PROCEDURE — 63600175 PHARM REV CODE 636 W HCPCS: Performed by: NURSE PRACTITIONER

## 2017-10-05 PROCEDURE — 85027 COMPLETE CBC AUTOMATED: CPT

## 2017-10-05 PROCEDURE — 86900 BLOOD TYPING SEROLOGIC ABO: CPT

## 2017-10-05 PROCEDURE — 84100 ASSAY OF PHOSPHORUS: CPT

## 2017-10-05 PROCEDURE — 25000242 PHARM REV CODE 250 ALT 637 W/ HCPCS: Performed by: INTERNAL MEDICINE

## 2017-10-05 PROCEDURE — 63600175 PHARM REV CODE 636 W HCPCS: Performed by: INTERNAL MEDICINE

## 2017-10-05 PROCEDURE — 99255 IP/OBS CONSLTJ NEW/EST HI 80: CPT | Mod: ,,, | Performed by: INTERNAL MEDICINE

## 2017-10-05 PROCEDURE — 25000003 PHARM REV CODE 250: Performed by: INTERNAL MEDICINE

## 2017-10-05 PROCEDURE — 83735 ASSAY OF MAGNESIUM: CPT

## 2017-10-05 PROCEDURE — 36430 TRANSFUSION BLD/BLD COMPNT: CPT

## 2017-10-05 PROCEDURE — 25000003 PHARM REV CODE 250: Performed by: NURSE PRACTITIONER

## 2017-10-05 PROCEDURE — 80202 ASSAY OF VANCOMYCIN: CPT

## 2017-10-05 PROCEDURE — A9155 ARTIFICIAL SALIVA: HCPCS | Performed by: NURSE PRACTITIONER

## 2017-10-05 RX ORDER — HYDROCODONE BITARTRATE AND ACETAMINOPHEN 500; 5 MG/1; MG/1
TABLET ORAL
Status: DISCONTINUED | OUTPATIENT
Start: 2017-10-05 | End: 2017-10-05

## 2017-10-05 RX ORDER — IPRATROPIUM BROMIDE AND ALBUTEROL SULFATE 2.5; .5 MG/3ML; MG/3ML
3 SOLUTION RESPIRATORY (INHALATION)
Status: DISCONTINUED | OUTPATIENT
Start: 2017-10-05 | End: 2017-10-06

## 2017-10-05 RX ORDER — ACETAMINOPHEN 325 MG/1
650 TABLET ORAL
Status: DISCONTINUED | OUTPATIENT
Start: 2017-10-05 | End: 2017-10-06

## 2017-10-05 RX ORDER — DIPHENHYDRAMINE HCL 25 MG
25 CAPSULE ORAL
Status: COMPLETED | OUTPATIENT
Start: 2017-10-05 | End: 2017-10-05

## 2017-10-05 RX ADMIN — LISINOPRIL 40 MG: 20 TABLET ORAL at 08:10

## 2017-10-05 RX ADMIN — MAGNESIUM OXIDE TAB 400 MG (241.3 MG ELEMENTAL MG) 400 MG: 400 (241.3 MG) TAB at 12:10

## 2017-10-05 RX ADMIN — Medication 30 ML: at 05:10

## 2017-10-05 RX ADMIN — Medication 10 ML: at 05:10

## 2017-10-05 RX ADMIN — POTASSIUM CHLORIDE 20 MEQ: 750 CAPSULE, EXTENDED RELEASE ORAL at 06:10

## 2017-10-05 RX ADMIN — ACYCLOVIR 800 MG: 200 CAPSULE ORAL at 08:10

## 2017-10-05 RX ADMIN — POTASSIUM PHOSPHATE, MONOBASIC AND POTASSIUM PHOSPHATE, DIBASIC 30 MMOL: 224; 236 INJECTION, SOLUTION, CONCENTRATE INTRAVENOUS at 06:10

## 2017-10-05 RX ADMIN — Medication 10 ML: at 11:10

## 2017-10-05 RX ADMIN — Medication 30 ML: at 11:10

## 2017-10-05 RX ADMIN — CEFEPIME HYDROCHLORIDE 2 G: 2 INJECTION, SOLUTION INTRAVENOUS at 04:10

## 2017-10-05 RX ADMIN — DIPHENHYDRAMINE HYDROCHLORIDE 25 MG: 25 CAPSULE ORAL at 06:10

## 2017-10-05 RX ADMIN — ACETAMINOPHEN 650 MG: 325 TABLET ORAL at 11:10

## 2017-10-05 RX ADMIN — LOPERAMIDE HYDROCHLORIDE 2 MG: 2 CAPSULE ORAL at 05:10

## 2017-10-05 RX ADMIN — AMLODIPINE BESYLATE 5 MG: 5 TABLET ORAL at 08:10

## 2017-10-05 RX ADMIN — CEFEPIME HYDROCHLORIDE 2 G: 2 INJECTION, SOLUTION INTRAVENOUS at 11:10

## 2017-10-05 RX ADMIN — IPRATROPIUM BROMIDE AND ALBUTEROL SULFATE 3 ML: .5; 3 SOLUTION RESPIRATORY (INHALATION) at 08:10

## 2017-10-05 RX ADMIN — FLUCONAZOLE 400 MG: 200 TABLET ORAL at 08:10

## 2017-10-05 RX ADMIN — ACETAMINOPHEN 650 MG: 325 TABLET ORAL at 03:10

## 2017-10-05 RX ADMIN — PANTOPRAZOLE SODIUM 40 MG: 40 TABLET, DELAYED RELEASE ORAL at 08:10

## 2017-10-05 RX ADMIN — LOPERAMIDE HYDROCHLORIDE 2 MG: 2 CAPSULE ORAL at 11:10

## 2017-10-05 RX ADMIN — Medication 1500 MG: at 01:10

## 2017-10-05 RX ADMIN — POTASSIUM CHLORIDE 20 MEQ: 750 CAPSULE, EXTENDED RELEASE ORAL at 08:10

## 2017-10-05 RX ADMIN — FILGRASTIM 480 MCG: 480 INJECTION, SOLUTION INTRAVENOUS; SUBCUTANEOUS at 08:10

## 2017-10-05 RX ADMIN — CITALOPRAM HYDROBROMIDE 40 MG: 20 TABLET ORAL at 08:10

## 2017-10-05 RX ADMIN — DIPHENOXYLATE HYDROCHLORIDE AND ATROPINE SULFATE 1 TABLET: 2.5; .025 TABLET ORAL at 01:10

## 2017-10-05 RX ADMIN — Medication 1500 MG: at 08:10

## 2017-10-05 RX ADMIN — Medication 1500 MG: at 05:10

## 2017-10-05 RX ADMIN — MAGNESIUM OXIDE TAB 400 MG (241.3 MG ELEMENTAL MG) 400 MG: 400 (241.3 MG) TAB at 08:10

## 2017-10-05 RX ADMIN — NEBIVOLOL HYDROCHLORIDE 10 MG: 5 TABLET ORAL at 08:10

## 2017-10-05 RX ADMIN — DIPHENOXYLATE HYDROCHLORIDE AND ATROPINE SULFATE 1 TABLET: 2.5; .025 TABLET ORAL at 08:10

## 2017-10-05 RX ADMIN — CEFEPIME HYDROCHLORIDE 2 G: 2 INJECTION, SOLUTION INTRAVENOUS at 08:10

## 2017-10-05 RX ADMIN — SODIUM CHLORIDE AND POTASSIUM CHLORIDE: 9; 1.49 INJECTION, SOLUTION INTRAVENOUS at 04:10

## 2017-10-05 RX ADMIN — MICAFUNGIN SODIUM 100 MG: 20 INJECTION, POWDER, LYOPHILIZED, FOR SOLUTION INTRAVENOUS at 05:10

## 2017-10-05 RX ADMIN — ACETAMINOPHEN 650 MG: 325 TABLET ORAL at 05:10

## 2017-10-05 NOTE — ASSESSMENT & PLAN NOTE
- Cdiff negative initially; recheck cdiff 10/4/17 negative  - if repeat cdiff negative, give scheduled loperamide and prn lomotil

## 2017-10-05 NOTE — PLAN OF CARE
Problem: Patient Care Overview  Goal: Plan of Care Review  Outcome: Ongoing (interventions implemented as appropriate)  Pt has remained free from injury this shift. Bed in low locked position. Call light and personal belongings within reach. Side rails up x2. Nonskid socks in place. Pt ambulates in room independently. 1 unit of platelets given this AM; repeat platelet count was 12k. Pt TMAX of 101.2. No c/o nausea but pt gets SOB with exertion. ID consulted. Pt on 3L O2 sating 91-95%. Pt still having diarrhea. All questions and concerns addressed at this time. Will continue to monitor.

## 2017-10-05 NOTE — PLAN OF CARE
Problem: Patient Care Overview  Goal: Plan of Care Review  Outcome: Ongoing (interventions implemented as appropriate)  Day +8 Auto Tx. Tmax 100.8 this shift. Tylenol given. Free from falls or injury. No complaints of pain. NS20K infusing at 75ml/hr. Cefepime and Vanc given as scheduled. Pt having loose stools. Immodium given as scheduled. Sats 94-97 on 3L of oxygen. CXR done this shift. One unit of platelets given this morning for a platelet count of 3. Bed locked in lowest position, non skid socks on, call light within reach. Pt instructed to call if any assistance is needed. Vitals stable. Neutropenic precautions maintained. Wife at bedside. Will cont to bety pt.

## 2017-10-05 NOTE — ASSESSMENT & PLAN NOTE
- WBC 0.09 k/uL; ANC 0  - Hemoglobin 8.9 grams  - Plts 3K  - Transfuse for hemoglobin <7 and platelets <10,000

## 2017-10-05 NOTE — CONSULTS
[facility]  Infectious Disease  Consult Note    Patient Name: Arik Bobo  MRN: [id number]  Admission Date: 9/25/2017  Hospital Length of Stay: 10 days  Attending Physician: Italo Bryant MD  Primary Care Provider: Avinash Lal MD     Isolation Status: No active isolations    Patient information was obtained from patient, spouse/SO and past medical records.      Inpatient consult to Infectious Diseases  Consult performed by: ISAAC NGO  Consult ordered by: URIEL HORTON  Reason for consult: neutropenic fever/PNA in auto-HSCT        Assessment/Plan:     Neutropenic fever    53yo man w/a history of MM (IgG kappa; dx 4/2015; s/p lenalidomide/dexamethasone in 2015 and more rencently CyBorD x4 with PR1), gout, and DVT (in 7/2015 and 2/2017; on apixaban) who was admitted on 9/25/2017 for PBSCT consolidation (s/p auto-HSCT on 9/27/2017 with melphalan conditioning) that has been c/b mucositis and neutropenic fevers over the last 48h despite empiric vanc/cefepime/fluc-->micafungin and prophylactic acyclovir with a new multifocal pneumonia noted on imaging, prompting ID consultation today. Clinical history given mucositis with recent emesis and short duration of neutropenia is most suspicious for aspiration as the cause of his multifocal pneumonia. His fevers seem to be abating today and would continue current coverage for the moment while observing his clinical course.    - would continue vanc/cefepime for now and if febrile overnight, broaden the latter to zosyn to perhaps better target an aspiration event  - would continue empiric micafungin for now and will deescalate if no evidence of Candidemia in current cultures and remains afebrile  - would continue acyclovir prophylaxis  - would obtain sputum culture if producing sputum  - will follow-up pending fungal markers sent by team this morning   - further care pending clinical course and fever curve            Thank you for  your consult. I will follow-up with patient. Please contact us if you have any additional questions. Dr. Wheeler takes over the service tomorrow and will assume his care.    Kelsie Brennan MD  Transplant ID Attending  [id number]    Kelsie Brennan MD  Infectious Disease  [facility]    Subjective:     Principal Problem: H/O autologous stem cell transplant    HPI: Mr. Bobo is a 53yo man w/a history of MM (IgG kappa; dx 4/2015; s/p lenalidomide/dexamethasone in 2015 and more rencently CyBorD x4 with PR1), gout, and DVT (in 7/2015 and 2/2017; on apixaban) who was admitted on 9/25/2017 for PBSCT consolidation (s/p auto-HSCT on 9/27/2017 with melphalan conditioning). His course has been c/b mucositis and neutropenic fevers over the last 48h despite empiric vanc/cefepime/fluc-->micafungin and prophylactic acyclovir. Workup to date is notable for negative blood/urine cultures and a CXR that shows a new multifocal pneumonia, prompting ID consultation today. Patient notes he felt well upon arrival but had some post-nasal drip on day 1 of his hospitalization. Since arrival, he has had intermittent nausea/emesis (with possible aspiration), diarrhea (5-6 episodes today so far, watery), and development of a dry cough/DEJESUS over the last 3 days. He notes improvement in his fever curve today. He denies HA, AMS, CP, abdominal pain, dysuria, rash, or line troubles. Social/exposure history notable for: lives in [facility] [place] [employer], worked as , lives with wife, no recent outdoor exposures, no sick contacts, has an indoor cat and outdoor dog, no farm animal/insect exposures, and no TB contacts/incarceration history.    Past Medical History:   Diagnosis Date    Anxiety     Arthritis     Depression     History of hip surgery     Hx of psychiatric care     Hyperlipidemia     Hypertension     Multiple myeloma     Psychiatric problem        Past Surgical History:   Procedure Laterality Date     HEMORRHOID SURGERY      TOTAL HIP ARTHROPLASTY         Review of patient's allergies indicates:   Allergen Reactions    Pcn [penicillins]      Unknown last dose as an infant       Medications:  Prescriptions Prior to Admission   Medication Sig    amlodipine (NORVASC) 5 MG tablet Take 5 mg by mouth once daily.    BYSTOLIC 10 mg Tab 10 mg once daily.     citalopram (CELEXA) 40 MG tablet Take 40 mg by mouth.    lisinopril (PRINIVIL,ZESTRIL) 20 MG tablet Take 40 mg by mouth once daily.     NEBIVOLOL HCL (NEBIVOLOL ORAL) Take by mouth.    SIMVASTATIN ORAL Take by mouth.     Antibiotics     Start     Stop Route Frequency Ordered    10/04/17 0945  vancomycin 1.5 g in 5 % dextrose 250 mL IVPB  (Vancomycin IVPB with levels panel)      -- IV Every 8 hours (non-standard times) 10/04/17 0855    10/03/17 1300  ceFEPIme in dextrose 5% 2 gram/50 mL IVPB 2 g      -- IV Every 8 hours (non-standard times) 10/03/17 1152        Antifungals     Start     Stop Route Frequency Ordered    10/04/17 1800  micafungin 100 mg in sodium chloride 0.9 % 100 mL IVPB (ready to mix system)      -- IV Every 24 hours (non-standard times) 10/04/17 1659    10/03/17 1200  duke's soln (benadryl 30 mL, mylanta 30 mL, lidocane 30 mL, nystatin 30 mL) 120 mL      -- Oral 4 times daily 10/03/17 1052        Antivirals         Stop Route Frequency     acyclovir      -- Oral 2 times daily             There is no immunization history on file for this patient.    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 Negative for appetite change, chills and diaphoresis.   HENT: Positive for postnasal drip. Negative for ear pain, mouth sores, sinus pressure and sore throat.    Eyes: Negative for photophobia, pain and redness.   Respiratory: Positive for cough and shortness of breath. Negative for wheezing.    Cardiovascular: Negative for chest pain and leg swelling.   Gastrointestinal: Positive for diarrhea. Negative for abdominal distention, abdominal pain and nausea.   Genitourinary: Negative for dysuria, flank pain, frequency and urgency.   Musculoskeletal: Negative for arthralgias, back pain, gait problem and myalgias.   Skin: Negative for pallor and rash.   Neurological: Negative for dizziness, tremors, seizures and headaches.   Psychiatric/Behavioral: Negative for confusion.     Objective:     Vital Signs (Most Recent):  Temp: (!) 100.5 °F (38.1 °C) (10/05/17 1232)  Pulse: 88 (10/05/17 1232)  Resp: 20 (10/05/17 1232)  BP: 139/67 (10/05/17 1232)  SpO2: (!) 92 % (10/05/17 1232) Vital Signs (24h Range):  Temp:  [99.4 °F (37.4 °C)-102.8 °F (39.3 °C)] 100.5 °F (38.1 °C)  Pulse:  [85-95] 88  Resp:  [20-26] 20  SpO2:  [89 %-96 %] 92 %  BP: (134-151)/(65-79) 139/67     Weight: 116 kg (255 lb 11.7 oz)  Body mass index is 38.88 kg/m².    Estimated Creatinine Clearance: 150.9 mL/min (based on SCr of 0.7 mg/dL).    Physical Exam   Constitutional: He is oriented to person, place, and time. He appears well-developed and well-nourished. No distress.   Chronically ill appearing.   HENT:   Head:  Atraumatic.   Mouth/Throat: Oropharynx is clear and moist. No oropharyngeal exudate.   Eyes: Conjunctivae and EOM are normal. Pupils are equal, round, and reactive to light. No scleral icterus.   Neck: Neck supple.   Cardiovascular: Normal rate and regular rhythm.  Exam reveals no friction rub.    No murmur heard.  Pulmonary/Chest: No respiratory distress. He has no wheezes. He has rales. He exhibits no tenderness.   On minimal O2 via NC.   Abdominal: Soft. Bowel sounds are normal. He exhibits no distension. There is no tenderness. There is no rebound and no guarding.   Musculoskeletal: Normal range of motion. He exhibits no edema.   Lymphadenopathy:     He has no cervical adenopathy.   Neurological: He is alert and oriented to person, place, and time. No cranial nerve deficit. He exhibits normal muscle tone. Coordination normal.   Skin: No rash noted. No erythema.   No rash. Line c/d/i.       Significant Labs:   CBC:   Recent Labs  Lab 10/04/17  0338 10/05/17  0331   WBC 0.05* 0.09*   HGB 10.3* 8.9*   HCT 30.2* 25.5*   PLT 17* 3*     CMP:   Recent Labs  Lab 10/04/17  0338 10/05/17  0331   * 132*   K 3.4* 3.5    103   CO2 18* 21*   * 112*   BUN 7 8   CREATININE 0.7 0.7   CALCIUM 7.7* 7.6*   PROT 5.7* 5.3*   ALBUMIN 2.8* 2.6*   BILITOT 0.8 0.8   ALKPHOS 101 84   AST 35 33   ALT 39 30   ANIONGAP 12 8   EGFRNONAA >60.0 >60.0       Significant Imaging: I have reviewed all pertinent imaging results/findings within the past 24 hours.     CXR: Large bore vascular catheter projects over the RIGHT hemithorax and mediastinum on PA view; lateral view confirms that the tip is near the junction of SVC and RIGHT atrium.    Mediastinal structures are midline.  Cardiac silhouette is at the upper limits of normal in size or mildly enlarged but stable compared with recent examinations dating back to 8/29/2017.  Displacement of paraspinous soft tissues is again noted at the inferior aspect of the mediastinum; this  may reflect the patient's known hiatal hernia but chronic enlargement of retrocrural lymph nodes is not excluded.  RIGHT peritracheal stripe is normal caliber.  No convincing evidence of hilar enlargement detected.    There is patchy heterogeneous nonsegmental opacification in both lungs, most conspicuous in the RIGHT upper lobe but also affecting the RIGHT lower lung zone and the LEFT perihilar region.  This is new since 10/3/2017.  Differential diagnosis includes pneumonia especially due to unusual agents, pulmonary hemorrhage, and widespread aspiration.  Although pulmonary edema due to elevated pulmonary venous  pressure and/or abnormal capillary permeability is typically not asymmetric in distribution, such asymmetry can be seen in patients with mitral insufficiency when asymmetric insufficiency jet encounters RIGHT pulmonary veins.  Clinical considerations will determine if echocardiography is warranted.    Microbiology:  9/29 C.diff negative  10/3 blood cx: negative  10/3 urine cx: negative  10/4 C.diff negative  10/4 SCrAg negative  10/4 urine histo pending  10/4 aspergillus antigen pending  10/4 fungitell pending

## 2017-10-05 NOTE — PROGRESS NOTES
Notified Dr Nazario that pt's 100.4 temp that sustained for an hour. (was 100.8 @ 7473) He stated he was covered with Tylenol, ABX and a cooling blanket if needed. No new orders given. Will given Tylenol and recheck temp in one hour.

## 2017-10-05 NOTE — PROGRESS NOTES
Notified MD that temp increased to 101.2 from 100.5 even after tylenol administration. Pt placed on cooling blanket. No new orders at this time. Will continue to monitor.

## 2017-10-05 NOTE — PROGRESS NOTES
[facility]  Hematology  Bone Marrow Transplant  Progress Note    Patient Name: Arik Bobo  Admission Date: 9/25/2017  Hospital Length of Stay: 10 days  Code Status: Full Code    Subjective:     Interval History:   - Day + 8 HD Melphalan after auto SCT.   - Remains febrile over the last 24 hours with T Max 103.5. CXR appears to show a bilateral pneumonia. Remains on vancomycin and cefepime per Dr. Brennan with ID okay to keep same antibiotics as fever curve has improved today. 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   - Patient complained of sore throat. C. Diff negative from 10/4.     Objective:     Vital Signs (Most Recent):  Temp: (!) 100.5 °F (38.1 °C) (10/05/17 1232)  Pulse: 88 (10/05/17 1232)  Resp: 20 (10/05/17 1232)  BP: 139/67 (10/05/17 1232)  SpO2: (!) 92 % (10/05/17 1232) Vital Signs (24h Range):  Temp:  [99.4 °F (37.4 °C)-102.8 °F (39.3 °C)] 100.5 °F (38.1 °C)  Pulse:  [85-95] 88  Resp:  [20-26] 20  SpO2:  [89 %-96 %] 92 %  BP: (134-151)/(65-79) 139/67     Weight: 116 kg (255 lb 11.7 oz)  Body mass index is 38.88 kg/m².  Body surface area is 2.36 meters squared.    ECOG SCORE         [unfilled]    Intake/Output - Last 3 Shifts       10/03 0700 - 10/04 0659 10/04 0700 - 10/05 0659 10/05 0700 - 10/06 0659    P.O. 1330 1320 440    I.V. (mL/kg) 1899.3 (16.4) 1531 (13.2)     Blood   242    IV Piggyback 550 975 50    Total Intake(mL/kg) 3779.3 (32.7) 3826 (33) 732 (6.3)    Urine (mL/kg/hr) 850 (0.3) 1425 (0.5) 0 (0)    Emesis/NG output       Stool 0 (0) 225 (0.1) 400 (0.5)    Total Output 850 1650 400    Net +2929.3 +2176 +332           Urine Occurrence   2 x    Stool Occurrence 2 x 4 x           Physical Exam   Constitutional: He is oriented to person, place, and time. He appears well-developed and well-nourished. No distress. Nasal cannula in place.   HENT:   Head: Normocephalic and atraumatic.   Nose: Nose normal.   Mouth/Throat: Oropharyngeal exudate and posterior  oropharyngeal erythema present.   Eyes: Pupils are equal, round, and reactive to light.   Neck: Neck supple.   Cardiovascular: Normal rate, regular rhythm and normal heart sounds.    Pulmonary/Chest: Effort normal. He has decreased breath sounds.   Abdominal: Soft. Bowel sounds are normal. He exhibits no distension. There is no tenderness.   Musculoskeletal: He exhibits no edema.   Neurological: He is alert and oriented to person, place, and time.   Skin: Skin is warm and dry. No rash noted.   Psychiatric: He has a normal mood and affect. His behavior is normal.   Nursing note and vitals reviewed.      Significant Labs:   CBC:   Recent Labs  Lab 10/04/17  0338 10/05/17  0331   WBC 0.05* 0.09*   HGB 10.3* 8.9*   HCT 30.2* 25.5*   PLT 17* 3*    and CMP:   Recent Labs  Lab 10/04/17  0338 10/05/17  0331   * 132*   K 3.4* 3.5    103   CO2 18* 21*   * 112*   BUN 7 8   CREATININE 0.7 0.7   CALCIUM 7.7* 7.6*   PROT 5.7* 5.3*   ALBUMIN 2.8* 2.6*   BILITOT 0.8 0.8   ALKPHOS 101 84   AST 35 33   ALT 39 30   ANIONGAP 12 8   EGFRNONAA >60.0 >60.0       Diagnostic Results:  None    Assessment/Plan:     * H/O autologous stem cell transplant    - Today is day +8  - Melphalan given on 9/26/17 without complications  - Auto SCT day 0 on 9/27/17; received 5 bags with a CD 34 count of 4.71 x 10^6/kg - tolerated well   - ppx antimicrobials to start per protocol  - Neupogen started day + 3   - with possible mucositis, ordered [place] (10/3/17)  - with n/v, continue ativan          Multiple myeloma in remission    - Completed 4 cycles of CyBorD   - Was treated with lenalidomide and dex  - Restaging marrow 8/21/17 showed a 10-50% cellular marrow with trilineage hematopoiesis and 5% residual  kappa-restricted plasma cells (6% by morphology).  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  Hence, he has achieved a partial remission (PR1) and is ready to proceed with transplant.    65% EF on 8/2/17   - Possible need to consider an allogeneic  transplant as a cure in future as pt is young. This is not an immediate consideration but since he has 4 siblings there is a 68% chance of finding a sibling match.   - s/p auto SCT as above         Neutropenic fever    -Tmax in 24 hours 103.5 10/4/17  -Blood cx x 2, chest xray, urine cx all ordered on 10/3/17 ngtd and do not give source of infection; ordered chest xray PA/LAT 10/4/17 (not done yet)  -Cefepime started on 10/3/17; vanc started on 10/4/17  - CXR 10/4/17 shows bilateral pneumonia   - fever curve appears to be improving  - ID consulted; appreciate recommendations   -Will re culture q 24 hours if continues to be febrile        Burning with urination    - started 10/2/17  - UA done and negative for nitrites and leukocytes; positive for 1+ occult blood   - will continue to monitor         Chemotherapy induced diarrhea    - Cdiff negative initially; recheck cdiff 10/4/17 negative  - if repeat cdiff negative, give scheduled loperamide and prn lomotil         Pancytopenia due to chemotherapy    - WBC 0.09 k/uL; ANC 0  - Hemoglobin 8.9 grams  - Plts 3K  - Transfuse for hemoglobin <7 and platelets <10,000         History of gout    Pt suffers from gout and is not currently on uric acid suppression medication.  This may have contributed to his hip degeneration.        Depression    - Continue home Celexa           Hyperlipemia    - Will hold statin with admission and may resume at discharge          Hypertension, essential    - Continue home amlodipine, bystolic and lisinopril              VTE Risk Mitigation         Ordered     heparin (porcine) injection 1,600 Units  As needed (PRN)     Route:  Intravenous        09/25/17 1733     High Risk of VTE  Once      09/25/17 1257          Disposition: Pending resolution of fevers and count recovery.     Kassidy Barba, NP  Bone Marrow Transplant  [facility]

## 2017-10-05 NOTE — SUBJECTIVE & OBJECTIVE
Past Medical History:   Diagnosis Date    Anxiety     Arthritis     Depression     History of hip surgery     Hx of psychiatric care     Hyperlipidemia     Hypertension     Multiple myeloma     Psychiatric problem        Past Surgical History:   Procedure Laterality Date    HEMORRHOID SURGERY      TOTAL HIP ARTHROPLASTY         Review of patient's allergies indicates:   Allergen Reactions    Pcn [penicillins]      Unknown last dose as an infant       Medications:  Prescriptions Prior to Admission   Medication Sig    amlodipine (NORVASC) 5 MG tablet Take 5 mg by mouth once daily.    BYSTOLIC 10 mg Tab 10 mg once daily.     citalopram (CELEXA) 40 MG tablet Take 40 mg by mouth.    lisinopril (PRINIVIL,ZESTRIL) 20 MG tablet Take 40 mg by mouth once daily.     NEBIVOLOL HCL (NEBIVOLOL ORAL) Take by mouth.    SIMVASTATIN ORAL Take by mouth.     Antibiotics     Start     Stop Route Frequency Ordered    10/04/17 0945  vancomycin 1.5 g in 5 % dextrose 250 mL IVPB  (Vancomycin IVPB with levels panel)      -- IV Every 8 hours (non-standard times) 10/04/17 0855    10/03/17 1300  ceFEPIme in dextrose 5% 2 gram/50 mL IVPB 2 g      -- IV Every 8 hours (non-standard times) 10/03/17 1152        Antifungals     Start     Stop Route Frequency Ordered    10/04/17 1800  micafungin 100 mg in sodium chloride 0.9 % 100 mL IVPB (ready to mix system)      -- IV Every 24 hours (non-standard times) 10/04/17 1659    10/03/17 1200  duke's soln (benadryl 30 mL, mylanta 30 mL, lidocane 30 mL, nystatin 30 mL) 120 mL      -- Oral 4 times daily 10/03/17 1052        Antivirals         Stop Route Frequency     acyclovir      -- Oral 2 times daily             There is no immunization history on file for this patient.    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 Negative for appetite change, chills and diaphoresis.   HENT: Positive for postnasal drip. Negative for ear pain, mouth sores, sinus pressure and sore throat.    Eyes: Negative for photophobia, pain and redness.   Respiratory: Positive for cough and shortness of breath. Negative for wheezing.    Cardiovascular: Negative for chest pain and leg swelling.   Gastrointestinal: Positive for diarrhea. Negative for abdominal distention, abdominal pain and nausea.   Genitourinary: Negative for dysuria, flank pain, frequency and urgency.   Musculoskeletal: Negative for arthralgias, back pain, gait problem and myalgias.   Skin: Negative for pallor and rash.   Neurological: Negative for dizziness, tremors, seizures and headaches.   Psychiatric/Behavioral: Negative for confusion.     Objective:     Vital Signs (Most Recent):  Temp: (!) 100.5 °F (38.1 °C) (10/05/17 1232)  Pulse: 88 (10/05/17 1232)  Resp: 20 (10/05/17 1232)  BP: 139/67 (10/05/17 1232)  SpO2: (!) 92 % (10/05/17 1232) Vital Signs (24h Range):  Temp:  [99.4 °F (37.4 °C)-102.8 °F (39.3 °C)] 100.5 °F (38.1 °C)  Pulse:  [85-95] 88  Resp:  [20-26] 20  SpO2:  [89 %-96 %] 92 %  BP: (134-151)/(65-79) 139/67     Weight: 116 kg (255 lb 11.7 oz)  Body mass  index is 38.88 kg/m².    Estimated Creatinine Clearance: 150.9 mL/min (based on SCr of 0.7 mg/dL).    Physical Exam   Constitutional: He is oriented to person, place, and time. He appears well-developed and well-nourished. No distress.   Chronically ill appearing.   HENT:   Head: Atraumatic.   Mouth/Throat: Oropharynx is clear and moist. No oropharyngeal exudate.   Eyes: Conjunctivae and EOM are normal. Pupils are equal, round, and reactive to light. No scleral icterus.   Neck: Neck supple.   Cardiovascular: Normal rate and regular rhythm.  Exam reveals no friction rub.    No murmur heard.  Pulmonary/Chest: No respiratory distress. He has no wheezes. He has rales. He exhibits no tenderness.   On minimal O2 via NC.   Abdominal: Soft. Bowel sounds are normal. He exhibits no distension. There is no tenderness. There is no rebound and no guarding.   Musculoskeletal: Normal range of motion. He exhibits no edema.   Lymphadenopathy:     He has no cervical adenopathy.   Neurological: He is alert and oriented to person, place, and time. No cranial nerve deficit. He exhibits normal muscle tone. Coordination normal.   Skin: No rash noted. No erythema.   No rash. Line c/d/i.       Significant Labs:   CBC:   Recent Labs  Lab 10/04/17  0338 10/05/17  0331   WBC 0.05* 0.09*   HGB 10.3* 8.9*   HCT 30.2* 25.5*   PLT 17* 3*     CMP:   Recent Labs  Lab 10/04/17  0338 10/05/17  0331   * 132*   K 3.4* 3.5    103   CO2 18* 21*   * 112*   BUN 7 8   CREATININE 0.7 0.7   CALCIUM 7.7* 7.6*   PROT 5.7* 5.3*   ALBUMIN 2.8* 2.6*   BILITOT 0.8 0.8   ALKPHOS 101 84   AST 35 33   ALT 39 30   ANIONGAP 12 8   EGFRNONAA >60.0 >60.0       Significant Imaging: I have reviewed all pertinent imaging results/findings within the past 24 hours.     CXR: Large bore vascular catheter projects over the RIGHT hemithorax and mediastinum on PA view; lateral view confirms that the tip is near the junction of SVC and RIGHT atrium.    Mediastinal  structures are midline.  Cardiac silhouette is at the upper limits of normal in size or mildly enlarged but stable compared with recent examinations dating back to 8/29/2017.  Displacement of paraspinous soft tissues is again noted at the inferior aspect of the mediastinum; this may reflect the patient's known hiatal hernia but chronic enlargement of retrocrural lymph nodes is not excluded.  RIGHT peritracheal stripe is normal caliber.  No convincing evidence of hilar enlargement detected.    There is patchy heterogeneous nonsegmental opacification in both lungs, most conspicuous in the RIGHT upper lobe but also affecting the RIGHT lower lung zone and the LEFT perihilar region.  This is new since 10/3/2017.  Differential diagnosis includes pneumonia especially due to unusual agents, pulmonary hemorrhage, and widespread aspiration.  Although pulmonary edema due to elevated pulmonary venous  pressure and/or abnormal capillary permeability is typically not asymmetric in distribution, such asymmetry can be seen in patients with mitral insufficiency when asymmetric insufficiency jet encounters RIGHT pulmonary veins.  Clinical considerations will determine if echocardiography is warranted.    Microbiology:  9/29 C.diff negative  10/3 blood cx: negative  10/3 urine cx: negative  10/4 C.diff negative  10/4 SCrAg negative  10/4 urine histo pending  10/4 aspergillus antigen pending  10/4 fungitell pending

## 2017-10-05 NOTE — HPI
Mr. Bobo is a 51yo man w/a history of MM (IgG kappa; dx 4/2015; s/p lenalidomide/dexamethasone in 2015 and more rencently CyBorD x4 with PR1), gout, and DVT (in 7/2015 and 2/2017; on apixaban) who was admitted on 9/25/2017 for PBSCT consolidation (s/p auto-HSCT on 9/27/2017 with melphalan conditioning). His course has been c/b mucositis and neutropenic fevers over the last 48h despite empiric vanc/cefepime/fluc-->micafungin and prophylactic acyclovir. Workup to date is notable for negative blood/urine cultures and a CXR that shows a new multifocal pneumonia, prompting ID consultation today. Patient notes he felt well upon arrival but had some post-nasal drip on day 1 of his hospitalization. Since arrival, he has had intermittent nausea/emesis (with possible aspiration), diarrhea (5-6 episodes today so far, watery), and development of a dry cough/DEJESUS over the last 3 days. He notes improvement in his fever curve today. He denies HA, AMS, CP, abdominal pain, dysuria, rash, or line troubles. Social/exposure history notable for: lives in [facility] [place], worked as , lives with wife, no recent outdoor exposures, no sick contacts, has an indoor cat and outdoor dog, no farm animal/insect exposures, and no TB contacts/incarceration history.

## 2017-10-05 NOTE — PROGRESS NOTES
Pt. Seen for follow up visit. Pts. Sister present at bedside. Pt. Not feeling well today but is having all side effects related to transplant. Sister present at bedside and very supportive of pts. Needs.

## 2017-10-05 NOTE — SUBJECTIVE & OBJECTIVE
Subjective:     Interval History:   - Day + 8 HD Melphalan after auto SCT.   - Remains febrile over the last 24 hours with T Max 103.5. CXR appears to show a bilateral pneumonia. Remains on vancomycin and cefepime per Dr. Brennan with ID okay to keep same antibiotics as fever curve has improved today. 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   - Patient complained of sore throat. C. Diff negative from 10/4.     Objective:     Vital Signs (Most Recent):  Temp: (!) 100.5 °F (38.1 °C) (10/05/17 1232)  Pulse: 88 (10/05/17 1232)  Resp: 20 (10/05/17 1232)  BP: 139/67 (10/05/17 1232)  SpO2: (!) 92 % (10/05/17 1232) Vital Signs (24h Range):  Temp:  [99.4 °F (37.4 °C)-102.8 °F (39.3 °C)] 100.5 °F (38.1 °C)  Pulse:  [85-95] 88  Resp:  [20-26] 20  SpO2:  [89 %-96 %] 92 %  BP: (134-151)/(65-79) 139/67     Weight: 116 kg (255 lb 11.7 oz)  Body mass index is 38.88 kg/m².  Body surface area is 2.36 meters squared.    ECOG SCORE         [unfilled]    Intake/Output - Last 3 Shifts       10/03 0700 - 10/04 0659 10/04 0700 - 10/05 0659 10/05 0700 - 10/06 0659    P.O. 1330 1320 440    I.V. (mL/kg) 1899.3 (16.4) 1531 (13.2)     Blood   242    IV Piggyback 550 975 50    Total Intake(mL/kg) 3779.3 (32.7) 3826 (33) 732 (6.3)    Urine (mL/kg/hr) 850 (0.3) 1425 (0.5) 0 (0)    Emesis/NG output       Stool 0 (0) 225 (0.1) 400 (0.5)    Total Output 850 1650 400    Net +2929.3 +2176 +332           Urine Occurrence   2 x    Stool Occurrence 2 x 4 x           Physical Exam   Constitutional: He is oriented to person, place, and time. He appears well-developed and well-nourished. No distress. Nasal cannula in place.   HENT:   Head: Normocephalic and atraumatic.   Nose: Nose normal.   Mouth/Throat: Oropharyngeal exudate and posterior oropharyngeal erythema present.   Eyes: Pupils are equal, round, and reactive to light.   Neck: Neck supple.   Cardiovascular: Normal rate, regular rhythm and normal heart sounds.    Pulmonary/Chest: Effort  normal. He has decreased breath sounds.   Abdominal: Soft. Bowel sounds are normal. He exhibits no distension. There is no tenderness.   Musculoskeletal: He exhibits no edema.   Neurological: He is alert and oriented to person, place, and time.   Skin: Skin is warm and dry. No rash noted.   Psychiatric: He has a normal mood and affect. His behavior is normal.   Nursing note and vitals reviewed.      Significant Labs:   CBC:   Recent Labs  Lab 10/04/17  0338 10/05/17  0331   WBC 0.05* 0.09*   HGB 10.3* 8.9*   HCT 30.2* 25.5*   PLT 17* 3*    and CMP:   Recent Labs  Lab 10/04/17  0338 10/05/17  0331   * 132*   K 3.4* 3.5    103   CO2 18* 21*   * 112*   BUN 7 8   CREATININE 0.7 0.7   CALCIUM 7.7* 7.6*   PROT 5.7* 5.3*   ALBUMIN 2.8* 2.6*   BILITOT 0.8 0.8   ALKPHOS 101 84   AST 35 33   ALT 39 30   ANIONGAP 12 8   EGFRNONAA >60.0 >60.0       Diagnostic Results:  None

## 2017-10-05 NOTE — ASSESSMENT & PLAN NOTE
- Today is day +8  - Melphalan given on 9/26/17 without complications  - Auto SCT day 0 on 9/27/17; received 5 bags with a CD 34 count of 4.71 x 10^6/kg - tolerated well   - ppx antimicrobials to start per protocol  - Neupogen started day + 3   - with possible mucositis, ordered Corson (10/3/17)  - with n/v, continue ativan

## 2017-10-05 NOTE — ASSESSMENT & PLAN NOTE
51yo man w/a history of MM (IgG kappa; dx 4/2015; s/p lenalidomide/dexamethasone in 2015 and more rencently CyBorD x4 with PR1), gout, and DVT (in 7/2015 and 2/2017; on apixaban) who was admitted on 9/25/2017 for PBSCT consolidation (s/p auto-HSCT on 9/27/2017 with melphalan conditioning) that has been c/b mucositis and neutropenic fevers over the last 48h despite empiric vanc/cefepime/fluc-->micafungin and prophylactic acyclovir with a new multifocal pneumonia noted on imaging, prompting ID consultation today. Clinical history given mucositis with recent emesis and short duration of neutropenia is most suspicious for aspiration as the cause of his multifocal pneumonia. His fevers seem to be abating today and would continue current coverage for the moment while observing his clinical course.    - would continue vanc/cefepime for now and if febrile overnight, broaden the latter to zosyn to perhaps better target an aspiration event  - would continue empiric micafungin for now and will deescalate if no evidence of Candidemia in current cultures and remains afebrile  - would continue acyclovir prophylaxis  - would obtain sputum culture if producing sputum  - will follow-up pending fungal markers sent by team this morning   - further care pending clinical course and fever curve

## 2017-10-06 LAB
ALBUMIN SERPL BCP-MCNC: 2.4 G/DL
ALP SERPL-CCNC: 80 U/L
ALT SERPL W/O P-5'-P-CCNC: 24 U/L
AMORPH CRY UR QL COMP ASSIST: ABNORMAL
ANION GAP SERPL CALC-SCNC: 8 MMOL/L
ANISOCYTOSIS BLD QL SMEAR: SLIGHT
ANISOCYTOSIS BLD QL SMEAR: SLIGHT
AST SERPL-CCNC: 27 U/L
BACTERIA #/AREA URNS AUTO: ABNORMAL /HPF
BASOPHILS # BLD AUTO: 0 K/UL
BASOPHILS # BLD AUTO: ABNORMAL K/UL
BASOPHILS NFR BLD: 0 %
BASOPHILS NFR BLD: 0 %
BILIRUB SERPL-MCNC: 0.6 MG/DL
BILIRUB UR QL STRIP: NEGATIVE
BLD PROD TYP BPU: NORMAL
BLOOD UNIT EXPIRATION DATE: NORMAL
BLOOD UNIT TYPE CODE: 7300
BLOOD UNIT TYPE: NORMAL
BUN SERPL-MCNC: 9 MG/DL
CALCIUM SERPL-MCNC: 7.8 MG/DL
CHLORIDE SERPL-SCNC: 106 MMOL/L
CLARITY UR REFRACT.AUTO: ABNORMAL
CO2 SERPL-SCNC: 20 MMOL/L
CODING SYSTEM: NORMAL
COLOR UR AUTO: YELLOW
CREAT SERPL-MCNC: 0.8 MG/DL
DACRYOCYTES BLD QL SMEAR: ABNORMAL
DIFFERENTIAL METHOD: ABNORMAL
DIFFERENTIAL METHOD: ABNORMAL
DISPENSE STATUS: NORMAL
EOSINOPHIL # BLD AUTO: 0 K/UL
EOSINOPHIL # BLD AUTO: ABNORMAL K/UL
EOSINOPHIL NFR BLD: 0 %
EOSINOPHIL NFR BLD: 0 %
ERYTHROCYTE [DISTWIDTH] IN BLOOD BY AUTOMATED COUNT: 13.8 %
ERYTHROCYTE [DISTWIDTH] IN BLOOD BY AUTOMATED COUNT: 13.9 %
EST. GFR  (AFRICAN AMERICAN): >60 ML/MIN/1.73 M^2
EST. GFR  (NON AFRICAN AMERICAN): >60 ML/MIN/1.73 M^2
GALACTOMANNAN AG SERPL IA-ACNC: <0.5 INDEX
GLUCOSE SERPL-MCNC: 123 MG/DL
GLUCOSE UR QL STRIP: NEGATIVE
GRAN CASTS UR QL COMP ASSIST: 8 /LPF
HCT VFR BLD AUTO: 24.1 %
HCT VFR BLD AUTO: 25.5 %
HGB BLD-MCNC: 8.5 G/DL
HGB BLD-MCNC: 8.9 G/DL
HGB UR QL STRIP: ABNORMAL
HYPOCHROMIA BLD QL SMEAR: ABNORMAL
HYPOCHROMIA BLD QL SMEAR: ABNORMAL
KETONES UR QL STRIP: NEGATIVE
LACTATE SERPL-SCNC: 0.9 MMOL/L
LEUKOCYTE ESTERASE UR QL STRIP: NEGATIVE
LYMPHOCYTES # BLD AUTO: 0.1 K/UL
LYMPHOCYTES # BLD AUTO: ABNORMAL K/UL
LYMPHOCYTES NFR BLD: 100 %
LYMPHOCYTES NFR BLD: 54.5 %
MAGNESIUM SERPL-MCNC: 1.9 MG/DL
MCH RBC QN AUTO: 30.3 PG
MCH RBC QN AUTO: 30.8 PG
MCHC RBC AUTO-ENTMCNC: 34.9 G/DL
MCHC RBC AUTO-ENTMCNC: 35.3 G/DL
MCV RBC AUTO: 87 FL
MCV RBC AUTO: 87 FL
MICROSCOPIC COMMENT: ABNORMAL
MONOCYTES # BLD AUTO: 0 K/UL
MONOCYTES # BLD AUTO: ABNORMAL K/UL
MONOCYTES NFR BLD: 0 %
MONOCYTES NFR BLD: 36.4 %
NEUTROPHILS # BLD AUTO: 0 K/UL
NEUTROPHILS NFR BLD: 0 %
NEUTROPHILS NFR BLD: 9.1 %
NITRITE UR QL STRIP: NEGATIVE
NUM UNITS TRANS WBC-POOR PLATPHERESIS: NORMAL
OVALOCYTES BLD QL SMEAR: ABNORMAL
OVALOCYTES BLD QL SMEAR: ABNORMAL
PH UR STRIP: 6 [PH] (ref 5–8)
PHOSPHATE SERPL-MCNC: 1.6 MG/DL
PLATELET # BLD AUTO: 3 K/UL
PLATELET # BLD AUTO: 5 K/UL
PLATELET BLD QL SMEAR: ABNORMAL
PMV BLD AUTO: 8.9 FL
PMV BLD AUTO: 9.4 FL
POIKILOCYTOSIS BLD QL SMEAR: SLIGHT
POIKILOCYTOSIS BLD QL SMEAR: SLIGHT
POLYCHROMASIA BLD QL SMEAR: ABNORMAL
POLYCHROMASIA BLD QL SMEAR: ABNORMAL
POTASSIUM SERPL-SCNC: 3.5 MMOL/L
PROT SERPL-MCNC: 5.3 G/DL
PROT UR QL STRIP: NEGATIVE
RBC # BLD AUTO: 2.76 M/UL
RBC # BLD AUTO: 2.94 M/UL
RBC #/AREA URNS AUTO: 10 /HPF (ref 0–4)
SODIUM SERPL-SCNC: 134 MMOL/L
SP GR UR STRIP: 1.01 (ref 1–1.03)
SQUAMOUS #/AREA URNS AUTO: 1 /HPF
URN SPEC COLLECT METH UR: ABNORMAL
UROBILINOGEN UR STRIP-ACNC: NEGATIVE EU/DL
WBC # BLD AUTO: 0.09 K/UL
WBC # BLD AUTO: 0.12 K/UL
WBC #/AREA URNS AUTO: 4 /HPF (ref 0–5)

## 2017-10-06 PROCEDURE — A9155 ARTIFICIAL SALIVA: HCPCS | Performed by: NURSE PRACTITIONER

## 2017-10-06 PROCEDURE — 87086 URINE CULTURE/COLONY COUNT: CPT

## 2017-10-06 PROCEDURE — 86644 CMV ANTIBODY: CPT

## 2017-10-06 PROCEDURE — 36415 COLL VENOUS BLD VENIPUNCTURE: CPT

## 2017-10-06 PROCEDURE — 80053 COMPREHEN METABOLIC PANEL: CPT

## 2017-10-06 PROCEDURE — P9037 PLATE PHERES LEUKOREDU IRRAD: HCPCS

## 2017-10-06 PROCEDURE — 36430 TRANSFUSION BLD/BLD COMPNT: CPT

## 2017-10-06 PROCEDURE — 63600175 PHARM REV CODE 636 W HCPCS: Performed by: NURSE PRACTITIONER

## 2017-10-06 PROCEDURE — 87040 BLOOD CULTURE FOR BACTERIA: CPT

## 2017-10-06 PROCEDURE — 25000003 PHARM REV CODE 250: Performed by: NURSE PRACTITIONER

## 2017-10-06 PROCEDURE — 94761 N-INVAS EAR/PLS OXIMETRY MLT: CPT

## 2017-10-06 PROCEDURE — 94640 AIRWAY INHALATION TREATMENT: CPT

## 2017-10-06 PROCEDURE — 94799 UNLISTED PULMONARY SVC/PX: CPT

## 2017-10-06 PROCEDURE — 99233 SBSQ HOSP IP/OBS HIGH 50: CPT | Mod: ,,, | Performed by: INTERNAL MEDICINE

## 2017-10-06 PROCEDURE — 81001 URINALYSIS AUTO W/SCOPE: CPT

## 2017-10-06 PROCEDURE — 63600175 PHARM REV CODE 636 W HCPCS: Performed by: INTERNAL MEDICINE

## 2017-10-06 PROCEDURE — 83735 ASSAY OF MAGNESIUM: CPT

## 2017-10-06 PROCEDURE — 25000242 PHARM REV CODE 250 ALT 637 W/ HCPCS: Performed by: INTERNAL MEDICINE

## 2017-10-06 PROCEDURE — 25000003 PHARM REV CODE 250: Performed by: INTERNAL MEDICINE

## 2017-10-06 PROCEDURE — 83605 ASSAY OF LACTIC ACID: CPT

## 2017-10-06 PROCEDURE — 20600001 HC STEP DOWN PRIVATE ROOM

## 2017-10-06 PROCEDURE — 27000221 HC OXYGEN, UP TO 24 HOURS

## 2017-10-06 PROCEDURE — 85025 COMPLETE CBC W/AUTO DIFF WBC: CPT

## 2017-10-06 PROCEDURE — 84100 ASSAY OF PHOSPHORUS: CPT

## 2017-10-06 RX ORDER — DIPHENHYDRAMINE HCL 25 MG
25 CAPSULE ORAL
Status: COMPLETED | OUTPATIENT
Start: 2017-10-06 | End: 2017-10-06

## 2017-10-06 RX ORDER — HYDROCODONE BITARTRATE AND ACETAMINOPHEN 500; 5 MG/1; MG/1
TABLET ORAL
Status: DISCONTINUED | OUTPATIENT
Start: 2017-10-06 | End: 2017-10-07

## 2017-10-06 RX ORDER — ACETAMINOPHEN 325 MG/1
650 TABLET ORAL
Status: COMPLETED | OUTPATIENT
Start: 2017-10-06 | End: 2017-10-06

## 2017-10-06 RX ORDER — IPRATROPIUM BROMIDE AND ALBUTEROL SULFATE 2.5; .5 MG/3ML; MG/3ML
3 SOLUTION RESPIRATORY (INHALATION)
Status: DISCONTINUED | OUTPATIENT
Start: 2017-10-06 | End: 2017-10-10 | Stop reason: HOSPADM

## 2017-10-06 RX ADMIN — LOPERAMIDE HYDROCHLORIDE 2 MG: 2 CAPSULE ORAL at 05:10

## 2017-10-06 RX ADMIN — POTASSIUM & SODIUM PHOSPHATES POWDER PACK 280-160-250 MG 2 PACKET: 280-160-250 PACK at 10:10

## 2017-10-06 RX ADMIN — MICAFUNGIN SODIUM 100 MG: 20 INJECTION, POWDER, LYOPHILIZED, FOR SOLUTION INTRAVENOUS at 06:10

## 2017-10-06 RX ADMIN — IPRATROPIUM BROMIDE AND ALBUTEROL SULFATE 3 ML: .5; 3 SOLUTION RESPIRATORY (INHALATION) at 07:10

## 2017-10-06 RX ADMIN — POTASSIUM & SODIUM PHOSPHATES POWDER PACK 280-160-250 MG 2 PACKET: 280-160-250 PACK at 05:10

## 2017-10-06 RX ADMIN — ONDANSETRON 8 MG: 8 TABLET, ORALLY DISINTEGRATING ORAL at 04:10

## 2017-10-06 RX ADMIN — Medication 10 ML: at 12:10

## 2017-10-06 RX ADMIN — PANTOPRAZOLE SODIUM 40 MG: 40 TABLET, DELAYED RELEASE ORAL at 08:10

## 2017-10-06 RX ADMIN — POTASSIUM CHLORIDE 20 MEQ: 750 CAPSULE, EXTENDED RELEASE ORAL at 05:10

## 2017-10-06 RX ADMIN — MAGNESIUM OXIDE TAB 400 MG (241.3 MG ELEMENTAL MG) 400 MG: 400 (241.3 MG) TAB at 10:10

## 2017-10-06 RX ADMIN — LOPERAMIDE HYDROCHLORIDE 2 MG: 2 CAPSULE ORAL at 06:10

## 2017-10-06 RX ADMIN — PIPERACILLIN AND TAZOBACTAM 4.5 G: 4; .5 INJECTION, POWDER, LYOPHILIZED, FOR SOLUTION INTRAVENOUS; PARENTERAL at 08:10

## 2017-10-06 RX ADMIN — Medication 30 ML: at 06:10

## 2017-10-06 RX ADMIN — Medication 10 ML: at 06:10

## 2017-10-06 RX ADMIN — LISINOPRIL 40 MG: 20 TABLET ORAL at 08:10

## 2017-10-06 RX ADMIN — IPRATROPIUM BROMIDE AND ALBUTEROL SULFATE 3 ML: .5; 3 SOLUTION RESPIRATORY (INHALATION) at 11:10

## 2017-10-06 RX ADMIN — PIPERACILLIN AND TAZOBACTAM 4.5 G: 4; .5 INJECTION, POWDER, LYOPHILIZED, FOR SOLUTION INTRAVENOUS; PARENTERAL at 01:10

## 2017-10-06 RX ADMIN — Medication 30 ML: at 12:10

## 2017-10-06 RX ADMIN — FILGRASTIM 480 MCG: 480 INJECTION, SOLUTION INTRAVENOUS; SUBCUTANEOUS at 08:10

## 2017-10-06 RX ADMIN — MAGNESIUM OXIDE TAB 400 MG (241.3 MG ELEMENTAL MG) 400 MG: 400 (241.3 MG) TAB at 05:10

## 2017-10-06 RX ADMIN — SODIUM CHLORIDE AND POTASSIUM CHLORIDE: 9; 1.49 INJECTION, SOLUTION INTRAVENOUS at 09:10

## 2017-10-06 RX ADMIN — SODIUM CHLORIDE AND POTASSIUM CHLORIDE: 9; 1.49 INJECTION, SOLUTION INTRAVENOUS at 07:10

## 2017-10-06 RX ADMIN — POTASSIUM CHLORIDE 20 MEQ: 750 CAPSULE, EXTENDED RELEASE ORAL at 08:10

## 2017-10-06 RX ADMIN — LOPERAMIDE HYDROCHLORIDE 2 MG: 2 CAPSULE ORAL at 12:10

## 2017-10-06 RX ADMIN — POTASSIUM & SODIUM PHOSPHATES POWDER PACK 280-160-250 MG 2 PACKET: 280-160-250 PACK at 04:10

## 2017-10-06 RX ADMIN — PIPERACILLIN AND TAZOBACTAM 4.5 G: 4; .5 INJECTION, POWDER, LYOPHILIZED, FOR SOLUTION INTRAVENOUS; PARENTERAL at 04:10

## 2017-10-06 RX ADMIN — DIPHENHYDRAMINE HYDROCHLORIDE 25 MG: 25 CAPSULE ORAL at 08:10

## 2017-10-06 RX ADMIN — ACYCLOVIR 800 MG: 200 CAPSULE ORAL at 09:10

## 2017-10-06 RX ADMIN — DIPHENOXYLATE HYDROCHLORIDE AND ATROPINE SULFATE 1 TABLET: 2.5; .025 TABLET ORAL at 04:10

## 2017-10-06 RX ADMIN — CITALOPRAM HYDROBROMIDE 40 MG: 20 TABLET ORAL at 08:10

## 2017-10-06 RX ADMIN — ACYCLOVIR 800 MG: 200 CAPSULE ORAL at 08:10

## 2017-10-06 RX ADMIN — IPRATROPIUM BROMIDE AND ALBUTEROL SULFATE 3 ML: .5; 3 SOLUTION RESPIRATORY (INHALATION) at 01:10

## 2017-10-06 RX ADMIN — IPRATROPIUM BROMIDE AND ALBUTEROL SULFATE 3 ML: .5; 3 SOLUTION RESPIRATORY (INHALATION) at 03:10

## 2017-10-06 RX ADMIN — NEBIVOLOL HYDROCHLORIDE 10 MG: 5 TABLET ORAL at 08:10

## 2017-10-06 RX ADMIN — Medication 30 ML: at 05:10

## 2017-10-06 RX ADMIN — ACETAMINOPHEN 650 MG: 325 TABLET ORAL at 08:10

## 2017-10-06 RX ADMIN — Medication 1500 MG: at 06:10

## 2017-10-06 RX ADMIN — DIPHENOXYLATE HYDROCHLORIDE AND ATROPINE SULFATE 1 TABLET: 2.5; .025 TABLET ORAL at 08:10

## 2017-10-06 RX ADMIN — AMLODIPINE BESYLATE 5 MG: 5 TABLET ORAL at 08:10

## 2017-10-06 RX ADMIN — Medication 10 ML: at 05:10

## 2017-10-06 NOTE — ASSESSMENT & PLAN NOTE
- Cdiff negative initially; recheck cdiff 10/4/17 negative  - Repeat cdiff negative, give scheduled loperamide and prn lomotil

## 2017-10-06 NOTE — ASSESSMENT & PLAN NOTE
- Today is day +9  - Melphalan given on 9/26/17 without complications  - Auto SCT day 0 on 9/27/17; received 5 bags with a CD 34 count of 4.71 x 10^6/kg - tolerated well   - ppx antimicrobials to start per protocol  - Neupogen started day + 3   - with possible mucositis, ordered Pattie (10/3/17)  - with n/v, continue ativan    - hospital course complicated with neutropenic fever r/t PNA and possibly early engraftment

## 2017-10-06 NOTE — PLAN OF CARE
Problem: Patient Care Overview  Goal: Plan of Care Review  Outcome: Ongoing (interventions implemented as appropriate)  Plan of care reviewed with the patient at the beginning of the shift. Pt is day +9 of auto transplant for multiple myeloma. Pt with watery liquid diarrhea, most of which has been measured. Pt with fecal incontinence once this shift. Encouraged pt to use moist wipes. Skin is intact. Pt denies n/v. Pt with bilateral pneumonia. Oxygen at 3L nasal canula. He is dyspneic at rest, lungs diminished with exp wheezing and crackles to bilateral bases. Encouraged pt to use oxygen with ambulation to the restroom. Resp treatments now scheduled. Pt followed by ID. T max 101.9. Pan cultured, lactic acid, and cxr done. Zosyn started. Vanc and Micafungin continued. Mucous membranes are intact, however he does complain of some discomfort when swallowing. Salt and soda rinses provided. Pt on dukes solution. Poor PO intake. Oral intake encouraged. Fall precautions maintained. Pt remained free from falls and injury this shift. Bed locked in lowest position, side rails up x2, call light within reach. Instructed pt to call for assistance as needed. Pt verbalized understanding. Thrombocytopenic and neutropenic precautions maintained. Will continue to monitor.

## 2017-10-06 NOTE — PROGRESS NOTES
Dr Nazario aware of temp 101.9. Dr Saavedra also aware and at the bedside for assessment. Pt is tachypneic with RR 28. Breathing is labored with dyspnea at rest. Oxygen at 3L NC. Lungs are diminished with exp wheezing and crackles at the bases bilaterally. Resp tx changed to q4, chest xray ordered, lactic, blood and urine cultures ordered, and Zosyn started. Cefepime discontinued. Pt given Tylenol. No other orders at this time. Will continue to monitor.

## 2017-10-06 NOTE — ASSESSMENT & PLAN NOTE
- started 10/2/17; improved  - UA done and negative for nitrites and leukocytes; positive for 1+ occult blood   - will continue to monitor

## 2017-10-06 NOTE — ASSESSMENT & PLAN NOTE
- WBC 0.12 k/uL; ANC 11; with monocytes  - Hemoglobin 8.5 grams  - Plts 5K; transfusing today  - Transfuse for hemoglobin <7 and platelets <10,000

## 2017-10-06 NOTE — SUBJECTIVE & OBJECTIVE
Subjective:     Interval History:   - Day + 9 HD Melphalan after auto SCT.   - Remains febrile over the last 24 hours with T Max 101.9 last night, repeated panculture. Initial CXR showed bilateral pneumonia; repeat with continue bilat PNA and some improvement in interstitial lung disease. Blood cx ngtd. D/C'd vancomycin. Currently on zosyn and donnell. 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 Crypto negative.  - Sore throat and diarrhea continue. Denies tenderness or cramping. Using Navarro. C. Diff negative from 10/4.   - States he is feeling much better today and breathing better    Objective:     Vital Signs (Most Recent):  Temp: 98.4 °F (36.9 °C) (10/06/17 1206)  Pulse: 84 (10/06/17 1207)  Resp: 20 (10/06/17 1206)  BP: (!) 121/57 (10/06/17 1207)  SpO2: (!) 91 % (10/06/17 1207) Vital Signs (24h Range):  Temp:  [98.4 °F (36.9 °C)-101.9 °F (38.8 °C)] 98.4 °F (36.9 °C)  Pulse:  [78-93] 84  Resp:  [16-28] 20  SpO2:  [88 %-97 %] 91 %  BP: (120-158)/(57-74) 121/57     Weight: 116.4 kg (256 lb 11.6 oz)  Body mass index is 39.03 kg/m².  Body surface area is 2.36 meters squared.    ECOG SCORE         [unfilled]    Intake/Output - Last 3 Shifts       10/04 0700 - 10/05 0659 10/05 0700 - 10/06 0659 10/06 0700 - 10/07 0659    P.O. 1320 1140 320    I.V. (mL/kg) 1531 (13.2) 1167.9 (10) 657 (5.6)    Blood  242 233    IV Piggyback 975 1600 100    Total Intake(mL/kg) 3826 (33) 4149.9 (35.7) 1310 (11.3)    Urine (mL/kg/hr) 1425 (0.5) 150 (0.1) 0 (0)    Stool 225 (0.1) 950 (0.3) 600 (1)    Total Output 1650 1100 600    Net +2176 +3049.9 +710           Urine Occurrence  6 x 2 x    Stool Occurrence 4 x 1 x           Physical Exam   Constitutional: He is oriented to person, place, and time. He appears well-developed and well-nourished. No distress. Nasal cannula in place.   HENT:   Head: Normocephalic and atraumatic.   Nose: Nose normal.   Mouth/Throat: Oral lesions (mucositis, getting dukes) present. Oropharyngeal exudate and  posterior oropharyngeal erythema present.   Eyes: Pupils are equal, round, and reactive to light.   Neck: Neck supple.   Cardiovascular: Normal rate, regular rhythm and normal heart sounds.    Pulmonary/Chest: Effort normal. He has decreased breath sounds.   Abdominal: Soft. Bowel sounds are normal. He exhibits no distension. There is no tenderness (none noted on exam).   Musculoskeletal: He exhibits no edema.   Neurological: He is alert and oriented to person, place, and time.   Skin: Skin is warm and dry. No rash noted.   Psychiatric: He has a normal mood and affect. His behavior is normal.   Nursing note and vitals reviewed.      Significant Labs:   CBC:     Recent Labs  Lab 10/05/17  0331 10/05/17  1249 10/06/17  0342   WBC 0.09*  --  0.12*   HGB 8.9*  --  8.5*   HCT 25.5*  --  24.1*   PLT 3* 12* 5*    and CMP:     Recent Labs  Lab 10/05/17  0331 10/06/17  0342   * 134*   K 3.5 3.5    106   CO2 21* 20*   * 123*   BUN 8 9   CREATININE 0.7 0.8   CALCIUM 7.6* 7.8*   PROT 5.3* 5.3*   ALBUMIN 2.6* 2.4*   BILITOT 0.8 0.6   ALKPHOS 84 80   AST 33 27   ALT 30 24   ANIONGAP 8 8   EGFRNONAA >60.0 >60.0       Diagnostic Results:    Repeat cxy 10/6/17: Abnormal chest radiograph consistent with bilateral pneumonia.  Improvement in interstitial lung disease since 10/4/2017 suggests interval diuresis.  No new disease identified.    Blood cx 10/3/17 and 10/6/17 NGTD    Urine cx 10/3/17 no growth and 10/6/17 in process

## 2017-10-06 NOTE — PROGRESS NOTES
[facility]  Infectious Disease  Progress Note    Patient Name: Arik Bobo  MRN: [id number]  Admission Date: 9/25/2017  Length of Stay: 11 days  Attending Physician: Italo Bryant MD  Primary Care Provider: Avinash Lal MD    Isolation Status: No active isolations  Assessment/Plan:      Neutropenic fever    51yo man w/a history of MM (IgG kappa; dx 4/2015; s/p lenalidomide/dexamethasone in 2015 and more rencently CyBorD x4 with PR1), gout, and DVT (in 7/2015 and 2/2017; on apixaban) who was admitted on 9/25/2017 for PBSCT consolidation (s/p auto-HSCT on 9/27/2017 with melphalan conditioning) that has been c/b mucositis and neutropenic fevers despite empiric vanc/cefepime/fluc-->micafungin and prophylactic acyclovir with a new multifocal pneumonia noted on imaging. Clinical history given mucositis with recent emesis and short duration of neutropenia is most suspicious for aspiration as the cause of his multifocal pneumonia. Overall, fever curve has been trending downwards over last 48 hours.    - Agree with discontinuing vancomycin as no cultures suggestive of MRSA, also would expect more severe respiratory symptoms with an MRSA pneumonia  - Continue pip-tazo for coverage of resistant GN and anaerobes  - On empiric micafungin, may be able to deescalate if no evidence of Candidemia and fevers resolve  - Continue acyclovir prophylaxis  - Sputum culture if producing sputum  - Follow-up pending fungal markers sent by team (histo, aspergillus ag, 13BDglucan)              Anticipated Disposition: pending resolution of fevers    Thank you for your consult. I will follow-up with patient. Please contact us if you have any additional questions.    Celi Frederick MD  Infectious Disease  [facility]    Subjective:     Principal Problem:H/O autologous stem cell transplant    HPI: Mr. Bobo is a 51yo man w/a history of MM (IgG kappa; dx 4/2015; s/p lenalidomide/dexamethasone  in 2015 and more rencently CyBorD x4 with PR1), gout, and DVT (in 7/2015 and 2/2017; on apixaban) who was admitted on 9/25/2017 for PBSCT consolidation (s/p auto-HSCT on 9/27/2017 with melphalan conditioning). His course has been c/b mucositis and neutropenic fevers over the last 48h despite empiric vanc/cefepime/fluc-->micafungin and prophylactic acyclovir. Workup to date is notable for negative blood/urine cultures and a CXR that shows a new multifocal pneumonia, prompting ID consultation today. Patient notes he felt well upon arrival but had some post-nasal drip on day 1 of his hospitalization. Since arrival, he has had intermittent nausea/emesis (with possible aspiration), diarrhea (5-6 episodes today so far, watery), and development of a dry cough/DEJESUS over the last 3 days. He notes improvement in his fever curve today. He denies HA, AMS, CP, abdominal pain, dysuria, rash, or line troubles. Social/exposure history notable for: lives in [facility] [place], worked as , lives with wife, no recent outdoor exposures, no sick contacts, has an indoor cat and outdoor dog, no farm animal/insect exposures, and no TB contacts/incarceration history.  Interval History:   - Fever to 101.9 last night at 23:45  - Patient reports feeling better compared to four days ago when fevers started  - His main complaint is diarrhea, occurring every hour. Denied having any abdominal cramping  - Reports cough, but non-productive  - Denies feeling SOB, but on oxygen supplementation    Review of Systems   Constitutional: Negative for chills, diaphoresis, fever and unexpected weight change.   HENT: Negative for congestion, mouth sores, rhinorrhea, sinus pain, sore throat and trouble swallowing.    Respiratory: Positive for cough. Negative for shortness of breath and stridor.    Cardiovascular: Negative for chest pain and leg swelling.   Gastrointestinal: Positive for diarrhea. Negative for abdominal pain, blood in  stool, nausea and vomiting.   Genitourinary: Negative for dysuria, flank pain and genital sores.   Musculoskeletal: Negative for arthralgias, back pain, joint swelling, myalgias and neck stiffness.   Skin: Negative for rash and wound.   Neurological: Negative for light-headedness and headaches.   Hematological: Negative for adenopathy.     Objective:     Vital Signs (Most Recent):  Temp: 99.4 °F (37.4 °C) (10/06/17 1455)  Pulse: 87 (10/06/17 1509)  Resp: 18 (10/06/17 1509)  BP: 111/62 (10/06/17 1455)  SpO2: 96 % (10/06/17 1509) Vital Signs (24h Range):  Temp:  [98.4 °F (36.9 °C)-101.9 °F (38.8 °C)] 99.4 °F (37.4 °C)  Pulse:  [78-93] 87  Resp:  [16-28] 18  SpO2:  [89 %-97 %] 96 %  BP: (111-158)/(57-74) 111/62     Weight: 116.4 kg (256 lb 11.6 oz)  Body mass index is 39.03 kg/m².    Estimated Creatinine Clearance: 132.3 mL/min (based on SCr of 0.8 mg/dL).    Physical Exam   Constitutional: He is oriented to person, place, and time. No distress.   Eyes: Conjunctivae are normal.   Neck: Normal range of motion. Neck supple.   Cardiovascular: Normal rate and regular rhythm.  Exam reveals no friction rub.    No murmur heard.  Pulmonary/Chest: Effort normal. No respiratory distress. He has no wheezes. He has rales.   On nasal cannula   Abdominal: Soft. Bowel sounds are normal. He exhibits no distension. There is no tenderness. There is no rebound and no guarding.   Musculoskeletal: Normal range of motion. He exhibits no edema.   Lymphadenopathy:     He has no cervical adenopathy.   Neurological: He is alert and oriented to person, place, and time.   Skin: Skin is warm and dry. No rash noted. He is not diaphoretic. No erythema.   Psychiatric: He has a normal mood and affect.       Significant Labs:   CBC:   Recent Labs  Lab 10/05/17  0331 10/05/17  1249 10/06/17  0342   WBC 0.09*  --  0.12*   HGB 8.9*  --  8.5*   HCT 25.5*  --  24.1*   PLT 3* 12* 5*     10/4/17 Crypto Ag negative  10/4/17 C. Diff negative  10/3/17 Blood  culture x2 ngtd  10/3/17 Urine culture ngtd    Significant Imaging: CXR: I have reviewed all pertinent results/findings within the past 24 hours:  bilateral opacities

## 2017-10-06 NOTE — PLAN OF CARE
Problem: Patient Care Overview  Goal: Plan of Care Review  Outcome: Ongoing (interventions implemented as appropriate)  Pt has remained free from injury this shift. Bed in low locked position. Call light and personal belongings within reach. Side rails up x2. Nonskid socks in place. Pt ambulates in room independently. 1 unit of platelets given this AM. Pt afebrile thus far this shift. No c/o nausea but pt gets SOB with exertion; pt on 3L O2 sating 91-95%. Respiratory treatments continued. Pt still having diarrhea. All questions and concerns addressed at this time. Will continue to monitor.

## 2017-10-06 NOTE — ASSESSMENT & PLAN NOTE
53yo man w/a history of MM (IgG kappa; dx 4/2015; s/p lenalidomide/dexamethasone in 2015 and more rencently CyBorD x4 with PR1), gout, and DVT (in 7/2015 and 2/2017; on apixaban) who was admitted on 9/25/2017 for PBSCT consolidation (s/p auto-HSCT on 9/27/2017 with melphalan conditioning) that has been c/b mucositis and neutropenic fevers despite empiric vanc/cefepime/fluc-->micafungin and prophylactic acyclovir with a new multifocal pneumonia noted on imaging. Clinical history given mucositis with recent emesis and short duration of neutropenia is most suspicious for aspiration as the cause of his multifocal pneumonia. Overall, fever curve has been trending downwards over last 48 hours.    - Agree with discontinuing vancomycin as no cultures suggestive of MRSA, also would expect more severe respiratory symptoms with an MRSA pneumonia  - Continue pip-tazo for coverage of resistant GN and anaerobes  - On empiric micafungin, may be able to deescalate if no evidence of Candidemia and fevers resolve  - Continue acyclovir prophylaxis  - Sputum culture if producing sputum  - Follow-up pending fungal markers sent by team (histo, aspergillus ag, 13BDglucan)

## 2017-10-06 NOTE — SUBJECTIVE & OBJECTIVE
Interval History:   - Fever to 101.9 last night at 23:45  - Patient reports feeling better compared to four days ago when fevers started  - His main complaint is diarrhea, occurring every hour. Denied having any abdominal cramping  - Reports cough, but non-productive  - Denies feeling SOB, but on oxygen supplementation    Review of Systems   Constitutional: Negative for chills, diaphoresis, fever and unexpected weight change.   HENT: Negative for congestion, mouth sores, rhinorrhea, sinus pain, sore throat and trouble swallowing.    Respiratory: Positive for cough. Negative for shortness of breath and stridor.    Cardiovascular: Negative for chest pain and leg swelling.   Gastrointestinal: Positive for diarrhea. Negative for abdominal pain, blood in stool, nausea and vomiting.   Genitourinary: Negative for dysuria, flank pain and genital sores.   Musculoskeletal: Negative for arthralgias, back pain, joint swelling, myalgias and neck stiffness.   Skin: Negative for rash and wound.   Neurological: Negative for light-headedness and headaches.   Hematological: Negative for adenopathy.     Objective:     Vital Signs (Most Recent):  Temp: 99.4 °F (37.4 °C) (10/06/17 1455)  Pulse: 87 (10/06/17 1509)  Resp: 18 (10/06/17 1509)  BP: 111/62 (10/06/17 1455)  SpO2: 96 % (10/06/17 1509) Vital Signs (24h Range):  Temp:  [98.4 °F (36.9 °C)-101.9 °F (38.8 °C)] 99.4 °F (37.4 °C)  Pulse:  [78-93] 87  Resp:  [16-28] 18  SpO2:  [89 %-97 %] 96 %  BP: (111-158)/(57-74) 111/62     Weight: 116.4 kg (256 lb 11.6 oz)  Body mass index is 39.03 kg/m².    Estimated Creatinine Clearance: 132.3 mL/min (based on SCr of 0.8 mg/dL).    Physical Exam   Constitutional: He is oriented to person, place, and time. No distress.   Eyes: Conjunctivae are normal.   Neck: Normal range of motion. Neck supple.   Cardiovascular: Normal rate and regular rhythm.  Exam reveals no friction rub.    No murmur heard.  Pulmonary/Chest: Effort normal. No respiratory  distress. He has no wheezes. He has rales.   On nasal cannula   Abdominal: Soft. Bowel sounds are normal. He exhibits no distension. There is no tenderness. There is no rebound and no guarding.   Musculoskeletal: Normal range of motion. He exhibits no edema.   Lymphadenopathy:     He has no cervical adenopathy.   Neurological: He is alert and oriented to person, place, and time.   Skin: Skin is warm and dry. No rash noted. He is not diaphoretic. No erythema.   Psychiatric: He has a normal mood and affect.       Significant Labs:   CBC:   Recent Labs  Lab 10/05/17  0331 10/05/17  1249 10/06/17  0342   WBC 0.09*  --  0.12*   HGB 8.9*  --  8.5*   HCT 25.5*  --  24.1*   PLT 3* 12* 5*     10/4/17 Crypto Ag negative  10/4/17 C. Diff negative  10/3/17 Blood culture x2 ngtd  10/3/17 Urine culture ngtd    Significant Imaging: CXR: I have reviewed all pertinent results/findings within the past 24 hours:  bilateral opacities

## 2017-10-06 NOTE — ASSESSMENT & PLAN NOTE
- Tmax in 24 hours 101.9 on 10/5/17 at 2345  - Blood cx x 2, chest xray, urine cx all ordered on 10/3/17 ngtd and do not give source of infection; ordered chest xray PA/LAT 10/4/17 (bilateral PNA)  - Repeat pancultures on 10/6/17, chest xray shows same bilateral PNA and improving interstitial lung disease  - Cefepime from 10/3/17 to 10/5/17  - Vanc from 10/4/17-10/6/17  - Zosyn started 10/5/17 and continues  - fever curve improving  - ID consulted 10/5/17 and following; appreciate recommendations   - Fungal markers in process, crypto negative  - Will re culture q 24 hours if continues to be febrile

## 2017-10-07 PROBLEM — N17.9 AKI (ACUTE KIDNEY INJURY): Status: ACTIVE | Noted: 2017-10-07

## 2017-10-07 LAB
1,3 BETA GLUCAN SPEC-MCNC: <31 PG/ML
ALBUMIN SERPL BCP-MCNC: 2.1 G/DL
ALBUMIN SERPL BCP-MCNC: 2.2 G/DL
ALBUMIN SERPL BCP-MCNC: 2.3 G/DL
ALP SERPL-CCNC: 105 U/L
ALP SERPL-CCNC: 93 U/L
ALT SERPL W/O P-5'-P-CCNC: 22 U/L
ALT SERPL W/O P-5'-P-CCNC: 25 U/L
AMORPH CRY UR QL COMP ASSIST: ABNORMAL
ANION GAP SERPL CALC-SCNC: 10 MMOL/L
ANION GAP SERPL CALC-SCNC: 8 MMOL/L
ANION GAP SERPL CALC-SCNC: 9 MMOL/L
AST SERPL-CCNC: 29 U/L
AST SERPL-CCNC: 31 U/L
BACTERIA #/AREA URNS AUTO: ABNORMAL /HPF
BACTERIA UR CULT: NO GROWTH
BASOPHILS # BLD AUTO: 0 K/UL
BASOPHILS NFR BLD: 0 %
BILIRUB SERPL-MCNC: 0.5 MG/DL
BILIRUB SERPL-MCNC: 0.6 MG/DL
BILIRUB UR QL STRIP: NEGATIVE
BUN SERPL-MCNC: 15 MG/DL
BUN SERPL-MCNC: 17 MG/DL
BUN SERPL-MCNC: 18 MG/DL
C3 SERPL-MCNC: 142 MG/DL
C4 SERPL-MCNC: 25 MG/DL
CALCIUM SERPL-MCNC: 7.6 MG/DL
CALCIUM SERPL-MCNC: 7.8 MG/DL
CALCIUM SERPL-MCNC: 8 MG/DL
CHLORIDE SERPL-SCNC: 108 MMOL/L
CHLORIDE SERPL-SCNC: 109 MMOL/L
CHLORIDE SERPL-SCNC: 111 MMOL/L
CLARITY UR REFRACT.AUTO: ABNORMAL
CO2 SERPL-SCNC: 17 MMOL/L
CO2 SERPL-SCNC: 18 MMOL/L
CO2 SERPL-SCNC: 19 MMOL/L
COLOR UR AUTO: YELLOW
CREAT SERPL-MCNC: 2.2 MG/DL
CREAT SERPL-MCNC: 2.5 MG/DL
CREAT SERPL-MCNC: 2.6 MG/DL
CREAT UR-MCNC: 127 MG/DL
CREAT UR-MCNC: 127 MG/DL
DIFFERENTIAL METHOD: ABNORMAL
EOSINOPHIL # BLD AUTO: 0 K/UL
EOSINOPHIL NFR BLD: 0 %
EOSINOPHIL URNS QL WRIGHT STN: NORMAL
ERYTHROCYTE [DISTWIDTH] IN BLOOD BY AUTOMATED COUNT: 14.2 %
EST. GFR  (AFRICAN AMERICAN): 31.2 ML/MIN/1.73 M^2
EST. GFR  (AFRICAN AMERICAN): 32.7 ML/MIN/1.73 M^2
EST. GFR  (AFRICAN AMERICAN): 38.1 ML/MIN/1.73 M^2
EST. GFR  (NON AFRICAN AMERICAN): 26.9 ML/MIN/1.73 M^2
EST. GFR  (NON AFRICAN AMERICAN): 28.3 ML/MIN/1.73 M^2
EST. GFR  (NON AFRICAN AMERICAN): 33 ML/MIN/1.73 M^2
GLUCOSE SERPL-MCNC: 106 MG/DL
GLUCOSE SERPL-MCNC: 120 MG/DL
GLUCOSE SERPL-MCNC: 134 MG/DL
GLUCOSE UR QL STRIP: NEGATIVE
GRAM STN SPEC: NORMAL
GRAM STN SPEC: NORMAL
HCT VFR BLD AUTO: 23.2 %
HGB BLD-MCNC: 8 G/DL
HGB UR QL STRIP: ABNORMAL
HYALINE CASTS UR QL AUTO: 0 /LPF
KETONES UR QL STRIP: NEGATIVE
LEUKOCYTE ESTERASE UR QL STRIP: NEGATIVE
LYMPHOCYTES # BLD AUTO: 0.1 K/UL
LYMPHOCYTES NFR BLD: 53.8 %
MAGNESIUM SERPL-MCNC: 1.7 MG/DL
MAGNESIUM SERPL-MCNC: 1.8 MG/DL
MCH RBC QN AUTO: 30.2 PG
MCHC RBC AUTO-ENTMCNC: 34.5 G/DL
MCV RBC AUTO: 88 FL
MICROSCOPIC COMMENT: ABNORMAL
MONOCYTES # BLD AUTO: 0.1 K/UL
MONOCYTES NFR BLD: 23.1 %
NEUTROPHILS # BLD AUTO: 0.1 K/UL
NEUTROPHILS NFR BLD: 23.1 %
NITRITE UR QL STRIP: NEGATIVE
NON-SQ EPI CELLS #/AREA URNS AUTO: <1 /HPF
PH UR STRIP: 5 [PH] (ref 5–8)
PHOSPHATE SERPL-MCNC: 1.7 MG/DL
PHOSPHATE SERPL-MCNC: 1.7 MG/DL
PHOSPHATE SERPL-MCNC: 1.9 MG/DL
PLATELET # BLD AUTO: 10 K/UL
PLATELET BLD QL SMEAR: ABNORMAL
PMV BLD AUTO: 11 FL
POTASSIUM SERPL-SCNC: 3.7 MMOL/L
POTASSIUM SERPL-SCNC: 4.1 MMOL/L
POTASSIUM SERPL-SCNC: 4.4 MMOL/L
PROT SERPL-MCNC: 5.3 G/DL
PROT SERPL-MCNC: 5.7 G/DL
PROT UR QL STRIP: ABNORMAL
PROT UR-MCNC: 116 MG/DL
PROT/CREAT RATIO, UR: 0.91
RBC # BLD AUTO: 2.65 M/UL
RBC #/AREA URNS AUTO: 3 /HPF (ref 0–4)
SODIUM SERPL-SCNC: 135 MMOL/L
SODIUM SERPL-SCNC: 136 MMOL/L
SODIUM SERPL-SCNC: 138 MMOL/L
SODIUM UR-SCNC: <20 MMOL/L
SP GR UR STRIP: 1.01 (ref 1–1.03)
URATE SERPL-MCNC: 4.2 MG/DL
URN SPEC COLLECT METH UR: ABNORMAL
UROBILINOGEN UR STRIP-ACNC: NEGATIVE EU/DL
UUN UR-MCNC: 293 MG/DL
WBC # BLD AUTO: 0.26 K/UL
WBC #/AREA URNS AUTO: 8 /HPF (ref 0–5)

## 2017-10-07 PROCEDURE — 94761 N-INVAS EAR/PLS OXIMETRY MLT: CPT

## 2017-10-07 PROCEDURE — 87205 SMEAR GRAM STAIN: CPT | Mod: 59

## 2017-10-07 PROCEDURE — 25000003 PHARM REV CODE 250: Performed by: STUDENT IN AN ORGANIZED HEALTH CARE EDUCATION/TRAINING PROGRAM

## 2017-10-07 PROCEDURE — 99223 1ST HOSP IP/OBS HIGH 75: CPT | Mod: ,,, | Performed by: INTERNAL MEDICINE

## 2017-10-07 PROCEDURE — 99233 SBSQ HOSP IP/OBS HIGH 50: CPT | Mod: ,,, | Performed by: INTERNAL MEDICINE

## 2017-10-07 PROCEDURE — 82570 ASSAY OF URINE CREATININE: CPT

## 2017-10-07 PROCEDURE — 94664 DEMO&/EVAL PT USE INHALER: CPT

## 2017-10-07 PROCEDURE — 86160 COMPLEMENT ANTIGEN: CPT

## 2017-10-07 PROCEDURE — 63600175 PHARM REV CODE 636 W HCPCS: Performed by: STUDENT IN AN ORGANIZED HEALTH CARE EDUCATION/TRAINING PROGRAM

## 2017-10-07 PROCEDURE — 20600001 HC STEP DOWN PRIVATE ROOM

## 2017-10-07 PROCEDURE — 80053 COMPREHEN METABOLIC PANEL: CPT

## 2017-10-07 PROCEDURE — 25000003 PHARM REV CODE 250: Performed by: INTERNAL MEDICINE

## 2017-10-07 PROCEDURE — 84540 ASSAY OF URINE/UREA-N: CPT

## 2017-10-07 PROCEDURE — 25000242 PHARM REV CODE 250 ALT 637 W/ HCPCS: Performed by: INTERNAL MEDICINE

## 2017-10-07 PROCEDURE — 27000221 HC OXYGEN, UP TO 24 HOURS

## 2017-10-07 PROCEDURE — 80069 RENAL FUNCTION PANEL: CPT

## 2017-10-07 PROCEDURE — 85025 COMPLETE CBC W/AUTO DIFF WBC: CPT

## 2017-10-07 PROCEDURE — 87205 SMEAR GRAM STAIN: CPT

## 2017-10-07 PROCEDURE — 84300 ASSAY OF URINE SODIUM: CPT

## 2017-10-07 PROCEDURE — 87086 URINE CULTURE/COLONY COUNT: CPT

## 2017-10-07 PROCEDURE — A9155 ARTIFICIAL SALIVA: HCPCS | Performed by: NURSE PRACTITIONER

## 2017-10-07 PROCEDURE — 81001 URINALYSIS AUTO W/SCOPE: CPT

## 2017-10-07 PROCEDURE — 83735 ASSAY OF MAGNESIUM: CPT | Mod: 91

## 2017-10-07 PROCEDURE — 63600175 PHARM REV CODE 636 W HCPCS: Performed by: INTERNAL MEDICINE

## 2017-10-07 PROCEDURE — 80053 COMPREHEN METABOLIC PANEL: CPT | Mod: 91

## 2017-10-07 PROCEDURE — 94640 AIRWAY INHALATION TREATMENT: CPT

## 2017-10-07 PROCEDURE — 63600175 PHARM REV CODE 636 W HCPCS: Performed by: NURSE PRACTITIONER

## 2017-10-07 PROCEDURE — 83735 ASSAY OF MAGNESIUM: CPT

## 2017-10-07 PROCEDURE — 86160 COMPLEMENT ANTIGEN: CPT | Mod: 59

## 2017-10-07 PROCEDURE — 25000003 PHARM REV CODE 250: Performed by: NURSE PRACTITIONER

## 2017-10-07 PROCEDURE — 99900035 HC TECH TIME PER 15 MIN (STAT)

## 2017-10-07 PROCEDURE — 84550 ASSAY OF BLOOD/URIC ACID: CPT

## 2017-10-07 PROCEDURE — 84100 ASSAY OF PHOSPHORUS: CPT | Mod: 91

## 2017-10-07 PROCEDURE — 84100 ASSAY OF PHOSPHORUS: CPT

## 2017-10-07 RX ORDER — SODIUM CHLORIDE 9 MG/ML
INJECTION, SOLUTION INTRAVENOUS CONTINUOUS
Status: DISCONTINUED | OUTPATIENT
Start: 2017-10-07 | End: 2017-10-09

## 2017-10-07 RX ORDER — FLUCONAZOLE 200 MG/1
400 TABLET ORAL DAILY
Status: DISCONTINUED | OUTPATIENT
Start: 2017-10-07 | End: 2017-10-10 | Stop reason: HOSPADM

## 2017-10-07 RX ORDER — CIPROFLOXACIN 500 MG/1
500 TABLET ORAL EVERY 24 HOURS
Status: DISCONTINUED | OUTPATIENT
Start: 2017-10-10 | End: 2017-10-10 | Stop reason: HOSPADM

## 2017-10-07 RX ADMIN — PIPERACILLIN AND TAZOBACTAM 4.5 G: 4; .5 INJECTION, POWDER, LYOPHILIZED, FOR SOLUTION INTRAVENOUS; PARENTERAL at 08:10

## 2017-10-07 RX ADMIN — PIPERACILLIN AND TAZOBACTAM 4.5 G: 4; .5 INJECTION, POWDER, LYOPHILIZED, FOR SOLUTION INTRAVENOUS; PARENTERAL at 04:10

## 2017-10-07 RX ADMIN — IPRATROPIUM BROMIDE AND ALBUTEROL SULFATE 3 ML: .5; 3 SOLUTION RESPIRATORY (INHALATION) at 07:10

## 2017-10-07 RX ADMIN — IPRATROPIUM BROMIDE AND ALBUTEROL SULFATE 3 ML: .5; 3 SOLUTION RESPIRATORY (INHALATION) at 11:10

## 2017-10-07 RX ADMIN — DIPHENOXYLATE HYDROCHLORIDE AND ATROPINE SULFATE 1 TABLET: 2.5; .025 TABLET ORAL at 08:10

## 2017-10-07 RX ADMIN — SODIUM CHLORIDE AND POTASSIUM CHLORIDE: 9; 1.49 INJECTION, SOLUTION INTRAVENOUS at 10:10

## 2017-10-07 RX ADMIN — SODIUM CHLORIDE: 0.9 INJECTION, SOLUTION INTRAVENOUS at 07:10

## 2017-10-07 RX ADMIN — POTASSIUM CHLORIDE 20 MEQ: 750 CAPSULE, EXTENDED RELEASE ORAL at 05:10

## 2017-10-07 RX ADMIN — Medication 30 ML: at 12:10

## 2017-10-07 RX ADMIN — NEBIVOLOL HYDROCHLORIDE 10 MG: 5 TABLET ORAL at 10:10

## 2017-10-07 RX ADMIN — ACYCLOVIR 800 MG: 200 CAPSULE ORAL at 08:10

## 2017-10-07 RX ADMIN — Medication 10 ML: at 05:10

## 2017-10-07 RX ADMIN — Medication 10 ML: at 12:10

## 2017-10-07 RX ADMIN — PANTOPRAZOLE SODIUM 40 MG: 40 TABLET, DELAYED RELEASE ORAL at 08:10

## 2017-10-07 RX ADMIN — DIPHENOXYLATE HYDROCHLORIDE AND ATROPINE SULFATE 1 TABLET: 2.5; .025 TABLET ORAL at 03:10

## 2017-10-07 RX ADMIN — LOPERAMIDE HYDROCHLORIDE 2 MG: 2 CAPSULE ORAL at 12:10

## 2017-10-07 RX ADMIN — Medication 30 ML: at 05:10

## 2017-10-07 RX ADMIN — POTASSIUM & SODIUM PHOSPHATES POWDER PACK 280-160-250 MG 2 PACKET: 280-160-250 PACK at 05:10

## 2017-10-07 RX ADMIN — ACYCLOVIR 800 MG: 200 CAPSULE ORAL at 09:10

## 2017-10-07 RX ADMIN — CITALOPRAM HYDROBROMIDE 40 MG: 20 TABLET ORAL at 08:10

## 2017-10-07 RX ADMIN — MAGNESIUM OXIDE TAB 400 MG (241.3 MG ELEMENTAL MG) 400 MG: 400 (241.3 MG) TAB at 05:10

## 2017-10-07 RX ADMIN — IPRATROPIUM BROMIDE AND ALBUTEROL SULFATE 3 ML: .5; 3 SOLUTION RESPIRATORY (INHALATION) at 03:10

## 2017-10-07 RX ADMIN — Medication 10 ML: at 07:10

## 2017-10-07 RX ADMIN — LOPERAMIDE HYDROCHLORIDE 2 MG: 2 CAPSULE ORAL at 05:10

## 2017-10-07 RX ADMIN — PIPERACILLIN AND TAZOBACTAM 4.5 G: 4; .5 INJECTION, POWDER, LYOPHILIZED, FOR SOLUTION INTRAVENOUS; PARENTERAL at 12:10

## 2017-10-07 RX ADMIN — AMLODIPINE BESYLATE 5 MG: 5 TABLET ORAL at 10:10

## 2017-10-07 RX ADMIN — Medication 30 ML: at 07:10

## 2017-10-07 RX ADMIN — FILGRASTIM 480 MCG: 480 INJECTION, SOLUTION INTRAVENOUS; SUBCUTANEOUS at 08:10

## 2017-10-07 RX ADMIN — LOPERAMIDE HYDROCHLORIDE 2 MG: 2 CAPSULE ORAL at 07:10

## 2017-10-07 RX ADMIN — FLUCONAZOLE 400 MG: 200 TABLET ORAL at 08:10

## 2017-10-07 NOTE — SUBJECTIVE & OBJECTIVE
Past Medical History:   Diagnosis Date    Anxiety     Arthritis     Depression     History of hip surgery     Hx of psychiatric care     Hyperlipidemia     Hypertension     Multiple myeloma     Psychiatric problem        Past Surgical History:   Procedure Laterality Date    HEMORRHOID SURGERY      TOTAL HIP ARTHROPLASTY         Review of patient's allergies indicates:   Allergen Reactions    Pcn [penicillins]      Unknown last dose as an infant     Current Facility-Administered Medications   Medication Frequency    0.9 % NaCl with KCl 20 mEq infusion Continuous    0.9 % NaCl with KCl 20 mEq infusion Continuous    acetaminophen tablet 650 mg Q6H PRN    acyclovir capsule 800 mg BID    albuterol-ipratropium 2.5mg-0.5mg/3mL nebulizer solution 3 mL Q4H WAKE    alteplase injection 2 mg BID PRN    aluminum-magnesium hydroxide-simethicone 200-200-20 mg/5 mL suspension 30 mL Q6H PRN    amlodipine tablet 5 mg Daily    citalopram tablet 40 mg Daily    dextromethorphan-guaifenesin  mg/5 ml liquid 10 mL Q4H PRN    diphenoxylate-atropine 2.5-0.025 mg per tablet 1 tablet QID PRN    duke's soln (benadryl 30 mL, mylanta 30 mL, lidocane 30 mL, nystatin 30 mL) 120 mL QID    filgrastim injection 480 mcg Daily    heparin (porcine) injection 1,600 Units PRN    loperamide capsule 2 mg QID    lorazepam injection 1 mg Q4H PRN    magnesium oxide tablet 400 mg Q4H PRN    magnesium oxide tablet 400 mg Q4H PRN    magnesium oxide tablet 800 mg Q4H PRN    micafungin 100 mg in sodium chloride 0.9 % 100 mL IVPB (ready to mix system) Q24H    nebivolol tablet 10 mg Daily    ondansetron disintegrating tablet 8 mg Q6H PRN    oxycodone immediate release tablet 5 mg Q6H PRN    pantoprazole EC tablet 40 mg Daily    piperacillin-tazobactam 4.5 g in dextrose 5 % 100 mL IVPB (ready to mix system) Q8H    potassium chloride CR capsule 20 mEq PRN    potassium chloride CR capsule 20 mEq Q2H PRN    potassium chloride  CR capsule 20 mEq Q2H PRN    potassium, sodium phosphates 280-160-250 mg packet 1 packet Q4H PRN    potassium, sodium phosphates 280-160-250 mg packet 2 packet Q4H PRN    promethazine (PHENERGAN) 12.5 mg in dextrose 5 % 50 mL IVPB Q4H PRN    saliva substitute combo no.2 solution 30 mL QID    saliva substitute combo no.2 solution 30 mL Q4H PRN    sodium chloride 0.9% flush 10 mL PRN     Family History     Problem Relation (Age of Onset)    Alcohol abuse Father    Anxiety disorder Sister    Depression Brother        Social History Main Topics    Smoking status: Never Smoker    Smokeless tobacco: Current User     Types: Chew    Alcohol use No      Comment: former excessive use; 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    Objective:     Vital Signs (Most Recent):  Temp: 97.9 °F (36.6 °C) (10/07/17 1209)  Pulse: 97 (10/07/17 1209)  Resp: 19 (10/07/17 1209)  BP: 128/64 (10/07/17 1209)  SpO2: 97 % (10/07/17 1209)  O2 Device (Oxygen Therapy): nasal cannula (10/07/17 1209) Vital Signs (24h Range):  Temp:  [97.9 °F (36.6 °C)-98.6 °F (37 °C)] 97.9 °F (36.6 °C)  Pulse:  [] 97  Resp:  [18-26] 19  SpO2:  [93 %-98 %] 97 %  BP: (114-128)/(58-66) 128/64     Weight: 117.8 kg (259 lb 13 oz) (10/07/17 0341)  Body mass index is 39.5 kg/m².  Body surface area is 2.38 meters squared.    I/O last 3 completed shifts:  In: 6266.2 [P.O.:2540; I.V.:2193.2; Blood:233; IV Piggyback:1300]  Out: 2525 [Urine:775; Stool:1750]    Physical Exam   Constitutional: He is oriented to person, place, and time. He appears well-developed and well-nourished. No distress.   HENT:   Head: Normocephalic and atraumatic.   Eyes: Conjunctivae and EOM are normal.   Neck: Neck supple. No tracheal deviation present. No thyromegaly present.   Cardiovascular: Normal rate, regular rhythm, normal heart sounds and intact distal pulses.     Pulmonary/Chest: Effort normal. No respiratory distress. He has no wheezes. He has rales.   Abdominal: Soft. He exhibits no distension. There is no tenderness. There is no guarding.   Musculoskeletal: He exhibits edema. He exhibits no tenderness or deformity.   Neurological: He is alert and oriented to person, place, and time.   Skin: Skin is warm and dry. He is not diaphoretic. No erythema. No pallor.   Psychiatric: He has a normal mood and affect. His behavior is normal. Thought content normal.       Significant Labs:  CBC:   Recent Labs  Lab 10/07/17  0341   WBC 0.26*   RBC 2.65*   HGB 8.0*   HCT 23.2*   PLT 10*   MCV 88   MCH 30.2   MCHC 34.5     CMP:   Recent Labs  Lab 10/07/17  0341 10/07/17  0857    134*   CALCIUM 7.6* 7.8*   ALBUMIN 2.1* 2.3*   PROT 5.3*  --    * 136   K 3.7 4.1   CO2 19* 17*    109   BUN 15 17   CREATININE 2.2* 2.5*   ALKPHOS 93  --    ALT 22  --    AST 29  --    BILITOT 0.6  --        Recent Labs  Lab 10/07/17  1040   COLORU Yellow   SPECGRAV 1.015   PHUR 5.0   PROTEINUA 1+*   BACTERIA Few*   NITRITE Negative   LEUKOCYTESUR Negative   UROBILINOGEN Negative   HYALINECASTS 0     All labs within the past 24 hours have been reviewed.    Significant Imaging:  Labs: Reviewed

## 2017-10-07 NOTE — ASSESSMENT & PLAN NOTE
- baseline sCr 0.8. Acute rise to 2.2 this morning  - no obvious etiology  - UA with blood and protein  - differential includes urinary obstruction, ischemic ATN from profuse diarrhea and relative hypotension (SBP 160s --> 110s), septic ATN, TMA, renal involvement of GVHD?, secondary GN from malignancy (though unsure why this would present now), and acyclovir nephrotoxicity (less likely).  - agree with renal US  - decreased UOP today: ordered bladder scan to r/o obstruction  - recommend stopping all antihypertensives for now  - will order serum complements  - in setting of decreased UOP and progressive renal failure, recommend stopping all potassium-containing IVF. Consider holding IVF for now  - repeat labs this evening  - will discuss with staff

## 2017-10-07 NOTE — SUBJECTIVE & OBJECTIVE
Subjective:     Interval History:   - Day + 10 HD Melphalan after auto SCT.   - Afebrile over past 24h, Tm= 99.4   - Initial CXR showed bilateral pneumonia; repeat with continue bilat PNA and some improvement in interstitial lung disease. Blood cx ngtd. D/C'd vancomycin. Currently on zosyn and donnell. 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 Crypto negative. ID following  - Sore throat and diarrhea continue. Denies tenderness or cramping. Using [place]. C. Diff negative from 10/4.   - Reports SOB/DEJESUS is unchanged  - No other complaints    Objective:     Vital Signs (Most Recent):  Temp: 98.2 °F (36.8 °C) (10/07/17 0739)  Pulse: 110 (10/07/17 1136)  Resp: 20 (10/07/17 1136)  BP: 114/60 (10/07/17 0959)  SpO2: 98 % (10/07/17 1136) Vital Signs (24h Range):  Temp:  [98.2 °F (36.8 °C)-99.4 °F (37.4 °C)] 98.2 °F (36.8 °C)  Pulse:  [] 110  Resp:  [18-26] 20  SpO2:  [91 %-98 %] 98 %  BP: (111-126)/(57-66) 114/60     Weight: 117.8 kg (259 lb 13 oz)  Body mass index is 39.5 kg/m².  Body surface area is 2.38 meters squared.    ECOG SCORE         [unfilled]    Intake/Output - Last 3 Shifts       10/05 0700 - 10/06 0659 10/06 0700 - 10/07 0659 10/07 0700 - 10/08 0659    P.O. 1140 2000 760    I.V. (mL/kg) 1167.9 (10) 1999.5 (17) 511 (4.3)    Blood 242 233     IV Piggyback 1600 400 100    Total Intake(mL/kg) 4149.9 (35.7) 4632.5 (39.3) 1371 (11.6)    Urine (mL/kg/hr) 150 (0.1) 625 (0.2) 175 (0.3)    Stool 950 (0.3) 1400 (0.5) 175 (0.3)    Total Output 1100 2025 350    Net +3049.9 +2607.5 +1021           Urine Occurrence 6 x 10 x 1 x    Stool Occurrence 1 x 4 x 1 x          Physical Exam   Constitutional: He is oriented to person, place, and time. He appears well-developed and well-nourished. No distress. Nasal cannula in place.   HENT:   Head: Normocephalic and atraumatic.   Nose: Nose normal.   Mouth/Throat: Oral lesions (mucositis, getting dukes) present. Oropharyngeal exudate and posterior oropharyngeal erythema  present.   Eyes: Pupils are equal, round, and reactive to light.   Neck: Neck supple.   Cardiovascular: Normal rate, regular rhythm and normal heart sounds.    Pulmonary/Chest: Effort normal. He has decreased breath sounds.   +course breath sounds   Abdominal: Soft. Bowel sounds are normal. He exhibits no distension. There is no tenderness (none noted on exam).   Musculoskeletal: He exhibits no edema.   Neurological: He is alert and oriented to person, place, and time.   Skin: Skin is warm and dry. No rash noted.   Psychiatric: He has a normal mood and affect. His behavior is normal.   Nursing note and vitals reviewed.      Significant Labs:   CBC:     Recent Labs  Lab 10/05/17  1249 10/06/17  0342 10/07/17  0341   WBC  --  0.12* 0.26*   HGB  --  8.5* 8.0*   HCT  --  24.1* 23.2*   PLT 12* 5* 10*    and CMP:     Recent Labs  Lab 10/06/17  0342 10/07/17  0341 10/07/17  0857   * 135* 136   K 3.5 3.7 4.1    108 109   CO2 20* 19* 17*   * 106 134*   BUN 9 15 17   CREATININE 0.8 2.2* 2.5*   CALCIUM 7.8* 7.6* 7.8*   PROT 5.3* 5.3*  --    ALBUMIN 2.4* 2.1* 2.3*   BILITOT 0.6 0.6  --    ALKPHOS 80 93  --    AST 27 29  --    ALT 24 22  --    ANIONGAP 8 8 10   EGFRNONAA >60.0 33.0* 28.3*       Diagnostic Results:    Repeat cxy 10/6/17: Abnormal chest radiograph consistent with bilateral pneumonia.  Improvement in interstitial lung disease since 10/4/2017 suggests interval diuresis.  No new disease identified.    Blood cx 10/3/17 and 10/6/17 NGTD    Urine cx 10/3/17 no growth and 10/6/17 in process

## 2017-10-07 NOTE — ASSESSMENT & PLAN NOTE
- Tmax 99.4 in 24 hours with last fever of 101.9 on 10/5/17 at 2345  - Blood cx x 2, chest xray, urine cx all ordered on 10/3/17 ngtd and do not give source of infection; ordered chest xray PA/LAT 10/4/17 (bilateral PNA)  - Repeat pancultures on 10/6/17, chest xray shows same bilateral PNA and improving interstitial lung disease  - Cefepime from 10/3/17 to 10/5/17  - Vanc from 10/4/17-10/6/17  - Zosyn started 10/5/17 and continues  - fever curve improving  - ID consulted 10/5/17 and following; appreciate recommendations   - Fungal markers in process, crypto negative  - Will re-culture q 24 hours if continues to be febrile

## 2017-10-07 NOTE — PROGRESS NOTES
[facility]  Hematology  Bone Marrow Transplant  Progress Note    Patient Name: Arik Bobo  Admission Date: 9/25/2017  Hospital Length of Stay: 12 days  Code Status: Full Code    Subjective:     Interval History:   - Day + 10 HD Melphalan after auto SCT.   - Afebrile over past 24h, Tm= 99.4   - Initial CXR showed bilateral pneumonia; repeat with continue bilat PNA and some improvement in interstitial lung disease. Blood cx ngtd. D/C'd vancomycin. Currently on zosyn and donnell. 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 Crypto negative. ID following  - Sore throat and diarrhea continue. Denies tenderness or cramping. Using [place]. C. Diff negative from 10/4.   - Reports SOB/DEJESUS is unchanged  - No other complaints    Objective:     Vital Signs (Most Recent):  Temp: 98.2 °F (36.8 °C) (10/07/17 0739)  Pulse: 110 (10/07/17 1136)  Resp: 20 (10/07/17 1136)  BP: 114/60 (10/07/17 0959)  SpO2: 98 % (10/07/17 1136) Vital Signs (24h Range):  Temp:  [98.2 °F (36.8 °C)-99.4 °F (37.4 °C)] 98.2 °F (36.8 °C)  Pulse:  [] 110  Resp:  [18-26] 20  SpO2:  [91 %-98 %] 98 %  BP: (111-126)/(57-66) 114/60     Weight: 117.8 kg (259 lb 13 oz)  Body mass index is 39.5 kg/m².  Body surface area is 2.38 meters squared.    ECOG SCORE         [unfilled]    Intake/Output - Last 3 Shifts       10/05 0700 - 10/06 0659 10/06 0700 - 10/07 0659 10/07 0700 - 10/08 0659    P.O. 1140 2000 760    I.V. (mL/kg) 1167.9 (10) 1999.5 (17) 511 (4.3)    Blood 242 233     IV Piggyback 1600 400 100    Total Intake(mL/kg) 4149.9 (35.7) 4632.5 (39.3) 1371 (11.6)    Urine (mL/kg/hr) 150 (0.1) 625 (0.2) 175 (0.3)    Stool 950 (0.3) 1400 (0.5) 175 (0.3)    Total Output 1100 2025 350    Net +3049.9 +2607.5 +1021           Urine Occurrence 6 x 10 x 1 x    Stool Occurrence 1 x 4 x 1 x          Physical Exam   Constitutional: He is oriented to person, place, and time. He appears well-developed and well-nourished. No distress. Nasal  cannula in place.   HENT:   Head: Normocephalic and atraumatic.   Nose: Nose normal.   Mouth/Throat: Oral lesions (mucositis, getting dukes) present. Oropharyngeal exudate and posterior oropharyngeal erythema present.   Eyes: Pupils are equal, round, and reactive to light.   Neck: Neck supple.   Cardiovascular: Normal rate, regular rhythm and normal heart sounds.    Pulmonary/Chest: Effort normal. He has decreased breath sounds.   +course breath sounds   Abdominal: Soft. Bowel sounds are normal. He exhibits no distension. There is no tenderness (none noted on exam).   Musculoskeletal: He exhibits no edema.   Neurological: He is alert and oriented to person, place, and time.   Skin: Skin is warm and dry. No rash noted.   Psychiatric: He has a normal mood and affect. His behavior is normal.   Nursing note and vitals reviewed.      Significant Labs:   CBC:     Recent Labs  Lab 10/05/17  1249 10/06/17  0342 10/07/17  0341   WBC  --  0.12* 0.26*   HGB  --  8.5* 8.0*   HCT  --  24.1* 23.2*   PLT 12* 5* 10*    and CMP:     Recent Labs  Lab 10/06/17  0342 10/07/17  0341 10/07/17  0857   * 135* 136   K 3.5 3.7 4.1    108 109   CO2 20* 19* 17*   * 106 134*   BUN 9 15 17   CREATININE 0.8 2.2* 2.5*   CALCIUM 7.8* 7.6* 7.8*   PROT 5.3* 5.3*  --    ALBUMIN 2.4* 2.1* 2.3*   BILITOT 0.6 0.6  --    ALKPHOS 80 93  --    AST 27 29  --    ALT 24 22  --    ANIONGAP 8 8 10   EGFRNONAA >60.0 33.0* 28.3*       Diagnostic Results:    Repeat cxy 10/6/17: Abnormal chest radiograph consistent with bilateral pneumonia.  Improvement in interstitial lung disease since 10/4/2017 suggests interval diuresis.  No new disease identified.    Blood cx 10/3/17 and 10/6/17 NGTD    Urine cx 10/3/17 no growth and 10/6/17 in process    Assessment/Plan:     * H/O autologous stem cell transplant    - Today is day +10  - Melphalan given on 9/26/17 without complications  - Auto SCT day 0 on 9/27/17; received 5 bags with a CD 34 count of 4.71 x  10^6/kg - tolerated well   - ppx antimicrobials to start per protocol  - Neupogen started day + 3   - with possible mucositis, ordered [place] (10/3/17)  - with n/v, continue ativan    - hospital course complicated with neutropenic fever r/t PNA and possibly early engraftment         ELENA (acute kidney injury)    - SCr of 2.2 on 10/7/17, confirmed at 2.5 on repeat  - baseline Scr 0.8 on 10/6/17  - unclear etiology  - check urine lytes  - check renal ultrasound  - continue IVFs  - strict I/Os  - avoid nephrotoxic drugs, renally dose meds  - consult nephrology, appreciate recs  - continue to monitor        Neutropenic fever    - Tmax 99.4 in 24 hours with last fever of 101.9 on 10/5/17 at 2345  - Blood cx x 2, chest xray, urine cx all ordered on 10/3/17 ngtd and do not give source of infection; ordered chest xray PA/LAT 10/4/17 (bilateral PNA)  - Repeat pancultures on 10/6/17, chest xray shows same bilateral PNA and improving interstitial lung disease  - Cefepime from 10/3/17 to 10/5/17  - Vanc from 10/4/17-10/6/17  - Zosyn started 10/5/17 and continues  - fever curve improving  - ID consulted 10/5/17 and following; appreciate recommendations   - Fungal markers in process, crypto negative  - Will re-culture q 24 hours if continues to be febrile        Burning with urination    - started 10/2/17; improved  - UA done and negative for nitrites and leukocytes; positive for 1+ occult blood   - will continue to monitor         Chemotherapy induced diarrhea    - Cdiff negative initially; recheck cdiff 10/4/17 negative  - Repeat cdiff negative, give scheduled loperamide and prn lomotil          Pancytopenia due to chemotherapy    - WBC 0.26 k/uL; ANC 26   - Hemoglobin 8.0 grams  - Plts 10K   - Transfuse for hemoglobin <7 and platelets <10,000         History of gout    Pt suffers from gout and is not currently on uric acid suppression medication.  This may have contributed to his hip degeneration.        Depression    - Continue  home Celexa           Hyperlipidemia    - Will hold statin with admission and may resume at discharge          Hypertension, essential    - Continue home amlodipine and bystolic   - Hold lisinopril in setting of ELENA        Multiple myeloma in remission    - Completed 4 cycles of CyBorD   - Was treated with lenalidomide and dex  - Restaging marrow 8/21/17 showed a 10-50% cellular marrow with trilineage hematopoiesis and 5% residual  kappa-restricted plasma cells (6% by morphology).  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  Hence, he has achieved a partial remission (PR1) and is ready to proceed with transplant.    65% EF on 8/2/17   - Possible need to consider an allogeneic transplant as a cure in future as pt is young. This is not an immediate consideration but since he has 4 siblings there is a 68% chance of finding a sibling match.   - s/p auto SCT as above              VTE Risk Mitigation         Ordered     heparin (porcine) injection 1,600 Units  As needed (PRN)     Route:  Intravenous        09/25/17 1733     High Risk of VTE  Once      09/25/17 1257          Disposition: pending clinical improvement    Ivan Elizabeth MD  Bone Marrow Transplant  [facility]

## 2017-10-07 NOTE — ASSESSMENT & PLAN NOTE
- baseline sCr 0.8. Acutely orin to 2.6 on 10/7  - likely pre-renal etiology from diarrhea in setting of concurrent ACEi use  - stabilized yesterday and improving today  - BP starting to rise again. Recommend restarting home amlodipine. If BP still elevated tomorrow can restart nebivolol. Continue holding lisinopril until ELENA completely resolved  - consider discontinuing IVF in setting of new peripheral edema  - monitor for post-ATN diuresis; allow patient to drink to thirst.

## 2017-10-07 NOTE — ASSESSMENT & PLAN NOTE
51yo man w/a history of MM (IgG kappa; dx 4/2015; s/p lenalidomide/dexamethasone in 2015 and more rencently CyBorD x4 with PR1), gout, and DVT (in 7/2015 and 2/2017; on apixaban) who was admitted on 9/25/2017 for PBSCT consolidation (s/p auto-HSCT on 9/27/2017 with melphalan conditioning) that has been c/b mucositis and neutropenic fevers despite empiric vanc/cefepime/fluc-->micafungin and prophylactic acyclovir with a new multifocal pneumonia noted on imaging. Clinical history given mucositis with recent emesis and short duration of neutropenia is most suspicious for aspiration as the cause of his multifocal pneumonia. Fevers have now resolved.    - Complete 7-day antibiotic course for pneumonia with pip-tazo (day 1 was 10/3)  - Would discontinue micafungin as no evidence of candidemia, patient now afebrile  - Restart antifungal prophylaxis per SCT team  - Continue acyclovir prophylaxis  - Follow-up pending fungal markers sent by team (histo, aspergillus ag, 13BDglucan)

## 2017-10-07 NOTE — CONSULTS
[facility]  Nephrology  Consult Note    Patient Name: Arik Bobo  MRN: [id number]  Admission Date: 9/25/2017  Hospital Length of Stay: 12 days  Attending Provider: Italo Bryant MD   Primary Care Physician: Avinash Lal MD  Principal Problem:H/O autologous stem cell transplant    Inpatient consult to Nephrology  Consult performed by: ARON MADRID  Consult ordered by: BRENNEN BABB  Reason for consult: ELENA        Subjective:     HPI: Mr. Bobo is a 51 yo WM with HTN, h/o DVT x2, and IgG kappa multiple myeloma admitted on 9/25/17 for stem cell transplant. He was diagnosed with MM in 2015 as part of an anemia evaluation. He received lenalidomide/dexamethasone in 2015. He subsequently completed 4 cycles of CyBorD. Restaging marrow 8/21/17 showed partial remission and plans were made to proceed with transplant. AutoSCT was performed 9/27/17. Post-procedure course has been complicated by nausea, diarrhea, and neutropenic fever. ID is following; presumed source of infection is bilateral PNA. He was initially treated with vanc and cefepime but is currently on zosyn which was started yesterday. He is also being treated empirically with micafungin and remains on ppx acyclovir which was started 9/26. Patient's sCr has remained 0.8 throughout his stay but acutely orin to 2.2 this morning. He denies urinary retention. He has not received any IV contrast. No new medications except for Zosyn. No supratherapeutic vanc troughs. He has not had any hypotensive episodes however his SBP was 140-160s the first several days of his admission and now is in the 110s. Patient developed diarrhea after his transplant. He is being treated with loperamide. He reports diarrhea started to improve but was quite severe yesterday. Stool documented as 1.4L yesterday. He reports SOB since his transplant and is currently wearing supplemental oxygen. After reflection he admits to decreased UOP today. He also  reports seeing some blood in his urine that started around the same time as his transplant. He denies any other complaints.   Last 24 hours: In 4.6L, UOP 625cc, stool 1.4L.     Past Medical History:   Diagnosis Date    Anxiety     Arthritis     Depression     History of hip surgery     Hx of psychiatric care     Hyperlipidemia     Hypertension     Multiple myeloma     Psychiatric problem        Past Surgical History:   Procedure Laterality Date    HEMORRHOID SURGERY      TOTAL HIP ARTHROPLASTY         Review of patient's allergies indicates:   Allergen Reactions    Pcn [penicillins]      Unknown last dose as an infant     Current Facility-Administered Medications   Medication Frequency    0.9 % NaCl with KCl 20 mEq infusion Continuous    0.9 % NaCl with KCl 20 mEq infusion Continuous    acetaminophen tablet 650 mg Q6H PRN    acyclovir capsule 800 mg BID    albuterol-ipratropium 2.5mg-0.5mg/3mL nebulizer solution 3 mL Q4H WAKE    alteplase injection 2 mg BID PRN    aluminum-magnesium hydroxide-simethicone 200-200-20 mg/5 mL suspension 30 mL Q6H PRN    amlodipine tablet 5 mg Daily    citalopram tablet 40 mg Daily    dextromethorphan-guaifenesin  mg/5 ml liquid 10 mL Q4H PRN    diphenoxylate-atropine 2.5-0.025 mg per tablet 1 tablet QID PRN    duke's soln (benadryl 30 mL, mylanta 30 mL, lidocane 30 mL, nystatin 30 mL) 120 mL QID    filgrastim injection 480 mcg Daily    heparin (porcine) injection 1,600 Units PRN    loperamide capsule 2 mg QID    lorazepam injection 1 mg Q4H PRN    magnesium oxide tablet 400 mg Q4H PRN    magnesium oxide tablet 400 mg Q4H PRN    magnesium oxide tablet 800 mg Q4H PRN    micafungin 100 mg in sodium chloride 0.9 % 100 mL IVPB (ready to mix system) Q24H    nebivolol tablet 10 mg Daily    ondansetron disintegrating tablet 8 mg Q6H PRN    oxycodone immediate release tablet 5 mg Q6H PRN    pantoprazole EC tablet 40 mg Daily    piperacillin-tazobactam  4.5 g in dextrose 5 % 100 mL IVPB (ready to mix system) Q8H    potassium chloride CR capsule 20 mEq PRN    potassium chloride CR capsule 20 mEq Q2H PRN    potassium chloride CR capsule 20 mEq Q2H PRN    potassium, sodium phosphates 280-160-250 mg packet 1 packet Q4H PRN    potassium, sodium phosphates 280-160-250 mg packet 2 packet Q4H PRN    promethazine (PHENERGAN) 12.5 mg in dextrose 5 % 50 mL IVPB Q4H PRN    saliva substitute combo no.2 solution 30 mL QID    saliva substitute combo no.2 solution 30 mL Q4H PRN    sodium chloride 0.9% flush 10 mL PRN     Family History     Problem Relation (Age of Onset)    Alcohol abuse Father    Anxiety disorder Sister    Depression Brother        Social History Main Topics    Smoking status: Never Smoker    Smokeless tobacco: Current User     Types: Chew    Alcohol use No      Comment: former excessive use; 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    Objective:     Vital Signs (Most Recent):  Temp: 97.9 °F (36.6 °C) (10/07/17 1209)  Pulse: 97 (10/07/17 1209)  Resp: 19 (10/07/17 1209)  BP: 128/64 (10/07/17 1209)  SpO2: 97 % (10/07/17 1209)  O2 Device (Oxygen Therapy): nasal cannula (10/07/17 1209) Vital Signs (24h Range):  Temp:  [97.9 °F (36.6 °C)-98.6 °F (37 °C)] 97.9 °F (36.6 °C)  Pulse:  [] 97  Resp:  [18-26] 19  SpO2:  [93 %-98 %] 97 %  BP: (114-128)/(58-66) 128/64     Weight: 117.8 kg (259 lb 13 oz) (10/07/17 0341)  Body mass index is 39.5 kg/m².  Body surface area is 2.38 meters squared.    I/O last 3 completed shifts:  In: 6266.2 [P.O.:2540; I.V.:2193.2; Blood:233; IV Piggyback:1300]  Out: 2525 [Urine:775; Stool:1750]    Physical Exam   Constitutional: He is oriented to person, place, and time. He appears well-developed and well-nourished. No distress.   HENT:   Head: Normocephalic and atraumatic.   Eyes: Conjunctivae and EOM are normal.   Neck:  Neck supple. No tracheal deviation present. No thyromegaly present.   Cardiovascular: Normal rate, regular rhythm, normal heart sounds and intact distal pulses.    Pulmonary/Chest: Effort normal. No respiratory distress. He has no wheezes. He has rales.   Abdominal: Soft. He exhibits no distension. There is no tenderness. There is no guarding.   Musculoskeletal: He exhibits edema. He exhibits no tenderness or deformity.   Neurological: He is alert and oriented to person, place, and time.   Skin: Skin is warm and dry. He is not diaphoretic. No erythema. No pallor.   Psychiatric: He has a normal mood and affect. His behavior is normal. Thought content normal.       Significant Labs:  CBC:   Recent Labs  Lab 10/07/17  0341   WBC 0.26*   RBC 2.65*   HGB 8.0*   HCT 23.2*   PLT 10*   MCV 88   MCH 30.2   MCHC 34.5     CMP:   Recent Labs  Lab 10/07/17  0341 10/07/17  0857    134*   CALCIUM 7.6* 7.8*   ALBUMIN 2.1* 2.3*   PROT 5.3*  --    * 136   K 3.7 4.1   CO2 19* 17*    109   BUN 15 17   CREATININE 2.2* 2.5*   ALKPHOS 93  --    ALT 22  --    AST 29  --    BILITOT 0.6  --        Recent Labs  Lab 10/07/17  1040   COLORU Yellow   SPECGRAV 1.015   PHUR 5.0   PROTEINUA 1+*   BACTERIA Few*   NITRITE Negative   LEUKOCYTESUR Negative   UROBILINOGEN Negative   HYALINECASTS 0     All labs within the past 24 hours have been reviewed.    Significant Imaging:  Labs: Reviewed    Assessment/Plan:     ELENA (acute kidney injury)    - baseline sCr 0.8. Acute rise to 2.2 this morning  - no obvious etiology  - UA with blood and protein  - differential includes urinary obstruction, ischemic ATN from profuse diarrhea and relative hypotension (SBP 160s --> 110s), septic ATN, TMA, secondary GN from malignancy (though unsure why this would present now), and acyclovir nephrotoxicity (less likely).  - agree with renal US  - decreased UOP today: ordered bladder scan to r/o obstruction  - recommend stopping all antihypertensives for  now  - will order serum complements  - in setting of decreased UOP and progressive renal failure, recommend stopping all potassium-containing IVF. Consider holding IVF for now  - repeat labs this evening  - will discuss with staff            Juliane Garcia, PGY-5  Nephrology Fellow  [facility]  Pager: [phone]

## 2017-10-07 NOTE — ASSESSMENT & PLAN NOTE
- WBC 0.26 k/uL; ANC 26   - Hemoglobin 8.0 grams  - Plts 10K   - Transfuse for hemoglobin <7 and platelets <10,000

## 2017-10-07 NOTE — PROGRESS NOTES
[facility]  Infectious Disease  Progress Note    Patient Name: Arik Bobo  MRN: [id number]  Admission Date: 9/25/2017  Length of Stay: 12 days  Attending Physician: Italo Bryant MD  Primary Care Provider: Avinash Lal MD    Isolation Status: No active isolations  Assessment/Plan:      Neutropenic fever    53yo man w/a history of MM (IgG kappa; dx 4/2015; s/p lenalidomide/dexamethasone in 2015 and more rencently CyBorD x4 with PR1), gout, and DVT (in 7/2015 and 2/2017; on apixaban) who was admitted on 9/25/2017 for PBSCT consolidation (s/p auto-HSCT on 9/27/2017 with melphalan conditioning) that has been c/b mucositis and neutropenic fevers despite empiric vanc/cefepime/fluc-->micafungin and prophylactic acyclovir with a new multifocal pneumonia noted on imaging. Clinical history given mucositis with recent emesis and short duration of neutropenia is most suspicious for aspiration as the cause of his multifocal pneumonia. Fevers have now resolved.    - Complete 7-day antibiotic course for pneumonia with pip-tazo (day 1 was 10/3)  - Would discontinue micafungin as no evidence of candidemia, patient now afebrile  - Restart antifungal prophylaxis per SCT team  - Continue acyclovir prophylaxis  - Follow-up pending fungal markers sent by team (histo, aspergillus ag, 13BDglucan)              Anticipated Disposition: stable vitals    Thank you for your consult. I will sign off. Please contact us if you have any additional questions.    Pg [phone]  Celi Frederick MD  Infectious Disease  [facility]    Subjective:     Principal Problem:H/O autologous stem cell transplant    HPI: Mr. Bobo is a 53yo man w/a history of MM (IgG kappa; dx 4/2015; s/p lenalidomide/dexamethasone in 2015 and more rencently CyBorD x4 with PR1), gout, and DVT (in 7/2015 and 2/2017; on apixaban) who was admitted on 9/25/2017 for PBSCT consolidation (s/p auto-HSCT on 9/27/2017 with melphalan  conditioning). His course has been c/b mucositis and neutropenic fevers over the last 48h despite empiric vanc/cefepime/fluc-->micafungin and prophylactic acyclovir. Workup to date is notable for negative blood/urine cultures and a CXR that shows a new multifocal pneumonia, prompting ID consultation today. Patient notes he felt well upon arrival but had some post-nasal drip on day 1 of his hospitalization. Since arrival, he has had intermittent nausea/emesis (with possible aspiration), diarrhea (5-6 episodes today so far, watery), and development of a dry cough/DEJESUS over the last 3 days. He notes improvement in his fever curve today. He denies HA, AMS, CP, abdominal pain, dysuria, rash, or line troubles. Social/exposure history notable for: lives in [place], worked as , lives with wife, no recent outdoor exposures, no sick contacts, has an indoor cat and outdoor dog, no farm animal/insect exposures, and no TB contacts/incarceration history.  Interval History:   - Last fever was on 10/5  - No fevers overnight  - Continues to have diarrhea, but stable  - On 3L NC    Review of Systems   Constitutional: Negative for chills, diaphoresis, fever and unexpected weight change.   HENT: Negative for congestion, mouth sores, rhinorrhea, sinus pain, sore throat and trouble swallowing.    Respiratory: Negative for cough, shortness of breath and stridor.    Cardiovascular: Negative for chest pain and leg swelling.   Gastrointestinal: Positive for diarrhea. Negative for abdominal pain, blood in stool, nausea and vomiting.   Genitourinary: Negative for dysuria, flank pain and genital sores.   Musculoskeletal: Negative for arthralgias, back pain, joint swelling, myalgias and neck stiffness.   Skin: Negative for rash and wound.   Neurological: Negative for light-headedness and headaches.   Hematological: Negative for adenopathy.     Objective:     Vital Signs (Most Recent):  Temp: 98.2 °F (36.8 °C)  (10/07/17 0739)  Pulse: 81 (10/07/17 0754)  Resp: 18 (10/07/17 0754)  BP: (!) 116/59 (10/07/17 0739)  SpO2: 97 % (10/07/17 0754) Vital Signs (24h Range):  Temp:  [98.2 °F (36.8 °C)-99.4 °F (37.4 °C)] 98.2 °F (36.8 °C)  Pulse:  [] 81  Resp:  [18-26] 18  SpO2:  [91 %-97 %] 97 %  BP: (111-126)/(57-66) 116/59     Weight: 117.8 kg (259 lb 13 oz)  Body mass index is 39.5 kg/m².    Estimated Creatinine Clearance: 48.4 mL/min (based on SCr of 2.2 mg/dL (H)).    Physical Exam   Constitutional: He is oriented to person, place, and time. No distress.   HENT:   Mouth/Throat: Oropharynx is clear and moist. No oropharyngeal exudate.   Eyes: Conjunctivae are normal.   Neck: Normal range of motion. Neck supple.   Cardiovascular: Normal rate and regular rhythm.  Exam reveals no friction rub.    No murmur heard.  Pulmonary/Chest: Effort normal. No respiratory distress. He has no wheezes. He has rales.   Abdominal: Soft. Bowel sounds are normal. He exhibits no distension. There is no tenderness. There is no rebound and no guarding.   Musculoskeletal: Normal range of motion. He exhibits no edema.   Lymphadenopathy:     He has no cervical adenopathy.   Neurological: He is alert and oriented to person, place, and time.   Skin: Skin is warm and dry. No rash noted. He is not diaphoretic. No erythema.   Psychiatric: He has a normal mood and affect.   Vitals reviewed.      Significant Labs: All pertinent labs within the past 24 hours have been reviewed.      Recent Labs  Lab 10/07/17  0341   WBC 0.26*   RBC 2.65*   HGB 8.0*   HCT 23.2*   MCV 88   MCH 30.2   MCHC 34.5     Cr 0.8 -> 2.2    Blood culture   10/3/17 x2 ngtd  10/6/17 x1 ngtd    Significant Imaging: None

## 2017-10-07 NOTE — ASSESSMENT & PLAN NOTE
- Today is day +10  - Melphalan given on 9/26/17 without complications  - Auto SCT day 0 on 9/27/17; received 5 bags with a CD 34 count of 4.71 x 10^6/kg - tolerated well   - ppx antimicrobials to start per protocol  - Neupogen started day + 3   - with possible mucositis, ordered [place] (10/3/17)  - with n/v, continue ativan    - hospital course complicated with neutropenic fever r/t PNA and possibly early engraftment

## 2017-10-07 NOTE — PLAN OF CARE
Problem: Patient Care Overview  Goal: Plan of Care Review  Outcome: Ongoing (interventions implemented as appropriate)  Plan of care reviewed with the patient at the beginning of the shift. Pt is day +10 of auto transplant for multiple myeloma. Pt with watery liquid diarrhea, but has had more consistency. Encouraged pt to use moist wipes. Skin is intact. Pt denies n/v. Pt with bilateral pneumonia . Oxygen at 3L nasal canula. He is dyspneic at rest, lungs diminished with exp wheezing and crackles to bilateral bases. Encouraged pt to use oxygen with ambulation to the restroom. Resp treatments scheduled. Pt followed by ID. Pt afebrile overnight. Zosyn and Micafungin continued. Mucous membranes are intact, however he does complain of some discomfort when swallowing. Mucositis resolving. Salt and soda rinses provided. Pt on dukes solution. Poor PO intake. Oral intake encouraged. Fall precautions maintained. Pt remained free from falls and injury this shift. Bed locked in lowest position, side rails up x2, call light within reach. Instructed pt to call for assistance as needed. Pt verbalized understanding. Thrombocytopenic and neutropenic precautions maintained. Will continue to monitor.

## 2017-10-07 NOTE — ASSESSMENT & PLAN NOTE
- SCr of 2.2 on 10/7/17, confirmed at 2.5 on repeat  - baseline Scr 0.8 on 10/6/17  - unclear etiology  - check urine lytes  - check renal ultrasound  - continue IVFs  - strict I/Os  - avoid nephrotoxic drugs, renally dose meds  - consult nephrology, appreciate recs  - continue to monitor

## 2017-10-07 NOTE — PLAN OF CARE
Problem: Patient Care Overview  Goal: Plan of Care Review  Outcome: Ongoing (interventions implemented as appropriate)  Pt. Day +10 for autologous BMT.  Pt. with nonskid footwear on with bed in lowest position and locked with bed rails up x2.  Pt. ambulates independently and instructed to call if assistance is needed.  Pt. with call light within reach.  Pt. with c/o diarrhea with imodium given scheduled and lomotil PRN.  Pt. With a decreased appetite.  Pt. pending kidney US.   Will continue to monitor pt.

## 2017-10-07 NOTE — SUBJECTIVE & OBJECTIVE
Interval History:   - Last fever was on 10/5  - No fevers overnight  - Continues to have diarrhea, but stable  - On 3L NC    Review of Systems   Constitutional: Negative for chills, diaphoresis, fever and unexpected weight change.   HENT: Negative for congestion, mouth sores, rhinorrhea, sinus pain, sore throat and trouble swallowing.    Respiratory: Negative for cough, shortness of breath and stridor.    Cardiovascular: Negative for chest pain and leg swelling.   Gastrointestinal: Positive for diarrhea. Negative for abdominal pain, blood in stool, nausea and vomiting.   Genitourinary: Negative for dysuria, flank pain and genital sores.   Musculoskeletal: Negative for arthralgias, back pain, joint swelling, myalgias and neck stiffness.   Skin: Negative for rash and wound.   Neurological: Negative for light-headedness and headaches.   Hematological: Negative for adenopathy.     Objective:     Vital Signs (Most Recent):  Temp: 98.2 °F (36.8 °C) (10/07/17 0739)  Pulse: 81 (10/07/17 0754)  Resp: 18 (10/07/17 0754)  BP: (!) 116/59 (10/07/17 0739)  SpO2: 97 % (10/07/17 0754) Vital Signs (24h Range):  Temp:  [98.2 °F (36.8 °C)-99.4 °F (37.4 °C)] 98.2 °F (36.8 °C)  Pulse:  [] 81  Resp:  [18-26] 18  SpO2:  [91 %-97 %] 97 %  BP: (111-126)/(57-66) 116/59     Weight: 117.8 kg (259 lb 13 oz)  Body mass index is 39.5 kg/m².    Estimated Creatinine Clearance: 48.4 mL/min (based on SCr of 2.2 mg/dL (H)).    Physical Exam   Constitutional: He is oriented to person, place, and time. No distress.   HENT:   Mouth/Throat: Oropharynx is clear and moist. No oropharyngeal exudate.   Eyes: Conjunctivae are normal.   Neck: Normal range of motion. Neck supple.   Cardiovascular: Normal rate and regular rhythm.  Exam reveals no friction rub.    No murmur heard.  Pulmonary/Chest: Effort normal. No respiratory distress. He has no wheezes. He has rales.   Abdominal: Soft. Bowel sounds are normal. He exhibits no distension. There is no  tenderness. There is no rebound and no guarding.   Musculoskeletal: Normal range of motion. He exhibits no edema.   Lymphadenopathy:     He has no cervical adenopathy.   Neurological: He is alert and oriented to person, place, and time.   Skin: Skin is warm and dry. No rash noted. He is not diaphoretic. No erythema.   Psychiatric: He has a normal mood and affect.   Vitals reviewed.      Significant Labs: All pertinent labs within the past 24 hours have been reviewed.      Recent Labs  Lab 10/07/17  0341   WBC 0.26*   RBC 2.65*   HGB 8.0*   HCT 23.2*   MCV 88   MCH 30.2   MCHC 34.5     Cr 0.8 -> 2.2    Blood culture   10/3/17 x2 ngtd  10/6/17 x1 ngtd    Significant Imaging: None

## 2017-10-07 NOTE — HPI
Mr. Bobo is a 51 yo WM with HTN, h/o DVT x2, and IgG kappa multiple myeloma admitted on 9/25/17 for stem cell transplant. He was diagnosed with MM in 2015 as part of an anemia evaluation. He received lenalidomide/dexamethasone in 2015. He subsequently completed 4 cycles of CyBorD. Restaging marrow 8/21/17 showed partial remission and plans were made to proceed with transplant. AutoSCT was performed 9/27/17. Post-procedure course has been complicated by nausea, diarrhea, and neutropenic fever. ID is following; presumed source of infection is bilateral PNA. He was initially treated with vanc and cefepime but is currently on zosyn which was started yesterday. He is also being treated empirically with micafungin and remains on ppx acyclovir which was started 9/26. Patient's sCr has remained 0.8 throughout his stay but acutely orin to 2.2 this morning. He denies urinary retention. He has not received any IV contrast. No new medications except for Zosyn. No supratherapeutic vanc troughs. He has not had any hypotensive episodes however his SBP was 140-160s the first several days of his admission and now is in the 110s. Patient developed diarrhea after his transplant. He is being treated with loperamide. He reports diarrhea started to improve but was quite severe yesterday. Stool documented as 1.4L yesterday. He reports SOB since his transplant and is currently wearing supplemental oxygen. After reflection he admits to decreased UOP today. He also reports seeing some blood in his urine that started around the same time as his transplant. He denies any other complaints.   Last 24 hours: In 4.6L, UOP 625cc, stool 1.4L.

## 2017-10-08 LAB
ABO + RH BLD: NORMAL
ALBUMIN SERPL BCP-MCNC: 2.2 G/DL
ALP SERPL-CCNC: 119 U/L
ALT SERPL W/O P-5'-P-CCNC: 24 U/L
ANION GAP SERPL CALC-SCNC: 8 MMOL/L
AST SERPL-CCNC: 30 U/L
BACTERIA BLD CULT: NORMAL
BACTERIA BLD CULT: NORMAL
BACTERIA UR CULT: NO GROWTH
BASOPHILS # BLD AUTO: 0.02 K/UL
BASOPHILS NFR BLD: 2.1 %
BILIRUB SERPL-MCNC: 0.6 MG/DL
BLD GP AB SCN CELLS X3 SERPL QL: NORMAL
BUN SERPL-MCNC: 19 MG/DL
CALCIUM SERPL-MCNC: 7.9 MG/DL
CHLORIDE SERPL-SCNC: 112 MMOL/L
CO2 SERPL-SCNC: 18 MMOL/L
CREAT SERPL-MCNC: 2.6 MG/DL
DIFFERENTIAL METHOD: ABNORMAL
EOSINOPHIL # BLD AUTO: 0 K/UL
EOSINOPHIL NFR BLD: 0 %
ERYTHROCYTE [DISTWIDTH] IN BLOOD BY AUTOMATED COUNT: 14.7 %
EST. GFR  (AFRICAN AMERICAN): 31.2 ML/MIN/1.73 M^2
EST. GFR  (NON AFRICAN AMERICAN): 26.9 ML/MIN/1.73 M^2
GLUCOSE SERPL-MCNC: 97 MG/DL
HCT VFR BLD AUTO: 23.4 %
HGB BLD-MCNC: 8 G/DL
HISTOPLASMA AG VALUE: 0 NG/ML
HISTOPLASMA ANTIGEN URINE: NEGATIVE
LYMPHOCYTES # BLD AUTO: 0.3 K/UL
LYMPHOCYTES NFR BLD: 27.4 %
MAGNESIUM SERPL-MCNC: 1.7 MG/DL
MCH RBC QN AUTO: 29.9 PG
MCHC RBC AUTO-ENTMCNC: 34.2 G/DL
MCV RBC AUTO: 87 FL
MONOCYTES # BLD AUTO: 0.2 K/UL
MONOCYTES NFR BLD: 17.9 %
NEUTROPHILS # BLD AUTO: 0.5 K/UL
NEUTROPHILS NFR BLD: 52.6 %
PHOSPHATE SERPL-MCNC: 2.1 MG/DL
PLATELET # BLD AUTO: 15 K/UL
PMV BLD AUTO: ABNORMAL FL
POTASSIUM SERPL-SCNC: 4 MMOL/L
PROT SERPL-MCNC: 5.5 G/DL
RBC # BLD AUTO: 2.68 M/UL
SODIUM SERPL-SCNC: 138 MMOL/L
WBC # BLD AUTO: 0.95 K/UL

## 2017-10-08 PROCEDURE — 84100 ASSAY OF PHOSPHORUS: CPT

## 2017-10-08 PROCEDURE — 27000221 HC OXYGEN, UP TO 24 HOURS

## 2017-10-08 PROCEDURE — 63600175 PHARM REV CODE 636 W HCPCS: Performed by: INTERNAL MEDICINE

## 2017-10-08 PROCEDURE — 94664 DEMO&/EVAL PT USE INHALER: CPT

## 2017-10-08 PROCEDURE — 86900 BLOOD TYPING SEROLOGIC ABO: CPT

## 2017-10-08 PROCEDURE — 85025 COMPLETE CBC W/AUTO DIFF WBC: CPT

## 2017-10-08 PROCEDURE — 20600001 HC STEP DOWN PRIVATE ROOM

## 2017-10-08 PROCEDURE — A9155 ARTIFICIAL SALIVA: HCPCS | Performed by: NURSE PRACTITIONER

## 2017-10-08 PROCEDURE — 25000003 PHARM REV CODE 250: Performed by: NURSE PRACTITIONER

## 2017-10-08 PROCEDURE — 80053 COMPREHEN METABOLIC PANEL: CPT

## 2017-10-08 PROCEDURE — 25000003 PHARM REV CODE 250: Performed by: INTERNAL MEDICINE

## 2017-10-08 PROCEDURE — 99233 SBSQ HOSP IP/OBS HIGH 50: CPT | Mod: ,,, | Performed by: INTERNAL MEDICINE

## 2017-10-08 PROCEDURE — 25000003 PHARM REV CODE 250: Performed by: STUDENT IN AN ORGANIZED HEALTH CARE EDUCATION/TRAINING PROGRAM

## 2017-10-08 PROCEDURE — 94761 N-INVAS EAR/PLS OXIMETRY MLT: CPT

## 2017-10-08 PROCEDURE — 94640 AIRWAY INHALATION TREATMENT: CPT

## 2017-10-08 PROCEDURE — 86901 BLOOD TYPING SEROLOGIC RH(D): CPT

## 2017-10-08 PROCEDURE — 83735 ASSAY OF MAGNESIUM: CPT

## 2017-10-08 PROCEDURE — 63600175 PHARM REV CODE 636 W HCPCS: Performed by: NURSE PRACTITIONER

## 2017-10-08 PROCEDURE — 99900035 HC TECH TIME PER 15 MIN (STAT)

## 2017-10-08 PROCEDURE — 25000242 PHARM REV CODE 250 ALT 637 W/ HCPCS: Performed by: INTERNAL MEDICINE

## 2017-10-08 PROCEDURE — 27000173 HC ACAPELLA DEVICE DH OR DM

## 2017-10-08 RX ADMIN — FLUCONAZOLE 400 MG: 200 TABLET ORAL at 08:10

## 2017-10-08 RX ADMIN — Medication 30 ML: at 12:10

## 2017-10-08 RX ADMIN — LOPERAMIDE HYDROCHLORIDE 2 MG: 2 CAPSULE ORAL at 12:10

## 2017-10-08 RX ADMIN — DIPHENOXYLATE HYDROCHLORIDE AND ATROPINE SULFATE 1 TABLET: 2.5; .025 TABLET ORAL at 03:10

## 2017-10-08 RX ADMIN — ACYCLOVIR 800 MG: 200 CAPSULE ORAL at 08:10

## 2017-10-08 RX ADMIN — SODIUM CHLORIDE: 0.9 INJECTION, SOLUTION INTRAVENOUS at 03:10

## 2017-10-08 RX ADMIN — IPRATROPIUM BROMIDE AND ALBUTEROL SULFATE 3 ML: .5; 3 SOLUTION RESPIRATORY (INHALATION) at 11:10

## 2017-10-08 RX ADMIN — PIPERACILLIN AND TAZOBACTAM 4.5 G: 4; .5 INJECTION, POWDER, LYOPHILIZED, FOR SOLUTION INTRAVENOUS; PARENTERAL at 12:10

## 2017-10-08 RX ADMIN — PANTOPRAZOLE SODIUM 40 MG: 40 TABLET, DELAYED RELEASE ORAL at 08:10

## 2017-10-08 RX ADMIN — SODIUM CHLORIDE: 0.9 INJECTION, SOLUTION INTRAVENOUS at 04:10

## 2017-10-08 RX ADMIN — CITALOPRAM HYDROBROMIDE 40 MG: 20 TABLET ORAL at 08:10

## 2017-10-08 RX ADMIN — IPRATROPIUM BROMIDE AND ALBUTEROL SULFATE 3 ML: .5; 3 SOLUTION RESPIRATORY (INHALATION) at 08:10

## 2017-10-08 RX ADMIN — HEPARIN SODIUM 1600 UNITS: 1000 INJECTION, SOLUTION INTRAVENOUS; SUBCUTANEOUS at 11:10

## 2017-10-08 RX ADMIN — ACYCLOVIR 800 MG: 200 CAPSULE ORAL at 09:10

## 2017-10-08 RX ADMIN — LOPERAMIDE HYDROCHLORIDE 2 MG: 2 CAPSULE ORAL at 06:10

## 2017-10-08 RX ADMIN — Medication 30 ML: at 06:10

## 2017-10-08 RX ADMIN — DIPHENOXYLATE HYDROCHLORIDE AND ATROPINE SULFATE 1 TABLET: 2.5; .025 TABLET ORAL at 08:10

## 2017-10-08 RX ADMIN — LOPERAMIDE HYDROCHLORIDE 2 MG: 2 CAPSULE ORAL at 05:10

## 2017-10-08 RX ADMIN — FILGRASTIM 480 MCG: 480 INJECTION, SOLUTION INTRAVENOUS; SUBCUTANEOUS at 08:10

## 2017-10-08 RX ADMIN — IPRATROPIUM BROMIDE AND ALBUTEROL SULFATE 3 ML: .5; 3 SOLUTION RESPIRATORY (INHALATION) at 03:10

## 2017-10-08 RX ADMIN — PIPERACILLIN AND TAZOBACTAM 4.5 G: 4; .5 INJECTION, POWDER, LYOPHILIZED, FOR SOLUTION INTRAVENOUS; PARENTERAL at 08:10

## 2017-10-08 RX ADMIN — IPRATROPIUM BROMIDE AND ALBUTEROL SULFATE 3 ML: .5; 3 SOLUTION RESPIRATORY (INHALATION) at 07:10

## 2017-10-08 RX ADMIN — PIPERACILLIN AND TAZOBACTAM 4.5 G: 4; .5 INJECTION, POWDER, LYOPHILIZED, FOR SOLUTION INTRAVENOUS; PARENTERAL at 05:10

## 2017-10-08 RX ADMIN — Medication 30 ML: at 05:10

## 2017-10-08 NOTE — ASSESSMENT & PLAN NOTE
- Today is day +11  - Melphalan given on 9/26/17 without complications  - Auto SCT day 0 on 9/27/17; received 5 bags with a CD 34 count of 4.71 x 10^6/kg - tolerated well   - ppx antimicrobials to start per protocol  - Neupogen started day + 3   - with possible mucositis, ordered [place]attie (10/3/17)  - with n/v, continue ativan    - hospital course complicated with neutropenic fever r/t PNA and possibly early engraftment

## 2017-10-08 NOTE — ASSESSMENT & PLAN NOTE
- Tmax 99.4 in 24 hours with last fever of 101.9 on 10/5/17 at 2345  - Blood cx x 2, chest xray, urine cx all ordered on 10/3/17 ngtd and do not give source of infection; ordered chest xray PA/LAT 10/4/17 (bilateral PNA)  - Repeat pancultures on 10/6/17, chest xray shows same bilateral PNA and improving interstitial lung disease  - Cefepime from 10/3/17 to 10/5/17  - Vanc from 10/4/17-10/6/17  - Zosyn started 10/5/17 and will complete 7d per ID  - ID consulted 10/5/17 and following; appreciate recommendations   - Fungal markers in process, crypto negative  - Will re-culture q 24 hours if continues to be febrile

## 2017-10-08 NOTE — PROGRESS NOTES
Chart reviewed.  Renal function and electrolytes stable today. Patient currently in maintenance phase of ATN.   Continue current management. Avoid hypotension/contrast/NSAIDs.   Okay to continue zosyn for now; if this was contributing to ELENA would expect sCr to keep rising. Will f/u labs in AM; may need to reconsider if sCr doesn't improve.  Full note to follow tomorrow.      Juliane Garcia, PGY-5  Nephrology Fellow  [facility]  Pager: [phone]

## 2017-10-08 NOTE — PLAN OF CARE
Problem: Patient Care Overview  Goal: Plan of Care Review  Outcome: Ongoing (interventions implemented as appropriate)  Pt. Day +11 for autologous BMT.  Pt. with nonskid footwear on with bed in lowest position and locked with bed rails up x2.  Pt. ambulates independently and instructed to call if assistance is needed.  Pt. with call light within reach.  Pt. with c/o diarrhea with imodium given scheduled and lomotil PRN.  Pt. with a decreased appetite eating about 50% of meals.  Will continue to monitor pt.

## 2017-10-08 NOTE — ASSESSMENT & PLAN NOTE
- Hold all home antihypertensives: amlodipine, bystolic, and lisinopril in setting of ELENA per Nephrology

## 2017-10-08 NOTE — PLAN OF CARE
Problem: Patient Care Overview  Goal: Plan of Care Review  Outcome: Ongoing (interventions implemented as appropriate)  Plan of care reviewed with the patient at the beginning of the shift. Pt is day +11 of auto transplant for multiple myeloma. Pt with watery liquid diarrhea, but has had more consistency. C Diff negative. Pt given imodium and lomotil. Encouraged pt to use moist wipes. Skin is intact. Pt denies n/v. Pt with bilateral pneumonia . Oxygen at 3L nasal canula. He is dyspneic at rest, lungs diminished with exp wheezing and crackles to bilateral bases. Encouraged pt to use oxygen with ambulation to the restroom. Resp treatments scheduled. Pt followed by ID. Pt afebrile overnight. Zosyn continued. Micafungin discontinued due to ELENA. Pt seen by nephrology. Renal u/s completed. Mucous membranes are intact, however he does complain of some discomfort when swallowing. Mucositis resolving. Salt and soda rinses provided. Poor PO intake. Oral intake encouraged. Fall precautions maintained. Pt remained free from falls and injury this shift. Bed locked in lowest position, side rails up x2, call light within reach. Instructed pt to call for assistance as needed. Pt verbalized understanding. Thrombocytopenic and neutropenic precautions maintained. Will continue to monitor.

## 2017-10-08 NOTE — ASSESSMENT & PLAN NOTE
- SCr of 2.6 on 10/8/17, appears to have plateaued   - baseline Scr 0.8 on 10/6/17  - unclear etiology  - continue IVFs  - strict I/Os  - avoid nephrotoxic drugs, renally dose meds  - nephrology following, appreciate recs  - unlikely due to zosyn per nephrology as creatinine has stabilized despite continuation of medication  - will continue to monitor

## 2017-10-08 NOTE — SUBJECTIVE & OBJECTIVE
Subjective:     Interval History:   - Day + 11 HD Melphalan after auto SCT.   - Afebrile over past 24h, Tm= 99.4   - Initial CXR showed bilateral pneumonia; repeat with continue bilat PNA and some improvement in interstitial lung disease. Blood cx ngtd. D/C'd vancomycin. Tracie swithced back to dilfucan per ID. Currently on zosyn. 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 Crypto negative. ID following  - Reports continued diarrhea. Denies tenderness or cramping. Using Snyder. C. Diff negative from 10/4.   - Reports SOB/DEJESUS is unchanged, remains on 3L NC  - Remains with good UOP (1225+3x over past 24h). SCr has stabilized. Nephrology following.  - No other complaints    Objective:     Vital Signs (Most Recent):  Temp: 98.3 °F (36.8 °C) (10/08/17 0739)  Pulse: 86 (10/08/17 0819)  Resp: 16 (10/08/17 0819)  BP: 131/67 (10/08/17 0739)  SpO2: 99 % (10/08/17 0819) Vital Signs (24h Range):  Temp:  [97.9 °F (36.6 °C)-99.4 °F (37.4 °C)] 98.3 °F (36.8 °C)  Pulse:  [] 86  Resp:  [16-24] 16  SpO2:  [93 %-99 %] 99 %  BP: (107-146)/(60-72) 131/67     Weight: 118.6 kg (261 lb 9.2 oz)  Body mass index is 39.77 kg/m².  Body surface area is 2.39 meters squared.    ECOG SCORE         [unfilled]    Intake/Output - Last 3 Shifts       10/06 0700 - 10/07 0659 10/07 0700 - 10/08 0659 10/08 0700 - 10/09 0659    P.O. 2000 2200 480    I.V. (mL/kg) 1999.5 (17) 2130.8 (18) 692 (5.8)    Blood 233      IV Piggyback 400 300 100    Total Intake(mL/kg) 4632.5 (39.3) 4630.8 (39) 1272 (10.7)    Urine (mL/kg/hr) 625 (0.2) 1225 (0.4) 550 (1)    Stool 1400 (0.5) 800 (0.3) 200 (0.4)    Total Output 2025 2025 750    Net +2607.5 +2605.8 +522           Urine Occurrence 10 x 3 x     Stool Occurrence 4 x 1 x           Physical Exam   Constitutional: He is oriented to person, place, and time. He appears well-developed and well-nourished. No distress. Nasal cannula in place.   HENT:   Head: Normocephalic and atraumatic.   Nose: Nose normal.    Mouth/Throat: Oral lesions (mucositis, getting dukes) present. Oropharyngeal exudate and posterior oropharyngeal erythema present.   Eyes: Pupils are equal, round, and reactive to light.   Neck: Neck supple.   Cardiovascular: Normal rate, regular rhythm and normal heart sounds.    Pulmonary/Chest: Effort normal. He has decreased breath sounds.   +course breath sounds   Abdominal: Soft. Bowel sounds are normal. He exhibits no distension. There is no tenderness (none noted on exam).   Musculoskeletal: He exhibits no edema.   Neurological: He is alert and oriented to person, place, and time.   Skin: Skin is warm and dry. No rash noted.   Psychiatric: He has a normal mood and affect. His behavior is normal.   Nursing note and vitals reviewed.      Significant Labs:   CBC:     Recent Labs  Lab 10/07/17  0341 10/08/17  0441   WBC 0.26* 0.95*   HGB 8.0* 8.0*   HCT 23.2* 23.4*   PLT 10* 15*    and CMP:     Recent Labs  Lab 10/07/17  0341 10/07/17  0857 10/07/17  1632 10/08/17  0441   * 136 138 138   K 3.7 4.1 4.4 4.0    109 111* 112*   CO2 19* 17* 18* 18*    134* 120* 97   BUN 15 17 18 19   CREATININE 2.2* 2.5* 2.6* 2.6*   CALCIUM 7.6* 7.8* 8.0* 7.9*   PROT 5.3*  --  5.7* 5.5*   ALBUMIN 2.1* 2.3* 2.2* 2.2*   BILITOT 0.6  --  0.5 0.6   ALKPHOS 93  --  105 119   AST 29  --  31 30   ALT 22  --  25 24   ANIONGAP 8 10 9 8   EGFRNONAA 33.0* 28.3* 26.9* 26.9*       Diagnostic Results:    Repeat cxy 10/6/17: Abnormal chest radiograph consistent with bilateral pneumonia.  Improvement in interstitial lung disease since 10/4/2017 suggests interval diuresis.  No new disease identified.    Blood cx 10/3/17 and 10/6/17 NGTD    Urine cx 10/3/17 no growth and 10/6/17 in process

## 2017-10-08 NOTE — ASSESSMENT & PLAN NOTE
- WBC 0.95 k/uL; [id number]  - Hemoglobin 8.0 grams  - Plts 15K   - Transfuse for hemoglobin <7 and platelets <10,000

## 2017-10-08 NOTE — PROGRESS NOTES
[facility]  Hematology  Bone Marrow Transplant  Progress Note    Patient Name: Arik Bobo  Admission Date: 9/25/2017  Hospital Length of Stay: 13 days  Code Status: Full Code    Subjective:     Interval History:   - Day + 11 HD Melphalan after auto SCT.   - Afebrile over past 24h, Tm= 99.4   - Initial CXR showed bilateral pneumonia; repeat with continue bilat PNA and some improvement in interstitial lung disease. Blood cx ngtd. D/C'd vancomycin. Tracie swithced back to dilfucan per ID. Currently on zosyn. 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 Crypto negative. ID following  - Reports continued diarrhea. Denies tenderness or cramping. Using Dickinson. C. Diff negative from 10/4.   - Reports SOB/DEJESUS is unchanged, remains on 3L NC  - Remains with good UOP (1225+3x over past 24h). SCr has stabilized. Nephrology following.  - No other complaints    Objective:     Vital Signs (Most Recent):  Temp: 98.3 °F (36.8 °C) (10/08/17 0739)  Pulse: 86 (10/08/17 0819)  Resp: 16 (10/08/17 0819)  BP: 131/67 (10/08/17 0739)  SpO2: 99 % (10/08/17 0819) Vital Signs (24h Range):  Temp:  [97.9 °F (36.6 °C)-99.4 °F (37.4 °C)] 98.3 °F (36.8 °C)  Pulse:  [] 86  Resp:  [16-24] 16  SpO2:  [93 %-99 %] 99 %  BP: (107-146)/(60-72) 131/67     Weight: 118.6 kg (261 lb 9.2 oz)  Body mass index is 39.77 kg/m².  Body surface area is 2.39 meters squared.    ECOG SCORE         [unfilled]    Intake/Output - Last 3 Shifts       10/06 0700 - 10/07 0659 10/07 0700 - 10/08 0659 10/08 0700 - 10/09 0659    P.O. 2000 2200 480    I.V. (mL/kg) 1999.5 (17) 2130.8 (18) 692 (5.8)    Blood 233      IV Piggyback 400 300 100    Total Intake(mL/kg) 4632.5 (39.3) 4630.8 (39) 1272 (10.7)    Urine (mL/kg/hr) 625 (0.2) 1225 (0.4) 550 (1)    Stool 1400 (0.5) 800 (0.3) 200 (0.4)    Total Output 2025 2025 750    Net +2607.5 +2605.8 +522           Urine Occurrence 10 x 3 x     Stool Occurrence 4 x 1 x           Physical Exam    Constitutional: He is oriented to person, place, and time. He appears well-developed and well-nourished. No distress. Nasal cannula in place.   HENT:   Head: Normocephalic and atraumatic.   Nose: Nose normal.   Mouth/Throat: Oral lesions (mucositis, getting dukes) present. Oropharyngeal exudate and posterior oropharyngeal erythema present.   Eyes: Pupils are equal, round, and reactive to light.   Neck: Neck supple.   Cardiovascular: Normal rate, regular rhythm and normal heart sounds.    Pulmonary/Chest: Effort normal. He has decreased breath sounds.   +course breath sounds   Abdominal: Soft. Bowel sounds are normal. He exhibits no distension. There is no tenderness (none noted on exam).   Musculoskeletal: He exhibits no edema.   Neurological: He is alert and oriented to person, place, and time.   Skin: Skin is warm and dry. No rash noted.   Psychiatric: He has a normal mood and affect. His behavior is normal.   Nursing note and vitals reviewed.      Significant Labs:   CBC:     Recent Labs  Lab 10/07/17  0341 10/08/17  0441   WBC 0.26* 0.95*   HGB 8.0* 8.0*   HCT 23.2* 23.4*   PLT 10* 15*    and CMP:     Recent Labs  Lab 10/07/17  0341 10/07/17  0857 10/07/17  1632 10/08/17  0441   * 136 138 138   K 3.7 4.1 4.4 4.0    109 111* 112*   CO2 19* 17* 18* 18*    134* 120* 97   BUN 15 17 18 19   CREATININE 2.2* 2.5* 2.6* 2.6*   CALCIUM 7.6* 7.8* 8.0* 7.9*   PROT 5.3*  --  5.7* 5.5*   ALBUMIN 2.1* 2.3* 2.2* 2.2*   BILITOT 0.6  --  0.5 0.6   ALKPHOS 93  --  105 119   AST 29  --  31 30   ALT 22  --  25 24   ANIONGAP 8 10 9 8   EGFRNONAA 33.0* 28.3* 26.9* 26.9*       Diagnostic Results:    Repeat cxy 10/6/17: Abnormal chest radiograph consistent with bilateral pneumonia.  Improvement in interstitial lung disease since 10/4/2017 suggests interval diuresis.  No new disease identified.    Blood cx 10/3/17 and 10/6/17 NGTD    Urine cx 10/3/17 no growth and 10/6/17 in process    Assessment/Plan:     * H/O  autologous stem cell transplant    - Today is day +11  - Melphalan given on 9/26/17 without complications  - Auto SCT day 0 on 9/27/17; received 5 bags with a CD 34 count of 4.71 x 10^6/kg - tolerated well   - ppx antimicrobials to start per protocol  - Neupogen started day + 3   - with possible mucositis, ordered [place] (10/3/17)  - with n/v, continue ativan    - hospital course complicated with neutropenic fever r/t PNA and possibly early engraftment         ELENA (acute kidney injury)    - SCr of 2.6 on 10/8/17, appears to have plateaued   - baseline Scr 0.8 on 10/6/17  - unclear etiology  - continue IVFs  - strict I/Os  - avoid nephrotoxic drugs, renally dose meds  - nephrology following, appreciate recs  - unlikely due to zosyn per nephrology as creatinine has stabilized despite continuation of medication  - will continue to monitor        Neutropenic fever    - Tmax 99.4 in 24 hours with last fever of 101.9 on 10/5/17 at 2345  - Blood cx x 2, chest xray, urine cx all ordered on 10/3/17 ngtd and do not give source of infection; ordered chest xray PA/LAT 10/4/17 (bilateral PNA)  - Repeat pancultures on 10/6/17, chest xray shows same bilateral PNA and improving interstitial lung disease  - Cefepime from 10/3/17 to 10/5/17  - Vanc from 10/4/17-10/6/17  - Zosyn started 10/5/17 and will complete 7d per ID  - ID consulted 10/5/17 and following; appreciate recommendations   - Fungal markers in process, crypto negative  - Will re-culture q 24 hours if continues to be febrile        Burning with urination    - started 10/2/17; improved  - UA done and negative for nitrites and leukocytes; positive for 1+ occult blood   - will continue to monitor         Chemotherapy induced diarrhea    - Cdiff negative initially; recheck cdiff 10/4/17 negative  - Repeat cdiff negative, give scheduled loperamide and prn lomotil          Pancytopenia due to chemotherapy    - WBC 0.95 k/uL; [id number]  - Hemoglobin 8.0 grams  - Plts 15K   -  Transfuse for hemoglobin <7 and platelets <10,000         History of gout    Pt suffers from gout and is not currently on uric acid suppression medication.  This may have contributed to his hip degeneration.        Depression    - Continue home Celexa           Hyperlipidemia    - Will hold statin with admission and may resume at discharge          Hypertension, essential    - Hold all home antihypertensives: amlodipine, bystolic, and lisinopril in setting of ELENA per Nephrology         Multiple myeloma in remission    - Completed 4 cycles of CyBorD   - Was treated with lenalidomide and dex  - Restaging marrow 8/21/17 showed a 10-50% cellular marrow with trilineage hematopoiesis and 5% residual  kappa-restricted plasma cells (6% by morphology).  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  Hence, he has achieved a partial remission (PR1) and is ready to proceed with transplant.    65% EF on 8/2/17   - Possible need to consider an allogeneic transplant as a cure in future as pt is young. This is not an immediate consideration but since he has 4 siblings there is a 68% chance of finding a sibling match.   - s/p auto SCT as above              VTE Risk Mitigation         Ordered     heparin (porcine) injection 1,600 Units  As needed (PRN)     Route:  Intravenous        09/25/17 1733     High Risk of VTE  Once      09/25/17 1257          Disposition: pending clinical improvement    Ivan Elizabeth MD  Bone Marrow Transplant  [facility]

## 2017-10-09 LAB
ALBUMIN SERPL BCP-MCNC: 2.1 G/DL
ALP SERPL-CCNC: 110 U/L
ALT SERPL W/O P-5'-P-CCNC: 22 U/L
ANION GAP SERPL CALC-SCNC: 10 MMOL/L
ANISOCYTOSIS BLD QL SMEAR: SLIGHT
AST SERPL-CCNC: 25 U/L
BASOPHILS # BLD AUTO: ABNORMAL K/UL
BASOPHILS NFR BLD: 0 %
BILIRUB SERPL-MCNC: 0.5 MG/DL
BUN SERPL-MCNC: 17 MG/DL
BURR CELLS BLD QL SMEAR: ABNORMAL
CALCIUM SERPL-MCNC: 8.1 MG/DL
CHLORIDE SERPL-SCNC: 114 MMOL/L
CO2 SERPL-SCNC: 17 MMOL/L
CREAT SERPL-MCNC: 2.2 MG/DL
DIFFERENTIAL METHOD: ABNORMAL
EOSINOPHIL # BLD AUTO: ABNORMAL K/UL
EOSINOPHIL NFR BLD: 0 %
ERYTHROCYTE [DISTWIDTH] IN BLOOD BY AUTOMATED COUNT: 14.8 %
EST. GFR  (AFRICAN AMERICAN): 38.1 ML/MIN/1.73 M^2
EST. GFR  (NON AFRICAN AMERICAN): 33 ML/MIN/1.73 M^2
GLUCOSE SERPL-MCNC: 104 MG/DL
HCT VFR BLD AUTO: 22.8 %
HGB BLD-MCNC: 7.6 G/DL
HYPOCHROMIA BLD QL SMEAR: ABNORMAL
LYMPHOCYTES # BLD AUTO: ABNORMAL K/UL
LYMPHOCYTES NFR BLD: 11 %
MAGNESIUM SERPL-MCNC: 1.6 MG/DL
MCH RBC QN AUTO: 30.3 PG
MCHC RBC AUTO-ENTMCNC: 33.3 G/DL
MCV RBC AUTO: 91 FL
METAMYELOCYTES NFR BLD MANUAL: 1 %
MONOCYTES # BLD AUTO: ABNORMAL K/UL
MONOCYTES NFR BLD: 10 %
MYELOCYTES NFR BLD MANUAL: 1 %
NEUTROPHILS NFR BLD: 74 %
NEUTS BAND NFR BLD MANUAL: 3 %
OVALOCYTES BLD QL SMEAR: ABNORMAL
PHOSPHATE SERPL-MCNC: 2.5 MG/DL
PLATELET # BLD AUTO: 14 K/UL
PMV BLD AUTO: 10.3 FL
POIKILOCYTOSIS BLD QL SMEAR: SLIGHT
POLYCHROMASIA BLD QL SMEAR: ABNORMAL
POTASSIUM SERPL-SCNC: 3.7 MMOL/L
PROT SERPL-MCNC: 5.3 G/DL
RBC # BLD AUTO: 2.51 M/UL
SODIUM SERPL-SCNC: 141 MMOL/L
WBC # BLD AUTO: 1.62 K/UL

## 2017-10-09 PROCEDURE — 80053 COMPREHEN METABOLIC PANEL: CPT

## 2017-10-09 PROCEDURE — 84100 ASSAY OF PHOSPHORUS: CPT

## 2017-10-09 PROCEDURE — 25000003 PHARM REV CODE 250: Performed by: INTERNAL MEDICINE

## 2017-10-09 PROCEDURE — 99232 SBSQ HOSP IP/OBS MODERATE 35: CPT | Mod: ,,, | Performed by: INTERNAL MEDICINE

## 2017-10-09 PROCEDURE — 25000242 PHARM REV CODE 250 ALT 637 W/ HCPCS: Performed by: INTERNAL MEDICINE

## 2017-10-09 PROCEDURE — 25000003 PHARM REV CODE 250: Performed by: STUDENT IN AN ORGANIZED HEALTH CARE EDUCATION/TRAINING PROGRAM

## 2017-10-09 PROCEDURE — 27000173 HC ACAPELLA DEVICE DH OR DM

## 2017-10-09 PROCEDURE — 25000003 PHARM REV CODE 250: Performed by: NURSE PRACTITIONER

## 2017-10-09 PROCEDURE — 27000221 HC OXYGEN, UP TO 24 HOURS

## 2017-10-09 PROCEDURE — 63600175 PHARM REV CODE 636 W HCPCS: Performed by: INTERNAL MEDICINE

## 2017-10-09 PROCEDURE — 85007 BL SMEAR W/DIFF WBC COUNT: CPT

## 2017-10-09 PROCEDURE — 94664 DEMO&/EVAL PT USE INHALER: CPT

## 2017-10-09 PROCEDURE — 83735 ASSAY OF MAGNESIUM: CPT

## 2017-10-09 PROCEDURE — 94640 AIRWAY INHALATION TREATMENT: CPT

## 2017-10-09 PROCEDURE — 63600175 PHARM REV CODE 636 W HCPCS: Performed by: NURSE PRACTITIONER

## 2017-10-09 PROCEDURE — 94761 N-INVAS EAR/PLS OXIMETRY MLT: CPT

## 2017-10-09 PROCEDURE — 97803 MED NUTRITION INDIV SUBSEQ: CPT

## 2017-10-09 PROCEDURE — 20600001 HC STEP DOWN PRIVATE ROOM

## 2017-10-09 PROCEDURE — A9155 ARTIFICIAL SALIVA: HCPCS | Performed by: NURSE PRACTITIONER

## 2017-10-09 PROCEDURE — 99900035 HC TECH TIME PER 15 MIN (STAT)

## 2017-10-09 PROCEDURE — 85027 COMPLETE CBC AUTOMATED: CPT

## 2017-10-09 PROCEDURE — 99233 SBSQ HOSP IP/OBS HIGH 50: CPT | Mod: ,,, | Performed by: INTERNAL MEDICINE

## 2017-10-09 RX ORDER — AMLODIPINE BESYLATE 5 MG/1
5 TABLET ORAL DAILY
Status: DISCONTINUED | OUTPATIENT
Start: 2017-10-09 | End: 2017-10-10 | Stop reason: HOSPADM

## 2017-10-09 RX ORDER — MAGNESIUM SULFATE HEPTAHYDRATE 40 MG/ML
2 INJECTION, SOLUTION INTRAVENOUS ONCE
Status: COMPLETED | OUTPATIENT
Start: 2017-10-09 | End: 2017-10-09

## 2017-10-09 RX ADMIN — HEPARIN SODIUM 1600 UNITS: 1000 INJECTION, SOLUTION INTRAVENOUS; SUBCUTANEOUS at 03:10

## 2017-10-09 RX ADMIN — AMLODIPINE BESYLATE 5 MG: 5 TABLET ORAL at 03:10

## 2017-10-09 RX ADMIN — Medication 30 ML: at 05:10

## 2017-10-09 RX ADMIN — LOPERAMIDE HYDROCHLORIDE 2 MG: 2 CAPSULE ORAL at 05:10

## 2017-10-09 RX ADMIN — IPRATROPIUM BROMIDE AND ALBUTEROL SULFATE 3 ML: .5; 3 SOLUTION RESPIRATORY (INHALATION) at 07:10

## 2017-10-09 RX ADMIN — LOPERAMIDE HYDROCHLORIDE 2 MG: 2 CAPSULE ORAL at 12:10

## 2017-10-09 RX ADMIN — Medication 30 ML: at 06:10

## 2017-10-09 RX ADMIN — ACYCLOVIR 800 MG: 200 CAPSULE ORAL at 08:10

## 2017-10-09 RX ADMIN — Medication 30 ML: at 12:10

## 2017-10-09 RX ADMIN — PANTOPRAZOLE SODIUM 40 MG: 40 TABLET, DELAYED RELEASE ORAL at 08:10

## 2017-10-09 RX ADMIN — CITALOPRAM HYDROBROMIDE 40 MG: 20 TABLET ORAL at 08:10

## 2017-10-09 RX ADMIN — SODIUM CHLORIDE: 0.9 INJECTION, SOLUTION INTRAVENOUS at 12:10

## 2017-10-09 RX ADMIN — ACYCLOVIR 800 MG: 200 CAPSULE ORAL at 09:10

## 2017-10-09 RX ADMIN — IPRATROPIUM BROMIDE AND ALBUTEROL SULFATE 3 ML: .5; 3 SOLUTION RESPIRATORY (INHALATION) at 12:10

## 2017-10-09 RX ADMIN — LOPERAMIDE HYDROCHLORIDE 2 MG: 2 CAPSULE ORAL at 06:10

## 2017-10-09 RX ADMIN — PIPERACILLIN AND TAZOBACTAM 4.5 G: 4; .5 INJECTION, POWDER, LYOPHILIZED, FOR SOLUTION INTRAVENOUS; PARENTERAL at 12:10

## 2017-10-09 RX ADMIN — FLUCONAZOLE 400 MG: 200 TABLET ORAL at 08:10

## 2017-10-09 RX ADMIN — PIPERACILLIN AND TAZOBACTAM 4.5 G: 4; .5 INJECTION, POWDER, LYOPHILIZED, FOR SOLUTION INTRAVENOUS; PARENTERAL at 04:10

## 2017-10-09 RX ADMIN — PIPERACILLIN AND TAZOBACTAM 4.5 G: 4; .5 INJECTION, POWDER, LYOPHILIZED, FOR SOLUTION INTRAVENOUS; PARENTERAL at 08:10

## 2017-10-09 RX ADMIN — MAGNESIUM SULFATE IN WATER 2 G: 40 INJECTION, SOLUTION INTRAVENOUS at 08:10

## 2017-10-09 NOTE — PLAN OF CARE
Problem: Stem Cell/Bone Marrow Transplant (Adult)  Intervention: Minimize Barriers to Oral Intake  Recommendation/Intervention: 1. Continue w/ Regular diet. 2.Encourage PO intake >/= 75% EEN/EPN 3. If PO intake is <50% provide Boost and encourage HVP 1st w/ each meal, ensuring all BMT food safety precautions are being followed

## 2017-10-09 NOTE — PROGRESS NOTES
[facility]  Hematology  Bone Marrow Transplant  Progress Note    Patient Name: Arik Bobo  Admission Date: 9/25/2017  Hospital Length of Stay: 14 days  Code Status: Full Code    Subjective:     Interval History:   - Day + 12 HD Melphalan after auto SCT.   - Afebrile. Fungal markers negative. Will ween off O2 today (satting high 90s on room air). Zosyn will stop after today's dose. Tracie was changed to diflucan   - Reports continued diarrhea which is not as frequent now (cdiff negative). Denies tenderness or cramping  - Has swelling of BLE, will not diurese at this time   - Mucositis improving, using [place]  - Had increase in creatinine over the weekend. Nephrology was consulted. Unsure of reason. Spontaneously trending down from 2.6 to 2.2 today.   - ANC 1199, engrafted. Will d/c Neupogen today  - Will likely discharge to [facility] or [facility] tomorrow or Wednesday      Objective:     Vital Signs (Most Recent):  Temp: 97.6 °F (36.4 °C) (10/09/17 0726)  Pulse: 85 (10/09/17 0748)  Resp: 14 (10/09/17 0748)  BP: (!) 165/88 (10/09/17 0726)  SpO2: 97 % (10/09/17 0933) Vital Signs (24h Range):  Temp:  [97.6 °F (36.4 °C)-98.5 °F (36.9 °C)] 97.6 °F (36.4 °C)  Pulse:  [85-98] 85  Resp:  [14-24] 14  SpO2:  [95 %-99 %] 97 %  BP: (117-165)/(64-88) 165/88     Weight: 119.7 kg (263 lb 12.5 oz)  Body mass index is 40.11 kg/m².  Body surface area is 2.4 meters squared.    ECOG SCORE         [unfilled]    Intake/Output - Last 3 Shifts       10/07 0700 - 10/08 0659 10/08 0700 - 10/09 0659 10/09 0700 - 10/10 0659    P.O. 2200 1920 960    I.V. (mL/kg) 2130.8 (18) 2332 (19.5) 596 (5)    Blood       IV Piggyback 300 300 100    Total Intake(mL/kg) 4630.8 (39) 4552 (38.1) 1656 (13.8)    Urine (mL/kg/hr) 1225 (0.4) 2150 (0.7) 875 (1.7)    Stool 800 (0.3) 200 (0.1) 400 (0.8)    Total Output 2025 2350 1275    Net +2605.8 +2202 +381           Urine Occurrence 3 x 1 x     Stool Occurrence 1 x 1 x           Physical  Exam   Constitutional: He is oriented to person, place, and time. He appears well-developed and well-nourished. No distress.   Weening off O2 this am; satting high 90s on RA   HENT:   Head: Normocephalic and atraumatic.   Nose: Nose normal.   Mouth/Throat: Oral lesions (mucositis, getting dukes) present. Oropharyngeal exudate and posterior oropharyngeal erythema present.   Eyes: Pupils are equal, round, and reactive to light.   Neck: Neck supple.   Cardiovascular: Normal rate, regular rhythm and normal heart sounds.    Trace edema to BLE   Pulmonary/Chest: Effort normal. He has decreased breath sounds.   course breath sounds improving   Abdominal: Soft. Bowel sounds are normal. He exhibits no distension. There is no tenderness (none noted on exam).   Musculoskeletal: He exhibits no edema.   Neurological: He is alert and oriented to person, place, and time.   Skin: Skin is warm and dry. No rash noted.   Psychiatric: He has a normal mood and affect. His behavior is normal.   Nursing note and vitals reviewed.      Significant Labs:   CBC:     Recent Labs  Lab 10/08/17  0441 10/09/17  0359   WBC 0.95* 1.62*   HGB 8.0* 7.6*   HCT 23.4* 22.8*   PLT 15* 14*    and CMP:     Recent Labs  Lab 10/07/17  1632 10/08/17  0441 10/09/17  0359    138 141   K 4.4 4.0 3.7   * 112* 114*   CO2 18* 18* 17*   * 97 104   BUN 18 19 17   CREATININE 2.6* 2.6* 2.2*   CALCIUM 8.0* 7.9* 8.1*   PROT 5.7* 5.5* 5.3*   ALBUMIN 2.2* 2.2* 2.1*   BILITOT 0.5 0.6 0.5   ALKPHOS 105 119 110   AST 31 30 25   ALT 25 24 22   ANIONGAP 9 8 10   EGFRNONAA 26.9* 26.9* 33.0*       Diagnostic Results:    Repeat cxy 10/6/17: Abnormal chest radiograph consistent with bilateral pneumonia.  Improvement in interstitial lung disease since 10/4/2017 suggests interval diuresis.  No new disease identified.    Blood cx 10/3/17 and 10/6/17 NGTD    Urine cx 10/3/17 no growth and 10/6/17 no growth    Renal u/s this weekend was  unremarkable    Assessment/Plan:     * H/O autologous stem cell transplant    - Today is day +12  - Melphalan given on 9/26/17 without complications  - Auto SCT day 0 on 9/27/17; received 5 bags with a CD 34 count of 4.71 x 10^6/kg - tolerated well   - ppx antimicrobials to start per protocol  - Neupogen started day + 3; discontinued day +12   - with possible mucositis, ordered [place] (10/3/17)  - with n/v, continue ativan    - hospital course complicated with neutropenic fever r/t PNA and possibly early engraftment   - engrafting on day +12 with ANC of 1199        Multiple myeloma in remission    - Completed 4 cycles of CyBorD   - Was treated with lenalidomide and dex  - Restaging marrow 8/21/17 showed a 10-50% cellular marrow with trilineage hematopoiesis and 5% residual  kappa-restricted plasma cells (6% by morphology).  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  Hence, he has achieved a partial remission (PR1) and is ready to proceed with transplant.    65% EF on 8/2/17   - Possible need to consider an allogeneic transplant as a cure in future as pt is young. This is not an immediate consideration but since he has 4 siblings there is a 68% chance of finding a sibling match.   - s/p auto SCT as above          Neutropenic fever    - Tmax 98.5 in 24 hours with last fever of 101.9 on 10/5/17 at 2345  - Blood cx x 2, chest xray, urine cx all ordered on 10/3/17 ngtd and do not give source of infection; ordered chest xray PA/LAT 10/4/17 (bilateral PNA)  - Repeat pancultures on 10/6/17, repeat chest xray shows same bilateral PNA and improving interstitial lung disease  - Cefepime from 10/3/17 to 10/5/17  - Vanc from 10/4/17-10/6/17  - Zosyn started 10/5/17 and will complete 7d per ID on 10/9/17  - ID was following since 10/5/17 consult and have now signed off  - Fungal markers negative  Resolved as pt is afebrile and no longer neutropenic         Pancytopenia due to chemotherapy    - WBC 1.62 k/uL; ANC 1199 (started engrafting on day  +12)  - Hemoglobin 7.6 grams  - Plts 14K   - Transfuse for hemoglobin <7 and platelets <10,000   - Will need to transfuse platelets prior to vas cath removal         Chemotherapy induced diarrhea    - Cdiff negative initially; recheck cdiff 10/4/17 negative  - Repeat cdiff negative, give scheduled loperamide and prn lomotil    - improving, less frequent        Hyperlipidemia    - Will hold statin with admission and may resume at discharge          Hypertension, essential    - Hold all home antihypertensives: amlodipine, bystolic, and lisinopril in setting of ELENA per Nephrology    - BP stable        History of gout    Pt suffers from gout and is not currently on uric acid suppression medication.  This may have contributed to his hip degeneration.        Depression    - Continue home Celexa            Burning with urination    - started 10/2/17; resolved  - UA done and negative for nitrites and leukocytes; positive for 1+ occult blood         ELENA (acute kidney injury)    - SCr of 2.6 on 10/8/17 and spontaneously trending down to 2.2 on 10/9/17  - baseline Scr 0.8 on 10/6/17  - unclear etiology  - continue IVFs  - strict I/Os  - avoid nephrotoxic drugs, renally dose meds  - nephrology following, appreciate recs  - unlikely due to zosyn per nephrology as creatinine has stabilized despite continuation of medication. Zosyn to be completed on 10/9/17  - improving and will continue to monitor            VTE Risk Mitigation         Ordered     heparin (porcine) injection 1,600 Units  As needed (PRN)     Route:  Intravenous        09/25/17 1733     High Risk of VTE  Once      09/25/17 1257          Disposition: likely d/c to [facility] or [facility] tomorrow or the following day pending improvement in renal function and continued engraftment.     aMgdalena Miller, ROMAINE  Bone Marrow Transplant  [facility]

## 2017-10-09 NOTE — ASSESSMENT & PLAN NOTE
- Cdiff negative initially; recheck cdiff 10/4/17 negative  - Repeat cdiff negative, give scheduled loperamide and prn lomotil    - improving, less frequent

## 2017-10-09 NOTE — PROGRESS NOTES
[facility]  Nephrology  Progress Note    Patient Name: Arik Bobo  MRN: [id number]  Admission Date: 9/25/2017  Hospital Length of Stay: 14 days  Attending Provider: Blake Buck MD   Primary Care Physician: Avinash Lal MD  Principal Problem:H/O autologous stem cell transplant    Subjective:     HPI: Mr. Bobo is a 53 yo WM with HTN, h/o DVT x2, and IgG kappa multiple myeloma admitted on 9/25/17 for stem cell transplant. He was diagnosed with MM in 2015 as part of an anemia evaluation. He received lenalidomide/dexamethasone in 2015. He subsequently completed 4 cycles of CyBorD. Restaging marrow 8/21/17 showed partial remission and plans were made to proceed with transplant. AutoSCT was performed 9/27/17. Post-procedure course has been complicated by nausea, diarrhea, and neutropenic fever. ID is following; presumed source of infection is bilateral PNA. He was initially treated with vanc and cefepime but is currently on zosyn which was started yesterday. He is also being treated empirically with micafungin and remains on ppx acyclovir which was started 9/26. Patient's sCr has remained 0.8 throughout his stay but acutely orin to 2.2 this morning. He denies urinary retention. He has not received any IV contrast. No new medications except for Zosyn. No supratherapeutic vanc troughs. He has not had any hypotensive episodes however his SBP was 140-160s the first several days of his admission and now is in the 110s. Patient developed diarrhea after his transplant. He is being treated with loperamide. He reports diarrhea started to improve but was quite severe yesterday. Stool documented as 1.4L yesterday. He reports SOB since his transplant and is currently wearing supplemental oxygen. After reflection he admits to decreased UOP today. He also reports seeing some blood in his urine that started around the same time as his transplant. He denies any other complaints.   Last 24 hours: In  4.6L, UOP 625cc, stool 1.4L.     Interval History: bladder scan on Saturday with only 44cc urine. No events overnight. sCr stabilized over weekend. UOP 2.1L +1x yesterday. Net +2L/24h. Patient doing okay today. Reports DEJESUS. UOP increasing. Diarrhea (or at least urgency) is improving. Reports BLE edema that started yesterday.     Review of patient's allergies indicates:   Allergen Reactions    Pcn [penicillins]      Unknown last dose as an infant     Current Facility-Administered Medications   Medication Frequency    0.9%  NaCl infusion Continuous    acetaminophen tablet 650 mg Q6H PRN    acyclovir capsule 800 mg BID    albuterol-ipratropium 2.5mg-0.5mg/3mL nebulizer solution 3 mL Q4H WAKE    alteplase injection 2 mg BID PRN    aluminum-magnesium hydroxide-simethicone 200-200-20 mg/5 mL suspension 30 mL Q6H PRN    [START ON 10/10/2017] ciprofloxacin HCl tablet 500 mg Daily    citalopram tablet 40 mg Daily    dextromethorphan-guaifenesin  mg/5 ml liquid 10 mL Q4H PRN    diphenoxylate-atropine 2.5-0.025 mg per tablet 1 tablet QID PRN    duke's soln (benadryl 30 mL, mylanta 30 mL, lidocane 30 mL, nystatin 30 mL) 120 mL QID PRN    fluconazole tablet 400 mg Daily    heparin (porcine) injection 1,600 Units PRN    loperamide capsule 2 mg QID    lorazepam injection 1 mg Q4H PRN    magnesium oxide tablet 400 mg Q4H PRN    magnesium oxide tablet 400 mg Q4H PRN    magnesium oxide tablet 800 mg Q4H PRN    ondansetron disintegrating tablet 8 mg Q6H PRN    oxycodone immediate release tablet 5 mg Q6H PRN    pantoprazole EC tablet 40 mg Daily    piperacillin-tazobactam 4.5 g in dextrose 5 % 100 mL IVPB (ready to mix system) Q8H    potassium chloride CR capsule 20 mEq PRN    potassium chloride CR capsule 20 mEq Q2H PRN    potassium chloride CR capsule 20 mEq Q2H PRN    potassium, sodium phosphates 280-160-250 mg packet 1 packet Q4H PRN    potassium, sodium phosphates 280-160-250 mg packet 2 packet  Q4H PRN    promethazine (PHENERGAN) 12.5 mg in dextrose 5 % 50 mL IVPB Q4H PRN    saliva substitute combo no.2 solution 30 mL QID    saliva substitute combo no.2 solution 30 mL Q4H PRN    sodium chloride 0.9% flush 10 mL PRN       Objective:     Vital Signs (Most Recent):  Temp: 98.6 °F (37 °C) (10/09/17 1118)  Pulse: 88 (10/09/17 1208)  Resp: 14 (10/09/17 1208)  BP: (!) 148/79 (10/09/17 1118)  SpO2: 96 % (10/09/17 1208)  O2 Device (Oxygen Therapy): room air (10/09/17 1208) Vital Signs (24h Range):  Temp:  [97.6 °F (36.4 °C)-98.6 °F (37 °C)] 98.6 °F (37 °C)  Pulse:  [84-98] 88  Resp:  [14-24] 14  SpO2:  [95 %-99 %] 96 %  BP: (130-165)/(64-88) 148/79     Weight: 119.7 kg (263 lb 12.5 oz) (10/09/17 0402)  Body mass index is 40.11 kg/m².  Body surface area is 2.4 meters squared.    I/O last 3 completed shifts:  In: 7395.8 [P.O.:3360; I.V.:3635.8; IV Piggyback:400]  Out: 3300 [Urine:2800; Stool:500]    Physical Exam   Constitutional: He is oriented to person, place, and time. He appears well-developed and well-nourished. No distress.   HENT:   Head: Normocephalic and atraumatic.   Eyes: Conjunctivae and EOM are normal.   Cardiovascular: Normal rate and regular rhythm.    Pulmonary/Chest: Effort normal and breath sounds normal.   Musculoskeletal: He exhibits edema. He exhibits no deformity.   Neurological: He is alert and oriented to person, place, and time.   Skin: Skin is warm and dry. He is not diaphoretic.       Significant Labs:  CBC:   Recent Labs  Lab 10/09/17  0359   WBC 1.62*   RBC 2.51*   HGB 7.6*   HCT 22.8*   PLT 14*   MCV 91   MCH 30.3   MCHC 33.3     CMP:   Recent Labs  Lab 10/09/17  0359      CALCIUM 8.1*   ALBUMIN 2.1*   PROT 5.3*      K 3.7   CO2 17*   *   BUN 17   CREATININE 2.2*   ALKPHOS 110   ALT 22   AST 25   BILITOT 0.5     All labs within the past 24 hours have been reviewed.     Significant Imaging:  Labs: Reviewed    Assessment/Plan:     ELENA (acute kidney injury)    -  baseline sCr 0.8. Acutely orin to 2.6 on 10/7  - likely pre-renal etiology from diarrhea in setting of concurrent ACEi use  - stabilized yesterday and improving today  - BP starting to rise again. Recommend restarting home amlodipine. If BP still elevated tomorrow can restart nebivolol. Continue holding lisinopril until ELENA completely resolved  - consider discontinuing IVF in setting of new peripheral edema  - monitor for post-ATN diuresis; allow patient to drink to thirst.             Juliane Garcia, PGY-5  Nephrology Fellow  [facility]  Pager: [phone]    I have reviewed and concur with the Resident's history, physical, assessment, and plan. I have personally interviewed and examined the patient at bedside.

## 2017-10-09 NOTE — SUBJECTIVE & OBJECTIVE
Subjective:     Interval History:   - Day + 12 HD Melphalan after auto SCT.   - Afebrile. Fungal markers negative. Will ween off O2 today (satting high 90s on room air). Zosyn will stop after today's dose. Tracie was changed to diflucan   - Reports continued diarrhea which is not as frequent now (cdiff negative). Denies tenderness or cramping  - Has swelling of BLE, will not diurese at this time   - Mucositis improving, using [place]  - Had increase in creatinine over the weekend. Nephrology was consulted. Unsure of reason. Spontaneously trending down from 2.6 to 2.2 today.   - ANC 1199, engrafted. Will d/c Neupogen today  - Will likely discharge to [facility] or [facility] tomorrow or Wednesday      Objective:     Vital Signs (Most Recent):  Temp: 97.6 °F (36.4 °C) (10/09/17 0726)  Pulse: 85 (10/09/17 0748)  Resp: 14 (10/09/17 0748)  BP: (!) 165/88 (10/09/17 0726)  SpO2: 97 % (10/09/17 0933) Vital Signs (24h Range):  Temp:  [97.6 °F (36.4 °C)-98.5 °F (36.9 °C)] 97.6 °F (36.4 °C)  Pulse:  [85-98] 85  Resp:  [14-24] 14  SpO2:  [95 %-99 %] 97 %  BP: (117-165)/(64-88) 165/88     Weight: 119.7 kg (263 lb 12.5 oz)  Body mass index is 40.11 kg/m².  Body surface area is 2.4 meters squared.    ECOG SCORE         [unfilled]    Intake/Output - Last 3 Shifts       10/07 0700 - 10/08 0659 10/08 0700 - 10/09 0659 10/09 0700 - 10/10 0659    P.O. 2200 1920 960    I.V. (mL/kg) 2130.8 (18) 2332 (19.5) 596 (5)    Blood       IV Piggyback 300 300 100    Total Intake(mL/kg) 4630.8 (39) 4552 (38.1) 1656 (13.8)    Urine (mL/kg/hr) 1225 (0.4) 2150 (0.7) 875 (1.7)    Stool 800 (0.3) 200 (0.1) 400 (0.8)    Total Output 2025 2350 1275    Net +2605.8 +2202 +381           Urine Occurrence 3 x 1 x     Stool Occurrence 1 x 1 x           Physical Exam   Constitutional: He is oriented to person, place, and time. He appears well-developed and well-nourished. No distress.   Weening off O2 this am; satting high 90s on RA   HENT:   Head: Normocephalic and  atraumatic.   Nose: Nose normal.   Mouth/Throat: Oral lesions (mucositis, getting dukes) present. Oropharyngeal exudate and posterior oropharyngeal erythema present.   Eyes: Pupils are equal, round, and reactive to light.   Neck: Neck supple.   Cardiovascular: Normal rate, regular rhythm and normal heart sounds.    Trace edema to BLE   Pulmonary/Chest: Effort normal. He has decreased breath sounds.   course breath sounds improving   Abdominal: Soft. Bowel sounds are normal. He exhibits no distension. There is no tenderness (none noted on exam).   Musculoskeletal: He exhibits no edema.   Neurological: He is alert and oriented to person, place, and time.   Skin: Skin is warm and dry. No rash noted.   Psychiatric: He has a normal mood and affect. His behavior is normal.   Nursing note and vitals reviewed.      Significant Labs:   CBC:     Recent Labs  Lab 10/08/17  0441 10/09/17  0359   WBC 0.95* 1.62*   HGB 8.0* 7.6*   HCT 23.4* 22.8*   PLT 15* 14*    and CMP:     Recent Labs  Lab 10/07/17  1632 10/08/17  0441 10/09/17  0359    138 141   K 4.4 4.0 3.7   * 112* 114*   CO2 18* 18* 17*   * 97 104   BUN 18 19 17   CREATININE 2.6* 2.6* 2.2*   CALCIUM 8.0* 7.9* 8.1*   PROT 5.7* 5.5* 5.3*   ALBUMIN 2.2* 2.2* 2.1*   BILITOT 0.5 0.6 0.5   ALKPHOS 105 119 110   AST 31 30 25   ALT 25 24 22   ANIONGAP 9 8 10   EGFRNONAA 26.9* 26.9* 33.0*       Diagnostic Results:    Repeat cxy 10/6/17: Abnormal chest radiograph consistent with bilateral pneumonia.  Improvement in interstitial lung disease since 10/4/2017 suggests interval diuresis.  No new disease identified.    Blood cx 10/3/17 and 10/6/17 NGTD    Urine cx 10/3/17 no growth and 10/6/17 no growth    Renal u/s this weekend was unremarkable

## 2017-10-09 NOTE — ASSESSMENT & PLAN NOTE
- WBC 1.62 k/uL; ANC 1199 (started engrafting on day +12)  - Hemoglobin 7.6 grams  - Plts 14K   - Transfuse for hemoglobin <7 and platelets <10,000   - Will need to transfuse platelets prior to vas cath removal

## 2017-10-09 NOTE — PLAN OF CARE
Problem: Patient Care Overview  Goal: Plan of Care Review  Outcome: Ongoing (interventions implemented as appropriate)  Pt. day +12 for autologous BMT.  Pt. with nonskid footwear on with bed in lowest position and locked with bed rails up x2.  Pt. ambulates independently and instructed to call if assistance is needed.  Pt. with call light within reach.  Pt. states diarrhea is improving with imodium given scheduled.  Pt. Weaned off of O2 today with SpO2 93-98% on RA.  Pt. With hypertension this afternoon started back on amlodipine and IVF's D/C'd.  Pt. with a decreased appetite eating about 50% of meals.  Pt. with sister at bedside.  Will continue to monitor pt.

## 2017-10-09 NOTE — PROGRESS NOTES
[facility]  Adult Nutrition  Progress Note    SUMMARY     Recommendations    Recommendation/Intervention: 1. Continue w/ Regular diet. 2.Encourage PO intake >/= 75% EEN/EPN 3. If PO intake is <50% provide Boost and encourage HVP 1st w/ each meal. 4. RD to follow  Goals: pt to consume nutrients >/= 85% EEN/EPN   Nutrition Goal Status: new  Communication of RD Recs: discussed on rounds    Reason for Assessment    Reason for Assessment: RD follow-up  Diagnosis:  (SCT candidate (multiple myeloma in remission))  Relevant Medical History: high blood pressure, high cholesterol or triglycerides, multiple myeloma, depression   Interdisciplinary Rounds: attended  General Information Comments: Pt day +12 SCtx Reports appetite is improving no c/o N/V and diarrhea 2-3x day. Pt states he ate several doughnuts over the weekend. RD reminded Pt of BMT food safety precautions with sharing foods   Nutrition Discharge Planning: regular diet w/po intake >/= 75% EEN/EPN    Nutrition Prescription Ordered    Current Diet Order: Regular     Evaluation of Received Nutrients/Fluid Intake   IV Fluid (mL): 4800   I/O: +4.7L since admit        Intake/Output Summary (Last 24 hours) at 10/09/17 1350  Last data filed at 10/09/17 1214   Gross per 24 hour   Intake             5176 ml   Output             2875 ml   Net             2301 ml     Comments: 10/8     % Intake of Estimated Energy Needs: 50 - 75 %  % Meal Intake: 50%     Nutrition Risk Screen     Nutrition Risk Screen: no indicators present    Nutrition/Diet History    Patient Reported Diet/Restrictions/Preferences: general  Typical Food/Fluid Intake: Adequate PTA  Food Preferences: No cultural or religous preferences reported   Labs/Tests/Procedures/Meds    Pertinent Labs Reviewed: reviewed  Pertinent Labs Comments: WBC-1.62, H/H-7.6/22.8, Plts-14, Ca-8.1, Phos-2.5, Alb-2.1  Pertinent Medications Reviewed: reviewed  Pertinent Medications Comments: IVF, Acyclovir, cipro,  "heparin, Mg,  Duke's Sol    Physical Findings    Overall Physical Appearance: nourished, overweight  Tubes:  ([place])  Oral/Mouth Cavity:  (FRANCHESKA)  Skin: intact    Anthropometrics    Temp: 98.6 °F (37 °C)  Height: 5' 8" (172.7 cm)  Weight Method: Standard Scale  Weight: 119.7 kg (263 lb 12.5 oz)  Ideal Body Weight (IBW), Male: 154 lb  % Ideal Body Weight, Male (lb): 168.56 lb  BMI (Calculated): 39.6  BMI Grade: 35 - 39.9 - obesity - grade II     Estimated/Assessed Needs    Weight Used For Calorie Calculations: 117.6 kg (259 lb 4.2 oz)   Height (cm): 172.7 cm  Energy Calorie Requirements (kcal): 2269-1352  Energy Need Method: Kcal/kg   RMR ([place]-St. Jeor Equation): 1995.5   Weight Used For Protein Calculations: 117.6 kg (259 lb 4.2 oz)  Protein Requirements: 118-141 g (1.0-1.2)  Fluid Requirements (mL): 20-25 ml/kg  Fluid Need Method: RDA Method (1 ml/kcal or per MD)  RDA Method (mL): 2940       Assessment and Plan    Multiple myeloma in remission    Nutrition Problem  inadequate nutrient intake    Related to (etiology):   Physiological needs- BMT 9/27    Signs and Symptoms (as evidenced by):   Pt received BMT(day +12, increased Caloric/ Protein needs)      Nutrition Diagnosis Status:   Continues              Monitor and Evaluation    Food and Nutrient Intake: energy intake, food and beverage intake  Food and Nutrient Administration: diet order  Physical Activity and Function: nutrition-related ADLs and IADLs  Anthropometric Measurements: weight, weight change, body mass index  Biochemical Data, Medical Tests and Procedures:  (All labs)  Nutrition-Focused Physical Findings: overall appearance    Nutrition Risk    Level of Risk:  (RD f/u 1x wk)    Nutrition Follow-Up    RD Follow-up?: Yes  "

## 2017-10-09 NOTE — SUBJECTIVE & OBJECTIVE
Interval History: bladder scan on Saturday with only 44cc urine. No events overnight. sCr stabilized over weekend. UOP 2.1L +1x yesterday. Net +2L/24h. Patient doing okay today. Reports DEJESUS. UOP increasing. Diarrhea (or at least urgency) is improving. Reports BLE edema that started yesterday.     Review of patient's allergies indicates:   Allergen Reactions    Pcn [penicillins]      Unknown last dose as an infant     Current Facility-Administered Medications   Medication Frequency    0.9%  NaCl infusion Continuous    acetaminophen tablet 650 mg Q6H PRN    acyclovir capsule 800 mg BID    albuterol-ipratropium 2.5mg-0.5mg/3mL nebulizer solution 3 mL Q4H WAKE    alteplase injection 2 mg BID PRN    aluminum-magnesium hydroxide-simethicone 200-200-20 mg/5 mL suspension 30 mL Q6H PRN    [START ON 10/10/2017] ciprofloxacin HCl tablet 500 mg Daily    citalopram tablet 40 mg Daily    dextromethorphan-guaifenesin  mg/5 ml liquid 10 mL Q4H PRN    diphenoxylate-atropine 2.5-0.025 mg per tablet 1 tablet QID PRN    duke's soln (benadryl 30 mL, mylanta 30 mL, lidocane 30 mL, nystatin 30 mL) 120 mL QID PRN    fluconazole tablet 400 mg Daily    heparin (porcine) injection 1,600 Units PRN    loperamide capsule 2 mg QID    lorazepam injection 1 mg Q4H PRN    magnesium oxide tablet 400 mg Q4H PRN    magnesium oxide tablet 400 mg Q4H PRN    magnesium oxide tablet 800 mg Q4H PRN    ondansetron disintegrating tablet 8 mg Q6H PRN    oxycodone immediate release tablet 5 mg Q6H PRN    pantoprazole EC tablet 40 mg Daily    piperacillin-tazobactam 4.5 g in dextrose 5 % 100 mL IVPB (ready to mix system) Q8H    potassium chloride CR capsule 20 mEq PRN    potassium chloride CR capsule 20 mEq Q2H PRN    potassium chloride CR capsule 20 mEq Q2H PRN    potassium, sodium phosphates 280-160-250 mg packet 1 packet Q4H PRN    potassium, sodium phosphates 280-160-250 mg packet 2 packet Q4H PRN    promethazine  (PHENERGAN) 12.5 mg in dextrose 5 % 50 mL IVPB Q4H PRN    saliva substitute combo no.2 solution 30 mL QID    saliva substitute combo no.2 solution 30 mL Q4H PRN    sodium chloride 0.9% flush 10 mL PRN       Objective:     Vital Signs (Most Recent):  Temp: 98.6 °F (37 °C) (10/09/17 1118)  Pulse: 88 (10/09/17 1208)  Resp: 14 (10/09/17 1208)  BP: (!) 148/79 (10/09/17 1118)  SpO2: 96 % (10/09/17 1208)  O2 Device (Oxygen Therapy): room air (10/09/17 1208) Vital Signs (24h Range):  Temp:  [97.6 °F (36.4 °C)-98.6 °F (37 °C)] 98.6 °F (37 °C)  Pulse:  [84-98] 88  Resp:  [14-24] 14  SpO2:  [95 %-99 %] 96 %  BP: (130-165)/(64-88) 148/79     Weight: 119.7 kg (263 lb 12.5 oz) (10/09/17 0402)  Body mass index is 40.11 kg/m².  Body surface area is 2.4 meters squared.    I/O last 3 completed shifts:  In: 7395.8 [P.O.:3360; I.V.:3635.8; IV Piggyback:400]  Out: 3300 [Urine:2800; Stool:500]    Physical Exam   Constitutional: He is oriented to person, place, and time. He appears well-developed and well-nourished. No distress.   HENT:   Head: Normocephalic and atraumatic.   Eyes: Conjunctivae and EOM are normal.   Cardiovascular: Normal rate and regular rhythm.    Pulmonary/Chest: Effort normal and breath sounds normal.   Musculoskeletal: He exhibits edema. He exhibits no deformity.   Neurological: He is alert and oriented to person, place, and time.   Skin: Skin is warm and dry. He is not diaphoretic.       Significant Labs:  CBC:   Recent Labs  Lab 10/09/17  0359   WBC 1.62*   RBC 2.51*   HGB 7.6*   HCT 22.8*   PLT 14*   MCV 91   MCH 30.3   MCHC 33.3     CMP:   Recent Labs  Lab 10/09/17  0359      CALCIUM 8.1*   ALBUMIN 2.1*   PROT 5.3*      K 3.7   CO2 17*   *   BUN 17   CREATININE 2.2*   ALKPHOS 110   ALT 22   AST 25   BILITOT 0.5     All labs within the past 24 hours have been reviewed.     Significant Imaging:  Labs: Reviewed

## 2017-10-09 NOTE — PROGRESS NOTES
Magdalena Miller, NP notified of current /81, will restart amlodipine and D/C IVF's.  Will continue to monitor pt.

## 2017-10-09 NOTE — ASSESSMENT & PLAN NOTE
- Hold all home antihypertensives: amlodipine, bystolic, and lisinopril in setting of ELENA per Nephrology    - BP stable

## 2017-10-09 NOTE — ASSESSMENT & PLAN NOTE
- Today is day +12  - Melphalan given on 9/26/17 without complications  - Auto SCT day 0 on 9/27/17; received 5 bags with a CD 34 count of 4.71 x 10^6/kg - tolerated well   - ppx antimicrobials to start per protocol  - Neupogen started day + 3; discontinued day +12   - with possible mucositis, ordered [place] (10/3/17)  - with n/v, continue ativan    - hospital course complicated with neutropenic fever r/t PNA and possibly early engraftment   - engrafting on day +12 with ANC of 1199

## 2017-10-09 NOTE — ASSESSMENT & PLAN NOTE
- Tmax 98.5 in 24 hours with last fever of 101.9 on 10/5/17 at 2345  - Blood cx x 2, chest xray, urine cx all ordered on 10/3/17 ngtd and do not give source of infection; ordered chest xray PA/LAT 10/4/17 (bilateral PNA)  - Repeat pancultures on 10/6/17, repeat chest xray shows same bilateral PNA and improving interstitial lung disease  - Cefepime from 10/3/17 to 10/5/17  - Vanc from 10/4/17-10/6/17  - Zosyn started 10/5/17 and will complete 7d per ID on 10/9/17  - ID was following since 10/5/17 consult and have now signed off  - Fungal markers negative  Resolved as pt is afebrile and no longer neutropenic

## 2017-10-09 NOTE — ASSESSMENT & PLAN NOTE
- started 10/2/17; resolved  - UA done and negative for nitrites and leukocytes; positive for 1+ occult blood

## 2017-10-09 NOTE — PLAN OF CARE
Problem: Patient Care Overview  Goal: Plan of Care Review  Outcome: Ongoing (interventions implemented as appropriate)  Plan of care reviewed with the patient at the beginning of the shift. Pt is day +12 of auto transplant for multiple myeloma. Diarrhea improving. No BM overnight. C Diff negative. Pt given imodium. Encouraged pt to use moist wipes. Skin is intact. Pt denies n/v. Pt with bilateral pneumonia . Oxygen at 3L nasal canula. He is dyspneic at rest, lungs diminished and coarse. Encouraged pt to use oxygen with ambulation to the restroom. Resp treatments scheduled. Pt followed by ID. Pt afebrile overnight. Zosyn continued. Micafungin discontinued due to ELENA. Pt seen by nephrology. Renal u/s completed. Mucous membranes are intact. Mucositis resolving. Salt and soda rinses provided. Poor PO intake. Oral intake encouraged. Fall precautions maintained. Pt remained free from falls and injury this shift. Bed locked in lowest position, side rails up x2, call light within reach. Instructed pt to call for assistance as needed. Pt verbalized understanding. Thrombocytopenic and neutropenic precautions maintained. Will continue to monitor.

## 2017-10-09 NOTE — ASSESSMENT & PLAN NOTE
- SCr of 2.6 on 10/8/17 and spontaneously trending down to 2.2 on 10/9/17  - baseline Scr 0.8 on 10/6/17  - unclear etiology  - continue IVFs  - strict I/Os  - avoid nephrotoxic drugs, renally dose meds  - nephrology following, appreciate recs  - unlikely due to zosyn per nephrology as creatinine has stabilized despite continuation of medication. Zosyn to be completed on 10/9/17  - improving and will continue to monitor

## 2017-10-09 NOTE — ASSESSMENT & PLAN NOTE
Nutrition Problem  inadequate nutrient intake    Related to (etiology):   Physiological needs- BMT 9/27    Signs and Symptoms (as evidenced by):   Pt received BMT(day +12, increased Caloric/ Protein needs)      Nutrition Diagnosis Status:   Continues

## 2017-10-10 VITALS
HEART RATE: 85 BPM | SYSTOLIC BLOOD PRESSURE: 156 MMHG | WEIGHT: 260.56 LBS | TEMPERATURE: 98 F | BODY MASS INDEX: 39.49 KG/M2 | OXYGEN SATURATION: 92 % | RESPIRATION RATE: 18 BRPM | DIASTOLIC BLOOD PRESSURE: 85 MMHG | HEIGHT: 68 IN

## 2017-10-10 DIAGNOSIS — Z94.84 H/O AUTOLOGOUS STEM CELL TRANSPLANT: Primary | ICD-10-CM

## 2017-10-10 LAB
ALBUMIN SERPL BCP-MCNC: 2.3 G/DL
ALP SERPL-CCNC: 114 U/L
ALT SERPL W/O P-5'-P-CCNC: 24 U/L
ANION GAP SERPL CALC-SCNC: 9 MMOL/L
ANISOCYTOSIS BLD QL SMEAR: SLIGHT
AST SERPL-CCNC: 30 U/L
BASOPHILS # BLD AUTO: 0 K/UL
BASOPHILS NFR BLD: 0 %
BILIRUB SERPL-MCNC: 0.5 MG/DL
BLD PROD TYP BPU: NORMAL
BLOOD UNIT EXPIRATION DATE: NORMAL
BLOOD UNIT TYPE CODE: 5100
BLOOD UNIT TYPE: NORMAL
BUN SERPL-MCNC: 14 MG/DL
CALCIUM SERPL-MCNC: 8.6 MG/DL
CHLORIDE SERPL-SCNC: 115 MMOL/L
CO2 SERPL-SCNC: 18 MMOL/L
CODING SYSTEM: NORMAL
CREAT SERPL-MCNC: 1.9 MG/DL
DIFFERENTIAL METHOD: ABNORMAL
DISPENSE STATUS: NORMAL
EOSINOPHIL # BLD AUTO: 0 K/UL
EOSINOPHIL NFR BLD: 0 %
ERYTHROCYTE [DISTWIDTH] IN BLOOD BY AUTOMATED COUNT: 14.7 %
EST. GFR  (AFRICAN AMERICAN): 45.5 ML/MIN/1.73 M^2
EST. GFR  (NON AFRICAN AMERICAN): 39.4 ML/MIN/1.73 M^2
GLUCOSE SERPL-MCNC: 95 MG/DL
HCT VFR BLD AUTO: 23.6 %
HGB BLD-MCNC: 7.9 G/DL
LYMPHOCYTES # BLD AUTO: 0.4 K/UL
LYMPHOCYTES NFR BLD: 13.7 %
MAGNESIUM SERPL-MCNC: 2 MG/DL
MCH RBC QN AUTO: 29.9 PG
MCHC RBC AUTO-ENTMCNC: 33.5 G/DL
MCV RBC AUTO: 89 FL
MONOCYTES # BLD AUTO: 0.5 K/UL
MONOCYTES NFR BLD: 15.4 %
NEUTROPHILS # BLD AUTO: 2 K/UL
NEUTROPHILS NFR BLD: 70.9 %
NUM UNITS TRANS WBC-POOR PLATPHERESIS: NORMAL
PHOSPHATE SERPL-MCNC: 2.7 MG/DL
PLATELET # BLD AUTO: 28 K/UL
PLATELET BLD QL SMEAR: ABNORMAL
PMV BLD AUTO: 10.7 FL
POTASSIUM SERPL-SCNC: 3.5 MMOL/L
PROT SERPL-MCNC: 5.5 G/DL
RBC # BLD AUTO: 2.64 M/UL
SODIUM SERPL-SCNC: 142 MMOL/L
WBC # BLD AUTO: 2.92 K/UL

## 2017-10-10 PROCEDURE — 85025 COMPLETE CBC W/AUTO DIFF WBC: CPT

## 2017-10-10 PROCEDURE — 84100 ASSAY OF PHOSPHORUS: CPT

## 2017-10-10 PROCEDURE — 94640 AIRWAY INHALATION TREATMENT: CPT

## 2017-10-10 PROCEDURE — 97530 THERAPEUTIC ACTIVITIES: CPT

## 2017-10-10 PROCEDURE — A9155 ARTIFICIAL SALIVA: HCPCS | Performed by: NURSE PRACTITIONER

## 2017-10-10 PROCEDURE — 36589 REMOVAL TUNNELED CV CATH: CPT | Mod: ,,, | Performed by: INTERNAL MEDICINE

## 2017-10-10 PROCEDURE — 80053 COMPREHEN METABOLIC PANEL: CPT

## 2017-10-10 PROCEDURE — 83735 ASSAY OF MAGNESIUM: CPT

## 2017-10-10 PROCEDURE — 36430 TRANSFUSION BLD/BLD COMPNT: CPT

## 2017-10-10 PROCEDURE — 25000003 PHARM REV CODE 250: Performed by: INTERNAL MEDICINE

## 2017-10-10 PROCEDURE — P9037 PLATE PHERES LEUKOREDU IRRAD: HCPCS

## 2017-10-10 PROCEDURE — 94761 N-INVAS EAR/PLS OXIMETRY MLT: CPT

## 2017-10-10 PROCEDURE — 25000003 PHARM REV CODE 250

## 2017-10-10 PROCEDURE — 99232 SBSQ HOSP IP/OBS MODERATE 35: CPT | Mod: 24,,, | Performed by: INTERNAL MEDICINE

## 2017-10-10 PROCEDURE — 63600175 PHARM REV CODE 636 W HCPCS: Performed by: NURSE PRACTITIONER

## 2017-10-10 PROCEDURE — 25000003 PHARM REV CODE 250: Performed by: NURSE PRACTITIONER

## 2017-10-10 PROCEDURE — 36589 REMOVAL TUNNELED CV CATH: CPT

## 2017-10-10 PROCEDURE — 25000242 PHARM REV CODE 250 ALT 637 W/ HCPCS: Performed by: INTERNAL MEDICINE

## 2017-10-10 PROCEDURE — 99239 HOSP IP/OBS DSCHRG MGMT >30: CPT | Mod: ,,, | Performed by: INTERNAL MEDICINE

## 2017-10-10 RX ORDER — DIPHENHYDRAMINE HCL 25 MG
25 CAPSULE ORAL
Status: COMPLETED | OUTPATIENT
Start: 2017-10-10 | End: 2017-10-10

## 2017-10-10 RX ORDER — ACETAMINOPHEN 325 MG/1
650 TABLET ORAL
Status: COMPLETED | OUTPATIENT
Start: 2017-10-10 | End: 2017-10-10

## 2017-10-10 RX ORDER — HYDROCODONE BITARTRATE AND ACETAMINOPHEN 500; 5 MG/1; MG/1
TABLET ORAL
Status: DISCONTINUED | OUTPATIENT
Start: 2017-10-10 | End: 2017-10-10 | Stop reason: HOSPADM

## 2017-10-10 RX ORDER — POTASSIUM CHLORIDE 20 MEQ/1
20 TABLET, EXTENDED RELEASE ORAL DAILY
Status: DISCONTINUED | OUTPATIENT
Start: 2017-10-10 | End: 2017-10-10 | Stop reason: HOSPADM

## 2017-10-10 RX ORDER — ACYCLOVIR 800 MG/1
800 TABLET ORAL 2 TIMES DAILY
Qty: 60 TABLET | Refills: 5 | Status: SHIPPED | OUTPATIENT
Start: 2017-10-10 | End: 2018-01-05 | Stop reason: ALTCHOICE

## 2017-10-10 RX ORDER — LISINOPRIL 20 MG/1
TABLET ORAL
Qty: 60 TABLET | Refills: 0 | Status: SHIPPED | OUTPATIENT
Start: 2017-10-10 | End: 2018-01-05 | Stop reason: DRUGHIGH

## 2017-10-10 RX ORDER — LOPERAMIDE HYDROCHLORIDE 2 MG/1
2 CAPSULE ORAL 4 TIMES DAILY PRN
Qty: 30 CAPSULE | Refills: 1 | Status: SHIPPED | OUTPATIENT
Start: 2017-10-10 | End: 2017-10-20

## 2017-10-10 RX ORDER — NEBIVOLOL 5 MG/1
10 TABLET ORAL DAILY
Status: DISCONTINUED | OUTPATIENT
Start: 2017-10-10 | End: 2017-10-10 | Stop reason: HOSPADM

## 2017-10-10 RX ORDER — ONDANSETRON 8 MG/1
8 TABLET, ORALLY DISINTEGRATING ORAL EVERY 6 HOURS PRN
Qty: 30 TABLET | Refills: 2 | Status: SHIPPED | OUTPATIENT
Start: 2017-10-10 | End: 2018-03-08

## 2017-10-10 RX ORDER — LOPERAMIDE HYDROCHLORIDE 2 MG/1
2 CAPSULE ORAL 4 TIMES DAILY PRN
Status: DISCONTINUED | OUTPATIENT
Start: 2017-10-10 | End: 2017-10-10 | Stop reason: HOSPADM

## 2017-10-10 RX ADMIN — POTASSIUM CHLORIDE 20 MEQ: 1500 TABLET, EXTENDED RELEASE ORAL at 09:10

## 2017-10-10 RX ADMIN — CITALOPRAM HYDROBROMIDE 40 MG: 20 TABLET ORAL at 09:10

## 2017-10-10 RX ADMIN — LOPERAMIDE HYDROCHLORIDE 2 MG: 2 CAPSULE ORAL at 12:10

## 2017-10-10 RX ADMIN — FLUCONAZOLE 400 MG: 200 TABLET ORAL at 09:10

## 2017-10-10 RX ADMIN — Medication 30 ML: at 12:10

## 2017-10-10 RX ADMIN — ACYCLOVIR 800 MG: 200 CAPSULE ORAL at 09:10

## 2017-10-10 RX ADMIN — IPRATROPIUM BROMIDE AND ALBUTEROL SULFATE 3 ML: .5; 3 SOLUTION RESPIRATORY (INHALATION) at 07:10

## 2017-10-10 RX ADMIN — AMLODIPINE BESYLATE 5 MG: 5 TABLET ORAL at 09:10

## 2017-10-10 RX ADMIN — CIPROFLOXACIN HYDROCHLORIDE 500 MG: 500 TABLET, FILM COATED ORAL at 09:10

## 2017-10-10 RX ADMIN — PANTOPRAZOLE SODIUM 40 MG: 40 TABLET, DELAYED RELEASE ORAL at 09:10

## 2017-10-10 RX ADMIN — Medication 30 ML: at 06:10

## 2017-10-10 RX ADMIN — ACETAMINOPHEN 650 MG: 325 TABLET ORAL at 10:10

## 2017-10-10 RX ADMIN — DIPHENHYDRAMINE HYDROCHLORIDE 25 MG: 25 CAPSULE ORAL at 10:10

## 2017-10-10 RX ADMIN — NEBIVOLOL HYDROCHLORIDE 10 MG: 5 TABLET ORAL at 09:10

## 2017-10-10 NOTE — PLAN OF CARE
Problem: Patient Care Overview  Goal: Plan of Care Review  Outcome: Ongoing (interventions implemented as appropriate)  Plan of care reviewed with the patient at the beginning of the shift. Pt is day +13 of auto transplant for multiple myeloma. Pt has no complaints.   Diarrhea is much improvied. Two BMs overnight; however, both were soft/semi-formed. Will no longer give scheduled imodium. C Diff negative. Encouraged pt to use moist wipes. Skin is intact. Pt denies n/v. Pt with bilateral pneumonia. Pt on room air. He is dyspneic with exertion but it has also improved. Pt followed by ID. Pt afebrile overnight. Zosyn discontinued. Micafungin discontinued due to ELENA. Pt seen by nephrology. Serum Cr improved yesterday. Today's labs pending. Mucous membranes are intact. Mucositis resolving. He does complain of slight soreness in his throat but he refused dukes. Salt and soda rinses and saliva substitute provided. Poor PO intake. Oral intake encouraged. Fall precautions maintained. Pt remained free from falls and injury this shift. Bed locked in lowest position, side rails up x2, call light within reach. Instructed pt to call for assistance as needed. Pt verbalized understanding. Thrombocytopenic and neutropenic precautions maintained. Will continue to monitor.

## 2017-10-10 NOTE — PROGRESS NOTES
Discharge instructions given and explained to pt and pt's sister.  Pt. And sister both verbalized understanding with no further questions.  Right chest vascath removed per MD with wound care complete.  VS WDL.  Patient is awaiting verification of [facility].      ROAD TEST  O=SpO2 92% on RA  A=Ambulating around room and hallway  D=Vascath removed  T=Tolerating regular diet  E=Voids  S=Performs self care independently  T=Teaching on wounds care complete

## 2017-10-10 NOTE — ASSESSMENT & PLAN NOTE
- baseline sCr 0.8. Acutely orin to 2.6 on 10/7  - likely pre-renal etiology from diarrhea in setting of concurrent ACEi use. BP meds held and IVF given  - improving with supportive care   - okay to discharge from renal standpoint  - amlodipine restarted yesterday  - can restart nebivolol now  - continue to hold lisinopril until ELENA completely resolved  - post-ATN diuresis: allow patient to drink to thirst. May take up to a week to resolve.

## 2017-10-10 NOTE — SUBJECTIVE & OBJECTIVE
Interval History:   IVF stopped and amlodipine started yesterday afternoon. No events overnight. UOP >4L yesterday. Patient reports increased this since yesterday; drinking to match his output. Legs are still swollen. Planning for discharge today.     Review of patient's allergies indicates:   Allergen Reactions    Pcn [penicillins]      Unknown last dose as an infant     Current Facility-Administered Medications   Medication Frequency    0.9%  NaCl infusion (for blood administration) Q24H PRN    acetaminophen tablet 650 mg Q6H PRN    acyclovir capsule 800 mg BID    albuterol-ipratropium 2.5mg-0.5mg/3mL nebulizer solution 3 mL Q4H WAKE    alteplase injection 2 mg BID PRN    aluminum-magnesium hydroxide-simethicone 200-200-20 mg/5 mL suspension 30 mL Q6H PRN    amlodipine tablet 5 mg Daily    ciprofloxacin HCl tablet 500 mg Daily    citalopram tablet 40 mg Daily    dextromethorphan-guaifenesin  mg/5 ml liquid 10 mL Q4H PRN    diphenoxylate-atropine 2.5-0.025 mg per tablet 1 tablet QID PRN    duke's soln (benadryl 30 mL, mylanta 30 mL, lidocane 30 mL, nystatin 30 mL) 120 mL QID PRN    fluconazole tablet 400 mg Daily    heparin (porcine) injection 1,600 Units PRN    loperamide capsule 2 mg QID PRN    lorazepam injection 1 mg Q4H PRN    magnesium oxide tablet 400 mg Q4H PRN    magnesium oxide tablet 400 mg Q4H PRN    magnesium oxide tablet 800 mg Q4H PRN    nebivolol tablet 10 mg Daily    ondansetron disintegrating tablet 8 mg Q6H PRN    oxycodone immediate release tablet 5 mg Q6H PRN    pantoprazole EC tablet 40 mg Daily    potassium chloride CR capsule 20 mEq PRN    potassium chloride CR capsule 20 mEq Q2H PRN    potassium chloride CR capsule 20 mEq Q2H PRN    potassium chloride SA CR tablet 20 mEq Daily    potassium, sodium phosphates 280-160-250 mg packet 1 packet Q4H PRN    potassium, sodium phosphates 280-160-250 mg packet 2 packet Q4H PRN    promethazine (PHENERGAN) 12.5 mg in  dextrose 5 % 50 mL IVPB Q4H PRN    saliva substitute combo no.2 solution 30 mL QID    saliva substitute combo no.2 solution 30 mL Q4H PRN    sodium chloride 0.9% flush 10 mL PRN       Objective:     Vital Signs (Most Recent):  Temp: 98.4 °F (36.9 °C) (10/10/17 1115)  Pulse: 85 (10/10/17 1115)  Resp: 18 (10/10/17 1115)  BP: (!) 156/85 (10/10/17 1115)  SpO2: (!) 92 % (10/10/17 1115)  O2 Device (Oxygen Therapy): room air (10/10/17 1057) Vital Signs (24h Range):  Temp:  [97.9 °F (36.6 °C)-98.6 °F (37 °C)] 98.4 °F (36.9 °C)  Pulse:  [] 85  Resp:  [16-22] 18  SpO2:  [92 %-99 %] 92 %  BP: (153-170)/(77-90) 156/85     Weight: 118.2 kg (260 lb 9.3 oz) (10/10/17 0415)  Body mass index is 39.62 kg/m².  Body surface area is 2.38 meters squared.    I/O last 3 completed shifts:  In: 6515.7 [P.O.:4080; I.V.:2135.7; IV Piggyback:300]  Out: 5775 [Urine:5375; Stool:400]    Physical Exam   Constitutional: He is oriented to person, place, and time. He appears well-developed and well-nourished. No distress.   HENT:   Head: Normocephalic and atraumatic.   Eyes: Conjunctivae and EOM are normal.   Cardiovascular: Normal rate and regular rhythm.    Pulmonary/Chest: Effort normal and breath sounds normal.   Musculoskeletal: He exhibits edema. He exhibits no deformity.   Neurological: He is alert and oriented to person, place, and time.   Skin: Skin is warm and dry. He is not diaphoretic.       Significant Labs:  CBC:   Recent Labs  Lab 10/10/17  0422   WBC 2.92*   RBC 2.64*   HGB 7.9*   HCT 23.6*   PLT 28*   MCV 89   MCH 29.9   MCHC 33.5     CMP:   Recent Labs  Lab 10/10/17  0422   GLU 95   CALCIUM 8.6*   ALBUMIN 2.3*   PROT 5.5*      K 3.5   CO2 18*   *   BUN 14   CREATININE 1.9*   ALKPHOS 114   ALT 24   AST 30   BILITOT 0.5     All labs within the past 24 hours have been reviewed.     Significant Imaging:  Labs: Reviewed

## 2017-10-10 NOTE — PROGRESS NOTES
Discharge teaching done with patient and patient's caregivers on BMT unit. Pt's spouse and sister in law were present (both caregivers) and pt's additional caregiver was on speaker phone during entire conversation. Approximately 30 minutes spent on discussion of post-transplant guidelines including:  Low microbial diet, safe food handling, dining out, home sanitation, outpatient plan of care, progression of recovery of cells and immune system, ways to prevent infection, fatigue, ways to avoid bleeding, pet and plant exposure and care, returning to work, smoking and alcohol consumption, sexual activity, sexual desire and response, immunizations, traveling, nutrition, personal hygiene, hand washing, oral care, eye care, signs and symptoms to report immediately, and emotional changes post transplant.  Also discussed who to contact during business hours, after hours, and holidays.  Written materials supplied.  Patient and family given the opportunity to ask questions.  Verbalizes understanding.  All questions answered to their satisfaction.  Patient and family instructed to call with any questions or concerns.  Patient and caregivers were given handouts which reiterate all the above teaching. Pt will f/u with Dr. Buck in BMT clinic on 10/13/17 and will have outpatient PT. Will discharge pt to [facility] today.    Magdalena Miller DNP, NP  Hematology/Oncology

## 2017-10-10 NOTE — PT/OT/SLP PROGRESS
"Physical Therapy  Treatment    Arik Bobo   MRN: [id number]   Admitting Diagnosis: H/O autologous stem cell transplant    PT Received On: 10/10/17  PT Start Time: 0902     PT Stop Time: 0914    PT Total Time (min): 12 min       Billable Minutes:  Therapeutic Activity  12 minutes    Treatment Type: Treatment  PT/PTA: PT     PTA Visit Number: 0       General Precautions: Standard, fall  Orthopedic Precautions: N/A   Braces: N/A    Do you have any cultural, spiritual, Religion conflicts, given your current situation?: None stated     Subjective:  Communicated with RN prior to session.  Pt agreeable to PT session. Pt states "I think I'm leaving today. I am winded from that walk."     Pain/Comfort  Pain Rating 1: 0/10  Pain Rating Post-Intervention 1: 0/10    Objective:   Patient found with:  (No lines)    Functional Mobility:  Bed Mobility: Pt found standing in bathroom.     Transfers:  Sit <> Stand Assistance: Independent  Sit <> Stand Assistive Device: No Assistive Device    Gait:   Gait Distance: 600' with 1 standing rest breaks. SOB noted during ambulation.   Assistance 1: Independent  Gait Assistive Device: No device  Gait Pattern: reciprocal  Gait Deviation(s): decreased erickson, decreased step length, decreased stride length    Stairs:  Pt ascended/descend 3 stair(s) with No Assistive Device with left with Supervision or Set-up Assistance.     Therapeutic Activities and Exercises:  - Education on pursed lip breathing to maximize lung capacity      AM-PAC 6 CLICK MOBILITY  How much help from another person does this patient currently need?   1 = Unable, Total/Dependent Assistance  2 = A lot, Maximum/Moderate Assistance  3 = A little, Minimum/Contact Guard/Supervision  4 = None, Modified [place]/Independent    Turning over in bed (including adjusting bedclothes, sheets and blankets)?: 4  Sitting down on and standing up from a chair with arms (e.g., wheelchair, bedside commode, etc.): 4  Moving from " lying on back to sitting on the side of the bed?: 4  Moving to and from a bed to a chair (including a wheelchair)?: 4  Need to walk in hospital room?: 4  Climbing 3-5 steps with a railing?: 4  Total Score: 24    AM-PAC Raw Score CMS G-Code Modifier Level of Impairment Assistance   6 % Total / Unable   7 - 9 CM 80 - 100% Maximal Assist   10 - 14 CL 60 - 80% Moderate Assist   15 - 19 CK 40 - 60% Moderate Assist   20 - 22 CJ 20 - 40% Minimal Assist   23 CI 1-20% SBA / CGA   24 CH 0% Independent/ Mod I     Patient left up in chair with all lines intact and call button in reach.    Assessment:  Arik Bobo is a 52 y.o. male with a medical diagnosis of H/O autologous stem cell transplant. Pt progressing towards goals, but not at PLOF. Pt's main limitation is decreased endurance with impaired CP response to activity.. Pt would benefit from continued PT services to improve overall functional mobility. Recommend d/c to outpatient PT to maximize functional independence.    Rehab identified problem list/impairments: Rehab identified problem list/impairments: impaired endurance, impaired cardiopulmonary response to activity, impaired functional mobilty    Rehab potential is good.    Activity tolerance: Good    Discharge recommendations: Outpatient PT    Barriers to discharge: Barriers to Discharge: None    Equipment recommendations: Equipment Needed After Discharge: none     GOALS:    Physical Therapy Goals        Problem: Physical Therapy Goal    Goal Priority Disciplines Outcome Goal Variances Interventions   Physical Therapy Goal     PT/OT, PT Ongoing (interventions implemented as appropriate)     Description:  Goals to be met by: 10/26/17     Patient will increase functional independence with mobility by performin. Sit to stand transfer with [place]  2. Bed to chair transfer with [place]   3. Gait  x 500 feet with [place]   4. Ascend/descend 3 stair with right Handrails Modified  [place]                    PLAN:    Patient to be seen 1 x/week  to address the above listed problems via gait training, therapeutic activities, therapeutic exercises  Plan of Care expires: 10/26/17  Plan of Care reviewed with: patient         Luana Delatorre, PT  10/10/2017

## 2017-10-10 NOTE — PROGRESS NOTES
[facility]  Nephrology  Progress Note    Patient Name: Arik Bobo  MRN: [id number]  Admission Date: 9/25/2017  Hospital Length of Stay: 15 days  Attending Provider: Blake Buck MD   Primary Care Physician: Avinash Lal MD  Principal Problem:H/O autologous stem cell transplant    Subjective:     HPI: Mr. Bobo is a 51 yo WM with HTN, h/o DVT x2, and IgG kappa multiple myeloma admitted on 9/25/17 for stem cell transplant. He was diagnosed with MM in 2015 as part of an anemia evaluation. He received lenalidomide/dexamethasone in 2015. He subsequently completed 4 cycles of CyBorD. Restaging marrow 8/21/17 showed partial remission and plans were made to proceed with transplant. AutoSCT was performed 9/27/17. Post-procedure course has been complicated by nausea, diarrhea, and neutropenic fever. ID is following; presumed source of infection is bilateral PNA. He was initially treated with vanc and cefepime but is currently on zosyn which was started yesterday. He is also being treated empirically with micafungin and remains on ppx acyclovir which was started 9/26. Patient's sCr has remained 0.8 throughout his stay but acutely orin to 2.2 this morning. He denies urinary retention. He has not received any IV contrast. No new medications except for Zosyn. No supratherapeutic vanc troughs. He has not had any hypotensive episodes however his SBP was 140-160s the first several days of his admission and now is in the 110s. Patient developed diarrhea after his transplant. He is being treated with loperamide. He reports diarrhea started to improve but was quite severe yesterday. Stool documented as 1.4L yesterday. He reports SOB since his transplant and is currently wearing supplemental oxygen. After reflection he admits to decreased UOP today. He also reports seeing some blood in his urine that started around the same time as his transplant. He denies any other complaints.   Last 24 hours: In  4.6L, UOP 625cc, stool 1.4L.     Interval History:   IVF stopped and amlodipine started yesterday afternoon. No events overnight. UOP >4L yesterday. Patient reports increased this since yesterday; drinking to match his output. Legs are still swollen. Planning for discharge today.     Review of patient's allergies indicates:   Allergen Reactions    Pcn [penicillins]      Unknown last dose as an infant     Current Facility-Administered Medications   Medication Frequency    0.9%  NaCl infusion (for blood administration) Q24H PRN    acetaminophen tablet 650 mg Q6H PRN    acyclovir capsule 800 mg BID    albuterol-ipratropium 2.5mg-0.5mg/3mL nebulizer solution 3 mL Q4H WAKE    alteplase injection 2 mg BID PRN    aluminum-magnesium hydroxide-simethicone 200-200-20 mg/5 mL suspension 30 mL Q6H PRN    amlodipine tablet 5 mg Daily    ciprofloxacin HCl tablet 500 mg Daily    citalopram tablet 40 mg Daily    dextromethorphan-guaifenesin  mg/5 ml liquid 10 mL Q4H PRN    diphenoxylate-atropine 2.5-0.025 mg per tablet 1 tablet QID PRN    duke's soln (benadryl 30 mL, mylanta 30 mL, lidocane 30 mL, nystatin 30 mL) 120 mL QID PRN    fluconazole tablet 400 mg Daily    heparin (porcine) injection 1,600 Units PRN    loperamide capsule 2 mg QID PRN    lorazepam injection 1 mg Q4H PRN    magnesium oxide tablet 400 mg Q4H PRN    magnesium oxide tablet 400 mg Q4H PRN    magnesium oxide tablet 800 mg Q4H PRN    nebivolol tablet 10 mg Daily    ondansetron disintegrating tablet 8 mg Q6H PRN    oxycodone immediate release tablet 5 mg Q6H PRN    pantoprazole EC tablet 40 mg Daily    potassium chloride CR capsule 20 mEq PRN    potassium chloride CR capsule 20 mEq Q2H PRN    potassium chloride CR capsule 20 mEq Q2H PRN    potassium chloride SA CR tablet 20 mEq Daily    potassium, sodium phosphates 280-160-250 mg packet 1 packet Q4H PRN    potassium, sodium phosphates 280-160-250 mg packet 2 packet Q4H PRN     promethazine (PHENERGAN) 12.5 mg in dextrose 5 % 50 mL IVPB Q4H PRN    saliva substitute combo no.2 solution 30 mL QID    saliva substitute combo no.2 solution 30 mL Q4H PRN    sodium chloride 0.9% flush 10 mL PRN       Objective:     Vital Signs (Most Recent):  Temp: 98.4 °F (36.9 °C) (10/10/17 1115)  Pulse: 85 (10/10/17 1115)  Resp: 18 (10/10/17 1115)  BP: (!) 156/85 (10/10/17 1115)  SpO2: (!) 92 % (10/10/17 1115)  O2 Device (Oxygen Therapy): room air (10/10/17 1057) Vital Signs (24h Range):  Temp:  [97.9 °F (36.6 °C)-98.6 °F (37 °C)] 98.4 °F (36.9 °C)  Pulse:  [] 85  Resp:  [16-22] 18  SpO2:  [92 %-99 %] 92 %  BP: (153-170)/(77-90) 156/85     Weight: 118.2 kg (260 lb 9.3 oz) (10/10/17 0415)  Body mass index is 39.62 kg/m².  Body surface area is 2.38 meters squared.    I/O last 3 completed shifts:  In: 6515.7 [P.O.:4080; I.V.:2135.7; IV Piggyback:300]  Out: 5775 [Urine:5375; Stool:400]    Physical Exam   Constitutional: He is oriented to person, place, and time. He appears well-developed and well-nourished. No distress.   HENT:   Head: Normocephalic and atraumatic.   Eyes: Conjunctivae and EOM are normal.   Cardiovascular: Normal rate and regular rhythm.    Pulmonary/Chest: Effort normal and breath sounds normal.   Musculoskeletal: He exhibits edema. He exhibits no deformity.   Neurological: He is alert and oriented to person, place, and time.   Skin: Skin is warm and dry. He is not diaphoretic.       Significant Labs:  CBC:   Recent Labs  Lab 10/10/17  0422   WBC 2.92*   RBC 2.64*   HGB 7.9*   HCT 23.6*   PLT 28*   MCV 89   MCH 29.9   MCHC 33.5     CMP:   Recent Labs  Lab 10/10/17  0422   GLU 95   CALCIUM 8.6*   ALBUMIN 2.3*   PROT 5.5*      K 3.5   CO2 18*   *   BUN 14   CREATININE 1.9*   ALKPHOS 114   ALT 24   AST 30   BILITOT 0.5     All labs within the past 24 hours have been reviewed.     Significant Imaging:  Labs: Reviewed    Assessment/Plan:     ELENA (acute kidney injury)    - baseline  sCr 0.8. Acutely orin to 2.6 on 10/7  - likely pre-renal etiology from diarrhea in setting of concurrent ACEi use. BP meds held and IVF given  - improving with supportive care   - okay to discharge from renal standpoint  - amlodipine restarted yesterday  - can restart nebivolol now  - continue to hold lisinopril until ELENA completely resolved  - post-ATN diuresis: allow patient to drink to thirst. May take up to a week to resolve.             Juliane Garcia, PGY-5  Nephrology Fellow  [facility]  Pager: [phone]  I have reviewed and concur with the Resident's history, physical, assessment, and plan. I have personally interviewed and examined the patient at bedside.

## 2017-10-10 NOTE — PROCEDURES
DATE OF PROCEDURE: 10/10/17    PROCEDURE TYPE: Removal of right Internal Jugular Vein tunneled dialysis catheter.     INDICATION: s/p stem cell transplant    PROCEDURE NOTE: After informed consent was obtained, the area of Mr. Bobo's catheter and right chest/neck were sterilely prepped and draped in usual fashion. Anesthesia was obtained with 2% lidocaine with epinephrine, and the subcutaneous cuff was blunt dissected free. The catheter was then removed in total, and a compression dressing was applied to the exit site. Mr. Bobo tolerated the procedure well. There were no immediate complications. Estimated blood loss was less than 1 mL. No sedation was given.

## 2017-10-10 NOTE — DISCHARGE SUMMARY
"[facility]  Hematology  Bone Marrow Transplant  Discharge Summary      Patient Name: Arik Bobo  MRN: [id number]  Admission Date: 9/25/2017  Hospital Length of Stay: 15 days  Discharge Date and Time:  10/10/2017 10:57 AM  Attending Physician: Blake Buck MD   Discharging Provider: Magdalena Miller NP  Primary Care Provider: Avinash Lal MD    HPI: KPS 90%. Mr. Bobo is here with his sister for follow up of IgG kappa multiple in preparation for PBSCT consolidation with admit to inpatient today. He reports a slight "tickle in throat" and his temp is 99.6. He is feeling well overall and is ready for transplant.    Myeloma History: He was noted to develop a progressive anemia in early 2015.  In retrospect, his total protein has been at the upper end of normal with hematocrit of 39 in 10/2013.  At the time of a total hip arthroplasty 11/2014 the marrow resected from his right femoral head did not show abnormalities. During that hospital stay he underwent an anemia workup showing iron 20, TIBC 234 with 12% saturation and normal ferritin at 103.  Subsequent colonoscopy showed multiple benign polyps and one at the appejndiceal lumen that resulted in appendectomy in 1/2015.    SPEP showed a monoclonal protein 2.9 g/dL with IgG elevated at 5677 and suppressed IgA and IgM, calcium 9, creatinine 0.2.  Bone survey did not find lytic lesions and kappa:lambda light chain ratio was over 100.  Albumin 3.2 (5/1) and beta-2 microglobin was elevated, though I do not know the value.  Marrow 4/14/15 at which time his hemoglobin was 9.6 with MCV 87 showed a 50% cellular marrow with 10% plasma cell infiltrated with atypical forms and a left shifted granulocyte population.   staining showed up to 50% plasma cells in some areas but kappa and lambda staining showed a mixture of cells without clear clonality.  Plasma cells were kappa restricted on flow cytometry (5.7% population).  No stainable iron " was present.  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  He was tried on IV iron with some improvement but not resolution of his anemia.    Given elevated light chain ratio over 100 he was formally diagnosed with myeloma and started on lenalidomide/dexamethasone in late 6/2015 which he continued until 12/2015 when a repeat bone marrow 12/3/15 showed a 60-70% cellular marrow with 20% kappa restricted plasma cells.  His protein levels were under good control with free kappa light chain 2.98 mg/dL and IgG kappa M-spike 1 g/dL.  Repeat marrow done 4/5/16 showed a 30-40% cellular marrow with a 17% kappa-restricted plasma cell population and no storage iron.  Hence, he was started on iron with resolution of his anemia, which had been the only feature qualifying him for the diagnosis of myeloma.  Hence, he was reclassified smoldering myeloma and has been followed since that time.    He developed two episodes of DVT in 7/2015 and 2/2017 was on apixaban.    He was seeing Dr. Zepeda who had been treating him primarily.  He completed hip replacement surgery and has healed well.     Procedure(s) (LRB):  PERMCATH REMOVAL-TUNNELED CVC REMOVAL (Right)     Hospital Course: - 9/25/17: Admitted (day -2). IVFs to begin with admission. No acute issues on admission  - 9/26/17: High dose Melphalan given Day -1. Complained of chills and stuffy nose last night, but was afebrile with stable VS.   - 9./27/17: Auto SCT to be done on day 0 at 13:30. 5 bags with a CD 34 count of 4.71 x 10^6/kg. Afebrile VSS  9/28/17: Day +1 Auto SCT for MM. Tolerated transplant well. Complains of a productive cough after transplant and had some nausea this morning which was relived by zofran.   - 10/3/17: Day +6, with some burning with urination, UA done with 1+ occult blood, negative nitrites and leukocytes. With continued n/v controlled with ativan and states diarrhea improved (cdiff negative, on  imodium prn). Started Dukes for possible mucositis. Pt spiked temp of 101. Blood cx x 2, chest xray, urine cx all ordered. Cefepime started.   - 10/4/17: Day +7, with continued fever, added vanc. Ordered pa/lateral chest xray   - 10/5/17: Day + 8 HD Melphalan after auto SCT. Remains febrile over the last 24 hours with T Max 103.5. CXR appears to show a bilateral pneumonia. Remains on vancomycin and cefepime per Dr. Brennan with ID oscar to keep same antibiotics as fever curve has improved today. 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 Patient complained of sore throat. C. Diff negative from 10/4. Transfused platelets  - 10/6/17: day +9; tmax 101.9 last night; re-pan-cultured. Now on zosyn and donnell; stopped vanc. Feeling much better and breathing better. Diarrhea continues and tenderness with swallowing due to mouth sore continues. Crypto negative, other fungal markers in process. Transfused platelets   - 10/9/17: Had increase in creatinine over the weekend. Nephrology was consulted. Unsure of reason. Spontaneously trending down from 2.6 to 2.2. Fungal markers negative. Will ween off O2 today. ANC 1199, engrafted. Will d/c neupogen. Zosyn will stop after today's dose. Donnell d/c'd. Will likely discharge to [facility] or [facility] tomorrow or Wednesday.   - 10/10/17: Day +13, day of discharge. Afebrile since 10/5/17. Diarrhea improved, stools now soft/semi formed--transitioned to prn imodium. Dyspnea only on exertion. Sats high 90s on RA. Restarted home amlodipine and nebivolol due to HTN and as creatinine spontaneously improving. Off IVFs. Mucositis improving. ANC 2070. Will f/u with Dr. Buck in clinic on 10/13/17. Discharge today after line removed by nephro access (platelet transfusion to be given first).     Discharged summary: Pt admitted on 9/25/17 (day -2) for auto SCT. Received high dose melphalan on 9/26/17 (day -1) without issue. Had auto SCT on 9/27/17 (day 0) 5 bags with a CD 34 count of 4.71 x  10^6/kg without issue except cough and nausea the day after. He did report burning with urination on day +6. UA with occult blood but negative for UTI. Nausea was controlled with antiemetics. Mucositis controlled with salt/soda. Pt with diarrhea during hospital stay following melphalan. Cdiff was negative and imodium and lomotil given and weened off. Stools now semi formed/soft. Pt spiked fever on 10/13/17 (day +6) and was pancultured and started on cefepime. Fever persisted on 10/5/17 (day +8) and pt was then started on vanc. Repeat chest xray done which showed bilateral PNA. Fungal markers were done and were negative. Pt spiked another fever that same evening and diflucan was changed to donnell and vanc was changed to zosyn. Pt remained afebrile after 10/5/17 at 2345. Overall, pt received the following antibx: cefepime from 10/3/17 to 10/5/17, vancomycin from 10/4/17 to 10/6/17, zosyn from 10/5/17 to 10/9/17. Infectious disease was following during stay and signed off on 10/8/17. Pt had a spike in his creatinine from 0.8 to 2.6 on 10/7/17. This was of unsure etiology. Nephrology was consulted and pt's BP meds were held. Restarted amlodipine and nebivolol on 10/9/17 and 10/10/17. Pt with dyspnea and hypoxia as well and required 3L O2 via NC. This was weened off and pt was satting high 90s by the day of discharge. Pt with residual dyspnea on exertion during ambulation, ordered outpatient PT per new PT recs. Pt engrafted on 10/9/17 (day +12) with an ANC of 1199. He will be discharged today 10/10/17 (day +13) after platelet transfusion and line removal by nephro access. Discharge teaching completed by NP and pharmacist. Meds delivered to bedside. Pt will f/u with Dr. Buck on 10/13/17 in BMT clinic.    Consults         Status Ordering Provider     Inpatient consult to Dietary  Once     Provider:  (Not yet assigned)    Completed NITA NAVARRO     Inpatient consult to Infectious Diseases  Once     Provider:  (Not yet  assigned)    Completed URIEL HORTON     Inpatient consult to Nephrology  Once     Provider:  (Not yet assigned)    Completed BRENNEN BABB          Significant Diagnostic Studies: Labs:   CMP   Recent Labs  Lab 10/09/17  0359 10/10/17  0422    142   K 3.7 3.5   * 115*   CO2 17* 18*    95   BUN 17 14   CREATININE 2.2* 1.9*   CALCIUM 8.1* 8.6*   PROT 5.3* 5.5*   ALBUMIN 2.1* 2.3*   BILITOT 0.5 0.5   ALKPHOS 110 114   AST 25 30   ALT 22 24   ANIONGAP 10 9   ESTGFRAFRICA 38.1* 45.5*   EGFRNONAA 33.0* 39.4*   , CBC   Recent Labs  Lab 10/09/17  0359 10/10/17  0422   WBC 1.62* 2.92*   HGB 7.6* 7.9*   HCT 22.8* 23.6*   PLT 14* 28*    and All labs within the past 24 hours have been reviewed    Physical Exam   Constitutional: He is oriented to person, place, and time. He appears well-developed and well-nourished. No distress.   Weened off O2 since 10/9/18; continues to sat high 90s on RA   HENT:   Head: Normocephalic and atraumatic.   Nose: Nose normal.   Mouth/Throat: Oral lesions (mucositis much improved) present.   Eyes: Pupils are equal, round, and reactive to light.   Neck: Neck supple.   Cardiovascular: Normal rate, regular rhythm and normal heart sounds.    Trace edema to BLE   Pulmonary/Chest: Effort normal. He has decreased breath sounds (improving)  Abdominal: Soft. Bowel sounds are normal. He exhibits no distension. There is no tenderness (none noted on exam).   Musculoskeletal: He exhibits no edema.   Neurological: He is alert and oriented to person, place, and time.   Skin: Skin is warm and dry. No rash noted.   Psychiatric: He has a normal mood and affect. His behavior is normal.     Pending Diagnostic Studies:     None        Final Active Diagnoses:    Diagnosis Date Noted POA    PRINCIPAL PROBLEM:  H/O autologous stem cell transplant [Z94.84]  Not Applicable    Multiple myeloma in remission [C90.01] 05/18/2015 Yes    Neutropenic fever [D70.9, R50.81] 10/03/2017 No    Pancytopenia  due to chemotherapy [D61.810] 09/27/2017 No    Chemotherapy induced diarrhea [K52.1, T45.1X5A] 10/02/2017 No    Hypertension, essential [I10] 09/25/2017 Yes    Hyperlipidemia [E78.5] 09/25/2017 Yes    Depression [F32.9] 09/25/2017 Yes    Burning with urination [R30.0] 10/03/2017 No    ELENA (acute kidney injury) [N17.9] 10/07/2017 No      Problems Resolved During this Admission:    Diagnosis Date Noted Date Resolved POA    Cytopenia [D75.9] 09/25/2017 09/26/2017 Yes    Diarrhea of presumed infectious origin [A09] 09/29/2017 10/01/2017 No    History of auto stem cell transplant [Z94.84] 09/28/2017 10/03/2017 Not Applicable    Anemia due to chemotherapy [D64.81, T45.1X5A] 09/26/2017 09/27/2017 Yes    Chemotherapy-induced thrombocytopenia [D69.59, T45.1X5A] 09/26/2017 09/27/2017 Yes      Discharged Condition: good    Disposition: Home or Self Care    Follow Up: with Dr. Buck in BMT clinic on 10/13/17    Patient Instructions:     CBC auto differential   Standing Status: Future  Standing Exp. Date: 12/09/18     Comprehensive metabolic panel   Standing Status: Future  Standing Exp. Date: 12/09/18     Magnesium   Standing Status: Future  Standing Exp. Date: 12/09/18     Phosphorus   Standing Status: Future  Standing Exp. Date: 12/09/18     Referral to OutPatient  Physical therapy   Referral Priority: Routine Referral Type: Physical Medicine   Referral Reason: Specialty Services Required    Requested Specialty: Physical Therapy    Number of Visits Requested: 1      Diet general     Activity as tolerated     Call MD for:  increased confusion or weakness     Call MD for:  persistent dizziness, light-headedness, or visual disturbances     Call MD for:  worsening rash     Call MD for:  severe persistent headache     Call MD for:  difficulty breathing or increased cough     Call MD for:  redness, tenderness, or signs of infection (pain, swelling, redness, odor or green/yellow discharge around incision site)     Call  MD for:  severe uncontrolled pain     Call MD for:  persistent nausea and vomiting or diarrhea     Call MD for:  temperature >100.4       Medications:  Reconciled Home Medications:   Current Discharge Medication List      START taking these medications    Details   acyclovir (ZOVIRAX) 800 MG Tab Take 1 tablet (800 mg total) by mouth 2 (two) times daily.  Qty: 60 tablet, Refills: 5      loperamide (IMODIUM) 2 mg capsule Take 1 capsule (2 mg total) by mouth 4 (four) times daily as needed for Diarrhea.  Qty: 30 capsule, Refills: 1      ondansetron (ZOFRAN-ODT) 8 MG TbDL Take 1 tablet (8 mg total) by mouth every 6 (six) hours as needed.  Qty: 30 tablet, Refills: 2    Associated Diagnoses: Multiple myeloma in remission; Stem cell transplant candidate         CONTINUE these medications which have CHANGED    Details   lisinopril (PRINIVIL,ZESTRIL) 20 MG tablet Hold until NP or MD says otherwise. Awaiting complete resolution of ELENA  Qty: 60 tablet, Refills: 0         CONTINUE these medications which have NOT CHANGED    Details   amlodipine (NORVASC) 5 MG tablet Take 5 mg by mouth once daily.      BYSTOLIC 10 mg Tab 10 mg once daily.   Refills: 2      citalopram (CELEXA) 40 MG tablet Take 40 mg by mouth.      SIMVASTATIN ORAL Take by mouth.             Magdalena Miller, ROMAINE  Bone Marrow Transplant  [facility]

## 2017-10-10 NOTE — PLAN OF CARE
Problem: Physical Therapy Goal  Goal: Physical Therapy Goal  Goals to be met by: 10/26/17     Patient will increase functional independence with mobility by performin. Sit to stand transfer with [place]  2. Bed to chair transfer with [place]   3. Gait  x 500 feet with [place]   4. Ascend/descend 3 stair with right Handrails Modified [place]   Outcome: Ongoing (interventions implemented as appropriate)  Arik Bobo is a 52 y.o. male with a medical diagnosis of H/O autologous stem cell transplant. Pt progressing towards goals, but not at PLOF. Pt's main limitation is decreased endurance with impaired CP response to activity.. Pt would benefit from continued PT services to improve overall functional mobility. Recommend d/c to outpatient PT to maximize functional independence.      Luana Delatorre DPT, PT  10/10/2017

## 2017-10-10 NOTE — PLAN OF CARE
MDR's with Dr Buck.  Patient is day+13 post his autoSCT.  His counts have recovered and is tolerating PO.  Now on RA.  Planning to d/c to the [facility] today after line removal.  CM reviewed f/u needs with the patient.  Orders for PO PT placed.  No other needs anticipated.  Will continue to follow.    Future Appointments  Date Time Provider Department Center   10/13/2017 10:00 AM LAB, HEMONC CANCER [facility] LAB  Ehsan Balbuena   10/13/2017 11:20 AM Blake Buck MD [facility]hs[facility]ena        10/10/17 1226   Final Note   Assessment Type Final Discharge Note   Discharge Disposition ([facility])   What phone number can be called within the next 1-3 days to see how you are doing after discharge? ([phone])   Hospital Follow Up  Appt(s) scheduled? Yes   Discharge plans and expectations educations in teach back method with documentation complete? Yes   Right Care Referral Info   Post Acute Recommendation (OP PT)

## 2017-10-10 NOTE — PROGRESS NOTES
Spoke with Waqar at [facility] ([phone]). Reservation has been confirmed for stay from 10/10/17 to 10/27/17. Pt. Aware of the plan. No other needs identified. Will follow.

## 2017-10-11 LAB — BACTERIA BLD CULT: NORMAL

## 2017-10-13 ENCOUNTER — LAB VISIT (OUTPATIENT)
Dept: LAB | Facility: HOSPITAL | Age: 52
End: 2017-10-13
Attending: INTERNAL MEDICINE
Payer: COMMERCIAL

## 2017-10-13 ENCOUNTER — OFFICE VISIT (OUTPATIENT)
Dept: HEMATOLOGY/ONCOLOGY | Facility: CLINIC | Age: 52
End: 2017-10-13
Payer: COMMERCIAL

## 2017-10-13 VITALS
HEART RATE: 77 BPM | TEMPERATURE: 99 F | DIASTOLIC BLOOD PRESSURE: 85 MMHG | BODY MASS INDEX: 37.86 KG/M2 | HEIGHT: 68 IN | SYSTOLIC BLOOD PRESSURE: 168 MMHG | WEIGHT: 249.81 LBS

## 2017-10-13 DIAGNOSIS — N17.9 AKI (ACUTE KIDNEY INJURY): ICD-10-CM

## 2017-10-13 DIAGNOSIS — Z94.84 H/O AUTOLOGOUS STEM CELL TRANSPLANT: ICD-10-CM

## 2017-10-13 DIAGNOSIS — C90.01 MULTIPLE MYELOMA IN REMISSION: ICD-10-CM

## 2017-10-13 DIAGNOSIS — C90.01 MULTIPLE MYELOMA IN REMISSION: Primary | ICD-10-CM

## 2017-10-13 PROBLEM — T45.1X5A CHEMOTHERAPY INDUCED DIARRHEA: Status: RESOLVED | Noted: 2017-10-02 | Resolved: 2017-10-13

## 2017-10-13 PROBLEM — D61.810 PANCYTOPENIA DUE TO CHEMOTHERAPY: Status: RESOLVED | Noted: 2017-09-27 | Resolved: 2017-10-13

## 2017-10-13 PROBLEM — R50.81 NEUTROPENIC FEVER: Status: RESOLVED | Noted: 2017-10-03 | Resolved: 2017-10-13

## 2017-10-13 PROBLEM — K52.1 CHEMOTHERAPY INDUCED DIARRHEA: Status: RESOLVED | Noted: 2017-10-02 | Resolved: 2017-10-13

## 2017-10-13 PROBLEM — D70.9 NEUTROPENIC FEVER: Status: RESOLVED | Noted: 2017-10-03 | Resolved: 2017-10-13

## 2017-10-13 LAB
ALBUMIN SERPL BCP-MCNC: 2.6 G/DL
ALP SERPL-CCNC: 112 U/L
ALT SERPL W/O P-5'-P-CCNC: 42 U/L
ANION GAP SERPL CALC-SCNC: 12 MMOL/L
ANISOCYTOSIS BLD QL SMEAR: SLIGHT
AST SERPL-CCNC: 46 U/L
BASOPHILS # BLD AUTO: ABNORMAL K/UL
BASOPHILS NFR BLD: 0 %
BILIRUB SERPL-MCNC: 0.6 MG/DL
BUN SERPL-MCNC: 9 MG/DL
CALCIUM SERPL-MCNC: 8.4 MG/DL
CHLORIDE SERPL-SCNC: 109 MMOL/L
CO2 SERPL-SCNC: 22 MMOL/L
CREAT SERPL-MCNC: 1.5 MG/DL
DIFFERENTIAL METHOD: ABNORMAL
EOSINOPHIL # BLD AUTO: ABNORMAL K/UL
EOSINOPHIL NFR BLD: 0 %
ERYTHROCYTE [DISTWIDTH] IN BLOOD BY AUTOMATED COUNT: 14.6 %
EST. GFR  (AFRICAN AMERICAN): >60 ML/MIN/1.73 M^2
EST. GFR  (NON AFRICAN AMERICAN): 52.4 ML/MIN/1.73 M^2
GLUCOSE SERPL-MCNC: 126 MG/DL
HCT VFR BLD AUTO: 27.5 %
HGB BLD-MCNC: 9.2 G/DL
HYPOCHROMIA BLD QL SMEAR: ABNORMAL
LYMPHOCYTES # BLD AUTO: ABNORMAL K/UL
LYMPHOCYTES NFR BLD: 7 %
MAGNESIUM SERPL-MCNC: 1.2 MG/DL
MCH RBC QN AUTO: 29.1 PG
MCHC RBC AUTO-ENTMCNC: 33.5 G/DL
MCV RBC AUTO: 87 FL
METAMYELOCYTES NFR BLD MANUAL: 4 %
MONOCYTES # BLD AUTO: ABNORMAL K/UL
MONOCYTES NFR BLD: 10 %
NEUTROPHILS NFR BLD: 77 %
NEUTS BAND NFR BLD MANUAL: 2 %
PHOSPHATE SERPL-MCNC: 3.7 MG/DL
PLATELET # BLD AUTO: 80 K/UL
PLATELET BLD QL SMEAR: ABNORMAL
PMV BLD AUTO: 9.6 FL
POTASSIUM SERPL-SCNC: 3.7 MMOL/L
PROT SERPL-MCNC: 6.1 G/DL
RBC # BLD AUTO: 3.16 M/UL
SODIUM SERPL-SCNC: 143 MMOL/L
WBC # BLD AUTO: 3.94 K/UL

## 2017-10-13 PROCEDURE — 99213 OFFICE O/P EST LOW 20 MIN: CPT | Mod: PBBFAC | Performed by: INTERNAL MEDICINE

## 2017-10-13 PROCEDURE — 80053 COMPREHEN METABOLIC PANEL: CPT

## 2017-10-13 PROCEDURE — 36415 COLL VENOUS BLD VENIPUNCTURE: CPT

## 2017-10-13 PROCEDURE — 83735 ASSAY OF MAGNESIUM: CPT

## 2017-10-13 PROCEDURE — 85027 COMPLETE CBC AUTOMATED: CPT

## 2017-10-13 PROCEDURE — 84100 ASSAY OF PHOSPHORUS: CPT

## 2017-10-13 PROCEDURE — 85007 BL SMEAR W/DIFF WBC COUNT: CPT

## 2017-10-13 PROCEDURE — 99999 PR PBB SHADOW E&M-EST. PATIENT-LVL III: CPT | Mod: PBBFAC,,, | Performed by: INTERNAL MEDICINE

## 2017-10-13 PROCEDURE — 99213 OFFICE O/P EST LOW 20 MIN: CPT | Mod: S$GLB,,, | Performed by: INTERNAL MEDICINE

## 2017-10-13 NOTE — LETTER
October 13, 2017        Avinash Lal MD  [address]  [facility]  [facility] [address]             [facility]  [address]  [facility] [address]  Phone: [phone]   Patient: Arik Bobo   MR Number: [id number]   YOB: 1965   Date of Visit: 10/13/2017       Dear Dr. Lal:    Thank you for referring Arik Bobo to me for evaluation. Below are the relevant portions of my assessment and plan of care.        1. Multiple myeloma in remission    2. H/O autologous stem cell transplant    3. ELENA (acute kidney injury)          Multiple myeloma in partial remission  Completed 4 cycles of CyBorD   Was treated with lenalidomide and dex  Restaging marrow 8/21/17 showed a 10-50% cellular marrow with trilineage hematopoiesis and 5% residual  kappa-restricted plasma cells (6% by morphology).  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  Achieved a partial remission (PR1) prior to transplant.    65% EF on 8/29/17  Recovering well at day 16 with  Normal WBC, platelets 80K and hemoglobin 9.2.  Still with slight dyspnea post pneumonia.    Renal/Hypertension  Improving creatinine to 1.5 with good fluid intake and urine output.  Continue home amlodipine, bystolic and lisinopril     Hyperlipidemia  Will resume statin     Depression  Continue home Celexa     Hx of Gout  Pt suffers from gout and is not currently on uric acid suppression medication.  This may have contributed to his hip degeneration.    He will return in 2 weeks for follow up and all of his questions were answered in the clinic today.    If you have questions, please do not hesitate to call me. I look forward to following Arik along with you.    Sincerely,      Blake Buck MD           CC  No Recipients

## 2017-10-24 ENCOUNTER — TELEPHONE (OUTPATIENT)
Dept: TRANSPLANT | Facility: HOSPITAL | Age: 52
End: 2017-10-24

## 2017-10-24 NOTE — TELEPHONE ENCOUNTER
Received call from pt's pharmacy regarding lisinopril. Will continue to hold until ELENA completely resolved. Last creatinine was 1.5. Will continue to hold at this time. Will restart once safe per MD. Home dose was 20 mg daily.    Magdalena Miller DNP, NP  Hematology/Oncology

## 2017-10-27 ENCOUNTER — OFFICE VISIT (OUTPATIENT)
Dept: HEMATOLOGY/ONCOLOGY | Facility: CLINIC | Age: 52
End: 2017-10-27
Payer: COMMERCIAL

## 2017-10-27 VITALS
DIASTOLIC BLOOD PRESSURE: 77 MMHG | WEIGHT: 237.88 LBS | HEIGHT: 68 IN | SYSTOLIC BLOOD PRESSURE: 136 MMHG | TEMPERATURE: 98 F | RESPIRATION RATE: 16 BRPM | OXYGEN SATURATION: 96 % | BODY MASS INDEX: 36.05 KG/M2 | HEART RATE: 67 BPM

## 2017-10-27 DIAGNOSIS — C90.01 MULTIPLE MYELOMA IN REMISSION: ICD-10-CM

## 2017-10-27 DIAGNOSIS — Z94.84 H/O AUTOLOGOUS STEM CELL TRANSPLANT: ICD-10-CM

## 2017-10-27 DIAGNOSIS — F32.A DEPRESSION, UNSPECIFIED DEPRESSION TYPE: Primary | ICD-10-CM

## 2017-10-27 PROCEDURE — 99213 OFFICE O/P EST LOW 20 MIN: CPT | Mod: S$GLB,,, | Performed by: INTERNAL MEDICINE

## 2017-10-27 PROCEDURE — 99999 PR PBB SHADOW E&M-EST. PATIENT-LVL III: CPT | Mod: PBBFAC,,, | Performed by: INTERNAL MEDICINE

## 2017-10-27 NOTE — LETTER
October 27, 2017        Avinash Lal MD  [address]  [facility]  [facility] [address]             [facility]  [address]  [facility] [address]  Phone: [phone]   Patient: Arik Bobo   MR Number: [id number]   YOB: 1965   Date of Visit: 10/27/2017       Dear Dr. Lal:    Thank you for referring Arik Bobo to me for evaluation. Below are the relevant portions of my assessment and plan of care.        1. Depression, unspecified depression type    2. Multiple myeloma in remission    3. H/O autologous stem cell transplant          Multiple myeloma in partial remission  Completed 4 cycles of CyBorD   Was treated with lenalidomide and dex  Restaging marrow 8/21/17 showed a 10-50% cellular marrow with trilineage hematopoiesis and 5% residual  kappa-restricted plasma cells (6% by morphology).  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  Achieved a partial remission (PR1) prior to transplant.    65% EF on 8/29/17  Recovering very well at day 30 with  Normal WBC (ANC 2900) and platelets with hemoglobin 9.7.  Resolved dyspnea post pneumonia.    Renal/Hypertension  Improving creatinine to 1.3 with good fluid intake and urine output.  Back on amlodipine, bystolic and lisinopril with good blood pressure control.  Eating much better and albumin now up to 3.2.    Hyperlipidemia  Back on statin     Depression  Continue home Celexa     Hx of Gout  Pt suffers from gout and is not currently on uric acid suppression medication.  This may have contributed to his hip degeneration.    He will head home today (leaving [facility]) and return in 4 weeks for follow up and all of his questions were answered in the clinic today.    If you have questions, please do not hesitate to call me. I look forward to following Arik along with you.    Sincerely,      Blake Buck MD           CC  No Recipients

## 2017-11-24 ENCOUNTER — TELEPHONE (OUTPATIENT)
Dept: HEMATOLOGY/ONCOLOGY | Facility: CLINIC | Age: 52
End: 2017-11-24

## 2017-11-24 ENCOUNTER — OFFICE VISIT (OUTPATIENT)
Dept: HEMATOLOGY/ONCOLOGY | Facility: CLINIC | Age: 52
End: 2017-11-24
Payer: COMMERCIAL

## 2017-11-24 ENCOUNTER — LAB VISIT (OUTPATIENT)
Dept: LAB | Facility: HOSPITAL | Age: 52
End: 2017-11-24
Attending: INTERNAL MEDICINE
Payer: COMMERCIAL

## 2017-11-24 VITALS
TEMPERATURE: 99 F | BODY MASS INDEX: 36.34 KG/M2 | OXYGEN SATURATION: 94 % | SYSTOLIC BLOOD PRESSURE: 147 MMHG | RESPIRATION RATE: 18 BRPM | WEIGHT: 239 LBS | HEART RATE: 60 BPM | DIASTOLIC BLOOD PRESSURE: 88 MMHG

## 2017-11-24 DIAGNOSIS — Z94.84 H/O AUTOLOGOUS STEM CELL TRANSPLANT: ICD-10-CM

## 2017-11-24 DIAGNOSIS — C90.01 MULTIPLE MYELOMA IN REMISSION: Primary | ICD-10-CM

## 2017-11-24 DIAGNOSIS — C90.01 MULTIPLE MYELOMA IN REMISSION: ICD-10-CM

## 2017-11-24 LAB
ALBUMIN SERPL BCP-MCNC: 3.3 G/DL
ALP SERPL-CCNC: 101 U/L
ALT SERPL W/O P-5'-P-CCNC: 12 U/L
ANION GAP SERPL CALC-SCNC: 7 MMOL/L
AST SERPL-CCNC: 16 U/L
BASOPHILS # BLD AUTO: 0.02 K/UL
BASOPHILS NFR BLD: 0.3 %
BILIRUB SERPL-MCNC: 0.5 MG/DL
BUN SERPL-MCNC: 16 MG/DL
CALCIUM SERPL-MCNC: 9.5 MG/DL
CHLORIDE SERPL-SCNC: 111 MMOL/L
CO2 SERPL-SCNC: 23 MMOL/L
CREAT SERPL-MCNC: 1.1 MG/DL
DIFFERENTIAL METHOD: ABNORMAL
EOSINOPHIL # BLD AUTO: 0.3 K/UL
EOSINOPHIL NFR BLD: 3.5 %
ERYTHROCYTE [DISTWIDTH] IN BLOOD BY AUTOMATED COUNT: 15 %
EST. GFR  (AFRICAN AMERICAN): >60 ML/MIN/1.73 M^2
EST. GFR  (NON AFRICAN AMERICAN): >60 ML/MIN/1.73 M^2
GLUCOSE SERPL-MCNC: 87 MG/DL
HCT VFR BLD AUTO: 32.2 %
HGB BLD-MCNC: 10.8 G/DL
IMM GRANULOCYTES # BLD AUTO: 0.04 K/UL
IMM GRANULOCYTES NFR BLD AUTO: 0.5 %
LYMPHOCYTES # BLD AUTO: 3.8 K/UL
LYMPHOCYTES NFR BLD: 48.4 %
MCH RBC QN AUTO: 29.9 PG
MCHC RBC AUTO-ENTMCNC: 33.5 G/DL
MCV RBC AUTO: 89 FL
MONOCYTES # BLD AUTO: 0.8 K/UL
MONOCYTES NFR BLD: 9.8 %
NEUTROPHILS # BLD AUTO: 3 K/UL
NEUTROPHILS NFR BLD: 37.5 %
NRBC BLD-RTO: 0 /100 WBC
PLATELET # BLD AUTO: 176 K/UL
PMV BLD AUTO: 9.5 FL
POTASSIUM SERPL-SCNC: 3.9 MMOL/L
PROT SERPL-MCNC: 6.2 G/DL
RBC # BLD AUTO: 3.61 M/UL
SODIUM SERPL-SCNC: 141 MMOL/L
WBC # BLD AUTO: 7.92 K/UL

## 2017-11-24 PROCEDURE — 99213 OFFICE O/P EST LOW 20 MIN: CPT | Mod: S$GLB,,, | Performed by: INTERNAL MEDICINE

## 2017-11-24 PROCEDURE — 80053 COMPREHEN METABOLIC PANEL: CPT

## 2017-11-24 PROCEDURE — 99999 PR PBB SHADOW E&M-EST. PATIENT-LVL III: CPT | Mod: PBBFAC,,, | Performed by: INTERNAL MEDICINE

## 2017-11-24 PROCEDURE — 85025 COMPLETE CBC W/AUTO DIFF WBC: CPT

## 2017-11-24 PROCEDURE — 36415 COLL VENOUS BLD VENIPUNCTURE: CPT

## 2017-11-24 RX ORDER — SIMVASTATIN 20 MG/1
TABLET, FILM COATED ORAL
Refills: 3 | COMMUNITY
Start: 2017-11-06

## 2017-11-24 NOTE — LETTER
November 24, 2017        Avinash Lal MD  [address]  [facility]  [facility] [address]             [facility]  [address]  [facility] [address]  Phone: [phone]   Patient: Arik Bobo   MR Number: [id number]   YOB: 1965   Date of Visit: 11/24/2017       Dear Dr. Lal:    Thank you for referring Arik Bobo to me for evaluation. Below are the relevant portions of my assessment and plan of care.        1. Multiple myeloma in remission    2. H/O autologous stem cell transplant          Multiple myeloma in partial remission  Completed 4 cycles of CyBorD   Was treated with lenalidomide and dex  Restaging marrow 8/21/17 showed a 10-50% cellular marrow with trilineage hematopoiesis and 5% residual  kappa-restricted plasma cells (6% by morphology).  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  Achieved a partial remission (PR1) prior to transplant.    65% EF on 8/29/17  Recovering very well at day 58 with normal WBC (ANC 3000) and platelets with hemoglobin 10.8.     Renal/Hypertension  Improving creatinine to 1.1 with good fluid intake and urine output.  Back on amlodipine, bystolic and lisinopril with good blood pressure control (147/88).  Eating much better and albumin now up to 3.3.    Hyperlipidemia  Back on statin     Depression  Continue home Celexa and mood much improved with better outlook    Hx of Gout  Pt suffers from gout and is not currently on uric acid suppression medication.  No recent symptoms.    He will return for day 100 restaging marrow with studies and follow up with Dr. Bryant thereafter.  I expect he will go on maintenance lenalidomide after confirmation of remission.  All of his questions were answered in the clinic today.    If you have questions, please do not hesitate to call me. I look forward to following Arik along with you.    Sincerely,      Blake Buck MD           CC  Italo Bryant MD

## 2017-11-24 NOTE — Clinical Note
This patient will need a day 100 marrow scheduled in about 6 weeks with labs: CBC, CMP, SPEP and light chains.  He should then return to SALIMA Bryant for follow up and start of maintenance therapy.  Thanks

## 2017-11-24 NOTE — PROGRESS NOTES
Subjective:       Patient ID: Arik Bobo is a 52 y.o. male.    Chief Complaint: Multiple myeloma in remission (patient complain of sinus problems)    HPI  KPS 90%. Mr. Bobo is here with his wife 58 days post PBSCT for IgG kappa multiple myeloma in PR1 at the time of transplant.  He has been recovering very well since his last visit with normalized taste and oral intake.  More active and feeling more himself now.  No pain or fever.  Excellent fluid intake.    His recent hospital admission (9/25 - 10/10/17) was complicated by infection, likely pneumonia, during his period of neutropenia with some instability but this resolved with recovery of his WBC.  He also developed significant renal failure for unclear reasons which has now resolved.    Myeloma History: He was noted to develop a progressive anemia in early 2015.  In retrospect, his total protein has been at the upper end of normal with hematocrit of 39 in 10/2013.  At the time of a total hip arthroplasty 11/2014 the marrow resected from his right femoral head did not show abnormalities. During that hospital stay he underwent an anemia workup showing iron 20, TIBC 234 with 12% saturation and normal ferritin at 103.  Subsequent colonoscopy showed multiple benign polyps and one at the appejndiceal lumen that resulted in appendectomy in 1/2015.    SPEP showed a monoclonal protein 2.9 g/dL with IgG elevated at 5677 and suppressed IgA and IgM, calcium 9, creatinine 0.2.  Bone survey did not find lytic lesions and kappa:lambda light chain ratio was over 100.  Albumin 3.2 (5/1) and beta-2 microglobin was elevated, though I do not know the value.  Marrow 4/14/15 at which time his hemoglobin was 9.6 with MCV 87 showed a 50% cellular marrow with 10% plasma cell infiltrated with atypical forms and a left shifted granulocyte population.   staining showed up to 50% plasma cells in some areas but kappa and lambda staining showed a mixture of cells without clear  clonality.  Plasma cells were kappa restricted on flow cytometry (5.7% population).  No stainable iron was present.  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  He was tried on IV iron with some improvement but not resolution of his anemia.    Given elevated light chain ratio over 100 he was formally diagnosed with myeloma and started on lenalidomide/dexamethasone in late 6/2015 which he continued until 12/2015 when a repeat bone marrow 12/3/15 showed a 60-70% cellular marrow with 20% kappa restricted plasma cells.  His protein levels were under good control with free kappa light chain 2.98 mg/dL and IgG kappa M-spike 1 g/dL.  Repeat marrow done 4/5/16 showed a 30-40% cellular marrow with a 17% kappa-restricted plasma cell population and no storage iron.  Hence, he was started on iron with resolution of his anemia, which had been the only feature qualifying him for the diagnosis of myeloma.  Hence, he was reclassified smoldering myeloma and has been followed since that time.    He developed two episodes of DVT in 7/2015 and 2/2017 was on apixaban.    He was seeing Dr. Zepeda who had been treating him primarily.  He completed hip replacement surgery and has healed well.     Review of Systems   Constitutional: Positive for fatigue (minimal). Negative for activity change, appetite change, diaphoresis, fever and unexpected weight change.   HENT: Negative for mouth sores, postnasal drip and sore throat.    Eyes: Negative for visual disturbance.   Respiratory: Negative for cough, chest tightness and shortness of breath.    Cardiovascular: Negative for chest pain and leg swelling.   Gastrointestinal: Negative for anal bleeding, blood in stool, constipation, diarrhea and rectal pain.   Genitourinary: Negative for dysuria, frequency and hematuria.   Musculoskeletal: Negative for arthralgias, back pain and neck pain.   Skin: Negative for rash.   Neurological: Negative  for tremors, weakness, light-headedness and numbness.   Hematological: Negative for adenopathy.   Psychiatric/Behavioral: Negative for confusion and sleep disturbance.       Objective:      Physical Exam   Constitutional: He is oriented to person, place, and time. He appears well-developed and well-nourished. No distress.   HENT:   Head: Normocephalic and atraumatic.   Mouth/Throat: Oropharynx is clear and moist. No oropharyngeal exudate.   Eyes: Conjunctivae are normal. Pupils are equal, round, and reactive to light.   Neck: Neck supple.   Cardiovascular: Normal rate and regular rhythm.    Pulmonary/Chest: Effort normal and breath sounds normal. He has no rales.   Abdominal: Soft. Bowel sounds are normal. He exhibits no mass. There is no splenomegaly. There is no tenderness.   Musculoskeletal: Normal range of motion. He exhibits no edema.   No spinal or rib tenderness   Lymphadenopathy:     He has no cervical adenopathy.     He has no axillary adenopathy.        Right: No inguinal adenopathy present.        Left: No inguinal adenopathy present.   Neurological: He is alert and oriented to person, place, and time.   Skin: Skin is warm and dry. No rash noted.   Psychiatric: He has a normal mood and affect. Thought content normal.       Assessment:       1. Multiple myeloma in remission    2. H/O autologous stem cell transplant        Plan:       Multiple myeloma in partial remission  Completed 4 cycles of CyBorD   Was treated with lenalidomide and dex  Restaging marrow 8/21/17 showed a 10-50% cellular marrow with trilineage hematopoiesis and 5% residual  kappa-restricted plasma cells (6% by morphology).  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  Achieved a partial remission (PR1) prior to transplant.    65% EF on 8/29/17  Recovering very well at day 58 with normal WBC (ANC 3000) and platelets with hemoglobin 10.8.     Renal/Hypertension  Improving creatinine to 1.1 with good fluid intake and urine output.  Back on amlodipine,  bystolic and lisinopril with good blood pressure control (147/88).  Eating much better and albumin now up to 3.3.    Hyperlipidemia  Back on statin     Depression  Continue home Celexa and mood much improved with better outlook    Hx of Gout  Pt suffers from gout and is not currently on uric acid suppression medication.  No recent symptoms.    He will return for day 100 restaging marrow with studies and follow up with Dr. Bryant thereafter.  I expect he will go on maintenance lenalidomide after confirmation of remission.  All of his questions were answered in the clinic today.

## 2017-12-28 ENCOUNTER — LAB VISIT (OUTPATIENT)
Dept: LAB | Facility: HOSPITAL | Age: 52
End: 2017-12-28
Attending: INTERNAL MEDICINE
Payer: COMMERCIAL

## 2017-12-28 DIAGNOSIS — Z94.84 H/O AUTOLOGOUS STEM CELL TRANSPLANT: ICD-10-CM

## 2017-12-28 DIAGNOSIS — C90.01 MULTIPLE MYELOMA IN REMISSION: ICD-10-CM

## 2017-12-28 LAB
ALBUMIN SERPL BCP-MCNC: 3.4 G/DL
ALP SERPL-CCNC: 97 U/L
ALT SERPL W/O P-5'-P-CCNC: 10 U/L
ANION GAP SERPL CALC-SCNC: 7 MMOL/L
AST SERPL-CCNC: 13 U/L
BASOPHILS # BLD AUTO: 0.01 K/UL
BASOPHILS NFR BLD: 0.2 %
BILIRUB SERPL-MCNC: 0.4 MG/DL
BUN SERPL-MCNC: 15 MG/DL
CALCIUM SERPL-MCNC: 9 MG/DL
CHLORIDE SERPL-SCNC: 109 MMOL/L
CO2 SERPL-SCNC: 27 MMOL/L
CREAT SERPL-MCNC: 0.9 MG/DL
DIFFERENTIAL METHOD: ABNORMAL
EOSINOPHIL # BLD AUTO: 0.2 K/UL
EOSINOPHIL NFR BLD: 3.9 %
ERYTHROCYTE [DISTWIDTH] IN BLOOD BY AUTOMATED COUNT: 13.6 %
EST. GFR  (AFRICAN AMERICAN): >60 ML/MIN/1.73 M^2
EST. GFR  (NON AFRICAN AMERICAN): >60 ML/MIN/1.73 M^2
GLUCOSE SERPL-MCNC: 87 MG/DL
HCT VFR BLD AUTO: 32.1 %
HGB BLD-MCNC: 10.7 G/DL
IMM GRANULOCYTES # BLD AUTO: 0.02 K/UL
IMM GRANULOCYTES NFR BLD AUTO: 0.4 %
LYMPHOCYTES # BLD AUTO: 1.7 K/UL
LYMPHOCYTES NFR BLD: 31.9 %
MCH RBC QN AUTO: 29.6 PG
MCHC RBC AUTO-ENTMCNC: 33.3 G/DL
MCV RBC AUTO: 89 FL
MONOCYTES # BLD AUTO: 0.6 K/UL
MONOCYTES NFR BLD: 10.6 %
NEUTROPHILS # BLD AUTO: 2.8 K/UL
NEUTROPHILS NFR BLD: 53 %
NRBC BLD-RTO: 0 /100 WBC
PLATELET # BLD AUTO: 183 K/UL
PMV BLD AUTO: 9.7 FL
POTASSIUM SERPL-SCNC: 4.3 MMOL/L
PROT SERPL-MCNC: 6.2 G/DL
RBC # BLD AUTO: 3.62 M/UL
SODIUM SERPL-SCNC: 143 MMOL/L
WBC # BLD AUTO: 5.18 K/UL

## 2017-12-28 PROCEDURE — 83520 IMMUNOASSAY QUANT NOS NONAB: CPT | Mod: 59

## 2017-12-28 PROCEDURE — 84165 PROTEIN E-PHORESIS SERUM: CPT | Mod: 26,,, | Performed by: PATHOLOGY

## 2017-12-28 PROCEDURE — 85025 COMPLETE CBC W/AUTO DIFF WBC: CPT

## 2017-12-28 PROCEDURE — 80053 COMPREHEN METABOLIC PANEL: CPT

## 2017-12-28 PROCEDURE — 84165 PROTEIN E-PHORESIS SERUM: CPT

## 2017-12-28 PROCEDURE — 36415 COLL VENOUS BLD VENIPUNCTURE: CPT

## 2017-12-29 LAB
ALBUMIN SERPL ELPH-MCNC: 3.53 G/DL
ALPHA1 GLOB SERPL ELPH-MCNC: 0.36 G/DL
ALPHA2 GLOB SERPL ELPH-MCNC: 0.79 G/DL
B-GLOBULIN SERPL ELPH-MCNC: 0.62 G/DL
GAMMA GLOB SERPL ELPH-MCNC: 0.39 G/DL
KAPPA LC SER QL IA: 0.76 MG/DL
KAPPA LC/LAMBDA SER IA: 1.43
LAMBDA LC SER QL IA: 0.53 MG/DL
PATHOLOGIST INTERPRETATION SPE: NORMAL
PROT SERPL-MCNC: 5.7 G/DL

## 2018-01-04 ENCOUNTER — HOSPITAL ENCOUNTER (OUTPATIENT)
Facility: HOSPITAL | Age: 53
Discharge: HOME | End: 2018-01-04
Attending: INTERNAL MEDICINE | Admitting: INTERNAL MEDICINE
Payer: COMMERCIAL

## 2018-01-04 VITALS
BODY MASS INDEX: 36.68 KG/M2 | TEMPERATURE: 98 F | HEIGHT: 68 IN | SYSTOLIC BLOOD PRESSURE: 162 MMHG | WEIGHT: 242 LBS | DIASTOLIC BLOOD PRESSURE: 81 MMHG | HEART RATE: 59 BPM | OXYGEN SATURATION: 99 % | RESPIRATION RATE: 16 BRPM

## 2018-01-04 DIAGNOSIS — C90.01 MULTIPLE MYELOMA IN REMISSION: Primary | ICD-10-CM

## 2018-01-04 DIAGNOSIS — C90.00 MULTIPLE MYELOMA: ICD-10-CM

## 2018-01-04 LAB
BODY SITE - BONE MARROW: NORMAL
BONE MARROW IRON STAIN COMMENT: NORMAL
BONE MARROW WRIGHT STAIN COMMENT: NORMAL
CLINICAL DIAGNOSIS - BONE MARROW: NORMAL
FLOW CYTOMETRY ANTIBODIES ANALYZED - BONE MARROW: NORMAL
FLOW CYTOMETRY COMMENT - BONE MARROW: NORMAL
FLOW CYTOMETRY INTERPRETATION - BONE MARROW: NORMAL

## 2018-01-04 PROCEDURE — 85097 BONE MARROW INTERPRETATION: CPT | Mod: ,,, | Performed by: PATHOLOGY

## 2018-01-04 PROCEDURE — 88341 IMHCHEM/IMCYTCHM EA ADD ANTB: CPT | Mod: 26,,, | Performed by: PATHOLOGY

## 2018-01-04 PROCEDURE — 36000705 HC OR TIME LEV I EA ADD 15 MIN: Performed by: INTERNAL MEDICINE

## 2018-01-04 PROCEDURE — 88313 SPECIAL STAINS GROUP 2: CPT

## 2018-01-04 PROCEDURE — 88305 TISSUE EXAM BY PATHOLOGIST: CPT | Mod: 59 | Performed by: PATHOLOGY

## 2018-01-04 PROCEDURE — 88264 CHROMOSOME ANALYSIS 20-25: CPT

## 2018-01-04 PROCEDURE — 88184 FLOWCYTOMETRY/ TC 1 MARKER: CPT | Performed by: PATHOLOGY

## 2018-01-04 PROCEDURE — 88189 FLOWCYTOMETRY/READ 16 & >: CPT | Mod: ,,, | Performed by: PATHOLOGY

## 2018-01-04 PROCEDURE — 88341 IMHCHEM/IMCYTCHM EA ADD ANTB: CPT | Mod: 59 | Performed by: PATHOLOGY

## 2018-01-04 PROCEDURE — 88275 CYTOGENETICS 100-300: CPT

## 2018-01-04 PROCEDURE — 88271 CYTOGENETICS DNA PROBE: CPT | Mod: 59

## 2018-01-04 PROCEDURE — 88311 DECALCIFY TISSUE: CPT | Mod: 26,,, | Performed by: PATHOLOGY

## 2018-01-04 PROCEDURE — 88275 CYTOGENETICS 100-300: CPT | Mod: 59

## 2018-01-04 PROCEDURE — 88342 IMHCHEM/IMCYTCHM 1ST ANTB: CPT | Mod: 26,59,, | Performed by: PATHOLOGY

## 2018-01-04 PROCEDURE — 88185 FLOWCYTOMETRY/TC ADD-ON: CPT | Mod: 59 | Performed by: PATHOLOGY

## 2018-01-04 PROCEDURE — 37000008 HC ANESTHESIA 1ST 15 MINUTES: Performed by: INTERNAL MEDICINE

## 2018-01-04 PROCEDURE — 37000009 HC ANESTHESIA EA ADD 15 MINS: Performed by: INTERNAL MEDICINE

## 2018-01-04 PROCEDURE — 88305 TISSUE EXAM BY PATHOLOGIST: CPT | Mod: 26,,, | Performed by: PATHOLOGY

## 2018-01-04 PROCEDURE — 88342 IMHCHEM/IMCYTCHM 1ST ANTB: CPT | Performed by: PATHOLOGY

## 2018-01-04 PROCEDURE — 38221 DX BONE MARROW BIOPSIES: CPT | Mod: LT,,, | Performed by: NURSE PRACTITIONER

## 2018-01-04 PROCEDURE — 88313 SPECIAL STAINS GROUP 2: CPT | Mod: 26,,, | Performed by: PATHOLOGY

## 2018-01-04 PROCEDURE — 25000003 PHARM REV CODE 250: Performed by: INTERNAL MEDICINE

## 2018-01-04 PROCEDURE — 88237 TISSUE CULTURE BONE MARROW: CPT

## 2018-01-04 PROCEDURE — 36000704 HC OR TIME LEV I 1ST 15 MIN: Performed by: INTERNAL MEDICINE

## 2018-01-04 RX ORDER — LIDOCAINE HYDROCHLORIDE 10 MG/ML
INJECTION, SOLUTION EPIDURAL; INFILTRATION; INTRACAUDAL; PERINEURAL
Status: DISCONTINUED | OUTPATIENT
Start: 2018-01-04 | End: 2018-01-04 | Stop reason: HOSPADM

## 2018-01-04 RX ORDER — LIDOCAINE HYDROCHLORIDE 20 MG/ML
INJECTION, SOLUTION EPIDURAL; INFILTRATION; INTRACAUDAL; PERINEURAL
Status: DISCONTINUED | OUTPATIENT
Start: 2018-01-04 | End: 2018-01-04 | Stop reason: HOSPADM

## 2018-01-04 RX ORDER — LIDOCAINE HYDROCHLORIDE 10 MG/ML
5 INJECTION, SOLUTION EPIDURAL; INFILTRATION; INTRACAUDAL; PERINEURAL ONCE
Status: DISCONTINUED | OUTPATIENT
Start: 2018-01-04 | End: 2018-01-04 | Stop reason: HOSPADM

## 2018-01-04 NOTE — PLAN OF CARE
Patient received discharge instructions.  Patient verbalized understanding of all instructions given and all questions were addressed prior to patient's discharge.  Patient's vital signs are stable and within patient's baseline.  Patient tolerated clear liquids PO.  Patient denies pain.  Patient denies nausea and vomiting at this time.  Patient meets all criteria for discharge at this time.  All required consents present in patient's chart upon patient's discharge.

## 2018-01-04 NOTE — BRIEF OP NOTE
PROCEDURE NOTE:  Bone Marrow aspiration and biopsy  Indication: day 100 post auto transplant for multiple myeloma  Consent: Informed consent was obtained from patient.  Timeout: Done and documented.  Position: Prone  Site: Left posterior illiac crest.  Prep: Betadine.  Needle used: 11 gauge Jamshidi needle.  Anesthetic: 1% lidocaine 5 cc and 2% lidocaine 5 cc  Biopsy: The biopsy needle was introduced into the marrow cavity and an aspirate was obtained on 2 nd attempt without complications and sent for flow, cytogenetics and FISH. Core biopsy obtained without difficulty and sent for routine histologic examination.  Complications: None.  EBL: minimal  Disposition: The patient was discharged home after lying supine for 20 minutes.    Renetta Torres NP  Hematology/BMT

## 2018-01-04 NOTE — DISCHARGE SUMMARY
[facility]  Hematology  Bone Marrow Transplant  Discharge Summary      Patient Name: Arik Bobo  MRN: [id number]  Admission Date: 1/4/2018  Hospital Length of Stay: 0 days  Discharge Date and Time: 1/4/2018  8:45 AM  Attending Physician: Blake Buck MD   Discharging Provider: Renetta Torres NP  Primary Care Provider: Avinash Lal MD    HPI: Day 100 post autologous stem cell transplant restaging    Procedure(s) (LRB):  BIOPSY-BONE MARROW (Left)     Hospital Course: Patient admitted to pre op today for a bone marrow aspiration and biopsy. Consent was obtained done for a bone marrow biopsy. Patient was sedated per anesthesia and a bone marrow biopsy and aspiration was performed in the OR (see Op note). Patient was then transferred to post op and discharged home when appropriate per anesthesia.       Pending Diagnostic Studies:     Procedure Component Value Units Date/Time    Bone Marrow Prep and Stain [[id number]] Collected:  01/04/18 0713    Order Status:  Sent Lab Status:  In process Updated:  01/04/18 0714    Specimen:  Bone Marrow from Bone Marrow     Chromosome Analysis, Bone Marrow [[id number]] Collected:  01/04/18 0713    Order Status:  Sent Lab Status:  In process Updated:  01/04/18 0714    Specimen:  Bone Marrow from Bone Marrow     Iron Stain, Bone Marrow [[id number]] Collected:  01/04/18 0713    Order Status:  Sent Lab Status:  In process Updated:  01/04/18 0714    Specimen:  Bone Marrow from Bone Marrow     Leukemia/Lymphoma Screen - Bone Marrow Left Posterior Iliac Crest [[id number]] Collected:  01/04/18 0713    Order Status:  Sent Lab Status:  In process Updated:  01/04/18 0714    Specimen:  Bone Marrow     Myelodysplastic Syndrome (MDS), FISH, Bone Marrow [[id number]] Collected:  01/04/18 0713    Order Status:  Sent Lab Status:  In process Updated:  01/04/18 0714    Specimen:  Bone Marrow from Bone Marrow         Final Active Diagnoses:    Diagnosis Date Noted POA     Multiple myeloma [C90.00] 01/04/2018 Yes      Problems Resolved During this Admission:    Diagnosis Date Noted Date Resolved POA      Discharged Condition: good    Disposition: Home or Self Care    Follow Up: with Dr. Bryant on 1/5    Patient Instructions:     Activity as tolerated     Notify your health care provider if you experience any of the following:  temperature >100.4     Notify your health care provider if you experience any of the following:  severe uncontrolled pain     Notify your health care provider if you experience any of the following:  redness, tenderness, or signs of infection (pain, swelling, redness, odor or green/yellow discharge around incision site)     Notify your health care provider if you experience any of the following:  difficulty breathing or increased cough     No dressing needed       Medications:  Reconciled Home Medications:   Current Discharge Medication List      CONTINUE these medications which have NOT CHANGED    Details   acyclovir (ZOVIRAX) 800 MG Tab Take 1 tablet (800 mg total) by mouth 2 (two) times daily.  Qty: 60 tablet, Refills: 5      amlodipine (NORVASC) 5 MG tablet Take 5 mg by mouth once daily.      BYSTOLIC 10 mg Tab 10 mg once daily.   Refills: 2      citalopram (CELEXA) 40 MG tablet Take 40 mg by mouth.      lisinopril (PRINIVIL,ZESTRIL) 20 MG tablet Hold until NP or MD says otherwise. Awaiting complete resolution of ELENA  Qty: 60 tablet, Refills: 0      !! simvastatin (ZOCOR) 20 MG tablet Refills: 3      ondansetron (ZOFRAN-ODT) 8 MG TbDL Take 1 tablet (8 mg total) by mouth every 6 (six) hours as needed.  Qty: 30 tablet, Refills: 2    Associated Diagnoses: Multiple myeloma in remission; Stem cell transplant candidate      !! SIMVASTATIN ORAL Take by mouth.       !! - Potential duplicate medications found. Please discuss with provider.          Renetta Torres NP  Bone Marrow Transplant  [facility]

## 2018-01-04 NOTE — H&P
[facility]  Hematology/Oncology  H&P    Patient Name: Arik Bobo  MRN: [id number]  Admission Date: 1/4/2018  Code Status: Prior   Attending Provider: Blake Buck MD  Primary Care Physician: Avinash Lal MD  Principal Problem:<principal problem not specified>    Subjective:     HPI: 52 year old male with history of multiple myeloma 100 days post autologous stem cell transplant presents for restaging bone marrow aspiration and biopsy. Patient has no complaints today. States he is back to baseline post transplant.    Oncology Treatment Plan:   BMT MA AUTOLOGOUS BMT MELPHALAN (SINGLE-AGENT) FOR MULTIPLE MYELOMA    Medications:  Continuous Infusions:  Scheduled Meds:  PRN Meds:     Review of patient's allergies indicates:   Allergen Reactions    Pcn [penicillins]      Unknown last dose as an infant        Past Medical History:   Diagnosis Date    Anxiety     Arthritis     Depression     History of hip surgery     Hx of psychiatric care     Hyperlipidemia     Hypertension     Multiple myeloma     Psychiatric problem      Past Surgical History:   Procedure Laterality Date    HEMORRHOID SURGERY      JOINT REPLACEMENT Bilateral     TOTAL HIP ARTHROPLASTY       Family History     Problem Relation (Age of Onset)    Alcohol abuse Father    Anxiety disorder Sister    Depression Brother        Social History Main Topics    Smoking status: Never Smoker    Smokeless tobacco: Current User     Types: Chew    Alcohol use No      Comment: former excessive use; 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   HENT: Negative for congestion, dental problem, mouth sores, nosebleeds, postnasal drip, rhinorrhea, sinus pressure, sore throat and trouble swallowing.    Eyes: Negative for photophobia and  visual disturbance.   Respiratory: Negative for cough and shortness of breath.    Cardiovascular: Negative for chest pain, palpitations and leg swelling.   Gastrointestinal: Negative for abdominal distention, abdominal pain, blood in stool, constipation, diarrhea, nausea and vomiting.   Genitourinary: Negative for dysuria, frequency, hematuria and urgency.   Musculoskeletal: Negative for arthralgias, back pain and myalgias.   Skin: Negative for pallor and rash.   Allergic/Immunologic: Negative for immunocompromised state.   Neurological: Negative for dizziness, syncope, weakness, numbness and headaches.   Hematological: Negative for adenopathy. Does not bruise/bleed easily.   Psychiatric/Behavioral: Negative for confusion. The patient is not nervous/anxious.      Objective:     Vital Signs (Most Recent):  Temp: 99.4 °F (37.4 °C) (01/04/18 0656)  Pulse: 60 (01/04/18 0656)  Resp: 16 (01/04/18 0656)  BP: (!) 160/90 (01/04/18 0656)  SpO2: 97 % (01/04/18 0656) Vital Signs (24h Range):  Temp:  [99.4 °F (37.4 °C)] 99.4 °F (37.4 °C)  Pulse:  [60] 60  Resp:  [16] 16  SpO2:  [97 %] 97 %  BP: (160)/(90) 160/90     Weight: 109.8 kg (242 lb)  Body mass index is 36.8 kg/m².  Body surface area is 2.3 meters squared.    No intake or output data in the 24 hours ending 01/04/18 0708    Physical Exam   Constitutional: He is oriented to person, place, and time. He appears well-developed and well-nourished. No distress.   HENT:   Head: Normocephalic.   Mouth/Throat: Oropharynx is clear and moist. No oropharyngeal exudate.   Eyes: Conjunctivae are normal. Pupils are equal, round, and reactive to light. No scleral icterus.   Neck: Normal range of motion. Neck supple. Normal range of motion present. No thyromegaly present.   Cardiovascular: Normal rate, regular rhythm, normal heart sounds and intact distal pulses.    Pulmonary/Chest: Effort normal and breath sounds normal. No respiratory distress.   Abdominal: Soft. Bowel sounds are  normal. He exhibits no distension. There is no tenderness.   Musculoskeletal: Normal range of motion. He exhibits no edema or tenderness.   Neurological: He is alert and oriented to person, place, and time. No cranial nerve deficit. Coordination normal.   Skin: Skin is warm and dry. No petechiae and no rash noted. No pallor.   Psychiatric: He has a normal mood and affect. Thought content normal.   Vitals reviewed.      Significant Labs:   CBC: No results for input(s): WBC, HGB, HCT, PLT in the last 48 hours. and CMP: No results for input(s): NA, K, CL, CO2, GLU, BUN, CREATININE, CALCIUM, PROT, ALBUMIN, BILITOT, ALKPHOS, AST, ALT, ANIONGAP, EGFRNONAA in the last 48 hours.    Invalid input(s): ESTGFAFRICA    Diagnostic Results:  None    Assessment/Plan:     Active Diagnoses:    Diagnosis Date Noted POA    Multiple myeloma [C90.00] 01/04/2018 Yes      Problems Resolved During this Admission:    Diagnosis Date Noted Date Resolved POA     52 year old male with multiple myeloma 100 days post autologous stem cell transplant. Nom contraindications to proceeding with bone marrow biopsy today. Patient does not want sedation. Consent signed and risk vs benefit explained.     Renetta Torres NP  Hematology/Oncology  [facility]

## 2018-01-04 NOTE — DISCHARGE INSTRUCTIONS
Discharge instructions for having a Bone Marrow Aspiration / Biopsy    Keep Bandage in place for 24 hours.  - Do not shower or take a tube bath during this time. (you may sponge bathe).  - Call the nurse or physician for excessive bleeding or pain at biopsy site.  - You may take Tylenol as needed for pain.    You have received medication to sedate you.  - Do not drive a car or operate heavy machinery for the rest of the day.  - You may resume other activities as tolerated.    You can call [phone] for any problems during the hours of 8:00 AM-5:00PM.    For an emergency after 5:00 PM you can call [phone] and have the  page the Hematologist / Oncologist on call.

## 2018-01-04 NOTE — PLAN OF CARE
Patient arrived from OR with OR Nurse.  Patient stable.  Report received at this time.  Assumed care of patient at this time.

## 2018-01-05 ENCOUNTER — OFFICE VISIT (OUTPATIENT)
Dept: HEMATOLOGY/ONCOLOGY | Facility: CLINIC | Age: 53
End: 2018-01-05
Payer: COMMERCIAL

## 2018-01-05 ENCOUNTER — TELEPHONE (OUTPATIENT)
Dept: PHARMACY | Facility: CLINIC | Age: 53
End: 2018-01-05

## 2018-01-05 VITALS
HEIGHT: 68 IN | TEMPERATURE: 99 F | BODY MASS INDEX: 36.75 KG/M2 | WEIGHT: 242.5 LBS | RESPIRATION RATE: 18 BRPM | OXYGEN SATURATION: 100 % | HEART RATE: 56 BPM | DIASTOLIC BLOOD PRESSURE: 81 MMHG | SYSTOLIC BLOOD PRESSURE: 157 MMHG

## 2018-01-05 DIAGNOSIS — C90.01 MULTIPLE MYELOMA IN REMISSION: ICD-10-CM

## 2018-01-05 DIAGNOSIS — D64.81 ANEMIA ASSOCIATED WITH CHEMOTHERAPY: ICD-10-CM

## 2018-01-05 DIAGNOSIS — Z91.89 AT HIGH RISK FOR VENOUS THROMBOEMBOLISM (VTE): ICD-10-CM

## 2018-01-05 DIAGNOSIS — C90.00 MULTIPLE MYELOMA, REMISSION STATUS UNSPECIFIED: Primary | ICD-10-CM

## 2018-01-05 DIAGNOSIS — Z94.84 H/O AUTOLOGOUS STEM CELL TRANSPLANT: Primary | ICD-10-CM

## 2018-01-05 DIAGNOSIS — T45.1X5A ANEMIA ASSOCIATED WITH CHEMOTHERAPY: ICD-10-CM

## 2018-01-05 PROCEDURE — 99999 PR PBB SHADOW E&M-EST. PATIENT-LVL III: CPT | Mod: PBBFAC,,, | Performed by: INTERNAL MEDICINE

## 2018-01-05 PROCEDURE — 99215 OFFICE O/P EST HI 40 MIN: CPT | Mod: S$GLB,,, | Performed by: INTERNAL MEDICINE

## 2018-01-05 RX ORDER — LISINOPRIL 40 MG/1
TABLET ORAL
Refills: 1 | COMMUNITY
Start: 2017-12-21 | End: 2021-03-03

## 2018-01-05 RX ORDER — LENALIDOMIDE 10 MG/1
10 CAPSULE ORAL DAILY
Qty: 21 EACH | Refills: 0 | Status: SHIPPED | OUTPATIENT
Start: 2018-01-05 | End: 2018-01-15 | Stop reason: SDUPTHER

## 2018-01-05 NOTE — PROGRESS NOTES
PATIENT: Arik Bobo  MRN: [id number]  DATE: 1/8/2018      Diagnosis:   1. H/O autologous stem cell transplant    2. Multiple myeloma in remission    3. Anemia associated with chemotherapy    4. At high risk for venous thromboembolism (VTE)        Chief Complaint: Follow-up    Subjective:    Initial History: Mr. Bobo is a 52 y.o. male who returns for follow up on treatment of Multiple Myeloma 100 days s/o auto-HSCT.  Last weekend the patient had symptoms of N/V, diarrhea which only lasted for a day.  The symptoms have thus resolved.  The patient denies associated fevers or chills at that time.  The patient currently denies CP, SOB, bony pain, fatigue, night sweats, LAD.  The patient states he is feeling well and would like to go back to work as a .  The patient states he used to see Dr Lal prior to his transplant evaluation and work up.    Myeloma History: He was noted to develop a progressive anemia in early 2015.  In retrospect, his total protein has been at the upper end of normal with hematocrit of 39 in 10/2013.  At the time of a total hip arthroplasty 11/2014 the marrow resected from his right femoral head did not show abnormalities. During that hospital stay he underwent an anemia workup showing iron 20, TIBC 234 with 12% saturation and normal ferritin at 103.  Subsequent colonoscopy showed multiple benign polyps and one at the appejndiceal lumen that resulted in appendectomy in 1/2015.     SPEP showed a monoclonal protein 2.9 g/dL with IgG elevated at 5677 and suppressed IgA and IgM, calcium 9, creatinine 0.2.  Bone survey did not find lytic lesions and kappa:lambda light chain ratio was over 100.  Albumin 3.2 (5/1) and beta-2 microglobin was elevated, though I do not know the value.  Marrow 4/14/15 at which time his hemoglobin was 9.6 with MCV 87 showed a 50% cellular marrow with 10% plasma cell infiltrated with atypical forms and a left shifted granulocyte population.   staining  showed up to 50% plasma cells in some areas but kappa and lambda staining showed a mixture of cells without clear clonality.  Plasma cells were kappa restricted on flow cytometry (5.7% population).  No stainable iron was present.  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  He was tried on IV iron with some improvement but not resolution of his anemia.     Given elevated light chain ratio over 100 he was formally diagnosed with myeloma and started on lenalidomide/dexamethasone in late 6/2015 which he continued until 12/2015 when a repeat bone marrow 12/3/15 showed a 60-70% cellular marrow with 20% kappa restricted plasma cells.  His protein levels were under good control with free kappa light chain 2.98 mg/dL and IgG kappa M-spike 1 g/dL.  Repeat marrow done 4/5/16 showed a 30-40% cellular marrow with a 17% kappa-restricted plasma cell population and no storage iron.  Hence, he was started on iron with resolution of his anemia, which had been the only feature qualifying him for the diagnosis of myeloma.  Hence, he was reclassified smoldering myeloma and has been followed since that time.     He developed two episodes of DVT in 7/2015 and 2/2017 was on apixaban.     He was seeing Dr. Zepeda who had been treating him primarily.  He completed hip replacement surgery and has healed well.     Past Medical History:   Past Medical History:   Diagnosis Date    Anxiety     Arthritis     Depression     History of hip surgery     Hx of psychiatric care     Hyperlipidemia     Hypertension     Multiple myeloma     Psychiatric problem        Past Surgical HIstory:   Past Surgical History:   Procedure Laterality Date    HEMORRHOID SURGERY      JOINT REPLACEMENT Bilateral     TOTAL HIP ARTHROPLASTY         Family History:   Family History   Problem Relation Age of Onset    Alcohol abuse Father     Anxiety disorder Sister     Depression Brother        Social  "History:  reports that he has never smoked. His smokeless tobacco use includes Chew. He reports that he does not drink alcohol or use drugs.    Allergies:  Review of patient's allergies indicates:   Allergen Reactions    Pcn [penicillins]      Unknown last dose as an infant       Medications:  Current Outpatient Prescriptions   Medication Sig Dispense Refill    amlodipine (NORVASC) 5 MG tablet Take 5 mg by mouth once daily.      BYSTOLIC 10 mg Tab 10 mg once daily.   2    citalopram (CELEXA) 40 MG tablet Take 40 mg by mouth.      lisinopril (PRINIVIL,ZESTRIL) 40 MG tablet   1    ondansetron (ZOFRAN-ODT) 8 MG TbDL Take 1 tablet (8 mg total) by mouth every 6 (six) hours as needed. 30 tablet 2    simvastatin (ZOCOR) 20 MG tablet   3    SIMVASTATIN ORAL Take by mouth.      apixaban 2.5 mg Tab Take 1 tablet (2.5 mg total) by mouth 2 (two) times daily. 60 tablet 3    lenalidomide (REVLIMID) 10 mg Cap Take 10 mg by mouth once daily. auth # [id number] on 1/5/18 21 each 0     No current facility-administered medications for this visit.        Review of Systems   Constitutional: Negative for chills and fever.   HENT: Negative for sore throat and trouble swallowing.    Respiratory: Negative for cough and shortness of breath.    Cardiovascular: Negative for chest pain and palpitations.   Gastrointestinal: Negative for abdominal pain, constipation, diarrhea, nausea and vomiting.   Skin: Negative for color change and rash.   Neurological: Negative for headaches.       ECOG Performance Status: 0   Objective:      Vitals:   Vitals:    01/05/18 1139   BP: (!) 157/81   BP Location: Right arm   Patient Position: Sitting   BP Method: Large (Automatic)   Pulse: (!) 56   Resp: 18   Temp: 98.6 °F (37 °C)   TempSrc: Oral   SpO2: 100%   Weight: 110 kg (242 lb 8.1 oz)   Height: 5' 8" (1.727 m)     BMI: Body mass index is 36.87 kg/m².    Physical Exam   Constitutional: He is oriented to person, place, and time. He appears " well-developed and well-nourished. No distress.   HENT:   Head: Normocephalic and atraumatic.   Eyes: EOM are normal. Right eye exhibits no discharge. Left eye exhibits no discharge. No scleral icterus.   Cardiovascular: Normal rate, regular rhythm, normal heart sounds and intact distal pulses.  Exam reveals no gallop and no friction rub.    No murmur heard.  Pulmonary/Chest: Effort normal and breath sounds normal. No respiratory distress. He has no wheezes. He has no rales. He exhibits no tenderness.   Abdominal: Soft. Bowel sounds are normal. He exhibits no distension and no mass. There is no tenderness. There is no rebound.   Musculoskeletal: Normal range of motion. He exhibits no edema or tenderness.   Lymphadenopathy:        Head (right side): No submental adenopathy present.        Head (left side): No submental adenopathy present.     He has no axillary adenopathy.        Right: No inguinal and no supraclavicular adenopathy present.        Left: No inguinal and no supraclavicular adenopathy present.   Neurological: He is alert and oriented to person, place, and time. No cranial nerve deficit. Coordination normal.   Skin: Skin is warm and dry. No rash noted. He is not diaphoretic. No erythema.   Psychiatric: He has a normal mood and affect. His behavior is normal.       Laboratory Data:  Admission on 01/04/2018, Discharged on 01/04/2018   Component Date Value Ref Range Status    Bone Marrow Padilla Stain Comment 01/04/2018 See Anatomic Pathology bone marrow report.   Final    Bone Marrow Iron Stain Comment 01/04/2018 See Anatomic Pathology bone marrow report.   Final    Clinical Diagnosis - Bone Marrow 01/04/2018 MM   Final    Body Site - Bone Marrow 01/04/2018 LPI   Final    Bone Marrow Interpretation 01/04/2018 No abnormal hematopoietic population is detected in this sample.   Final    Bone Marrow Antibodies Analyzed 01/04/2018 All analyzed: CD2, CD3, CD4, CD5, CD7, CD8, CD10, CD13, CD19, CD20, CD34,  CD38, , KAPPA, Kappa Cytolasmic, LAMBDA, Lambda Cytoplasmic,CD45 and 7AAD.   Final    Bone Marrow Comment 01/04/2018    Final                    Value:Flow cytometric analysis of  bone marrow shows populations of polyclonal B lymphocytes and T lymphocytes that are immunophenotypically unremarkable.  The blast gate is not increased.  Polyclonal plasma cells are detected. Flow differential:  Lymphocytes   13.4%, Monocytes 3.0%, Granulocytes  72.4%, Blast  1.9%, Debris/nRBC 9.4%,  Viability 95.0%.      Comment: This test was developed and its performance characteristics   determined by [facility].    It has not been cleared or approved by the U.S. Food and Drug   Administration. The FDA has determined that such clearance or   approval is not necessary.  This test is used for clinical purposes.    It should not be regarded as investigational or for research.  This   laboratory is certified under CLIA-88 as qualified to perform high   complexity clinical laboratory testing.      Reason for Referral 01/04/2018 multiple myeloma   Final    MDS FISH Reason for Referral (BM) 01/04/2018 Multiple Myeloma   Final    MDS FISH Specimen Source 01/04/2018 Bone Marrow   Final         Imaging: Reviewed    Assessment:       1. H/O autologous stem cell transplant    2. Multiple myeloma in remission    3. Anemia associated with chemotherapy    4. At high risk for venous thromboembolism (VTE)           Plan:     Multiple myeloma in partial remission - the patient was previously treated with CyBorD and lenalidomide with dex.  While on lenalidomide the patient had developed DVT's.  -Results of the BM biopsy performed yesterday is pending and will likely be back next week.  -Recent  SPEP performed on 12/28/17 shows an M protein of 0.19g/dL in gamma decreased from 0.40g/dL on 8/28/17   -It was discussed with the patient to start on lenalidomide maintenance therapy with 28 days cycles with the  patient taking 10mg daily for 21 days of the 28 day cycle.  -Will start the patient on prophylactic doses of Eliquis given the h/o clotting when on Lenalidomide  -The patient was informed of the increased risk for malignancy while on Lenalidomide and expressed a desire to take the risk and star on Lenalidomide.    Karl Chery MD PGY-IV  Hematology and Oncology  Pager:[phone]    Attending addendum:    Day +100 after autologous stem cell transplant for multiple myeloma. Mr. Bobo is recovering well and feels good after transplant. He has some mild anemia that remains, but he otherwise has done well. His M-protein is down to 0.19 g/dl, which represents a very good partial response (VGPR) by IMWG criteria. Bone marrow is pending.    We discussed maintenance therapy with lenalidomide, as it has been shown to provide a PFS benefit across multiple randomzied studies, and a recent meta-analysis showed an OS benefit. We discussed adverse effects of lenalidomide, including risk of second primary malignancies. Given his thrombotic history, we will plan for antithrombotic prophylaxis with low dose apixaban (rather than aspirin alone).     He will return to clinic in 2 months with labs for restaging of myeloma.     Italo Bryant MD  Hematology and Stem Cell Transplant    Distress Screening Results: Psychosocial Distress screening score of Distress Score: 0 noted and reviewed. No intervention indicated.

## 2018-01-09 NOTE — TELEPHONE ENCOUNTER
DOCUMENTATION ONLY   PA submitted to insurance   1/12/2018  PA approved until 1/2019  Copay 80.00  LDD transfer to [facility]

## 2018-01-10 LAB
CHROM BANDING METHOD: NORMAL
CHROMOSOME ANALYSIS BM ADDITIONAL INFORMATION: NORMAL
CHROMOSOME ANALYSIS BM RELEASED BY: NORMAL
CHROMOSOME ANALYSIS BM RESULT SUMMARY: NORMAL
CLINICAL CYTOGENETICIST REVIEW: NORMAL
CLINICAL CYTOGENETICIST REVIEW: NORMAL
FMDS SPECIMEN: NORMAL
KARYOTYP MAR: NORMAL
MDS FISH ADDITIONAL INFORMATION: NORMAL
MDS FISH DISCLAIMER: NORMAL
MDS FISH REASON FOR REFERRAL (BM): NORMAL
MDS FISH RELEASED BY: NORMAL
MDS FISH RESULT (BM): NORMAL
MDS FISH RESULT SUMMARY: NORMAL
MDS FISH RESULT TABLE: NORMAL
REASON FOR REFERRAL (NARRATIVE): NORMAL
REF LAB TEST METHOD: NORMAL
REF LAB TEST METHOD: NORMAL
SPECIMEN SOURCE: NORMAL
SPECIMEN SOURCE: NORMAL
SPECIMEN: NORMAL

## 2018-01-11 ENCOUNTER — TELEPHONE (OUTPATIENT)
Dept: HEMATOLOGY/ONCOLOGY | Facility: CLINIC | Age: 53
End: 2018-01-11

## 2018-01-11 NOTE — TELEPHONE ENCOUNTER
Called Mr. Bobo to review bone marrow biopsy results. No evidence of plasma cells. He is in VGPR by IMWG criteria. He will start maintenance when lenalidomide becomes available (prior auth pending).    Italo Bryant MD

## 2018-01-15 DIAGNOSIS — C90.01 MULTIPLE MYELOMA IN REMISSION: Primary | ICD-10-CM

## 2018-01-15 RX ORDER — LENALIDOMIDE 10 MG/1
10 CAPSULE ORAL DAILY
Qty: 21 EACH | Refills: 0 | Status: SHIPPED | OUTPATIENT
Start: 2018-01-15 | End: 2018-02-06 | Stop reason: SDUPTHER

## 2018-01-22 ENCOUNTER — TELEPHONE (OUTPATIENT)
Dept: HEMATOLOGY/ONCOLOGY | Facility: CLINIC | Age: 53
End: 2018-01-22

## 2018-01-22 NOTE — TELEPHONE ENCOUNTER
Spoke to family.  Informed them that I faxed paperwork to [employer] for patient.  Informed them they could  original paperwork if needed.  Verbalized understanding.      ----- Message from Nadja Arthur sent at 1/22/2018  9:46 AM CST -----  Contact: Pt  Pt calling to check status of return to work form      Pt call back number [phone]

## 2018-01-23 ENCOUNTER — TELEPHONE (OUTPATIENT)
Dept: HEMATOLOGY/ONCOLOGY | Facility: CLINIC | Age: 53
End: 2018-01-23

## 2018-01-23 NOTE — TELEPHONE ENCOUNTER
Spoke to patient.  Informed him that I faxed his paperwork to [employer] and He can  originals when he is in the area.  Verbalized understanding.        ----- Message from Nadja Arthur sent at 1/23/2018  2:17 PM CST -----  Contact: Pt  Pt calling to check status of clearance to return to work      Pt call back number [phone]

## 2018-02-06 DIAGNOSIS — C90.01 MULTIPLE MYELOMA IN REMISSION: ICD-10-CM

## 2018-02-07 ENCOUNTER — PATIENT MESSAGE (OUTPATIENT)
Dept: HEMATOLOGY/ONCOLOGY | Facility: CLINIC | Age: 53
End: 2018-02-07

## 2018-02-07 RX ORDER — LENALIDOMIDE 10 MG/1
10 CAPSULE ORAL DAILY
Qty: 21 EACH | Refills: 0 | Status: SHIPPED | OUTPATIENT
Start: 2018-02-07 | End: 2018-03-06 | Stop reason: SDUPTHER

## 2018-02-19 DIAGNOSIS — C90.00 MULTIPLE MYELOMA, REMISSION STATUS UNSPECIFIED: Primary | ICD-10-CM

## 2018-03-06 ENCOUNTER — PATIENT MESSAGE (OUTPATIENT)
Dept: HEMATOLOGY/ONCOLOGY | Facility: CLINIC | Age: 53
End: 2018-03-06

## 2018-03-06 DIAGNOSIS — C90.01 MULTIPLE MYELOMA IN REMISSION: ICD-10-CM

## 2018-03-06 RX ORDER — LENALIDOMIDE 10 MG/1
10 CAPSULE ORAL DAILY
Qty: 21 EACH | Refills: 0 | Status: SHIPPED | OUTPATIENT
Start: 2018-03-06 | End: 2018-04-03 | Stop reason: SDUPTHER

## 2018-03-08 ENCOUNTER — OFFICE VISIT (OUTPATIENT)
Dept: HEMATOLOGY/ONCOLOGY | Facility: CLINIC | Age: 53
End: 2018-03-08
Payer: COMMERCIAL

## 2018-03-08 ENCOUNTER — CLINICAL SUPPORT (OUTPATIENT)
Dept: INFECTIOUS DISEASES | Facility: CLINIC | Age: 53
End: 2018-03-08
Payer: COMMERCIAL

## 2018-03-08 ENCOUNTER — LAB VISIT (OUTPATIENT)
Dept: LAB | Facility: HOSPITAL | Age: 53
End: 2018-03-08
Attending: INTERNAL MEDICINE
Payer: COMMERCIAL

## 2018-03-08 VITALS
OXYGEN SATURATION: 99 % | HEIGHT: 68 IN | SYSTOLIC BLOOD PRESSURE: 163 MMHG | RESPIRATION RATE: 19 BRPM | BODY MASS INDEX: 37.99 KG/M2 | DIASTOLIC BLOOD PRESSURE: 83 MMHG | TEMPERATURE: 98 F | HEART RATE: 48 BPM | WEIGHT: 250.69 LBS

## 2018-03-08 DIAGNOSIS — Z94.84 H/O AUTOLOGOUS STEM CELL TRANSPLANT: ICD-10-CM

## 2018-03-08 DIAGNOSIS — Z94.84 H/O AUTOLOGOUS STEM CELL TRANSPLANT: Primary | ICD-10-CM

## 2018-03-08 DIAGNOSIS — C90.01 MULTIPLE MYELOMA IN REMISSION: ICD-10-CM

## 2018-03-08 DIAGNOSIS — C90.00 MULTIPLE MYELOMA, REMISSION STATUS UNSPECIFIED: ICD-10-CM

## 2018-03-08 DIAGNOSIS — Z86.718 HISTORY OF DVT (DEEP VEIN THROMBOSIS): ICD-10-CM

## 2018-03-08 LAB
ALBUMIN SERPL BCP-MCNC: 3.6 G/DL
ALP SERPL-CCNC: 96 U/L
ALT SERPL W/O P-5'-P-CCNC: 19 U/L
ANION GAP SERPL CALC-SCNC: 7 MMOL/L
AST SERPL-CCNC: 18 U/L
BASOPHILS # BLD AUTO: 0.05 K/UL
BASOPHILS NFR BLD: 1.1 %
BILIRUB SERPL-MCNC: 0.5 MG/DL
BUN SERPL-MCNC: 17 MG/DL
CALCIUM SERPL-MCNC: 9.3 MG/DL
CHLORIDE SERPL-SCNC: 107 MMOL/L
CO2 SERPL-SCNC: 27 MMOL/L
CREAT SERPL-MCNC: 0.9 MG/DL
DIFFERENTIAL METHOD: ABNORMAL
EOSINOPHIL # BLD AUTO: 0.4 K/UL
EOSINOPHIL NFR BLD: 8.4 %
ERYTHROCYTE [DISTWIDTH] IN BLOOD BY AUTOMATED COUNT: 14.7 %
EST. GFR  (AFRICAN AMERICAN): >60 ML/MIN/1.73 M^2
EST. GFR  (NON AFRICAN AMERICAN): >60 ML/MIN/1.73 M^2
GLUCOSE SERPL-MCNC: 87 MG/DL
HCT VFR BLD AUTO: 34.4 %
HGB BLD-MCNC: 11.1 G/DL
IMM GRANULOCYTES # BLD AUTO: 0.02 K/UL
IMM GRANULOCYTES NFR BLD AUTO: 0.5 %
LYMPHOCYTES # BLD AUTO: 1.5 K/UL
LYMPHOCYTES NFR BLD: 33 %
MCH RBC QN AUTO: 27.1 PG
MCHC RBC AUTO-ENTMCNC: 32.3 G/DL
MCV RBC AUTO: 84 FL
MONOCYTES # BLD AUTO: 0.4 K/UL
MONOCYTES NFR BLD: 9.7 %
NEUTROPHILS # BLD AUTO: 2.1 K/UL
NEUTROPHILS NFR BLD: 47.3 %
NRBC BLD-RTO: 0 /100 WBC
PLATELET # BLD AUTO: 144 K/UL
PMV BLD AUTO: 10.4 FL
POTASSIUM SERPL-SCNC: 4.4 MMOL/L
PROT SERPL-MCNC: 6.1 G/DL
RBC # BLD AUTO: 4.1 M/UL
SODIUM SERPL-SCNC: 141 MMOL/L
WBC # BLD AUTO: 4.42 K/UL

## 2018-03-08 PROCEDURE — 90648 HIB PRP-T VACCINE 4 DOSE IM: CPT | Mod: S$GLB,,, | Performed by: INTERNAL MEDICINE

## 2018-03-08 PROCEDURE — 85025 COMPLETE CBC W/AUTO DIFF WBC: CPT

## 2018-03-08 PROCEDURE — 84165 PROTEIN E-PHORESIS SERUM: CPT | Mod: 26,,, | Performed by: PATHOLOGY

## 2018-03-08 PROCEDURE — 90472 IMMUNIZATION ADMIN EACH ADD: CPT | Mod: S$GLB,,, | Performed by: INTERNAL MEDICINE

## 2018-03-08 PROCEDURE — 99999 PR PBB SHADOW E&M-EST. PATIENT-LVL III: CPT | Mod: PBBFAC,,, | Performed by: INTERNAL MEDICINE

## 2018-03-08 PROCEDURE — 80053 COMPREHEN METABOLIC PANEL: CPT

## 2018-03-08 PROCEDURE — 3077F SYST BP >= 140 MM HG: CPT | Mod: S$GLB,,, | Performed by: INTERNAL MEDICINE

## 2018-03-08 PROCEDURE — 86334 IMMUNOFIX E-PHORESIS SERUM: CPT | Mod: 26,,, | Performed by: PATHOLOGY

## 2018-03-08 PROCEDURE — 99214 OFFICE O/P EST MOD 30 MIN: CPT | Mod: S$GLB,,, | Performed by: INTERNAL MEDICINE

## 2018-03-08 PROCEDURE — 36415 COLL VENOUS BLD VENIPUNCTURE: CPT

## 2018-03-08 PROCEDURE — 90713 POLIOVIRUS IPV SC/IM: CPT | Mod: S$GLB,,, | Performed by: INTERNAL MEDICINE

## 2018-03-08 PROCEDURE — 90670 PCV13 VACCINE IM: CPT | Mod: S$GLB,,, | Performed by: INTERNAL MEDICINE

## 2018-03-08 PROCEDURE — 86334 IMMUNOFIX E-PHORESIS SERUM: CPT

## 2018-03-08 PROCEDURE — 90700 DTAP VACCINE < 7 YRS IM: CPT | Mod: S$GLB,,, | Performed by: INTERNAL MEDICINE

## 2018-03-08 PROCEDURE — 3079F DIAST BP 80-89 MM HG: CPT | Mod: S$GLB,,, | Performed by: INTERNAL MEDICINE

## 2018-03-08 PROCEDURE — 84165 PROTEIN E-PHORESIS SERUM: CPT

## 2018-03-08 PROCEDURE — 83520 IMMUNOASSAY QUANT NOS NONAB: CPT | Mod: 59

## 2018-03-08 PROCEDURE — 90471 IMMUNIZATION ADMIN: CPT | Mod: S$GLB,,, | Performed by: INTERNAL MEDICINE

## 2018-03-08 PROCEDURE — 90746 HEPB VACCINE 3 DOSE ADULT IM: CPT | Mod: S$GLB,,, | Performed by: INTERNAL MEDICINE

## 2018-03-08 NOTE — PROGRESS NOTES
Pt to clinic for multiple vaccinations: Dtap, Pneumococcal, Hep B, IPV, HIB . Pt arrived in no acute distress. Injection admin to right and left deltoids. Pt instructed to remain on campus for 15 min and to notify nurse of any S/S.

## 2018-03-09 LAB
ALBUMIN SERPL ELPH-MCNC: 3.7 G/DL
ALPHA1 GLOB SERPL ELPH-MCNC: 0.32 G/DL
ALPHA2 GLOB SERPL ELPH-MCNC: 0.67 G/DL
B-GLOBULIN SERPL ELPH-MCNC: 0.67 G/DL
GAMMA GLOB SERPL ELPH-MCNC: 0.43 G/DL
INTERPRETATION SERPL IFE-IMP: NORMAL
KAPPA LC SER QL IA: 1.28 MG/DL
KAPPA LC/LAMBDA SER IA: 1.78
LAMBDA LC SER QL IA: 0.72 MG/DL
PATHOLOGIST INTERPRETATION IFE: NORMAL
PATHOLOGIST INTERPRETATION SPE: NORMAL
PROT SERPL-MCNC: 5.8 G/DL

## 2018-03-09 NOTE — PROGRESS NOTES
HEMATOLOGIC MALIGNANCIES PROGRESS NOTE    IDENTIFYING STATEMENT   Arik Bobo (Arik) is a 52 y.o. male with a  of 1965 from [place] with the diagnosis of multiple myeloma.      ONCOLOGY HISTORY:    1. Multiple myeloma s/p autologous stem cell transplant   A. Early : Progressive anemia. K/L ratio > 100, and was treated with fina-dex   B. 12/3/2015: M-protein 1.02 g/dl, IgG-kappa by JAYESH. Kappa 2.98 mg/dl, lambda 0.5 mg/dl, ratio 5.96. BMBx shows 60-70% cellular marrow with 20% plasma cells, consistent with plasma cell myeloma. Hgb 11.8 g/dl, Calcium 9.44 (corrected). Creatinine 0.8 mg/dl.    C. 2016: M-protein 2.23 g/dl. Kappa 4.36 mg/dl, lambda 0.08 mg/dl, ratio 54.5. Progressive disease.   D. VCD therapy locally.    E. 2017: M-protein 1.23 g/dl.    F. 2017: M-protein 0.41 g/dl. Kappa 2.33 mg/dl, lambda 0.5 mg/dl, ratio 4.66, consistent with partial response.    G. 2017: Autologous stem cell transplant with melphalan conditioning.   H. 2018: Day +100 restaging - M-protein 0.19 g/dl, kappa 0.76 mg/dl, lambda 0.53 mg/dl, ratio 1.43. BMBx shows no definitive evidence of myeloma. Consistent with very good partial response.     2. Depression  3. HTN  4. History of DVT in 2015 and 2017    INTERVAL HISTORY:      Mr. Bobo returns to clinic on Day +162 of his autologous transplant for myeloma. Since his last visit, he has started maintenance lenalidomide and is adherent to this without adverse effect. He continues on his low-dose apixaban to prevent VTE. He has not had any bleeding/bruising. No rashes. No symptoms to suggest recurrent VTE. He otherwise feels well.     Past Medical History, Past Social History and Past Family History have been reviewed and are unchanged except as noted in the interval history.    MEDICATIONS:     Current Outpatient Prescriptions on File Prior to Visit   Medication Sig Dispense Refill    amlodipine (NORVASC) 5 MG tablet Take 5 mg by mouth  "once daily.      apixaban 2.5 mg Tab Take 1 tablet (2.5 mg total) by mouth 2 (two) times daily. 60 tablet 3    BYSTOLIC 10 mg Tab 10 mg once daily.   2    citalopram (CELEXA) 40 MG tablet Take 40 mg by mouth.      lenalidomide (REVLIMID) 10 mg Cap Take 10 mg by mouth once daily. auth #[id number] on 3/6/18 21 each 0    lisinopril (PRINIVIL,ZESTRIL) 40 MG tablet   1    simvastatin (ZOCOR) 20 MG tablet   3     No current facility-administered medications on file prior to visit.        ALLERGIES:   Review of patient's allergies indicates:   Allergen Reactions    Pcn [penicillins]      Unknown last dose as an infant        ROS:       Review of Systems   Constitutional: Negative for diaphoresis, fatigue, fever and unexpected weight change.   HENT:   Negative for lump/mass and sore throat.    Eyes: Negative for icterus.   Respiratory: Negative for cough and shortness of breath.    Cardiovascular: Negative for chest pain and palpitations.   Gastrointestinal: Negative for abdominal distention, constipation, diarrhea, nausea and vomiting.   Genitourinary: Negative for dysuria and frequency.    Musculoskeletal: Negative for arthralgias, gait problem and myalgias.   Skin: Negative for rash.   Neurological: Negative for dizziness, gait problem and headaches.   Hematological: Negative for adenopathy. Does not bruise/bleed easily.   Psychiatric/Behavioral: The patient is not nervous/anxious.        PHYSICAL EXAM:  Vitals:    03/08/18 1014   BP: (!) 163/83   Pulse: (!) 48   Resp: 19   Temp: 98.2 °F (36.8 °C)   TempSrc: Oral   SpO2: 99%   Weight: 113.7 kg (250 lb 10.6 oz)   Height: 5' 8" (1.727 m)   PainSc: 0-No pain       KARNOFSKY PERFORMANCE STATUS 90%  ECOG 1    Physical Exam   Constitutional: He is oriented to person, place, and time. He appears well-developed and well-nourished. No distress.   HENT:   Head: Normocephalic and atraumatic.   Mouth/Throat: Mucous membranes are normal. No oral lesions.   Eyes: Conjunctivae are " normal.   Neck: No thyromegaly present.   Cardiovascular: Normal rate, regular rhythm and normal heart sounds.    No murmur heard.  Pulmonary/Chest: Breath sounds normal. He has no wheezes. He has no rales.   Abdominal: Soft. He exhibits no distension and no mass. There is no splenomegaly or hepatomegaly. There is no tenderness.   Lymphadenopathy:     He has no cervical adenopathy.        Right cervical: No deep cervical adenopathy present.       Left cervical: No deep cervical adenopathy present.     He has no axillary adenopathy.        Right: No inguinal adenopathy present.        Left: No inguinal adenopathy present.   Neurological: He is alert and oriented to person, place, and time. He has normal strength and normal reflexes. No cranial nerve deficit. Coordination normal.   Skin: No rash noted.       LAB:   Results for orders placed or performed in visit on 03/08/18   CBC W/ AUTO DIFFERENTIAL   Result Value Ref Range    WBC 4.42 3.90 - 12.70 K/uL    RBC 4.10 (L) 4.60 - 6.20 M/uL    Hemoglobin 11.1 (L) 14.0 - 18.0 g/dL    Hematocrit 34.4 (L) 40.0 - 54.0 %    MCV 84 82 - 98 fL    MCH 27.1 27.0 - 31.0 pg    MCHC 32.3 32.0 - 36.0 g/dL    RDW 14.7 (H) 11.5 - 14.5 %    Platelets 144 (L) 150 - 350 K/uL    MPV 10.4 9.2 - 12.9 fL    Immature Granulocytes 0.5 0.0 - 0.5 %    Gran # (ANC) 2.1 1.8 - 7.7 K/uL    Immature Grans (Abs) 0.02 0.00 - 0.04 K/uL    Lymph # 1.5 1.0 - 4.8 K/uL    Mono # 0.4 0.3 - 1.0 K/uL    Eos # 0.4 0.0 - 0.5 K/uL    Baso # 0.05 0.00 - 0.20 K/uL    nRBC 0 0 /100 WBC    Gran% 47.3 38.0 - 73.0 %    Lymph% 33.0 18.0 - 48.0 %    Mono% 9.7 4.0 - 15.0 %    Eosinophil% 8.4 (H) 0.0 - 8.0 %    Basophil% 1.1 0.0 - 1.9 %    Differential Method Automated    COMPREHENSIVE METABOLIC PANEL   Result Value Ref Range    Sodium 141 136 - 145 mmol/L    Potassium 4.4 3.5 - 5.1 mmol/L    Chloride 107 95 - 110 mmol/L    CO2 27 23 - 29 mmol/L    Glucose 87 70 - 110 mg/dL    BUN, Bld 17 6 - 20 mg/dL    Creatinine 0.9 0.5 -  1.4 mg/dL    Calcium 9.3 8.7 - 10.5 mg/dL    Total Protein 6.1 6.0 - 8.4 g/dL    Albumin 3.6 3.5 - 5.2 g/dL    Total Bilirubin 0.5 0.1 - 1.0 mg/dL    Alkaline Phosphatase 96 55 - 135 U/L    AST 18 10 - 40 U/L    ALT 19 10 - 44 U/L    Anion Gap 7 (L) 8 - 16 mmol/L    eGFR if African American >60.0 >60 mL/min/1.73 m^2    eGFR if non African American >60.0 >60 mL/min/1.73 m^2   PROTEIN ELECTROPHORESIS, SERUM   Result Value Ref Range    Protein, Serum 5.8 (L) 6.0 - 8.4 g/dL    Albumin grams/dl 3.70 3.35 - 5.55 g/dL    Alpha-1 grams/dl 0.32 0.17 - 0.41 g/dL    Alpha-2 grams/dl 0.67 0.43 - 0.99 g/dL    Beta grams/dl 0.67 0.50 - 1.10 g/dL    Gamma grams/dl 0.43 (L) 0.67 - 1.58 g/dL   IMMUNOFIXATION ELECTROPHORESIS, SERUM   Result Value Ref Range    Immunofix Interp. SEE COMMENT    IMMUNOGLOBULIN FREE LT CHAINS BLOOD   Result Value Ref Range    Kappa Free Light Chains 1.28 0.33 - 1.94 mg/dL    Lambda Free Light Chains 0.72 0.57 - 2.63 mg/dL    Kappa/Lambda FLC Ratio 1.78 (H) 0.26 - 1.65   Pathologist Interpretation JAYESH   Result Value Ref Range    Pathologist Interpretation JAYESH REVIEWED    Pathologist Interpretation SPE   Result Value Ref Range    Pathologist Interpretation SPE REVIEWED        PROBLEMS ASSESSED THIS VISIT:    1. H/O autologous stem cell transplant    2. Multiple myeloma in remission    3. History of DVT (deep vein thrombosis)        PLAN:       Multiple myeloma in remission  Mr. Bobo presents on Day +162 of his autologous transplant for multiple myeloma. He is doing well. His M-protein is stable at 0.18 g/dl today; thus, he is maintaining his VGPR in response to transplant.     He is on lenalidomide maintenance, and we recommended he continues this. 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    He will begin immunizations today per protocol. We will follow-up with him in two months for his next immunization series.    We also discussed return home to local oncology,  though he believes his oncologist has retired. We will therefore plan this after he completes his vaccine series and 1-year appointment.       Italo Bryant MD  Hematology and Stem Cell Transplant

## 2018-03-12 PROBLEM — Z86.718 HISTORY OF DVT (DEEP VEIN THROMBOSIS): Status: ACTIVE | Noted: 2018-03-12

## 2018-03-12 NOTE — ASSESSMENT & PLAN NOTE
Mr. Bobo presents on Day +162 of his autologous transplant for multiple myeloma. He is doing well. His M-protein is stable at 0.18 g/dl today; thus, he is maintaining his VGPR in response to transplant.     He is on lenalidomide maintenance, and we recommended he continues this. 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    He will begin immunizations today per protocol. We will follow-up with him in two months for his next immunization series.    We also discussed return home to local oncology, though he believes his oncologist has retired. We will therefore plan this after he completes his vaccine series and 1-year appointment.

## 2018-03-27 ENCOUNTER — TELEPHONE (OUTPATIENT)
Dept: HEMATOLOGY/ONCOLOGY | Facility: CLINIC | Age: 53
End: 2018-03-27

## 2018-03-28 DIAGNOSIS — C90.00 MULTIPLE MYELOMA, REMISSION STATUS UNSPECIFIED: Primary | ICD-10-CM

## 2018-04-03 ENCOUNTER — PATIENT MESSAGE (OUTPATIENT)
Dept: HEMATOLOGY/ONCOLOGY | Facility: CLINIC | Age: 53
End: 2018-04-03

## 2018-04-03 DIAGNOSIS — C90.01 MULTIPLE MYELOMA IN REMISSION: ICD-10-CM

## 2018-04-03 RX ORDER — LENALIDOMIDE 10 MG/1
10 CAPSULE ORAL DAILY
Qty: 21 EACH | Refills: 0 | Status: SHIPPED | OUTPATIENT
Start: 2018-04-03 | End: 2018-05-09 | Stop reason: SDUPTHER

## 2018-04-12 NOTE — PROGRESS NOTES
Subjective:       Patient ID: Arik Bobo is a 52 y.o. male.    Chief Complaint: No chief complaint on file.    HPI  KPS 90%. Mr. Bobo is here with his wife 16 days post PBSCT for IgG kappa multiple myeloma in PR1 at the time of transplant.  His recent hospital admission (9/25 - 10/10/17) was complicated by infection, likely pneumonia, during his period of neutropenia with some instability but this resolved with recovery of his WBC.  He also developed significant renal failure for unclear reasons which has been resolving since discharge with improved fluid and food intake.      Myeloma History: He was noted to develop a progressive anemia in early 2015.  In retrospect, his total protein has been at the upper end of normal with hematocrit of 39 in 10/2013.  At the time of a total hip arthroplasty 11/2014 the marrow resected from his right femoral head did not show abnormalities. During that hospital stay he underwent an anemia workup showing iron 20, TIBC 234 with 12% saturation and normal ferritin at 103.  Subsequent colonoscopy showed multiple benign polyps and one at the appejndiceal lumen that resulted in appendectomy in 1/2015.    SPEP showed a monoclonal protein 2.9 g/dL with IgG elevated at 5677 and suppressed IgA and IgM, calcium 9, creatinine 0.2.  Bone survey did not find lytic lesions and kappa:lambda light chain ratio was over 100.  Albumin 3.2 (5/1) and beta-2 microglobin was elevated, though I do not know the value.  Marrow 4/14/15 at which time his hemoglobin was 9.6 with MCV 87 showed a 50% cellular marrow with 10% plasma cell infiltrated with atypical forms and a left shifted granulocyte population.   staining showed up to 50% plasma cells in some areas but kappa and lambda staining showed a mixture of cells without clear clonality.  Plasma cells were kappa restricted on flow cytometry (5.7% population).  No stainable iron was present.  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  He was tried on IV iron with some improvement but not resolution of his anemia.    Given elevated light chain ratio over 100 he was formally diagnosed with myeloma and started on lenalidomide/dexamethasone in late 6/2015 which he continued until 12/2015 when a repeat bone marrow 12/3/15 showed a 60-70% cellular marrow with 20% kappa restricted plasma cells.  His protein levels were under good control with free kappa light chain 2.98 mg/dL and IgG kappa M-spike 1 g/dL.  Repeat marrow done 4/5/16 showed a 30-40% cellular marrow with a 17% kappa-restricted plasma cell population and no storage iron.  Hence, he was started on iron with resolution of his anemia, which had been the only feature qualifying him for the diagnosis of myeloma.  Hence, he was reclassified smoldering myeloma and has been followed since that time.    He developed two episodes of DVT in 7/2015 and 2/2017 was on apixaban.    He was seeing Dr. Zepeda who had been treating him primarily.  He completed hip replacement surgery and has healed well.     Review of Systems   Constitutional: Positive for fatigue (improved). Negative for activity change, appetite change, diaphoresis, fever and unexpected weight change.   HENT: Negative for mouth sores, postnasal drip and sore throat.    Eyes: Negative for visual disturbance.   Respiratory: Positive for shortness of breath (improving). Negative for cough and chest tightness.    Cardiovascular: Negative for chest pain and leg swelling.   Gastrointestinal: Negative for anal bleeding, blood in stool, constipation, diarrhea and rectal pain.   Genitourinary: Negative for dysuria, frequency and hematuria.   Musculoskeletal: Negative for arthralgias, back pain and neck pain.   Skin: Negative for rash.   Neurological: Positive for weakness. Negative for tremors, light-headedness and numbness.   Hematological: Negative for adenopathy.   Psychiatric/Behavioral:  Negative for confusion and sleep disturbance.       Objective:      Physical Exam   Constitutional: He is oriented to person, place, and time. He appears well-developed and well-nourished. No distress.   HENT:   Head: Normocephalic and atraumatic.   Mouth/Throat: Oropharynx is clear and moist. No oropharyngeal exudate.   Eyes: Conjunctivae are normal. Pupils are equal, round, and reactive to light.   Neck: Neck supple.   Cardiovascular: Normal rate and regular rhythm.    Pulmonary/Chest: Effort normal and breath sounds normal. He has no rales.   Abdominal: Soft. Bowel sounds are normal. He exhibits no mass. There is no splenomegaly. There is no tenderness.   Musculoskeletal: Normal range of motion. He exhibits no edema.   No spinal or rib tenderness   Lymphadenopathy:     He has no cervical adenopathy.     He has no axillary adenopathy.        Right: No inguinal adenopathy present.        Left: No inguinal adenopathy present.   Neurological: He is alert and oriented to person, place, and time.   Skin: Skin is warm and dry. No rash noted.   Previous line site healing well.   Psychiatric: He has a normal mood and affect. Thought content normal.       Assessment:       1. Multiple myeloma in remission    2. H/O autologous stem cell transplant    3. ELENA (acute kidney injury)        Plan:       Multiple myeloma in partial remission  Completed 4 cycles of CyBorD   Was treated with lenalidomide and dex  Restaging marrow 8/21/17 showed a 10-50% cellular marrow with trilineage hematopoiesis and 5% residual  kappa-restricted plasma cells (6% by morphology).  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  Achieved a partial remission (PR1) prior to transplant.    65% EF on 8/29/17  Recovering well at day 16 with  Normal WBC, platelets 80K and hemoglobin 9.2.  Still with slight dyspnea post pneumonia.    Renal/Hypertension  Improving creatinine to 1.5 with good fluid intake and urine output.  Continue home amlodipine, bystolic and lisinopril      Hyperlipidemia  Will resume statin     Depression  Continue home Celexa     Hx of Gout  Pt suffers from gout and is not currently on uric acid suppression medication.  This may have contributed to his hip degeneration.    He will return in 2 weeks for follow up and all of his questions were answered in the clinic today.            Prep Text (Optional): Prior to removal the treatment areas were prepped in the usual fashion. Detail Level: Detailed Post-Care Instructions: I reviewed with the patient in detail post-care instructions. Patient is to keep the treatment areas dry overnight, and then apply bacitracin twice daily until healed. Patient may apply hydrogen peroxide soaks to remove any crusting. Extraction Method: comedo extractor Consent was obtained and risks were reviewed including but not limited to scarring, infection, bleeding, scabbing, incomplete removal, and allergy to anesthesia. Render Number Of Lesions Treated: no Acne Type: Comedonal Lesions

## 2018-04-24 ENCOUNTER — TELEPHONE (OUTPATIENT)
Dept: HEMATOLOGY/ONCOLOGY | Facility: CLINIC | Age: 53
End: 2018-04-24

## 2018-04-24 NOTE — TELEPHONE ENCOUNTER
Voicemail left for patient informing him that Dr Bryant is out on jury duty and will be back in the office on Thursday and I will have paperwork then.          ----- Message from Nadja Arthur sent at 4/24/2018  9:26 AM CDT -----  Contact: Pt  Pt calling to check status of medical clearance         Pt call back number [phone]

## 2018-04-27 ENCOUNTER — TELEPHONE (OUTPATIENT)
Dept: HEMATOLOGY/ONCOLOGY | Facility: CLINIC | Age: 53
End: 2018-04-27

## 2018-04-27 NOTE — TELEPHONE ENCOUNTER
Spoke to patient on yesterday. Informed him that paper work was faxed and he can  originals at next appointment. Verbalized understanding          ----- Message from Jody Mata sent at 4/26/2018  1:17 PM CDT -----  Contact: Self   Pt is requesting a call back in regards to the status of his medical clearance         Pt can be contacted at [phone]

## 2018-05-08 ENCOUNTER — OFFICE VISIT (OUTPATIENT)
Dept: HEMATOLOGY/ONCOLOGY | Facility: CLINIC | Age: 53
End: 2018-05-08
Payer: COMMERCIAL

## 2018-05-08 ENCOUNTER — CLINICAL SUPPORT (OUTPATIENT)
Dept: INFECTIOUS DISEASES | Facility: CLINIC | Age: 53
End: 2018-05-08
Payer: COMMERCIAL

## 2018-05-08 ENCOUNTER — LAB VISIT (OUTPATIENT)
Dept: LAB | Facility: HOSPITAL | Age: 53
End: 2018-05-08
Attending: INTERNAL MEDICINE
Payer: COMMERCIAL

## 2018-05-08 VITALS
HEART RATE: 44 BPM | DIASTOLIC BLOOD PRESSURE: 66 MMHG | OXYGEN SATURATION: 98 % | SYSTOLIC BLOOD PRESSURE: 137 MMHG | TEMPERATURE: 98 F | RESPIRATION RATE: 18 BRPM | WEIGHT: 246.94 LBS | HEIGHT: 68 IN | BODY MASS INDEX: 37.43 KG/M2

## 2018-05-08 DIAGNOSIS — Z94.84 H/O AUTOLOGOUS STEM CELL TRANSPLANT: ICD-10-CM

## 2018-05-08 DIAGNOSIS — D64.81 ANEMIA ASSOCIATED WITH CHEMOTHERAPY: ICD-10-CM

## 2018-05-08 DIAGNOSIS — D69.6 THROMBOCYTOPENIA: ICD-10-CM

## 2018-05-08 DIAGNOSIS — R00.1 BRADYCARDIA: ICD-10-CM

## 2018-05-08 DIAGNOSIS — C90.01 MULTIPLE MYELOMA IN REMISSION: Primary | ICD-10-CM

## 2018-05-08 DIAGNOSIS — C90.00 MULTIPLE MYELOMA, REMISSION STATUS UNSPECIFIED: ICD-10-CM

## 2018-05-08 DIAGNOSIS — T45.1X5A ANEMIA ASSOCIATED WITH CHEMOTHERAPY: ICD-10-CM

## 2018-05-08 LAB
ALBUMIN SERPL BCP-MCNC: 3.4 G/DL
ALP SERPL-CCNC: 99 U/L
ALT SERPL W/O P-5'-P-CCNC: 21 U/L
ANION GAP SERPL CALC-SCNC: 6 MMOL/L
AST SERPL-CCNC: 18 U/L
BASOPHILS # BLD AUTO: 0.03 K/UL
BASOPHILS NFR BLD: 0.7 %
BILIRUB SERPL-MCNC: 0.7 MG/DL
BUN SERPL-MCNC: 13 MG/DL
CALCIUM SERPL-MCNC: 9.2 MG/DL
CHLORIDE SERPL-SCNC: 106 MMOL/L
CO2 SERPL-SCNC: 28 MMOL/L
CREAT SERPL-MCNC: 1 MG/DL
DIFFERENTIAL METHOD: ABNORMAL
EOSINOPHIL # BLD AUTO: 0.3 K/UL
EOSINOPHIL NFR BLD: 6.2 %
ERYTHROCYTE [DISTWIDTH] IN BLOOD BY AUTOMATED COUNT: 15.5 %
EST. GFR  (AFRICAN AMERICAN): >60 ML/MIN/1.73 M^2
EST. GFR  (NON AFRICAN AMERICAN): >60 ML/MIN/1.73 M^2
GLUCOSE SERPL-MCNC: 92 MG/DL
HCT VFR BLD AUTO: 36.3 %
HGB BLD-MCNC: 11.5 G/DL
IGA SERPL-MCNC: 68 MG/DL
IGG SERPL-MCNC: 484 MG/DL
IGM SERPL-MCNC: 16 MG/DL
IMM GRANULOCYTES # BLD AUTO: 0.01 K/UL
IMM GRANULOCYTES NFR BLD AUTO: 0.2 %
LYMPHOCYTES # BLD AUTO: 1.4 K/UL
LYMPHOCYTES NFR BLD: 32.1 %
MCH RBC QN AUTO: 27.3 PG
MCHC RBC AUTO-ENTMCNC: 31.7 G/DL
MCV RBC AUTO: 86 FL
MONOCYTES # BLD AUTO: 0.5 K/UL
MONOCYTES NFR BLD: 11.1 %
NEUTROPHILS # BLD AUTO: 2.2 K/UL
NEUTROPHILS NFR BLD: 49.7 %
NRBC BLD-RTO: 0 /100 WBC
PLATELET # BLD AUTO: 128 K/UL
PMV BLD AUTO: 9.7 FL
POTASSIUM SERPL-SCNC: 4.6 MMOL/L
PROT SERPL-MCNC: 5.9 G/DL
RBC # BLD AUTO: 4.21 M/UL
SODIUM SERPL-SCNC: 140 MMOL/L
WBC # BLD AUTO: 4.33 K/UL

## 2018-05-08 PROCEDURE — 84165 PROTEIN E-PHORESIS SERUM: CPT

## 2018-05-08 PROCEDURE — 90471 IMMUNIZATION ADMIN: CPT | Mod: S$GLB,,, | Performed by: INTERNAL MEDICINE

## 2018-05-08 PROCEDURE — 86334 IMMUNOFIX E-PHORESIS SERUM: CPT | Mod: 26,,, | Performed by: PATHOLOGY

## 2018-05-08 PROCEDURE — 90746 HEPB VACCINE 3 DOSE ADULT IM: CPT | Mod: S$GLB,,, | Performed by: INTERNAL MEDICINE

## 2018-05-08 PROCEDURE — 99999 PR PBB SHADOW E&M-EST. PATIENT-LVL III: CPT | Mod: PBBFAC,,, | Performed by: INTERNAL MEDICINE

## 2018-05-08 PROCEDURE — 80053 COMPREHEN METABOLIC PANEL: CPT

## 2018-05-08 PROCEDURE — 3075F SYST BP GE 130 - 139MM HG: CPT | Mod: CPTII,S$GLB,, | Performed by: INTERNAL MEDICINE

## 2018-05-08 PROCEDURE — 85025 COMPLETE CBC W/AUTO DIFF WBC: CPT

## 2018-05-08 PROCEDURE — 3008F BODY MASS INDEX DOCD: CPT | Mod: CPTII,S$GLB,, | Performed by: INTERNAL MEDICINE

## 2018-05-08 PROCEDURE — 90472 IMMUNIZATION ADMIN EACH ADD: CPT | Mod: S$GLB,,, | Performed by: INTERNAL MEDICINE

## 2018-05-08 PROCEDURE — 90648 HIB PRP-T VACCINE 4 DOSE IM: CPT | Mod: S$GLB,,, | Performed by: INTERNAL MEDICINE

## 2018-05-08 PROCEDURE — 82784 ASSAY IGA/IGD/IGG/IGM EACH: CPT | Mod: 59

## 2018-05-08 PROCEDURE — 90670 PCV13 VACCINE IM: CPT | Mod: S$GLB,,, | Performed by: INTERNAL MEDICINE

## 2018-05-08 PROCEDURE — 83520 IMMUNOASSAY QUANT NOS NONAB: CPT | Mod: 59

## 2018-05-08 PROCEDURE — 99215 OFFICE O/P EST HI 40 MIN: CPT | Mod: S$GLB,,, | Performed by: INTERNAL MEDICINE

## 2018-05-08 PROCEDURE — 84165 PROTEIN E-PHORESIS SERUM: CPT | Mod: 26,,, | Performed by: PATHOLOGY

## 2018-05-08 PROCEDURE — 3078F DIAST BP <80 MM HG: CPT | Mod: CPTII,S$GLB,, | Performed by: INTERNAL MEDICINE

## 2018-05-08 PROCEDURE — 36415 COLL VENOUS BLD VENIPUNCTURE: CPT

## 2018-05-08 PROCEDURE — 99999 PR PBB SHADOW E&M-EST. PATIENT-LVL II: CPT | Mod: PBBFAC,,,

## 2018-05-08 PROCEDURE — 90700 DTAP VACCINE < 7 YRS IM: CPT | Mod: S$GLB,,, | Performed by: INTERNAL MEDICINE

## 2018-05-08 PROCEDURE — 86706 HEP B SURFACE ANTIBODY: CPT

## 2018-05-08 PROCEDURE — 86334 IMMUNOFIX E-PHORESIS SERUM: CPT

## 2018-05-08 PROCEDURE — 90713 POLIOVIRUS IPV SC/IM: CPT | Mod: S$GLB,,, | Performed by: INTERNAL MEDICINE

## 2018-05-08 NOTE — PROGRESS NOTES
HEMATOLOGIC MALIGNANCIES PROGRESS NOTE    IDENTIFYING STATEMENT   Arik Bobo (Arik) is a 52 y.o. male with a  of 1965 from [place], [place] with the diagnosis of multiple myeloma.      ONCOLOGY HISTORY:    1. Multiple myeloma s/p autologous stem cell transplant   A. Early : Progressive anemia. K/L ratio > 100, and was treated with fina-dex   B. 12/3/2015: M-protein 1.02 g/dl, IgG-kappa by JAYESH. Kappa 2.98 mg/dl, lambda 0.5 mg/dl, ratio 5.96. BMBx shows 60-70% cellular marrow with 20% plasma cells, consistent with plasma cell myeloma. Hgb 11.8 g/dl, Calcium 9.44 (corrected). Creatinine 0.8 mg/dl.    C. 2016: M-protein 2.23 g/dl. Kappa 4.36 mg/dl, lambda 0.08 mg/dl, ratio 54.5. Progressive disease.   D. VCD therapy locally.    E. 2017: M-protein 1.23 g/dl.    F. 2017: M-protein 0.41 g/dl. Kappa 2.33 mg/dl, lambda 0.5 mg/dl, ratio 4.66, consistent with partial response.    G. 2017: Autologous stem cell transplant with melphalan conditioning.   H. 2018: Day +100 restaging - M-protein 0.19 g/dl, kappa 0.76 mg/dl, lambda 0.53 mg/dl, ratio 1.43. BMBx shows no definitive evidence of myeloma. Consistent with very good partial response.    I. 3/8/2018: M-protein 0.18 g/dl   J. 2018: M-protein 0.16 g/dl    2. Depression  3. HTN  4. History of DVT in 2015 and 2017    INTERVAL HISTORY:      Mr. Bobo returns to clinic on Day +223 of his autologous transplant for myeloma. 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 He is not working full time because his legs still swell if he stands for a long time. He is optimistic that he will continue to recover his function.     He otherwise is doing well. No new bone pain. No neuropathy. No fevers or chills.     Past Medical History, Past Social History and Past Family History have been reviewed and are unchanged except as noted in the interval history.    MEDICATIONS:     Current Outpatient  "Prescriptions on File Prior to Visit   Medication Sig Dispense Refill    amlodipine (NORVASC) 5 MG tablet Take 5 mg by mouth once daily.      apixaban 2.5 mg Tab Take 1 tablet (2.5 mg total) by mouth 2 (two) times daily. 60 tablet 3    BYSTOLIC 10 mg Tab 10 mg once daily.   2    citalopram (CELEXA) 40 MG tablet Take 40 mg by mouth.      lisinopril (PRINIVIL,ZESTRIL) 40 MG tablet   1    simvastatin (ZOCOR) 20 MG tablet   3     No current facility-administered medications on file prior to visit.        ALLERGIES:   Review of patient's allergies indicates:   Allergen Reactions    Pcn [penicillins]      Unknown last dose as an infant        ROS:       Review of Systems   Constitutional: Negative for diaphoresis, fatigue, fever and unexpected weight change.   HENT:   Negative for lump/mass and sore throat.    Eyes: Negative for icterus.   Respiratory: Negative for cough and shortness of breath.    Cardiovascular: Negative for chest pain and palpitations.   Gastrointestinal: Negative for abdominal distention, constipation, diarrhea, nausea and vomiting.   Genitourinary: Negative for dysuria and frequency.    Musculoskeletal: Negative for arthralgias, gait problem and myalgias.   Skin: Negative for rash.   Neurological: Negative for dizziness, gait problem and headaches.   Hematological: Negative for adenopathy. Does not bruise/bleed easily.   Psychiatric/Behavioral: The patient is not nervous/anxious.        PHYSICAL EXAM:  Vitals:    05/08/18 0811   BP: 137/66   Pulse: (!) 44   Resp: 18   Temp: 98.3 °F (36.8 °C)   TempSrc: Oral   SpO2: 98%   Weight: 112 kg (246 lb 14.6 oz)   Height: 5' 8" (1.727 m)   PainSc: 0-No pain       KARNOFSKY PERFORMANCE STATUS 90%  ECOG 1    Physical Exam   Constitutional: He is oriented to person, place, and time. He appears well-developed and well-nourished. No distress.   HENT:   Head: Normocephalic and atraumatic.   Mouth/Throat: Mucous membranes are normal. No oral lesions.   Eyes: " Conjunctivae are normal.   Neck: No thyromegaly present.   Cardiovascular: Normal rate, regular rhythm and normal heart sounds.    No murmur heard.  Pulmonary/Chest: Breath sounds normal. He has no wheezes. He has no rales.   Abdominal: Soft. He exhibits no distension and no mass. There is no splenomegaly or hepatomegaly. There is no tenderness.   Lymphadenopathy:     He has no cervical adenopathy.        Right cervical: No deep cervical adenopathy present.       Left cervical: No deep cervical adenopathy present.     He has no axillary adenopathy.        Right: No inguinal adenopathy present.        Left: No inguinal adenopathy present.   Neurological: He is alert and oriented to person, place, and time. He has normal strength and normal reflexes. No cranial nerve deficit. Coordination normal.   Skin: No rash noted.       LAB:   Results for orders placed or performed in visit on 05/08/18   HEPATITIS B SURFACE ANTIBODY, QUAL/QUANT   Result Value Ref Range    Hep. B Surf Ab, Qual Negative     Hep. B Surf Ab, Quant. 9 mIU/mL   CBC auto differential   Result Value Ref Range    WBC 4.33 3.90 - 12.70 K/uL    RBC 4.21 (L) 4.60 - 6.20 M/uL    Hemoglobin 11.5 (L) 14.0 - 18.0 g/dL    Hematocrit 36.3 (L) 40.0 - 54.0 %    MCV 86 82 - 98 fL    MCH 27.3 27.0 - 31.0 pg    MCHC 31.7 (L) 32.0 - 36.0 g/dL    RDW 15.5 (H) 11.5 - 14.5 %    Platelets 128 (L) 150 - 350 K/uL    MPV 9.7 9.2 - 12.9 fL    Immature Granulocytes 0.2 0.0 - 0.5 %    Gran # (ANC) 2.2 1.8 - 7.7 K/uL    Immature Grans (Abs) 0.01 0.00 - 0.04 K/uL    Lymph # 1.4 1.0 - 4.8 K/uL    Mono # 0.5 0.3 - 1.0 K/uL    Eos # 0.3 0.0 - 0.5 K/uL    Baso # 0.03 0.00 - 0.20 K/uL    nRBC 0 0 /100 WBC    Gran% 49.7 38.0 - 73.0 %    Lymph% 32.1 18.0 - 48.0 %    Mono% 11.1 4.0 - 15.0 %    Eosinophil% 6.2 0.0 - 8.0 %    Basophil% 0.7 0.0 - 1.9 %    Differential Method Automated    Comprehensive metabolic panel   Result Value Ref Range    Sodium 140 136 - 145 mmol/L    Potassium 4.6 3.5  - 5.1 mmol/L    Chloride 106 95 - 110 mmol/L    CO2 28 23 - 29 mmol/L    Glucose 92 70 - 110 mg/dL    BUN, Bld 13 6 - 20 mg/dL    Creatinine 1.0 0.5 - 1.4 mg/dL    Calcium 9.2 8.7 - 10.5 mg/dL    Total Protein 5.9 (L) 6.0 - 8.4 g/dL    Albumin 3.4 (L) 3.5 - 5.2 g/dL    Total Bilirubin 0.7 0.1 - 1.0 mg/dL    Alkaline Phosphatase 99 55 - 135 U/L    AST 18 10 - 40 U/L    ALT 21 10 - 44 U/L    Anion Gap 6 (L) 8 - 16 mmol/L    eGFR if African American >60.0 >60 mL/min/1.73 m^2    eGFR if non African American >60.0 >60 mL/min/1.73 m^2   Protein electrophoresis, serum   Result Value Ref Range    Protein, Serum 5.5 (L) 6.0 - 8.4 g/dL    Albumin grams/dl 3.43 3.35 - 5.55 g/dL    Alpha-1 grams/dl 0.32 0.17 - 0.41 g/dL    Alpha-2 grams/dl 0.67 0.43 - 0.99 g/dL    Beta grams/dl 0.63 0.50 - 1.10 g/dL    Gamma grams/dl 0.45 (L) 0.67 - 1.58 g/dL   Immunofixation electrophoresis   Result Value Ref Range    Immunofix Interp. SEE COMMENT    Immunoglobulin free LT chains blood   Result Value Ref Range    Kappa Free Light Chains 1.29 0.33 - 1.94 mg/dL    Lambda Free Light Chains 0.53 (L) 0.57 - 2.63 mg/dL    Kappa/Lambda FLC Ratio 2.43 (H) 0.26 - 1.65   IMMUNOGLOBULINS (IGG, IGA, IGM) QUANTITATIVE   Result Value Ref Range    IgG - Serum 484 (L) 650 - 1600 mg/dL    IgA 68 40 - 350 mg/dL    IgM 16 (L) 50 - 300 mg/dL   Pathologist Interpretation JAYESH   Result Value Ref Range    Pathologist Interpretation JAYESH REVIEWED    Pathologist Interpretation SPE   Result Value Ref Range    Pathologist Interpretation SPE REVIEWED        PROBLEMS ASSESSED THIS VISIT:    1. Multiple myeloma in remission    2. H/O autologous stem cell transplant    3. Bradycardia        PLAN:       Multiple myeloma in remission  Mr. Mabile remains in a VGPR from his multiple myeloma after autologuos stem cell transplant. He is taking lenalidomide without any complications thus far. He feels well on this, and he is working again.    He is getting his immunization series.  We will see him with each appointment to monitor his serum M-protein and labs for myeloma.     Bradycardia  Likely drug-induced (Bystolic). He is asymptomatic. Monitor.     Follow-up in 2 months    Italo Bryant MD  Hematology and Stem Cell Transplant

## 2018-05-09 ENCOUNTER — PATIENT MESSAGE (OUTPATIENT)
Dept: HEMATOLOGY/ONCOLOGY | Facility: CLINIC | Age: 53
End: 2018-05-09

## 2018-05-09 DIAGNOSIS — C90.01 MULTIPLE MYELOMA IN REMISSION: ICD-10-CM

## 2018-05-09 LAB
ALBUMIN SERPL ELPH-MCNC: 3.43 G/DL
ALPHA1 GLOB SERPL ELPH-MCNC: 0.32 G/DL
ALPHA2 GLOB SERPL ELPH-MCNC: 0.67 G/DL
B-GLOBULIN SERPL ELPH-MCNC: 0.63 G/DL
GAMMA GLOB SERPL ELPH-MCNC: 0.45 G/DL
HEP. B SURF AB, QUAL: NEGATIVE
HEP. B SURF AB, QUANT.: 9 MIU/ML
INTERPRETATION SERPL IFE-IMP: NORMAL
KAPPA LC SER QL IA: 1.29 MG/DL
KAPPA LC/LAMBDA SER IA: 2.43
LAMBDA LC SER QL IA: 0.53 MG/DL
PATHOLOGIST INTERPRETATION IFE: NORMAL
PATHOLOGIST INTERPRETATION SPE: NORMAL
PROT SERPL-MCNC: 5.5 G/DL

## 2018-05-09 RX ORDER — LENALIDOMIDE 10 MG/1
10 CAPSULE ORAL DAILY
Qty: 21 EACH | Refills: 0 | Status: SHIPPED | OUTPATIENT
Start: 2018-05-09 | End: 2018-06-08 | Stop reason: SDUPTHER

## 2018-05-10 PROBLEM — D64.81 ANEMIA ASSOCIATED WITH CHEMOTHERAPY: Status: ACTIVE | Noted: 2018-05-10

## 2018-05-10 PROBLEM — D69.6 THROMBOCYTOPENIA: Status: ACTIVE | Noted: 2018-05-10

## 2018-05-10 PROBLEM — R00.1 BRADYCARDIA: Status: ACTIVE | Noted: 2018-05-10

## 2018-05-10 PROBLEM — T45.1X5A ANEMIA ASSOCIATED WITH CHEMOTHERAPY: Status: ACTIVE | Noted: 2018-05-10

## 2018-05-10 NOTE — ASSESSMENT & PLAN NOTE
Mr. Bobo remains in a VGPR from his multiple myeloma after autologuos stem cell transplant. He is taking lenalidomide without any complications thus far. He feels well on this, and he is working again.    He is getting his immunization series. We will see him with each appointment to monitor his serum M-protein and labs for myeloma.

## 2018-06-08 DIAGNOSIS — C90.01 MULTIPLE MYELOMA IN REMISSION: ICD-10-CM

## 2018-06-08 RX ORDER — LENALIDOMIDE 10 MG/1
10 CAPSULE ORAL DAILY
Qty: 21 EACH | Refills: 0 | Status: SHIPPED | OUTPATIENT
Start: 2018-06-08 | End: 2018-07-06 | Stop reason: SDUPTHER

## 2018-06-11 ENCOUNTER — PATIENT MESSAGE (OUTPATIENT)
Dept: HEMATOLOGY/ONCOLOGY | Facility: CLINIC | Age: 53
End: 2018-06-11

## 2018-07-06 ENCOUNTER — PATIENT MESSAGE (OUTPATIENT)
Dept: HEMATOLOGY/ONCOLOGY | Facility: CLINIC | Age: 53
End: 2018-07-06

## 2018-07-06 DIAGNOSIS — C90.01 MULTIPLE MYELOMA IN REMISSION: ICD-10-CM

## 2018-07-06 RX ORDER — LENALIDOMIDE 10 MG/1
10 CAPSULE ORAL DAILY
Qty: 21 EACH | Refills: 0 | Status: SHIPPED | OUTPATIENT
Start: 2018-07-06 | End: 2018-08-01 | Stop reason: SDUPTHER

## 2018-07-09 ENCOUNTER — OFFICE VISIT (OUTPATIENT)
Dept: HEMATOLOGY/ONCOLOGY | Facility: CLINIC | Age: 53
End: 2018-07-09
Payer: COMMERCIAL

## 2018-07-09 ENCOUNTER — CLINICAL SUPPORT (OUTPATIENT)
Dept: INFECTIOUS DISEASES | Facility: CLINIC | Age: 53
End: 2018-07-09
Payer: COMMERCIAL

## 2018-07-09 ENCOUNTER — LAB VISIT (OUTPATIENT)
Dept: LAB | Facility: HOSPITAL | Age: 53
End: 2018-07-09
Attending: INTERNAL MEDICINE
Payer: COMMERCIAL

## 2018-07-09 VITALS
HEIGHT: 68 IN | SYSTOLIC BLOOD PRESSURE: 108 MMHG | HEART RATE: 48 BPM | WEIGHT: 249.56 LBS | OXYGEN SATURATION: 98 % | BODY MASS INDEX: 37.82 KG/M2 | DIASTOLIC BLOOD PRESSURE: 63 MMHG | RESPIRATION RATE: 18 BRPM | TEMPERATURE: 99 F

## 2018-07-09 DIAGNOSIS — C90.01 MULTIPLE MYELOMA IN REMISSION: ICD-10-CM

## 2018-07-09 DIAGNOSIS — T45.1X5A PANCYTOPENIA DUE TO ANTINEOPLASTIC CHEMOTHERAPY: ICD-10-CM

## 2018-07-09 DIAGNOSIS — C90.00 MULTIPLE MYELOMA, REMISSION STATUS UNSPECIFIED: ICD-10-CM

## 2018-07-09 DIAGNOSIS — Z94.84 H/O AUTOLOGOUS STEM CELL TRANSPLANT: Primary | ICD-10-CM

## 2018-07-09 DIAGNOSIS — D61.810 PANCYTOPENIA DUE TO ANTINEOPLASTIC CHEMOTHERAPY: ICD-10-CM

## 2018-07-09 LAB
ALBUMIN SERPL BCP-MCNC: 3.4 G/DL
ALP SERPL-CCNC: 93 U/L
ALT SERPL W/O P-5'-P-CCNC: 18 U/L
ANION GAP SERPL CALC-SCNC: 8 MMOL/L
AST SERPL-CCNC: 19 U/L
BASOPHILS # BLD AUTO: 0.03 K/UL
BASOPHILS NFR BLD: 0.9 %
BILIRUB SERPL-MCNC: 0.7 MG/DL
BUN SERPL-MCNC: 16 MG/DL
CALCIUM SERPL-MCNC: 9 MG/DL
CHLORIDE SERPL-SCNC: 108 MMOL/L
CO2 SERPL-SCNC: 24 MMOL/L
CREAT SERPL-MCNC: 0.9 MG/DL
DIFFERENTIAL METHOD: ABNORMAL
EOSINOPHIL # BLD AUTO: 0.3 K/UL
EOSINOPHIL NFR BLD: 7.8 %
ERYTHROCYTE [DISTWIDTH] IN BLOOD BY AUTOMATED COUNT: 16.3 %
EST. GFR  (AFRICAN AMERICAN): >60 ML/MIN/1.73 M^2
EST. GFR  (NON AFRICAN AMERICAN): >60 ML/MIN/1.73 M^2
GLUCOSE SERPL-MCNC: 76 MG/DL
HCT VFR BLD AUTO: 31.9 %
HGB BLD-MCNC: 10.4 G/DL
IGA SERPL-MCNC: 82 MG/DL
IGG SERPL-MCNC: 626 MG/DL
IGM SERPL-MCNC: 41 MG/DL
IMM GRANULOCYTES # BLD AUTO: 0.01 K/UL
IMM GRANULOCYTES NFR BLD AUTO: 0.3 %
LYMPHOCYTES # BLD AUTO: 1.1 K/UL
LYMPHOCYTES NFR BLD: 33.8 %
MCH RBC QN AUTO: 28.9 PG
MCHC RBC AUTO-ENTMCNC: 32.6 G/DL
MCV RBC AUTO: 89 FL
MONOCYTES # BLD AUTO: 0.4 K/UL
MONOCYTES NFR BLD: 11.3 %
NEUTROPHILS # BLD AUTO: 1.5 K/UL
NEUTROPHILS NFR BLD: 45.9 %
NRBC BLD-RTO: 0 /100 WBC
PLATELET # BLD AUTO: 128 K/UL
PMV BLD AUTO: 10.5 FL
POTASSIUM SERPL-SCNC: 4.1 MMOL/L
PROT SERPL-MCNC: 5.9 G/DL
RBC # BLD AUTO: 3.6 M/UL
SODIUM SERPL-SCNC: 140 MMOL/L
WBC # BLD AUTO: 3.2 K/UL

## 2018-07-09 PROCEDURE — 90670 PCV13 VACCINE IM: CPT | Mod: S$GLB,,, | Performed by: INTERNAL MEDICINE

## 2018-07-09 PROCEDURE — 99999 PR PBB SHADOW E&M-EST. PATIENT-LVL III: CPT | Mod: PBBFAC,,, | Performed by: INTERNAL MEDICINE

## 2018-07-09 PROCEDURE — 86334 IMMUNOFIX E-PHORESIS SERUM: CPT | Mod: 26,,, | Performed by: PATHOLOGY

## 2018-07-09 PROCEDURE — 86334 IMMUNOFIX E-PHORESIS SERUM: CPT

## 2018-07-09 PROCEDURE — 83520 IMMUNOASSAY QUANT NOS NONAB: CPT

## 2018-07-09 PROCEDURE — 36415 COLL VENOUS BLD VENIPUNCTURE: CPT

## 2018-07-09 PROCEDURE — 90648 HIB PRP-T VACCINE 4 DOSE IM: CPT | Mod: S$GLB,,, | Performed by: INTERNAL MEDICINE

## 2018-07-09 PROCEDURE — 84165 PROTEIN E-PHORESIS SERUM: CPT | Mod: 26,,, | Performed by: PATHOLOGY

## 2018-07-09 PROCEDURE — 90713 POLIOVIRUS IPV SC/IM: CPT | Mod: S$GLB,,, | Performed by: INTERNAL MEDICINE

## 2018-07-09 PROCEDURE — 82784 ASSAY IGA/IGD/IGG/IGM EACH: CPT | Mod: 59

## 2018-07-09 PROCEDURE — 85025 COMPLETE CBC W/AUTO DIFF WBC: CPT

## 2018-07-09 PROCEDURE — 3078F DIAST BP <80 MM HG: CPT | Mod: CPTII,S$GLB,, | Performed by: INTERNAL MEDICINE

## 2018-07-09 PROCEDURE — 84165 PROTEIN E-PHORESIS SERUM: CPT

## 2018-07-09 PROCEDURE — 90471 IMMUNIZATION ADMIN: CPT | Mod: S$GLB,,, | Performed by: INTERNAL MEDICINE

## 2018-07-09 PROCEDURE — 90700 DTAP VACCINE < 7 YRS IM: CPT | Mod: S$GLB,,, | Performed by: INTERNAL MEDICINE

## 2018-07-09 PROCEDURE — 3074F SYST BP LT 130 MM HG: CPT | Mod: CPTII,S$GLB,, | Performed by: INTERNAL MEDICINE

## 2018-07-09 PROCEDURE — 3008F BODY MASS INDEX DOCD: CPT | Mod: CPTII,S$GLB,, | Performed by: INTERNAL MEDICINE

## 2018-07-09 PROCEDURE — 80053 COMPREHEN METABOLIC PANEL: CPT

## 2018-07-09 PROCEDURE — 99214 OFFICE O/P EST MOD 30 MIN: CPT | Mod: S$GLB,,, | Performed by: INTERNAL MEDICINE

## 2018-07-09 PROCEDURE — 90746 HEPB VACCINE 3 DOSE ADULT IM: CPT | Mod: S$GLB,,, | Performed by: INTERNAL MEDICINE

## 2018-07-09 PROCEDURE — 90472 IMMUNIZATION ADMIN EACH ADD: CPT | Mod: S$GLB,,, | Performed by: INTERNAL MEDICINE

## 2018-07-09 NOTE — PROGRESS NOTES
Pt received his immunizations (Dtap, Heb B, Prevnar 13, IPV, and Hib). Pt tolerated the injections well. Pt left the unit in NAD.

## 2018-07-09 NOTE — PROGRESS NOTES
HEMATOLOGIC MALIGNANCIES PROGRESS NOTE    IDENTIFYING STATEMENT   Airk Bobo (Arik) is a 53 y.o. male with a  of 1965 from [place] with the diagnosis of multiple myeloma.      ONCOLOGY HISTORY:    1. Multiple myeloma s/p autologous stem cell transplant   A. Early : Progressive anemia. K/L ratio > 100, and was treated with fina-dex   B. 12/3/2015: M-protein 1.02 g/dl, IgG-kappa by JAYESH. Kappa 2.98 mg/dl, lambda 0.5 mg/dl, ratio 5.96. BMBx shows 60-70% cellular marrow with 20% plasma cells, consistent with plasma cell myeloma. Hgb 11.8 g/dl, Calcium 9.44 (corrected). Creatinine 0.8 mg/dl.    C. 2016: M-protein 2.23 g/dl. Kappa 4.36 mg/dl, lambda 0.08 mg/dl, ratio 54.5. Progressive disease.   D. VCD therapy locally.    E. 2017: M-protein 1.23 g/dl.    F. 2017: M-protein 0.41 g/dl. Kappa 2.33 mg/dl, lambda 0.5 mg/dl, ratio 4.66, consistent with partial response.    G. 2017: Autologous stem cell transplant with melphalan conditioning.   H. 2018: Day +100 restaging - M-protein 0.19 g/dl, kappa 0.76 mg/dl, lambda 0.53 mg/dl, ratio 1.43. BMBx shows no definitive evidence of myeloma. Consistent with very good partial response.    I. 3/8/2018: M-protein 0.18 g/dl   J. 2018: M-protein 0.16 g/dl    2. Depression  3. HTN  4. History of DVT in 2015 and 2017    INTERVAL HISTORY:      Mr. Bobo returns to clinic on Day +285 of his autologous transplant for myeloma. 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 He is working on different techniques in order to be able to do so. He has the stamina to perform a full day's work, but he feels poorly in the heat. This is new compared with when he was previously doing this.     He otherwise is doing well. No new bone pain. No neuropathy. No fevers or chills.     Past Medical History, Past Social History and Past Family History have been reviewed and are unchanged  "except as noted in the interval history.    MEDICATIONS:     Current Outpatient Prescriptions on File Prior to Visit   Medication Sig Dispense Refill    amlodipine (NORVASC) 5 MG tablet Take 5 mg by mouth once daily.      apixaban 2.5 mg Tab Take 1 tablet (2.5 mg total) by mouth 2 (two) times daily. 60 tablet 3    BYSTOLIC 10 mg Tab 10 mg once daily.   2    citalopram (CELEXA) 40 MG tablet Take 40 mg by mouth.      lenalidomide (REVLIMID) 10 mg Cap Take 10 mg by mouth once daily. auth #[id number] on 7/6/18 21 each 0    lisinopril (PRINIVIL,ZESTRIL) 40 MG tablet   1    simvastatin (ZOCOR) 20 MG tablet   3     No current facility-administered medications on file prior to visit.        ALLERGIES:   Review of patient's allergies indicates:   Allergen Reactions    Pcn [penicillins]      Unknown last dose as an infant        ROS:       Review of Systems   Constitutional: Negative for diaphoresis, fatigue, fever and unexpected weight change.   HENT:   Negative for lump/mass and sore throat.    Eyes: Negative for icterus.   Respiratory: Negative for cough and shortness of breath.    Cardiovascular: Negative for chest pain and palpitations.   Gastrointestinal: Negative for abdominal distention, constipation, diarrhea, nausea and vomiting.   Genitourinary: Negative for dysuria and frequency.    Musculoskeletal: Negative for arthralgias, gait problem and myalgias.   Skin: Negative for rash.   Neurological: Negative for dizziness, gait problem and headaches.   Hematological: Negative for adenopathy. Does not bruise/bleed easily.   Psychiatric/Behavioral: The patient is not nervous/anxious.        PHYSICAL EXAM:  Vitals:    07/09/18 1024   BP: 108/63   Pulse: (!) 48   Resp: 18   Temp: 98.7 °F (37.1 °C)   TempSrc: Oral   SpO2: 98%   Weight: 113.2 kg (249 lb 9 oz)   Height: 5' 8" (1.727 m)   PainSc: 0-No pain       KARNOFSKY PERFORMANCE STATUS 90%  ECOG 1    Physical Exam   Constitutional: He is oriented to person, place, and " time. He appears well-developed and well-nourished. No distress.   HENT:   Head: Normocephalic and atraumatic.   Mouth/Throat: Mucous membranes are normal. No oral lesions.   Eyes: Conjunctivae are normal.   Neck: No thyromegaly present.   Cardiovascular: Normal rate, regular rhythm and normal heart sounds.    No murmur heard.  Pulmonary/Chest: Breath sounds normal. He has no wheezes. He has no rales.   Abdominal: Soft. He exhibits no distension and no mass. There is no splenomegaly or hepatomegaly. There is no tenderness.   Lymphadenopathy:     He has no cervical adenopathy.        Right cervical: No deep cervical adenopathy present.       Left cervical: No deep cervical adenopathy present.     He has no axillary adenopathy.        Right: No inguinal adenopathy present.        Left: No inguinal adenopathy present.   Neurological: He is alert and oriented to person, place, and time. He has normal strength and normal reflexes. No cranial nerve deficit. Coordination normal.   Skin: No rash noted.       LAB:   Results for orders placed or performed in visit on 07/09/18   Rapid BMT CBC with Diff   Result Value Ref Range    WBC 3.20 (L) 3.90 - 12.70 K/uL    RBC 3.60 (L) 4.60 - 6.20 M/uL    Hemoglobin 10.4 (L) 14.0 - 18.0 g/dL    Hematocrit 31.9 (L) 40.0 - 54.0 %    MCV 89 82 - 98 fL    MCH 28.9 27.0 - 31.0 pg    MCHC 32.6 32.0 - 36.0 g/dL    RDW 16.3 (H) 11.5 - 14.5 %    Platelets 128 (L) 150 - 350 K/uL    MPV 10.5 9.2 - 12.9 fL    Immature Granulocytes 0.3 0.0 - 0.5 %    Gran # (ANC) 1.5 (L) 1.8 - 7.7 K/uL    Immature Grans (Abs) 0.01 0.00 - 0.04 K/uL    Lymph # 1.1 1.0 - 4.8 K/uL    Mono # 0.4 0.3 - 1.0 K/uL    Eos # 0.3 0.0 - 0.5 K/uL    Baso # 0.03 0.00 - 0.20 K/uL    nRBC 0 0 /100 WBC    Gran% 45.9 38.0 - 73.0 %    Lymph% 33.8 18.0 - 48.0 %    Mono% 11.3 4.0 - 15.0 %    Eosinophil% 7.8 0.0 - 8.0 %    Basophil% 0.9 0.0 - 1.9 %    Differential Method Automated    Comprehensive metabolic panel   Result Value Ref Range     Sodium 140 136 - 145 mmol/L    Potassium 4.1 3.5 - 5.1 mmol/L    Chloride 108 95 - 110 mmol/L    CO2 24 23 - 29 mmol/L    Glucose 76 70 - 110 mg/dL    BUN, Bld 16 6 - 20 mg/dL    Creatinine 0.9 0.5 - 1.4 mg/dL    Calcium 9.0 8.7 - 10.5 mg/dL    Total Protein 5.9 (L) 6.0 - 8.4 g/dL    Albumin 3.4 (L) 3.5 - 5.2 g/dL    Total Bilirubin 0.7 0.1 - 1.0 mg/dL    Alkaline Phosphatase 93 55 - 135 U/L    AST 19 10 - 40 U/L    ALT 18 10 - 44 U/L    Anion Gap 8 8 - 16 mmol/L    eGFR if African American >60.0 >60 mL/min/1.73 m^2    eGFR if non African American >60.0 >60 mL/min/1.73 m^2   Protein electrophoresis, serum   Result Value Ref Range    Protein, Serum 5.5 (L) 6.0 - 8.4 g/dL   Immunoglobulins (IgG, IgA, IgM) Quantitative   Result Value Ref Range    IgG - Serum 626 (L) 650 - 1600 mg/dL    IgA 82 40 - 350 mg/dL    IgM 41 (L) 50 - 300 mg/dL       PROBLEMS ASSESSED THIS VISIT:    1. H/O autologous stem cell transplant    2. Multiple myeloma in remission    3. Pancytopenia due to antineoplastic chemotherapy        PLAN:       H/O autologous stem cell transplant  Mr. Bobo is seen on Day +285 of his autologous stem cell transplant. He is doing well overall. 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 He is not limited in any capacity.    We will plan on his 1-year restaging at his next visit.     Multiple myeloma in remission  M-protein from today is pending. He achieved VGPR at Day +100 after autoSCT. He is on lenalidomide maintenance at 10 mg daily. Given his pancytopenia, we will not escalate the dose.     We will follow-up serologic studies from today and restage at 1-year appointment. Continue lenalidomide.     Follow-up for 1-year post-transplant appointment.     Italo Bryant MD  Hematology and Stem Cell Transplant

## 2018-07-09 NOTE — ASSESSMENT & PLAN NOTE
M-protein from today is pending. He achieved VGPR at Day +100 after autoSCT. He is on lenalidomide maintenance at 10 mg daily. Given his pancytopenia, we will not escalate the dose.     We will follow-up serologic studies from today and restage at 1-year appointment. Continue lenalidomide.

## 2018-07-09 NOTE — Clinical Note
Return on or after 9/27/18 with CBC, CMP, SPEP, JAYESH, free light chains, immunoglobulins.  Please schedule bone marrow biopsy in clinic for day of return. This will be 1-year post-transplant follow-up visit.

## 2018-07-09 NOTE — ASSESSMENT & PLAN NOTE
Mr. Bobo is seen on Day +285 of his autologous stem cell transplant. He is doing well overall. 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 He is not limited in any capacity.    We will plan on his 1-year restaging at his next visit.

## 2018-07-10 LAB
ALBUMIN SERPL ELPH-MCNC: 3.43 G/DL
ALPHA1 GLOB SERPL ELPH-MCNC: 0.3 G/DL
ALPHA2 GLOB SERPL ELPH-MCNC: 0.63 G/DL
B-GLOBULIN SERPL ELPH-MCNC: 0.56 G/DL
GAMMA GLOB SERPL ELPH-MCNC: 0.58 G/DL
INTERPRETATION SERPL IFE-IMP: NORMAL
KAPPA LC SER QL IA: 2.9 MG/DL
KAPPA LC/LAMBDA SER IA: 1.56
LAMBDA LC SER QL IA: 1.86 MG/DL
PATHOLOGIST INTERPRETATION IFE: NORMAL
PATHOLOGIST INTERPRETATION SPE: NORMAL
PROT SERPL-MCNC: 5.5 G/DL

## 2018-08-01 ENCOUNTER — PATIENT MESSAGE (OUTPATIENT)
Dept: HEMATOLOGY/ONCOLOGY | Facility: CLINIC | Age: 53
End: 2018-08-01

## 2018-08-01 DIAGNOSIS — C90.01 MULTIPLE MYELOMA IN REMISSION: ICD-10-CM

## 2018-08-01 RX ORDER — LENALIDOMIDE 10 MG/1
10 CAPSULE ORAL DAILY
Qty: 21 EACH | Refills: 0 | Status: SHIPPED | OUTPATIENT
Start: 2018-08-01 | End: 2018-08-28 | Stop reason: SDUPTHER

## 2018-08-28 ENCOUNTER — PATIENT MESSAGE (OUTPATIENT)
Dept: HEMATOLOGY/ONCOLOGY | Facility: CLINIC | Age: 53
End: 2018-08-28

## 2018-08-28 DIAGNOSIS — C90.01 MULTIPLE MYELOMA IN REMISSION: ICD-10-CM

## 2018-08-28 RX ORDER — LENALIDOMIDE 10 MG/1
10 CAPSULE ORAL DAILY
Qty: 21 EACH | Refills: 0 | Status: SHIPPED | OUTPATIENT
Start: 2018-08-28 | End: 2018-09-24 | Stop reason: SDUPTHER

## 2018-09-24 ENCOUNTER — PATIENT MESSAGE (OUTPATIENT)
Dept: HEMATOLOGY/ONCOLOGY | Facility: CLINIC | Age: 53
End: 2018-09-24

## 2018-09-24 DIAGNOSIS — C90.01 MULTIPLE MYELOMA IN REMISSION: ICD-10-CM

## 2018-09-24 RX ORDER — LENALIDOMIDE 10 MG/1
10 CAPSULE ORAL DAILY
Qty: 21 EACH | Refills: 0 | Status: SHIPPED | OUTPATIENT
Start: 2018-09-24 | End: 2018-11-02 | Stop reason: SDUPTHER

## 2018-09-25 ENCOUNTER — OFFICE VISIT (OUTPATIENT)
Dept: HEMATOLOGY/ONCOLOGY | Facility: CLINIC | Age: 53
End: 2018-09-25
Payer: COMMERCIAL

## 2018-09-25 ENCOUNTER — LAB VISIT (OUTPATIENT)
Dept: LAB | Facility: HOSPITAL | Age: 53
End: 2018-09-25
Attending: INTERNAL MEDICINE
Payer: COMMERCIAL

## 2018-09-25 VITALS
OXYGEN SATURATION: 97 % | SYSTOLIC BLOOD PRESSURE: 144 MMHG | TEMPERATURE: 97 F | HEART RATE: 47 BPM | RESPIRATION RATE: 18 BRPM | DIASTOLIC BLOOD PRESSURE: 91 MMHG

## 2018-09-25 VITALS
OXYGEN SATURATION: 98 % | DIASTOLIC BLOOD PRESSURE: 81 MMHG | RESPIRATION RATE: 18 BRPM | HEART RATE: 46 BPM | WEIGHT: 250 LBS | BODY MASS INDEX: 37.89 KG/M2 | HEIGHT: 68 IN | TEMPERATURE: 99 F | SYSTOLIC BLOOD PRESSURE: 159 MMHG

## 2018-09-25 DIAGNOSIS — T45.1X5A ANEMIA DUE TO ANTINEOPLASTIC CHEMOTHERAPY: ICD-10-CM

## 2018-09-25 DIAGNOSIS — Z94.84 H/O AUTOLOGOUS STEM CELL TRANSPLANT: Primary | ICD-10-CM

## 2018-09-25 DIAGNOSIS — Z94.84 H/O AUTOLOGOUS STEM CELL TRANSPLANT: ICD-10-CM

## 2018-09-25 DIAGNOSIS — C90.01 MULTIPLE MYELOMA IN REMISSION: ICD-10-CM

## 2018-09-25 DIAGNOSIS — Z09 CHEMOTHERAPY FOLLOW-UP EXAMINATION: ICD-10-CM

## 2018-09-25 DIAGNOSIS — D64.81 ANEMIA DUE TO ANTINEOPLASTIC CHEMOTHERAPY: ICD-10-CM

## 2018-09-25 DIAGNOSIS — C90.01 MULTIPLE MYELOMA IN REMISSION: Primary | ICD-10-CM

## 2018-09-25 LAB
ALBUMIN SERPL BCP-MCNC: 3.6 G/DL
ALP SERPL-CCNC: 107 U/L
ALT SERPL W/O P-5'-P-CCNC: 21 U/L
ANION GAP SERPL CALC-SCNC: 7 MMOL/L
AST SERPL-CCNC: 17 U/L
BASOPHILS # BLD AUTO: 0.07 K/UL
BASOPHILS NFR BLD: 1.7 %
BILIRUB SERPL-MCNC: 0.7 MG/DL
BONE MARROW WRIGHT STAIN COMMENT: NORMAL
BUN SERPL-MCNC: 14 MG/DL
CALCIUM SERPL-MCNC: 9.5 MG/DL
CHLORIDE SERPL-SCNC: 106 MMOL/L
CO2 SERPL-SCNC: 28 MMOL/L
CREAT SERPL-MCNC: 0.9 MG/DL
DIFFERENTIAL METHOD: ABNORMAL
EOSINOPHIL # BLD AUTO: 0.2 K/UL
EOSINOPHIL NFR BLD: 4 %
ERYTHROCYTE [DISTWIDTH] IN BLOOD BY AUTOMATED COUNT: 14.1 %
EST. GFR  (AFRICAN AMERICAN): >60 ML/MIN/1.73 M^2
EST. GFR  (NON AFRICAN AMERICAN): >60 ML/MIN/1.73 M^2
GLUCOSE SERPL-MCNC: 94 MG/DL
HCT VFR BLD AUTO: 38.8 %
HGB BLD-MCNC: 12.5 G/DL
IGA SERPL-MCNC: 123 MG/DL
IGG SERPL-MCNC: 567 MG/DL
IGM SERPL-MCNC: 24 MG/DL
IMM GRANULOCYTES # BLD AUTO: 0 K/UL
IMM GRANULOCYTES NFR BLD AUTO: 0 %
LYMPHOCYTES # BLD AUTO: 2 K/UL
LYMPHOCYTES NFR BLD: 48.5 %
MAGNESIUM SERPL-MCNC: 2.2 MG/DL
MCH RBC QN AUTO: 28.9 PG
MCHC RBC AUTO-ENTMCNC: 32.2 G/DL
MCV RBC AUTO: 90 FL
MONOCYTES # BLD AUTO: 0.5 K/UL
MONOCYTES NFR BLD: 11.6 %
NEUTROPHILS # BLD AUTO: 1.4 K/UL
NEUTROPHILS NFR BLD: 34.2 %
NRBC BLD-RTO: 0 /100 WBC
PHOSPHATE SERPL-MCNC: 3.3 MG/DL
PLATELET # BLD AUTO: 158 K/UL
PMV BLD AUTO: 9.7 FL
POTASSIUM SERPL-SCNC: 4 MMOL/L
PROT SERPL-MCNC: 6.6 G/DL
RBC # BLD AUTO: 4.32 M/UL
SODIUM SERPL-SCNC: 141 MMOL/L
WBC # BLD AUTO: 4.04 K/UL

## 2018-09-25 PROCEDURE — 84165 PROTEIN E-PHORESIS SERUM: CPT | Mod: 26,,, | Performed by: PATHOLOGY

## 2018-09-25 PROCEDURE — 86334 IMMUNOFIX E-PHORESIS SERUM: CPT | Mod: 26,,, | Performed by: PATHOLOGY

## 2018-09-25 PROCEDURE — 3077F SYST BP >= 140 MM HG: CPT | Mod: CPTII,S$GLB,, | Performed by: INTERNAL MEDICINE

## 2018-09-25 PROCEDURE — 88271 CYTOGENETICS DNA PROBE: CPT | Mod: 59

## 2018-09-25 PROCEDURE — 88311 DECALCIFY TISSUE: CPT | Mod: 26,,, | Performed by: PATHOLOGY

## 2018-09-25 PROCEDURE — 86334 IMMUNOFIX E-PHORESIS SERUM: CPT

## 2018-09-25 PROCEDURE — 85025 COMPLETE CBC W/AUTO DIFF WBC: CPT

## 2018-09-25 PROCEDURE — 3008F BODY MASS INDEX DOCD: CPT | Mod: CPTII,S$GLB,, | Performed by: INTERNAL MEDICINE

## 2018-09-25 PROCEDURE — 99999 PR PBB SHADOW E&M-EST. PATIENT-LVL III: CPT | Mod: PBBFAC,,, | Performed by: INTERNAL MEDICINE

## 2018-09-25 PROCEDURE — 99499 UNLISTED E&M SERVICE: CPT | Mod: S$GLB,,, | Performed by: NURSE PRACTITIONER

## 2018-09-25 PROCEDURE — 88184 FLOWCYTOMETRY/ TC 1 MARKER: CPT | Performed by: PATHOLOGY

## 2018-09-25 PROCEDURE — 82784 ASSAY IGA/IGD/IGG/IGM EACH: CPT | Mod: 59

## 2018-09-25 PROCEDURE — 88341 IMHCHEM/IMCYTCHM EA ADD ANTB: CPT | Performed by: PATHOLOGY

## 2018-09-25 PROCEDURE — 88341 IMHCHEM/IMCYTCHM EA ADD ANTB: CPT | Mod: 26,59,, | Performed by: PATHOLOGY

## 2018-09-25 PROCEDURE — 88360 TUMOR IMMUNOHISTOCHEM/MANUAL: CPT | Mod: 26,,, | Performed by: PATHOLOGY

## 2018-09-25 PROCEDURE — 88189 FLOWCYTOMETRY/READ 16 & >: CPT | Mod: ,,, | Performed by: PATHOLOGY

## 2018-09-25 PROCEDURE — 3079F DIAST BP 80-89 MM HG: CPT | Mod: CPTII,S$GLB,, | Performed by: INTERNAL MEDICINE

## 2018-09-25 PROCEDURE — 83735 ASSAY OF MAGNESIUM: CPT

## 2018-09-25 PROCEDURE — 36415 COLL VENOUS BLD VENIPUNCTURE: CPT

## 2018-09-25 PROCEDURE — 88342 IMHCHEM/IMCYTCHM 1ST ANTB: CPT | Performed by: PATHOLOGY

## 2018-09-25 PROCEDURE — 88364 INSITU HYBRIDIZATION (FISH): CPT | Mod: 26,,, | Performed by: PATHOLOGY

## 2018-09-25 PROCEDURE — 85097 BONE MARROW INTERPRETATION: CPT | Mod: ,,, | Performed by: PATHOLOGY

## 2018-09-25 PROCEDURE — 88313 SPECIAL STAINS GROUP 2: CPT

## 2018-09-25 PROCEDURE — 84100 ASSAY OF PHOSPHORUS: CPT

## 2018-09-25 PROCEDURE — 88305 TISSUE EXAM BY PATHOLOGIST: CPT | Performed by: PATHOLOGY

## 2018-09-25 PROCEDURE — 88185 FLOWCYTOMETRY/TC ADD-ON: CPT | Performed by: PATHOLOGY

## 2018-09-25 PROCEDURE — 38222 DX BONE MARROW BX & ASPIR: CPT | Mod: LT,S$GLB,, | Performed by: NURSE PRACTITIONER

## 2018-09-25 PROCEDURE — 88360 TUMOR IMMUNOHISTOCHEM/MANUAL: CPT | Performed by: PATHOLOGY

## 2018-09-25 PROCEDURE — 84165 PROTEIN E-PHORESIS SERUM: CPT

## 2018-09-25 PROCEDURE — 99999 PR PBB SHADOW E&M-EST. PATIENT-LVL III: CPT | Mod: PBBFAC,,, | Performed by: NURSE PRACTITIONER

## 2018-09-25 PROCEDURE — 88342 IMHCHEM/IMCYTCHM 1ST ANTB: CPT | Mod: 26,59,, | Performed by: PATHOLOGY

## 2018-09-25 PROCEDURE — 88237 TISSUE CULTURE BONE MARROW: CPT

## 2018-09-25 PROCEDURE — 80053 COMPREHEN METABOLIC PANEL: CPT

## 2018-09-25 PROCEDURE — 83520 IMMUNOASSAY QUANT NOS NONAB: CPT | Mod: 59

## 2018-09-25 PROCEDURE — 88264 CHROMOSOME ANALYSIS 20-25: CPT

## 2018-09-25 PROCEDURE — 88305 TISSUE EXAM BY PATHOLOGIST: CPT | Mod: 26,,, | Performed by: PATHOLOGY

## 2018-09-25 PROCEDURE — 88313 SPECIAL STAINS GROUP 2: CPT | Mod: 26,,, | Performed by: PATHOLOGY

## 2018-09-25 PROCEDURE — 88365 INSITU HYBRIDIZATION (FISH): CPT | Mod: 26,,, | Performed by: PATHOLOGY

## 2018-09-25 PROCEDURE — 99215 OFFICE O/P EST HI 40 MIN: CPT | Mod: S$GLB,,, | Performed by: INTERNAL MEDICINE

## 2018-09-25 NOTE — Clinical Note
Follow-up in 3m onths with CBC, CMP, SPEP, JAYESH, free light chains, immunoglobulins.Patient also needs to perform 24-hr urine collection for UPEP, UIFE, and 24-hr urine protein

## 2018-09-25 NOTE — ASSESSMENT & PLAN NOTE
Mr. Bobo presents for his 1-year post autologous stem cell transplant evaluation. Serologic studies today are pending to assess response. Previous response was VGPR, which he has maintained since Day +100.     He is getting restaging bone marrow biopsy today.    Due to reports of abnormal urination, we will have him complete 24-hr urine collection to assess for proteinuria.    He will continue maintenance lenalidomide indefinitely. This is well-tolerated. He has only mild anemia with this today.

## 2018-09-25 NOTE — PROCEDURES
Bone marrow  Date/Time: 9/25/2018 11:24 AM  Performed by: Anabel Norris NP  Authorized by: Magdalena Miller NP         PROCEDURE NOTE:  Date of Procedure:9/25/18  Bone Marrow Biopsy and Aspiration  Indication: s/p melph auto  Consent: Informed consent was obtained from patient.  Timeout: Done and documented.  Position: prone  Site: Left posterior illiac crest.  Prep: Betadine.  Needle used: 11 gauge Jamshidi needle.  Anesthetic: 2% lidocaine 6 cc.  Biopsy: The biopsy needle was introduced into the marrow cavity and an aspirate was obtained without complications and sent for flow cytometry, cytogenetics, and pcpd fish. Core biopsy x 2 obtained without difficulty and sent for routine histologic examination.  Complications: None.  Disposition: The patient was left in room with RN and instructed to lie flat on his back. Instructed to remove bandaid in 24 hours.  Blood loss: Minimal.     Anabel Norris, KARIP  Hematology/Oncology/Bone Marrow Transplant

## 2018-09-25 NOTE — PROGRESS NOTES
HEMATOLOGIC MALIGNANCIES PROGRESS NOTE    IDENTIFYING STATEMENT   Arik Bobo (Arik) is a 53 y.o. male with a  of 1965 from [place] with the diagnosis of multiple myeloma.      ONCOLOGY HISTORY:    1. Multiple myeloma s/p autologous stem cell transplant   A. Early : Progressive anemia. K/L ratio > 100, and was treated with fina-dex   B. 12/3/2015: M-protein 1.02 g/dl, IgG-kappa by JAYESH. Kappa 2.98 mg/dl, lambda 0.5 mg/dl, ratio 5.96. BMBx shows 60-70% cellular marrow with 20% plasma cells, consistent with plasma cell myeloma. Hgb 11.8 g/dl, Calcium 9.44 (corrected). Creatinine 0.8 mg/dl.    C. 2016: M-protein 2.23 g/dl. Kappa 4.36 mg/dl, lambda 0.08 mg/dl, ratio 54.5. Progressive disease.   D. VCD therapy locally.    E. 2017: M-protein 1.23 g/dl.    F. 2017: M-protein 0.41 g/dl. Kappa 2.33 mg/dl, lambda 0.5 mg/dl, ratio 4.66, consistent with partial response.    G. 2017: Autologous stem cell transplant with melphalan conditioning.   H. 2018: Day +100 restaging - M-protein 0.19 g/dl, kappa 0.76 mg/dl, lambda 0.53 mg/dl, ratio 1.43. BMBx shows no definitive evidence of myeloma. Consistent with very good partial response.    I. 3/8/2018: M-protein 0.18 g/dl   J. 2018: M-protein 0.16 g/dl   K. 2018: M-protein 0.2 g/dl    2. Depression  3. HTN  4. History of DVT in 2015 and 2017    INTERVAL HISTORY:      Mr. Bobo returns to clinic on Day +363 of his autologous transplant for myeloma. 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 He has been approached by a former colleague with work opportunities, and he wants to take advantage of this.     He otherwise is doing well. No new bone pain. No neuropathy. No fevers or chills. He is tolerating lenalidomide well without adverse effect.     He has noticed that his urine appears more oily. He has no pain with urination. Denies any hematuria. No  cola-colored urine.     Past Medical History, Past Social History and Past Family History have been reviewed and are unchanged except as noted in the interval history.    MEDICATIONS:     Current Outpatient Medications on File Prior to Visit   Medication Sig Dispense Refill    amlodipine (NORVASC) 5 MG tablet Take 5 mg by mouth once daily.      apixaban 2.5 mg Tab Take 1 tablet (2.5 mg total) by mouth 2 (two) times daily. 60 tablet 3    BYSTOLIC 10 mg Tab 10 mg once daily.   2    citalopram (CELEXA) 40 MG tablet Take 40 mg by mouth.      lenalidomide (REVLIMID) 10 mg Cap Take 10 mg by mouth once daily. auth #[id number] on 9/24/18 21 each 0    lisinopril (PRINIVIL,ZESTRIL) 40 MG tablet   1    simvastatin (ZOCOR) 20 MG tablet   3     No current facility-administered medications on file prior to visit.        ALLERGIES:   Review of patient's allergies indicates:   Allergen Reactions    Pcn [penicillins]      Unknown last dose as an infant        ROS:       Review of Systems   Constitutional: Negative for diaphoresis, fatigue, fever and unexpected weight change.   HENT:   Negative for lump/mass and sore throat.    Eyes: Negative for icterus.   Respiratory: Negative for cough and shortness of breath.    Cardiovascular: Negative for chest pain and palpitations.   Gastrointestinal: Negative for abdominal distention, constipation, diarrhea, nausea and vomiting.   Genitourinary: Negative for dysuria and frequency. Difficulty urinating: Reports oily urine.    Musculoskeletal: Negative for arthralgias, gait problem and myalgias.   Skin: Negative for rash.   Neurological: Negative for dizziness, gait problem and headaches.   Hematological: Negative for adenopathy. Does not bruise/bleed easily.   Psychiatric/Behavioral: The patient is not nervous/anxious.        PHYSICAL EXAM:  Vitals:    09/25/18 0958   BP: (!) 159/81   Pulse: (!) 46   Resp: 18   Temp: 98.5 °F (36.9 °C)   TempSrc: Oral   SpO2: 98%   Weight: 113.4 kg (250  "lb 0 oz)   Height: 5' 8" (1.727 m)   PainSc: 0-No pain       KARNOFSKY PERFORMANCE STATUS 90%  ECOG 1    Physical Exam   Constitutional: He is oriented to person, place, and time. He appears well-developed and well-nourished. No distress.   HENT:   Head: Normocephalic and atraumatic.   Mouth/Throat: Mucous membranes are normal. No oral lesions.   Eyes: Conjunctivae are normal.   Neck: No thyromegaly present.   Cardiovascular: Regular rhythm and normal heart sounds.   No murmur heard.  Bradycardic   Pulmonary/Chest: Breath sounds normal. He has no wheezes. He has no rales.   Abdominal: Soft. He exhibits no distension and no mass. There is no splenomegaly or hepatomegaly. There is no tenderness.   Lymphadenopathy:     He has no cervical adenopathy.        Right cervical: No deep cervical adenopathy present.       Left cervical: No deep cervical adenopathy present.     He has no axillary adenopathy.        Right: No inguinal adenopathy present.        Left: No inguinal adenopathy present.   Neurological: He is alert and oriented to person, place, and time. He has normal strength and normal reflexes. No cranial nerve deficit. Coordination normal.   Skin: No rash noted.       LAB:   Results for orders placed or performed in visit on 09/25/18   Magnesium   Result Value Ref Range    Magnesium 2.2 1.6 - 2.6 mg/dL   Phosphorus   Result Value Ref Range    Phosphorus 3.3 2.7 - 4.5 mg/dL   Rapid BMT CBC with Diff   Result Value Ref Range    WBC 4.04 3.90 - 12.70 K/uL    RBC 4.32 (L) 4.60 - 6.20 M/uL    Hemoglobin 12.5 (L) 14.0 - 18.0 g/dL    Hematocrit 38.8 (L) 40.0 - 54.0 %    MCV 90 82 - 98 fL    MCH 28.9 27.0 - 31.0 pg    MCHC 32.2 32.0 - 36.0 g/dL    RDW 14.1 11.5 - 14.5 %    Platelets 158 150 - 350 K/uL    MPV 9.7 9.2 - 12.9 fL    Immature Granulocytes 0.0 0.0 - 0.5 %    Gran # (ANC) 1.4 (L) 1.8 - 7.7 K/uL    Immature Grans (Abs) 0.00 0.00 - 0.04 K/uL    Lymph # 2.0 1.0 - 4.8 K/uL    Mono # 0.5 0.3 - 1.0 K/uL    Eos # 0.2 " 0.0 - 0.5 K/uL    Baso # 0.07 0.00 - 0.20 K/uL    nRBC 0 0 /100 WBC    Gran% 34.2 (L) 38.0 - 73.0 %    Lymph% 48.5 (H) 18.0 - 48.0 %    Mono% 11.6 4.0 - 15.0 %    Eosinophil% 4.0 0.0 - 8.0 %    Basophil% 1.7 0.0 - 1.9 %    Differential Method Automated    Comprehensive metabolic panel   Result Value Ref Range    Sodium 141 136 - 145 mmol/L    Potassium 4.0 3.5 - 5.1 mmol/L    Chloride 106 95 - 110 mmol/L    CO2 28 23 - 29 mmol/L    Glucose 94 70 - 110 mg/dL    BUN, Bld 14 6 - 20 mg/dL    Creatinine 0.9 0.5 - 1.4 mg/dL    Calcium 9.5 8.7 - 10.5 mg/dL    Total Protein 6.6 6.0 - 8.4 g/dL    Albumin 3.6 3.5 - 5.2 g/dL    Total Bilirubin 0.7 0.1 - 1.0 mg/dL    Alkaline Phosphatase 107 55 - 135 U/L    AST 17 10 - 40 U/L    ALT 21 10 - 44 U/L    Anion Gap 7 (L) 8 - 16 mmol/L    eGFR if African American >60.0 >60 mL/min/1.73 m^2    eGFR if non African American >60.0 >60 mL/min/1.73 m^2   Protein electrophoresis, serum   Result Value Ref Range    Protein, Serum 6.2 6.0 - 8.4 g/dL   Immunoglobulins (IgG, IgA, IgM) Quantitative   Result Value Ref Range    IgG - Serum 567 (L) 650 - 1600 mg/dL    IgA 123 40 - 350 mg/dL    IgM 24 (L) 50 - 300 mg/dL       PROBLEMS ASSESSED THIS VISIT:    1. Multiple myeloma in remission    2. H/O autologous stem cell transplant    3. Anemia due to antineoplastic chemotherapy    4. Chemotherapy follow-up examination      PLAN:       H/O autologous stem cell transplant  Mr. Bobo presents for his 1-year post autologous stem cell transplant evaluation. Serologic studies today are pending to assess response. Previous response was VGPR, which he has maintained since Day +100.     He is getting restaging bone marrow biopsy today.    Due to reports of abnormal urination, we will have him complete 24-hr urine collection to assess for proteinuria.    He will continue maintenance lenalidomide indefinitely. This is well-tolerated. He has only mild anemia with this today.     Follow-up in 3 months    Italo  SHARON Bryant MD  Hematology and Stem Cell Transplant

## 2018-09-26 LAB
ALBUMIN SERPL ELPH-MCNC: 3.88 G/DL
ALPHA1 GLOB SERPL ELPH-MCNC: 0.32 G/DL
ALPHA2 GLOB SERPL ELPH-MCNC: 0.7 G/DL
B-GLOBULIN SERPL ELPH-MCNC: 0.76 G/DL
BODY SITE - BONE MARROW: NORMAL
BONE MARROW IRON STAIN COMMENT: NORMAL
CLINICAL DIAGNOSIS - BONE MARROW: NORMAL
FLOW CYTOMETRY ANTIBODIES ANALYZED - BONE MARROW: NORMAL
FLOW CYTOMETRY COMMENT - BONE MARROW: NORMAL
FLOW CYTOMETRY INTERPRETATION - BONE MARROW: NORMAL
GAMMA GLOB SERPL ELPH-MCNC: 0.55 G/DL
INTERPRETATION SERPL IFE-IMP: NORMAL
KAPPA LC SER QL IA: 0.83 MG/DL
KAPPA LC/LAMBDA SER IA: 1.6
LAMBDA LC SER QL IA: 0.52 MG/DL
PATHOLOGIST INTERPRETATION IFE: NORMAL
PATHOLOGIST INTERPRETATION SPE: NORMAL
PROT SERPL-MCNC: 6.2 G/DL

## 2018-11-02 ENCOUNTER — PATIENT MESSAGE (OUTPATIENT)
Dept: HEMATOLOGY/ONCOLOGY | Facility: CLINIC | Age: 53
End: 2018-11-02

## 2018-11-02 DIAGNOSIS — C90.01 MULTIPLE MYELOMA IN REMISSION: ICD-10-CM

## 2018-11-02 RX ORDER — LENALIDOMIDE 10 MG/1
10 CAPSULE ORAL DAILY
Qty: 21 EACH | Refills: 0 | Status: SHIPPED | OUTPATIENT
Start: 2018-11-02 | End: 2018-12-03 | Stop reason: SDUPTHER

## 2018-12-03 DIAGNOSIS — C90.01 MULTIPLE MYELOMA IN REMISSION: ICD-10-CM

## 2018-12-04 ENCOUNTER — PATIENT MESSAGE (OUTPATIENT)
Dept: HEMATOLOGY/ONCOLOGY | Facility: CLINIC | Age: 53
End: 2018-12-04

## 2018-12-04 RX ORDER — LENALIDOMIDE 10 MG/1
10 CAPSULE ORAL DAILY
Qty: 21 EACH | Refills: 0 | Status: SHIPPED | OUTPATIENT
Start: 2018-12-04 | End: 2019-01-03 | Stop reason: SDUPTHER

## 2018-12-20 ENCOUNTER — OFFICE VISIT (OUTPATIENT)
Dept: HEMATOLOGY/ONCOLOGY | Facility: CLINIC | Age: 53
End: 2018-12-20
Payer: COMMERCIAL

## 2018-12-20 ENCOUNTER — LAB VISIT (OUTPATIENT)
Dept: LAB | Facility: HOSPITAL | Age: 53
End: 2018-12-20
Attending: INTERNAL MEDICINE
Payer: COMMERCIAL

## 2018-12-20 VITALS
TEMPERATURE: 98 F | SYSTOLIC BLOOD PRESSURE: 169 MMHG | WEIGHT: 257.94 LBS | HEIGHT: 68 IN | DIASTOLIC BLOOD PRESSURE: 78 MMHG | BODY MASS INDEX: 39.09 KG/M2 | HEART RATE: 49 BPM | OXYGEN SATURATION: 98 %

## 2018-12-20 DIAGNOSIS — Z86.718 HISTORY OF DVT (DEEP VEIN THROMBOSIS): ICD-10-CM

## 2018-12-20 DIAGNOSIS — D64.81 ANEMIA DUE TO ANTINEOPLASTIC CHEMOTHERAPY: ICD-10-CM

## 2018-12-20 DIAGNOSIS — Z94.84 H/O AUTOLOGOUS STEM CELL TRANSPLANT: ICD-10-CM

## 2018-12-20 DIAGNOSIS — T45.1X5A ANEMIA DUE TO ANTINEOPLASTIC CHEMOTHERAPY: ICD-10-CM

## 2018-12-20 DIAGNOSIS — D69.6 THROMBOCYTOPENIA: ICD-10-CM

## 2018-12-20 DIAGNOSIS — D70.1 CHEMOTHERAPY-INDUCED NEUTROPENIA: ICD-10-CM

## 2018-12-20 DIAGNOSIS — C90.01 MULTIPLE MYELOMA IN REMISSION: Primary | ICD-10-CM

## 2018-12-20 DIAGNOSIS — T45.1X5A CHEMOTHERAPY-INDUCED NEUTROPENIA: ICD-10-CM

## 2018-12-20 DIAGNOSIS — C90.01 MULTIPLE MYELOMA IN REMISSION: ICD-10-CM

## 2018-12-20 DIAGNOSIS — R00.1 BRADYCARDIA: ICD-10-CM

## 2018-12-20 DIAGNOSIS — C90.00 MULTIPLE MYELOMA, REMISSION STATUS UNSPECIFIED: ICD-10-CM

## 2018-12-20 LAB
ALBUMIN SERPL BCP-MCNC: 3.3 G/DL
ALP SERPL-CCNC: 102 U/L
ALT SERPL W/O P-5'-P-CCNC: 19 U/L
ANION GAP SERPL CALC-SCNC: 8 MMOL/L
AST SERPL-CCNC: 17 U/L
BASOPHILS # BLD AUTO: 0.06 K/UL
BASOPHILS NFR BLD: 2.3 %
BILIRUB SERPL-MCNC: 0.5 MG/DL
BUN SERPL-MCNC: 16 MG/DL
CALCIUM SERPL-MCNC: 9.5 MG/DL
CHLORIDE SERPL-SCNC: 104 MMOL/L
CO2 SERPL-SCNC: 26 MMOL/L
CREAT SERPL-MCNC: 0.9 MG/DL
DIFFERENTIAL METHOD: ABNORMAL
EOSINOPHIL # BLD AUTO: 0.1 K/UL
EOSINOPHIL NFR BLD: 3 %
ERYTHROCYTE [DISTWIDTH] IN BLOOD BY AUTOMATED COUNT: 14.8 %
EST. GFR  (AFRICAN AMERICAN): >60 ML/MIN/1.73 M^2
EST. GFR  (NON AFRICAN AMERICAN): >60 ML/MIN/1.73 M^2
GLUCOSE SERPL-MCNC: 97 MG/DL
HCT VFR BLD AUTO: 35.5 %
HGB BLD-MCNC: 11.4 G/DL
IGA SERPL-MCNC: 115 MG/DL
IGG SERPL-MCNC: 407 MG/DL
IGM SERPL-MCNC: 22 MG/DL
IMM GRANULOCYTES # BLD AUTO: 0.01 K/UL
IMM GRANULOCYTES NFR BLD AUTO: 0.4 %
LYMPHOCYTES # BLD AUTO: 1.1 K/UL
LYMPHOCYTES NFR BLD: 41.7 %
MCH RBC QN AUTO: 28.4 PG
MCHC RBC AUTO-ENTMCNC: 32.1 G/DL
MCV RBC AUTO: 88 FL
MONOCYTES # BLD AUTO: 0.3 K/UL
MONOCYTES NFR BLD: 11.7 %
NEUTROPHILS # BLD AUTO: 1.1 K/UL
NEUTROPHILS NFR BLD: 40.9 %
NRBC BLD-RTO: 0 /100 WBC
PLATELET # BLD AUTO: 147 K/UL
PMV BLD AUTO: 9.7 FL
POTASSIUM SERPL-SCNC: 4.1 MMOL/L
PROT SERPL-MCNC: 6.1 G/DL
RBC # BLD AUTO: 4.02 M/UL
SODIUM SERPL-SCNC: 138 MMOL/L
WBC # BLD AUTO: 2.66 K/UL

## 2018-12-20 PROCEDURE — 84165 PROTEIN E-PHORESIS SERUM: CPT

## 2018-12-20 PROCEDURE — 3008F BODY MASS INDEX DOCD: CPT | Mod: CPTII,S$GLB,, | Performed by: INTERNAL MEDICINE

## 2018-12-20 PROCEDURE — 80053 COMPREHEN METABOLIC PANEL: CPT

## 2018-12-20 PROCEDURE — 83520 IMMUNOASSAY QUANT NOS NONAB: CPT | Mod: 59

## 2018-12-20 PROCEDURE — 99215 OFFICE O/P EST HI 40 MIN: CPT | Mod: S$GLB,,, | Performed by: INTERNAL MEDICINE

## 2018-12-20 PROCEDURE — 86334 IMMUNOFIX E-PHORESIS SERUM: CPT | Mod: 26,,, | Performed by: PATHOLOGY

## 2018-12-20 PROCEDURE — 3078F DIAST BP <80 MM HG: CPT | Mod: CPTII,S$GLB,, | Performed by: INTERNAL MEDICINE

## 2018-12-20 PROCEDURE — 86774 TETANUS ANTIBODY: CPT

## 2018-12-20 PROCEDURE — 86706 HEP B SURFACE ANTIBODY: CPT

## 2018-12-20 PROCEDURE — 82784 ASSAY IGA/IGD/IGG/IGM EACH: CPT | Mod: 59

## 2018-12-20 PROCEDURE — 85025 COMPLETE CBC W/AUTO DIFF WBC: CPT

## 2018-12-20 PROCEDURE — 36415 COLL VENOUS BLD VENIPUNCTURE: CPT

## 2018-12-20 PROCEDURE — 99999 PR PBB SHADOW E&M-EST. PATIENT-LVL III: CPT | Mod: PBBFAC,,, | Performed by: INTERNAL MEDICINE

## 2018-12-20 PROCEDURE — 84165 PROTEIN E-PHORESIS SERUM: CPT | Mod: 26,,, | Performed by: PATHOLOGY

## 2018-12-20 PROCEDURE — 3077F SYST BP >= 140 MM HG: CPT | Mod: CPTII,S$GLB,, | Performed by: INTERNAL MEDICINE

## 2018-12-20 PROCEDURE — 86334 IMMUNOFIX E-PHORESIS SERUM: CPT

## 2018-12-20 NOTE — PROGRESS NOTES
HEMATOLOGIC MALIGNANCIES PROGRESS NOTE    IDENTIFYING STATEMENT   Arik Bobo (Arik) is a 53 y.o. male with a  of 1965 from [place] with the diagnosis of multiple myeloma.      ONCOLOGY HISTORY:    1. Multiple myeloma s/p autologous stem cell transplant   A. Early : Progressive anemia. K/L ratio > 100, and was treated with fina-dex   B. 12/3/2015: M-protein 1.02 g/dl, IgG-kappa by JAYESH. Kappa 2.98 mg/dl, lambda 0.5 mg/dl, ratio 5.96. BMBx shows 60-70% cellular marrow with 20% plasma cells, consistent with plasma cell myeloma. Hgb 11.8 g/dl, Calcium 9.44 (corrected). Creatinine 0.8 mg/dl.    C. 2016: M-protein 2.23 g/dl. Kappa 4.36 mg/dl, lambda 0.08 mg/dl, ratio 54.5. Progressive disease.   D. VCD therapy locally.    E. 2017: M-protein 1.23 g/dl.    F. 2017: M-protein 0.41 g/dl. Kappa 2.33 mg/dl, lambda 0.5 mg/dl, ratio 4.66, consistent with partial response.    G. 2017: Autologous stem cell transplant with melphalan conditioning.   H. 2018: Day +100 restaging - M-protein 0.19 g/dl, kappa 0.76 mg/dl, lambda 0.53 mg/dl, ratio 1.43. BMBx shows no definitive evidence of myeloma. Consistent with very good partial response.    I. 3/8/2018: M-protein 0.18 g/dl   J. 2018: M-protein 0.16 g/dl   K. 2018: M-protein 0.2 g/dl   L. 2018: M-protein 0.14 g/dl; BMBx for 1-year re-evaluation shows 40% cellular marrow with 6-7% plasma cells and no evidence of clonality; thus, no plasma cell neoplasm evident. Findings consistent with ongoing VGPR.     2. Depression  3. HTN  4. History of DVT in 2015 and 2017    INTERVAL HISTORY:      Mr. Bobo returns to clinic on 14 months after  autologous transplant for myeloma. 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     He is doing well. No new bone pain. No neuropathy. No fevers or chills. He is tolerating lenalidomide well without adverse effect.     Past Medical History, Past Social History and Past Family  "History have been reviewed and are unchanged except as noted in the interval history.    MEDICATIONS:     Current Outpatient Medications on File Prior to Visit   Medication Sig Dispense Refill    amlodipine (NORVASC) 5 MG tablet Take 5 mg by mouth once daily.      apixaban 2.5 mg Tab Take 1 tablet (2.5 mg total) by mouth 2 (two) times daily. 60 tablet 3    BYSTOLIC 10 mg Tab 10 mg once daily.   2    citalopram (CELEXA) 40 MG tablet Take 40 mg by mouth.      lenalidomide (REVLIMID) 10 mg Cap Take 10 mg by mouth once daily auth # [id number] on 12/3/18. 21 each 0    lisinopril (PRINIVIL,ZESTRIL) 40 MG tablet   1    simvastatin (ZOCOR) 20 MG tablet   3     No current facility-administered medications on file prior to visit.        ALLERGIES:   Review of patient's allergies indicates:   Allergen Reactions    Pcn [penicillins]      Unknown last dose as an infant        ROS:       Review of Systems   Constitutional: Negative for diaphoresis, fatigue, fever and unexpected weight change.   HENT:   Negative for lump/mass and sore throat.    Eyes: Negative for icterus.   Respiratory: Negative for cough and shortness of breath.    Cardiovascular: Negative for chest pain and palpitations.   Gastrointestinal: Negative for abdominal distention, constipation, diarrhea, nausea and vomiting.   Genitourinary: Negative for dysuria and frequency.    Musculoskeletal: Negative for arthralgias, gait problem and myalgias.   Skin: Negative for rash.   Neurological: Negative for dizziness, gait problem and headaches.   Hematological: Negative for adenopathy. Does not bruise/bleed easily.   Psychiatric/Behavioral: The patient is not nervous/anxious.        PHYSICAL EXAM:  Vitals:    12/20/18 1046   BP: (!) 169/78   Pulse: (!) 49   Temp: 98.2 °F (36.8 °C)   SpO2: 98%   Weight: 117 kg (257 lb 15 oz)   Height: 5' 8" (1.727 m)   PainSc: 0-No pain       KARNOFSKY PERFORMANCE STATUS 90%  ECOG 1    Physical Exam   Constitutional: He is oriented " to person, place, and time. He appears well-developed and well-nourished. No distress.   HENT:   Head: Normocephalic and atraumatic.   Mouth/Throat: Mucous membranes are normal. No oral lesions.   Eyes: Conjunctivae are normal.   Neck: No thyromegaly present.   Cardiovascular: Regular rhythm and normal heart sounds.   No murmur heard.  Bradycardic   Pulmonary/Chest: Breath sounds normal. He has no wheezes. He has no rales.   Abdominal: Soft. He exhibits no distension and no mass. There is no splenomegaly or hepatomegaly. There is no tenderness.   Musculoskeletal: He exhibits edema.   Lymphadenopathy:     He has no cervical adenopathy.        Right cervical: No deep cervical adenopathy present.       Left cervical: No deep cervical adenopathy present.     He has no axillary adenopathy.        Right: No inguinal adenopathy present.        Left: No inguinal adenopathy present.   Neurological: He is alert and oriented to person, place, and time. He has normal strength and normal reflexes. No cranial nerve deficit. Coordination normal.   Skin: No rash noted.       LAB:   Results for orders placed or performed in visit on 12/20/18   Humoral Immune Eval (Pneumo 14) with H. flu   Result Value Ref Range    H influenza B Ab 1.33 >0.99 mg/L   HEPATITIS B SURFACE ANTIBODY, QUAL/QUANT   Result Value Ref Range    Hep. B Surf Ab, Qual Negative     Hep. B Surf Ab, Quant. <3 mIU/mL   Rapid BMT CBC with Diff   Result Value Ref Range    WBC 2.66 (L) 3.90 - 12.70 K/uL    RBC 4.02 (L) 4.60 - 6.20 M/uL    Hemoglobin 11.4 (L) 14.0 - 18.0 g/dL    Hematocrit 35.5 (L) 40.0 - 54.0 %    MCV 88 82 - 98 fL    MCH 28.4 27.0 - 31.0 pg    MCHC 32.1 32.0 - 36.0 g/dL    RDW 14.8 (H) 11.5 - 14.5 %    Platelets 147 (L) 150 - 350 K/uL    MPV 9.7 9.2 - 12.9 fL    Immature Granulocytes 0.4 0.0 - 0.5 %    Gran # (ANC) 1.1 (L) 1.8 - 7.7 K/uL    Immature Grans (Abs) 0.01 0.00 - 0.04 K/uL    Lymph # 1.1 1.0 - 4.8 K/uL    Mono # 0.3 0.3 - 1.0 K/uL    Eos # 0.1  0.0 - 0.5 K/uL    Baso # 0.06 0.00 - 0.20 K/uL    nRBC 0 0 /100 WBC    Gran% 40.9 38.0 - 73.0 %    Lymph% 41.7 18.0 - 48.0 %    Mono% 11.7 4.0 - 15.0 %    Eosinophil% 3.0 0.0 - 8.0 %    Basophil% 2.3 (H) 0.0 - 1.9 %    Differential Method Automated    Comprehensive metabolic panel   Result Value Ref Range    Sodium 138 136 - 145 mmol/L    Potassium 4.1 3.5 - 5.1 mmol/L    Chloride 104 95 - 110 mmol/L    CO2 26 23 - 29 mmol/L    Glucose 97 70 - 110 mg/dL    BUN, Bld 16 6 - 20 mg/dL    Creatinine 0.9 0.5 - 1.4 mg/dL    Calcium 9.5 8.7 - 10.5 mg/dL    Total Protein 6.1 6.0 - 8.4 g/dL    Albumin 3.3 (L) 3.5 - 5.2 g/dL    Total Bilirubin 0.5 0.1 - 1.0 mg/dL    Alkaline Phosphatase 102 55 - 135 U/L    AST 17 10 - 40 U/L    ALT 19 10 - 44 U/L    Anion Gap 8 8 - 16 mmol/L    eGFR if African American >60.0 >60 mL/min/1.73 m^2    eGFR if non African American >60.0 >60 mL/min/1.73 m^2   Protein electrophoresis, serum   Result Value Ref Range    Protein, Serum 5.5 (L) 6.0 - 8.4 g/dL    Albumin grams/dl 3.43 3.35 - 5.55 g/dL    Alpha-1 grams/dl 0.31 0.17 - 0.41 g/dL    Alpha-2 grams/dl 0.66 0.43 - 0.99 g/dL    Beta grams/dl 0.69 0.50 - 1.10 g/dL    Gamma grams/dl 0.41 (L) 0.67 - 1.58 g/dL   Immunofixation electrophoresis   Result Value Ref Range    Immunofix Interp. SEE COMMENT    Immunoglobulin free LT chains blood   Result Value Ref Range    Kappa Free Light Chains 0.87 0.33 - 1.94 mg/dL    Lambda Free Light Chains 0.48 (L) 0.57 - 2.63 mg/dL    Kappa/Lambda FLC Ratio 1.81 (H) 0.26 - 1.65   Immunoglobulins (IgG, IgA, IgM) Quantitative   Result Value Ref Range    IgG - Serum 407 (L) 650 - 1600 mg/dL    IgA 115 40 - 350 mg/dL    IgM 22 (L) 50 - 300 mg/dL   Pathologist Interpretation SPE   Result Value Ref Range    Pathologist Interpretation SPE REVIEWED    Pathologist Interpretation JAYESH   Result Value Ref Range    Pathologist Interpretation JAYESH REVIEWED        PROBLEMS ASSESSED THIS VISIT:    1. Multiple myeloma in remission     2. History of DVT (deep vein thrombosis)    3. H/O autologous stem cell transplant    4. Bradycardia    5. Thrombocytopenia    6. Anemia due to antineoplastic chemotherapy    7. Chemotherapy-induced neutropenia        PLAN:       Multiple myeloma in remission  Mr. Bobo continues in a VGPR for his multiple myeloma. He is tolerating lenalidomide maintenance well. He has returned to work and is doing this full time.     I have recommended indefinite continuation of lenalidomide to help prevent progression as long as possible. He understands and agrees.     History of DVT (deep vein thrombosis)  Continue apixaban indefinitely, particularly given lenalidomide use.     Bradycardia  Likely drug-induced (bisoprolol). I will notify his PCP to consider whether to continue this antihypertensive or change agents or class.     Thrombocytopenia  Mild. Secondary to lenalidomide. Monitor.     Anemia due to antineoplastic chemotherapy  Mild. Related to lenalidomide. Monitor.     Chemotherapy-induced neutropenia  Mild. ANC is still greater than 1,000. Infectious risk is low. Continue lenalidomide and monitor.     Follow-up in 3 months    Italo Bryant MD  Hematology and Stem Cell Transplant

## 2018-12-21 LAB
ALBUMIN SERPL ELPH-MCNC: 3.43 G/DL
ALPHA1 GLOB SERPL ELPH-MCNC: 0.31 G/DL
ALPHA2 GLOB SERPL ELPH-MCNC: 0.66 G/DL
B-GLOBULIN SERPL ELPH-MCNC: 0.69 G/DL
GAMMA GLOB SERPL ELPH-MCNC: 0.41 G/DL
INTERPRETATION SERPL IFE-IMP: NORMAL
KAPPA LC SER QL IA: 0.87 MG/DL
KAPPA LC/LAMBDA SER IA: 1.81
LAMBDA LC SER QL IA: 0.48 MG/DL
PATHOLOGIST INTERPRETATION IFE: NORMAL
PATHOLOGIST INTERPRETATION SPE: NORMAL
PROT SERPL-MCNC: 5.5 G/DL

## 2018-12-22 LAB
HBV SURFACE AB SER QL IA: NEGATIVE
HBV SURFACE AB SERPL IA-ACNC: <3 MIU/ML

## 2018-12-25 PROBLEM — D70.1 CHEMOTHERAPY-INDUCED NEUTROPENIA: Status: ACTIVE | Noted: 2018-12-25

## 2018-12-25 PROBLEM — T45.1X5A CHEMOTHERAPY-INDUCED NEUTROPENIA: Status: ACTIVE | Noted: 2018-12-25

## 2018-12-26 NOTE — ASSESSMENT & PLAN NOTE
Likely drug-induced (bisoprolol). I will notify his PCP to consider whether to continue this antihypertensive or change agents or class.

## 2018-12-26 NOTE — ASSESSMENT & PLAN NOTE
Mr. Bobo continues in a VGPR for his multiple myeloma. He is tolerating lenalidomide maintenance well. He has returned to work and is doing this full time.     I have recommended indefinite continuation of lenalidomide to help prevent progression as long as possible. He understands and agrees.

## 2018-12-26 NOTE — ASSESSMENT & PLAN NOTE
Mild. ANC is still greater than 1,000. Infectious risk is low. Continue lenalidomide and monitor.

## 2018-12-28 LAB
C DIPHTHERIAE AB SER IA-ACNC: <0.1 IU/ML
C TETANI AB SER-ACNC: 0.22 IU/ML
DEPRECATED S PNEUM 1 IGG SER-MCNC: <0.3 MCG/ML
DEPRECATED S PNEUM12 IGG SER-MCNC: <0.3 MCG/ML
DEPRECATED S PNEUM14 IGG SER-MCNC: 0.3 MCG/ML
DEPRECATED S PNEUM19 IGG SER-MCNC: 0.5 MCG/ML
DEPRECATED S PNEUM23 IGG SER-MCNC: <0.3 MCG/ML
DEPRECATED S PNEUM3 IGG SER-MCNC: 0.4 MCG/ML
DEPRECATED S PNEUM4 IGG SER-MCNC: <0.3 MCG/ML
DEPRECATED S PNEUM5 IGG SER-MCNC: 0.5 MCG/ML
DEPRECATED S PNEUM8 IGG SER-MCNC: <0.3 MCG/ML
DEPRECATED S PNEUM9 IGG SER-MCNC: <0.3 MCG/ML
HAEM INFLU B IGG SER-MCNC: 1.33 MG/L
S PNEUM DA 18C IGG SER-MCNC: <0.3 MCG/ML
S PNEUM DA 6B IGG SER-MCNC: <0.3 MCG/ML
S PNEUM DA 7F IGG SER-MCNC: 0.4 MCG/ML
S PNEUM DA 9V IGG SER-MCNC: <0.3 MCG/ML

## 2019-01-03 ENCOUNTER — PATIENT MESSAGE (OUTPATIENT)
Dept: HEMATOLOGY/ONCOLOGY | Facility: CLINIC | Age: 54
End: 2019-01-03

## 2019-01-03 DIAGNOSIS — C90.01 MULTIPLE MYELOMA IN REMISSION: ICD-10-CM

## 2019-01-03 RX ORDER — LENALIDOMIDE 10 MG/1
10 CAPSULE ORAL DAILY
Qty: 21 EACH | Refills: 0 | Status: SHIPPED | OUTPATIENT
Start: 2019-01-03 | End: 2019-01-31 | Stop reason: SDUPTHER

## 2019-01-31 ENCOUNTER — PATIENT MESSAGE (OUTPATIENT)
Dept: HEMATOLOGY/ONCOLOGY | Facility: CLINIC | Age: 54
End: 2019-01-31

## 2019-01-31 DIAGNOSIS — C90.01 MULTIPLE MYELOMA IN REMISSION: ICD-10-CM

## 2019-01-31 RX ORDER — LENALIDOMIDE 10 MG/1
10 CAPSULE ORAL DAILY
Qty: 21 EACH | Refills: 0 | Status: SHIPPED | OUTPATIENT
Start: 2019-01-31 | End: 2019-02-27 | Stop reason: SDUPTHER

## 2019-02-27 ENCOUNTER — PATIENT MESSAGE (OUTPATIENT)
Dept: HEMATOLOGY/ONCOLOGY | Facility: CLINIC | Age: 54
End: 2019-02-27

## 2019-02-27 DIAGNOSIS — C90.01 MULTIPLE MYELOMA IN REMISSION: ICD-10-CM

## 2019-02-27 RX ORDER — LENALIDOMIDE 10 MG/1
10 CAPSULE ORAL DAILY
Qty: 21 EACH | Refills: 0 | Status: SHIPPED | OUTPATIENT
Start: 2019-02-27 | End: 2019-03-29 | Stop reason: SDUPTHER

## 2019-03-29 DIAGNOSIS — C90.01 MULTIPLE MYELOMA IN REMISSION: ICD-10-CM

## 2019-03-29 RX ORDER — LENALIDOMIDE 10 MG/1
10 CAPSULE ORAL DAILY
Qty: 21 EACH | Refills: 0 | Status: SHIPPED | OUTPATIENT
Start: 2019-03-29 | End: 2019-04-26 | Stop reason: SDUPTHER

## 2019-04-01 ENCOUNTER — PATIENT MESSAGE (OUTPATIENT)
Dept: HEMATOLOGY/ONCOLOGY | Facility: CLINIC | Age: 54
End: 2019-04-01

## 2019-04-03 ENCOUNTER — LAB VISIT (OUTPATIENT)
Dept: LAB | Facility: HOSPITAL | Age: 54
End: 2019-04-03
Attending: INTERNAL MEDICINE
Payer: COMMERCIAL

## 2019-04-03 ENCOUNTER — OFFICE VISIT (OUTPATIENT)
Dept: HEMATOLOGY/ONCOLOGY | Facility: CLINIC | Age: 54
End: 2019-04-03
Payer: COMMERCIAL

## 2019-04-03 VITALS
HEART RATE: 62 BPM | OXYGEN SATURATION: 98 % | WEIGHT: 255.94 LBS | BODY MASS INDEX: 38.79 KG/M2 | SYSTOLIC BLOOD PRESSURE: 132 MMHG | HEIGHT: 68 IN | DIASTOLIC BLOOD PRESSURE: 83 MMHG | TEMPERATURE: 98 F

## 2019-04-03 DIAGNOSIS — T45.1X5A ANEMIA DUE TO ANTINEOPLASTIC CHEMOTHERAPY: ICD-10-CM

## 2019-04-03 DIAGNOSIS — C90.01 MULTIPLE MYELOMA IN REMISSION: Primary | ICD-10-CM

## 2019-04-03 DIAGNOSIS — Z09 CHEMOTHERAPY FOLLOW-UP EXAMINATION: ICD-10-CM

## 2019-04-03 DIAGNOSIS — D64.81 ANEMIA DUE TO ANTINEOPLASTIC CHEMOTHERAPY: ICD-10-CM

## 2019-04-03 DIAGNOSIS — Z94.84 H/O AUTOLOGOUS STEM CELL TRANSPLANT: ICD-10-CM

## 2019-04-03 DIAGNOSIS — C90.01 MULTIPLE MYELOMA IN REMISSION: ICD-10-CM

## 2019-04-03 LAB
ALBUMIN SERPL BCP-MCNC: 3.3 G/DL (ref 3.5–5.2)
ALP SERPL-CCNC: 110 U/L (ref 55–135)
ALT SERPL W/O P-5'-P-CCNC: 24 U/L (ref 10–44)
ANION GAP SERPL CALC-SCNC: 12 MMOL/L (ref 8–16)
ANISOCYTOSIS BLD QL SMEAR: SLIGHT
AST SERPL-CCNC: 22 U/L (ref 10–40)
BASOPHILS # BLD AUTO: 0.04 K/UL (ref 0–0.2)
BASOPHILS NFR BLD: 1 % (ref 0–1.9)
BILIRUB SERPL-MCNC: 0.6 MG/DL (ref 0.1–1)
BUN SERPL-MCNC: 21 MG/DL (ref 6–20)
CALCIUM SERPL-MCNC: 9.5 MG/DL (ref 8.7–10.5)
CHLORIDE SERPL-SCNC: 101 MMOL/L (ref 95–110)
CO2 SERPL-SCNC: 27 MMOL/L (ref 23–29)
CREAT SERPL-MCNC: 1 MG/DL (ref 0.5–1.4)
DIFFERENTIAL METHOD: ABNORMAL
EOSINOPHIL # BLD AUTO: 0.1 K/UL (ref 0–0.5)
EOSINOPHIL NFR BLD: 3.5 % (ref 0–8)
ERYTHROCYTE [DISTWIDTH] IN BLOOD BY AUTOMATED COUNT: 15.2 % (ref 11.5–14.5)
EST. GFR  (AFRICAN AMERICAN): >60 ML/MIN/1.73 M^2
EST. GFR  (NON AFRICAN AMERICAN): >60 ML/MIN/1.73 M^2
GLUCOSE SERPL-MCNC: 86 MG/DL (ref 70–110)
HCT VFR BLD AUTO: 37.9 % (ref 40–54)
HGB BLD-MCNC: 12.6 G/DL (ref 14–18)
IGA SERPL-MCNC: 153 MG/DL (ref 40–350)
IGG SERPL-MCNC: 517 MG/DL (ref 650–1600)
IGM SERPL-MCNC: 21 MG/DL (ref 50–300)
IMM GRANULOCYTES # BLD AUTO: 0.02 K/UL (ref 0–0.04)
IMM GRANULOCYTES NFR BLD AUTO: 0.5 % (ref 0–0.5)
LYMPHOCYTES # BLD AUTO: 1.3 K/UL (ref 1–4.8)
LYMPHOCYTES NFR BLD: 33.7 % (ref 18–48)
MCH RBC QN AUTO: 30.1 PG (ref 27–31)
MCHC RBC AUTO-ENTMCNC: 33.2 G/DL (ref 32–36)
MCV RBC AUTO: 91 FL (ref 82–98)
MONOCYTES # BLD AUTO: 0.4 K/UL (ref 0.3–1)
MONOCYTES NFR BLD: 11.1 % (ref 4–15)
NEUTROPHILS # BLD AUTO: 2 K/UL (ref 1.8–7.7)
NEUTROPHILS NFR BLD: 50.2 % (ref 38–73)
NRBC BLD-RTO: 0 /100 WBC
PLATELET # BLD AUTO: 166 K/UL (ref 150–350)
PLATELET BLD QL SMEAR: ABNORMAL
PMV BLD AUTO: 10.1 FL (ref 9.2–12.9)
POLYCHROMASIA BLD QL SMEAR: ABNORMAL
POTASSIUM SERPL-SCNC: 3.6 MMOL/L (ref 3.5–5.1)
PROT SERPL-MCNC: 6.2 G/DL (ref 6–8.4)
RBC # BLD AUTO: 4.19 M/UL (ref 4.6–6.2)
SODIUM SERPL-SCNC: 140 MMOL/L (ref 136–145)
WBC # BLD AUTO: 3.98 K/UL (ref 3.9–12.7)

## 2019-04-03 PROCEDURE — 85025 COMPLETE CBC W/AUTO DIFF WBC: CPT

## 2019-04-03 PROCEDURE — 3075F PR MOST RECENT SYSTOLIC BLOOD PRESS GE 130-139MM HG: ICD-10-PCS | Mod: CPTII,S$GLB,, | Performed by: INTERNAL MEDICINE

## 2019-04-03 PROCEDURE — 3079F DIAST BP 80-89 MM HG: CPT | Mod: CPTII,S$GLB,, | Performed by: INTERNAL MEDICINE

## 2019-04-03 PROCEDURE — 86334 IMMUNOFIX E-PHORESIS SERUM: CPT

## 2019-04-03 PROCEDURE — 83520 IMMUNOASSAY QUANT NOS NONAB: CPT

## 2019-04-03 PROCEDURE — 86334 IMMUNOFIX E-PHORESIS SERUM: CPT | Mod: 26,,, | Performed by: PATHOLOGY

## 2019-04-03 PROCEDURE — 84165 PROTEIN E-PHORESIS SERUM: CPT | Mod: 26,,, | Performed by: PATHOLOGY

## 2019-04-03 PROCEDURE — 82784 ASSAY IGA/IGD/IGG/IGM EACH: CPT | Mod: 59

## 2019-04-03 PROCEDURE — 3008F BODY MASS INDEX DOCD: CPT | Mod: CPTII,S$GLB,, | Performed by: INTERNAL MEDICINE

## 2019-04-03 PROCEDURE — 80053 COMPREHEN METABOLIC PANEL: CPT

## 2019-04-03 PROCEDURE — 36415 COLL VENOUS BLD VENIPUNCTURE: CPT

## 2019-04-03 PROCEDURE — 84165 PROTEIN E-PHORESIS SERUM: CPT

## 2019-04-03 PROCEDURE — 3079F PR MOST RECENT DIASTOLIC BLOOD PRESSURE 80-89 MM HG: ICD-10-PCS | Mod: CPTII,S$GLB,, | Performed by: INTERNAL MEDICINE

## 2019-04-03 PROCEDURE — 3008F PR BODY MASS INDEX (BMI) DOCUMENTED: ICD-10-PCS | Mod: CPTII,S$GLB,, | Performed by: INTERNAL MEDICINE

## 2019-04-03 PROCEDURE — 86334 PATHOLOGIST INTERPRETATION IFE: ICD-10-PCS | Mod: 26,,, | Performed by: PATHOLOGY

## 2019-04-03 PROCEDURE — 99215 OFFICE O/P EST HI 40 MIN: CPT | Mod: S$GLB,,, | Performed by: INTERNAL MEDICINE

## 2019-04-03 PROCEDURE — 84165 PATHOLOGIST INTERPRETATION SPE: ICD-10-PCS | Mod: 26,,, | Performed by: PATHOLOGY

## 2019-04-03 PROCEDURE — 3075F SYST BP GE 130 - 139MM HG: CPT | Mod: CPTII,S$GLB,, | Performed by: INTERNAL MEDICINE

## 2019-04-03 PROCEDURE — 99215 PR OFFICE/OUTPT VISIT, EST, LEVL V, 40-54 MIN: ICD-10-PCS | Mod: S$GLB,,, | Performed by: INTERNAL MEDICINE

## 2019-04-03 PROCEDURE — 99999 PR PBB SHADOW E&M-EST. PATIENT-LVL III: CPT | Mod: PBBFAC,,, | Performed by: INTERNAL MEDICINE

## 2019-04-03 PROCEDURE — 99999 PR PBB SHADOW E&M-EST. PATIENT-LVL III: ICD-10-PCS | Mod: PBBFAC,,, | Performed by: INTERNAL MEDICINE

## 2019-04-03 RX ORDER — HYDROCHLOROTHIAZIDE 25 MG/1
25 TABLET ORAL DAILY
COMMUNITY
End: 2019-10-30

## 2019-04-04 LAB
INTERPRETATION SERPL IFE-IMP: NORMAL
KAPPA LC SER QL IA: 1.41 MG/DL (ref 0.33–1.94)
KAPPA LC/LAMBDA SER IA: 1.11 (ref 0.26–1.65)
LAMBDA LC SER QL IA: 1.27 MG/DL (ref 0.57–2.63)
PATHOLOGIST INTERPRETATION IFE: NORMAL

## 2019-04-05 LAB
ALBUMIN SERPL ELPH-MCNC: 3.68 G/DL (ref 3.35–5.55)
ALPHA1 GLOB SERPL ELPH-MCNC: 0.32 G/DL (ref 0.17–0.41)
ALPHA2 GLOB SERPL ELPH-MCNC: 0.67 G/DL (ref 0.43–0.99)
B-GLOBULIN SERPL ELPH-MCNC: 0.79 G/DL (ref 0.5–1.1)
GAMMA GLOB SERPL ELPH-MCNC: 0.53 G/DL (ref 0.67–1.58)
PATHOLOGIST INTERPRETATION SPE: NORMAL
PROT SERPL-MCNC: 6 G/DL (ref 6–8.4)

## 2019-04-08 PROBLEM — T45.1X5A CHEMOTHERAPY-INDUCED NEUTROPENIA: Status: RESOLVED | Noted: 2018-12-25 | Resolved: 2019-04-08

## 2019-04-08 PROBLEM — D70.1 CHEMOTHERAPY-INDUCED NEUTROPENIA: Status: RESOLVED | Noted: 2018-12-25 | Resolved: 2019-04-08

## 2019-04-09 NOTE — PROGRESS NOTES
HEMATOLOGIC MALIGNANCIES PROGRESS NOTE    IDENTIFYING STATEMENT   Arik Bobo (Arik) is a 53 y.o. male with a  of 1965 from [place] with the diagnosis of multiple myeloma.      ONCOLOGY HISTORY:    1. Multiple myeloma s/p autologous stem cell transplant   A. Early : Progressive anemia. K/L ratio > 100, and was treated with fina-dex   B. 12/3/2015: M-protein 1.02 g/dl, IgG-kappa by JAYESH. Kappa 2.98 mg/dl, lambda 0.5 mg/dl, ratio 5.96. BMBx shows 60-70% cellular marrow with 20% plasma cells, consistent with plasma cell myeloma. Hgb 11.8 g/dl, Calcium 9.44 (corrected). Creatinine 0.8 mg/dl.    C. 2016: M-protein 2.23 g/dl. Kappa 4.36 mg/dl, lambda 0.08 mg/dl, ratio 54.5. Progressive disease.   D. VCD therapy locally.    E. 2017: M-protein 1.23 g/dl.    F. 2017: M-protein 0.41 g/dl. Kappa 2.33 mg/dl, lambda 0.5 mg/dl, ratio 4.66, consistent with partial response.    G. 2017: Autologous stem cell transplant with melphalan conditioning.   H. 2018: Day +100 restaging - M-protein 0.19 g/dl, kappa 0.76 mg/dl, lambda 0.53 mg/dl, ratio 1.43. BMBx shows no definitive evidence of myeloma. Consistent with very good partial response.    I. 3/8/2018: M-protein 0.18 g/dl   J. 2018: M-protein 0.16 g/dl   K. 2018: M-protein 0.2 g/dl   L. 2018: M-protein 0.14 g/dl; BMBx for 1-year re-evaluation shows 40% cellular marrow with 6-7% plasma cells and no evidence of clonality; thus, no plasma cell neoplasm evident. Findings consistent with ongoing VGPR.   M. 4/3/2019: M-protein < 0.1 g/dl.      2. Depression  3. HTN  4. History of DVT in 2015 and 2017    INTERVAL HISTORY:      Mr. Bobo returns to clinic 18 months after  autologous transplant for myeloma. He is continuing to work at full capacity.    He is doing well. No new bone pain. No neuropathy. No fevers or chills. He is tolerating lenalidomide well without adverse effect.     Past Medical History, Past Social History  "and Past Family History have been reviewed and are unchanged except as noted in the interval history.    MEDICATIONS:     Current Outpatient Medications on File Prior to Visit   Medication Sig Dispense Refill    amlodipine (NORVASC) 5 MG tablet Take 5 mg by mouth once daily.      apixaban 2.5 mg Tab Take 1 tablet (2.5 mg total) by mouth 2 (two) times daily. 60 tablet 3    citalopram (CELEXA) 40 MG tablet Take 40 mg by mouth.      hydroCHLOROthiazide (HYDRODIURIL) 25 MG tablet Take 25 mg by mouth once daily.      lenalidomide (REVLIMID) 10 mg Cap Take 10 mg by mouth once daily auth # [id number] on 3/29/19. 21 each 0    lisinopril (PRINIVIL,ZESTRIL) 40 MG tablet   1    simvastatin (ZOCOR) 20 MG tablet   3     No current facility-administered medications on file prior to visit.        ALLERGIES:   Review of patient's allergies indicates:   Allergen Reactions    Pcn [penicillins]      Unknown last dose as an infant        ROS:       Review of Systems   Constitutional: Negative for diaphoresis, fatigue, fever and unexpected weight change.   HENT:   Negative for lump/mass and sore throat.    Eyes: Negative for icterus.   Respiratory: Negative for cough and shortness of breath.    Cardiovascular: Negative for chest pain and palpitations.   Gastrointestinal: Negative for abdominal distention, constipation, diarrhea, nausea and vomiting.   Genitourinary: Negative for dysuria and frequency.    Musculoskeletal: Negative for arthralgias, gait problem and myalgias.   Skin: Negative for rash.   Neurological: Negative for dizziness, gait problem and headaches.   Hematological: Negative for adenopathy. Does not bruise/bleed easily.   Psychiatric/Behavioral: The patient is not nervous/anxious.        PHYSICAL EXAM:  Vitals:    04/03/19 1410   BP: 132/83   Pulse: 62   Temp: 98.4 °F (36.9 °C)   SpO2: 98%   Weight: 116.1 kg (255 lb 15.3 oz)   Height: 5' 8" (1.727 m)   PainSc: 0-No pain       KARNOFSKY PERFORMANCE STATUS 90%  ECOG " 1    Physical Exam   Constitutional: He is oriented to person, place, and time. He appears well-developed and well-nourished. No distress.   HENT:   Head: Normocephalic and atraumatic.   Mouth/Throat: Mucous membranes are normal. No oral lesions.   Eyes: Conjunctivae are normal.   Neck: No thyromegaly present.   Cardiovascular: Regular rhythm and normal heart sounds.   No murmur heard.  Bradycardic   Pulmonary/Chest: Breath sounds normal. He has no wheezes. He has no rales.   Abdominal: Soft. He exhibits no distension and no mass. There is no splenomegaly or hepatomegaly. There is no tenderness.   Musculoskeletal: He exhibits edema.   Lymphadenopathy:     He has no cervical adenopathy.        Right cervical: No deep cervical adenopathy present.       Left cervical: No deep cervical adenopathy present.     He has no axillary adenopathy.        Right: No inguinal adenopathy present.        Left: No inguinal adenopathy present.   Neurological: He is alert and oriented to person, place, and time. He has normal strength and normal reflexes. No cranial nerve deficit. Coordination normal.   Skin: No rash noted.       LAB:   Results for orders placed or performed in visit on 04/03/19   CBC auto differential   Result Value Ref Range    WBC 3.98 3.90 - 12.70 K/uL    RBC 4.19 (L) 4.60 - 6.20 M/uL    Hemoglobin 12.6 (L) 14.0 - 18.0 g/dL    Hematocrit 37.9 (L) 40.0 - 54.0 %    MCV 91 82 - 98 fL    MCH 30.1 27.0 - 31.0 pg    MCHC 33.2 32.0 - 36.0 g/dL    RDW 15.2 (H) 11.5 - 14.5 %    Platelets 166 150 - 350 K/uL    MPV 10.1 9.2 - 12.9 fL    Immature Granulocytes 0.5 0.0 - 0.5 %    Gran # (ANC) 2.0 1.8 - 7.7 K/uL    Immature Grans (Abs) 0.02 0.00 - 0.04 K/uL    Lymph # 1.3 1.0 - 4.8 K/uL    Mono # 0.4 0.3 - 1.0 K/uL    Eos # 0.1 0.0 - 0.5 K/uL    Baso # 0.04 0.00 - 0.20 K/uL    nRBC 0 0 /100 WBC    Gran% 50.2 38.0 - 73.0 %    Lymph% 33.7 18.0 - 48.0 %    Mono% 11.1 4.0 - 15.0 %    Eosinophil% 3.5 0.0 - 8.0 %    Basophil% 1.0 0.0 -  1.9 %    Platelet Estimate Appears normal     Aniso Slight     Poly Occasional     Differential Method Automated    Comprehensive metabolic panel   Result Value Ref Range    Sodium 140 136 - 145 mmol/L    Potassium 3.6 3.5 - 5.1 mmol/L    Chloride 101 95 - 110 mmol/L    CO2 27 23 - 29 mmol/L    Glucose 86 70 - 110 mg/dL    BUN, Bld 21 (H) 6 - 20 mg/dL    Creatinine 1.0 0.5 - 1.4 mg/dL    Calcium 9.5 8.7 - 10.5 mg/dL    Total Protein 6.2 6.0 - 8.4 g/dL    Albumin 3.3 (L) 3.5 - 5.2 g/dL    Total Bilirubin 0.6 0.1 - 1.0 mg/dL    Alkaline Phosphatase 110 55 - 135 U/L    AST 22 10 - 40 U/L    ALT 24 10 - 44 U/L    Anion Gap 12 8 - 16 mmol/L    eGFR if African American >60.0 >60 mL/min/1.73 m^2    eGFR if non African American >60.0 >60 mL/min/1.73 m^2   Protein electrophoresis, serum   Result Value Ref Range    Protein, Serum 6.0 6.0 - 8.4 g/dL    Albumin grams/dl 3.68 3.35 - 5.55 g/dL    Alpha-1 grams/dl 0.32 0.17 - 0.41 g/dL    Alpha-2 grams/dl 0.67 0.43 - 0.99 g/dL    Beta grams/dl 0.79 0.50 - 1.10 g/dL    Gamma grams/dl 0.53 (L) 0.67 - 1.58 g/dL   Immunofixation electrophoresis   Result Value Ref Range    Immunofix Interp. SEE COMMENT    Immunoglobulin free LT chains blood   Result Value Ref Range    Kappa Free Light Chains 1.41 0.33 - 1.94 mg/dL    Lambda Free Light Chains 1.27 0.57 - 2.63 mg/dL    Kappa/Lambda FLC Ratio 1.11 0.26 - 1.65   Immunoglobulins (IgG, IgA, IgM) Quantitative   Result Value Ref Range    IgG - Serum 517 (L) 650 - 1600 mg/dL    IgA 153 40 - 350 mg/dL    IgM 21 (L) 50 - 300 mg/dL   Pathologist Interpretation JAYESH   Result Value Ref Range    Pathologist Interpretation JAYESH REVIEWED    Pathologist Interpretation SPE   Result Value Ref Range    Pathologist Interpretation SPE REVIEWED        PROBLEMS ASSESSED THIS VISIT:    1. Multiple myeloma in remission    2. H/O autologous stem cell transplant    3. Anemia due to antineoplastic chemotherapy    4. Chemotherapy follow-up examination        PLAN:        Multiple myeloma in remission  Mr. Bobo has a detectable M-protein that measures < 0.1 g/dl. This is consistent with ongoing VGPR. His response is actually deepening on maintenance lenalidomide. I have recommended he continue this. He is tolerating it well without clinically significant adverse effect.     H/O autologous stem cell transplant  He is 18 months post transplant with ongoing VGPR as above. We will continue all post-transplant follow-up here.     Anemia due to antineoplastic chemotherapy  Likely related to lenalidomide therapy. We will monitor on therapy.     Follow-up in 3 months    Italo Bryant MD  Hematology and Stem Cell Transplant

## 2019-04-09 NOTE — ASSESSMENT & PLAN NOTE
He is 18 months post transplant with ongoing VGPR as above. We will continue all post-transplant follow-up here.

## 2019-04-09 NOTE — ASSESSMENT & PLAN NOTE
Mr. Bobo has a detectable M-protein that measures < 0.1 g/dl. This is consistent with ongoing VGPR. His response is actually deepening on maintenance lenalidomide. I have recommended he continue this. He is tolerating it well without clinically significant adverse effect.

## 2019-04-26 ENCOUNTER — PATIENT MESSAGE (OUTPATIENT)
Dept: HEMATOLOGY/ONCOLOGY | Facility: CLINIC | Age: 54
End: 2019-04-26

## 2019-04-26 DIAGNOSIS — C90.01 MULTIPLE MYELOMA IN REMISSION: ICD-10-CM

## 2019-04-26 RX ORDER — LENALIDOMIDE 10 MG/1
10 CAPSULE ORAL DAILY
Qty: 21 EACH | Refills: 0 | Status: SHIPPED | OUTPATIENT
Start: 2019-04-26 | End: 2019-05-24 | Stop reason: SDUPTHER

## 2019-05-24 DIAGNOSIS — C90.01 MULTIPLE MYELOMA IN REMISSION: ICD-10-CM

## 2019-05-24 RX ORDER — LENALIDOMIDE 10 MG/1
10 CAPSULE ORAL DAILY
Qty: 21 EACH | Refills: 0 | Status: SHIPPED | OUTPATIENT
Start: 2019-05-24 | End: 2019-06-26 | Stop reason: SDUPTHER

## 2019-06-26 ENCOUNTER — PATIENT MESSAGE (OUTPATIENT)
Dept: HEMATOLOGY/ONCOLOGY | Facility: CLINIC | Age: 54
End: 2019-06-26

## 2019-06-26 DIAGNOSIS — C90.01 MULTIPLE MYELOMA IN REMISSION: ICD-10-CM

## 2019-06-26 RX ORDER — LENALIDOMIDE 10 MG/1
10 CAPSULE ORAL DAILY
Qty: 21 EACH | Refills: 0 | Status: SHIPPED | OUTPATIENT
Start: 2019-06-26 | End: 2019-07-24 | Stop reason: SDUPTHER

## 2019-07-10 ENCOUNTER — LAB VISIT (OUTPATIENT)
Dept: LAB | Facility: HOSPITAL | Age: 54
End: 2019-07-10
Attending: INTERNAL MEDICINE
Payer: COMMERCIAL

## 2019-07-10 ENCOUNTER — OFFICE VISIT (OUTPATIENT)
Dept: HEMATOLOGY/ONCOLOGY | Facility: CLINIC | Age: 54
End: 2019-07-10
Payer: COMMERCIAL

## 2019-07-10 VITALS
SYSTOLIC BLOOD PRESSURE: 123 MMHG | OXYGEN SATURATION: 97 % | DIASTOLIC BLOOD PRESSURE: 83 MMHG | TEMPERATURE: 98 F | WEIGHT: 259.06 LBS | BODY MASS INDEX: 39.26 KG/M2 | HEART RATE: 60 BPM | HEIGHT: 68 IN

## 2019-07-10 DIAGNOSIS — C90.01 MULTIPLE MYELOMA IN REMISSION: Primary | ICD-10-CM

## 2019-07-10 DIAGNOSIS — C90.01 MULTIPLE MYELOMA IN REMISSION: ICD-10-CM

## 2019-07-10 DIAGNOSIS — Z94.84 H/O AUTOLOGOUS STEM CELL TRANSPLANT: ICD-10-CM

## 2019-07-10 DIAGNOSIS — D70.1 LEUKOPENIA DUE TO ANTINEOPLASTIC CHEMOTHERAPY: ICD-10-CM

## 2019-07-10 DIAGNOSIS — Z86.718 HISTORY OF DVT (DEEP VEIN THROMBOSIS): ICD-10-CM

## 2019-07-10 DIAGNOSIS — D64.81 ANEMIA DUE TO ANTINEOPLASTIC CHEMOTHERAPY: ICD-10-CM

## 2019-07-10 DIAGNOSIS — T45.1X5A ANEMIA DUE TO ANTINEOPLASTIC CHEMOTHERAPY: ICD-10-CM

## 2019-07-10 DIAGNOSIS — T45.1X5A LEUKOPENIA DUE TO ANTINEOPLASTIC CHEMOTHERAPY: ICD-10-CM

## 2019-07-10 LAB
ALBUMIN SERPL BCP-MCNC: 3.4 G/DL (ref 3.5–5.2)
ALP SERPL-CCNC: 86 U/L (ref 55–135)
ALT SERPL W/O P-5'-P-CCNC: 23 U/L (ref 10–44)
ANION GAP SERPL CALC-SCNC: 9 MMOL/L (ref 8–16)
AST SERPL-CCNC: 20 U/L (ref 10–40)
BASOPHILS # BLD AUTO: 0.03 K/UL (ref 0–0.2)
BASOPHILS NFR BLD: 0.9 % (ref 0–1.9)
BILIRUB SERPL-MCNC: 0.5 MG/DL (ref 0.1–1)
BUN SERPL-MCNC: 21 MG/DL (ref 6–20)
CALCIUM SERPL-MCNC: 9.8 MG/DL (ref 8.7–10.5)
CHLORIDE SERPL-SCNC: 100 MMOL/L (ref 95–110)
CO2 SERPL-SCNC: 31 MMOL/L (ref 23–29)
CREAT SERPL-MCNC: 0.9 MG/DL (ref 0.5–1.4)
DIFFERENTIAL METHOD: ABNORMAL
EOSINOPHIL # BLD AUTO: 0.2 K/UL (ref 0–0.5)
EOSINOPHIL NFR BLD: 5.6 % (ref 0–8)
ERYTHROCYTE [DISTWIDTH] IN BLOOD BY AUTOMATED COUNT: 16.1 % (ref 11.5–14.5)
EST. GFR  (AFRICAN AMERICAN): >60 ML/MIN/1.73 M^2
EST. GFR  (NON AFRICAN AMERICAN): >60 ML/MIN/1.73 M^2
GLUCOSE SERPL-MCNC: 75 MG/DL (ref 70–110)
HCT VFR BLD AUTO: 34.9 % (ref 40–54)
HGB BLD-MCNC: 11.6 G/DL (ref 14–18)
IGA SERPL-MCNC: 164 MG/DL (ref 40–350)
IGG SERPL-MCNC: 579 MG/DL (ref 650–1600)
IGM SERPL-MCNC: 25 MG/DL (ref 50–300)
IMM GRANULOCYTES # BLD AUTO: 0.02 K/UL (ref 0–0.04)
IMM GRANULOCYTES NFR BLD AUTO: 0.6 % (ref 0–0.5)
LYMPHOCYTES # BLD AUTO: 1.1 K/UL (ref 1–4.8)
LYMPHOCYTES NFR BLD: 35.4 % (ref 18–48)
MCH RBC QN AUTO: 29.3 PG (ref 27–31)
MCHC RBC AUTO-ENTMCNC: 33.2 G/DL (ref 32–36)
MCV RBC AUTO: 88 FL (ref 82–98)
MONOCYTES # BLD AUTO: 0.4 K/UL (ref 0.3–1)
MONOCYTES NFR BLD: 12.2 % (ref 4–15)
NEUTROPHILS # BLD AUTO: 1.4 K/UL (ref 1.8–7.7)
NEUTROPHILS NFR BLD: 45.3 % (ref 38–73)
NRBC BLD-RTO: 0 /100 WBC
PLATELET # BLD AUTO: 153 K/UL (ref 150–350)
PMV BLD AUTO: 9.8 FL (ref 9.2–12.9)
POTASSIUM SERPL-SCNC: 3.3 MMOL/L (ref 3.5–5.1)
PROT SERPL-MCNC: 6.2 G/DL (ref 6–8.4)
RBC # BLD AUTO: 3.96 M/UL (ref 4.6–6.2)
SODIUM SERPL-SCNC: 140 MMOL/L (ref 136–145)
WBC # BLD AUTO: 3.19 K/UL (ref 3.9–12.7)

## 2019-07-10 PROCEDURE — 3079F DIAST BP 80-89 MM HG: CPT | Mod: CPTII,S$GLB,, | Performed by: INTERNAL MEDICINE

## 2019-07-10 PROCEDURE — 84165 PROTEIN E-PHORESIS SERUM: CPT | Mod: 26,,, | Performed by: PATHOLOGY

## 2019-07-10 PROCEDURE — 80053 COMPREHEN METABOLIC PANEL: CPT

## 2019-07-10 PROCEDURE — 3074F SYST BP LT 130 MM HG: CPT | Mod: CPTII,S$GLB,, | Performed by: INTERNAL MEDICINE

## 2019-07-10 PROCEDURE — 36415 COLL VENOUS BLD VENIPUNCTURE: CPT

## 2019-07-10 PROCEDURE — 3074F PR MOST RECENT SYSTOLIC BLOOD PRESSURE < 130 MM HG: ICD-10-PCS | Mod: CPTII,S$GLB,, | Performed by: INTERNAL MEDICINE

## 2019-07-10 PROCEDURE — 3008F BODY MASS INDEX DOCD: CPT | Mod: CPTII,S$GLB,, | Performed by: INTERNAL MEDICINE

## 2019-07-10 PROCEDURE — 99999 PR PBB SHADOW E&M-EST. PATIENT-LVL III: ICD-10-PCS | Mod: PBBFAC,,, | Performed by: INTERNAL MEDICINE

## 2019-07-10 PROCEDURE — 99215 OFFICE O/P EST HI 40 MIN: CPT | Mod: S$GLB,,, | Performed by: INTERNAL MEDICINE

## 2019-07-10 PROCEDURE — 3079F PR MOST RECENT DIASTOLIC BLOOD PRESSURE 80-89 MM HG: ICD-10-PCS | Mod: CPTII,S$GLB,, | Performed by: INTERNAL MEDICINE

## 2019-07-10 PROCEDURE — 83520 IMMUNOASSAY QUANT NOS NONAB: CPT | Mod: 59

## 2019-07-10 PROCEDURE — 84165 PATHOLOGIST INTERPRETATION SPE: ICD-10-PCS | Mod: 26,,, | Performed by: PATHOLOGY

## 2019-07-10 PROCEDURE — 99999 PR PBB SHADOW E&M-EST. PATIENT-LVL III: CPT | Mod: PBBFAC,,, | Performed by: INTERNAL MEDICINE

## 2019-07-10 PROCEDURE — 84165 PROTEIN E-PHORESIS SERUM: CPT

## 2019-07-10 PROCEDURE — 85025 COMPLETE CBC W/AUTO DIFF WBC: CPT

## 2019-07-10 PROCEDURE — 86334 IMMUNOFIX E-PHORESIS SERUM: CPT

## 2019-07-10 PROCEDURE — 3008F PR BODY MASS INDEX (BMI) DOCUMENTED: ICD-10-PCS | Mod: CPTII,S$GLB,, | Performed by: INTERNAL MEDICINE

## 2019-07-10 PROCEDURE — 82784 ASSAY IGA/IGD/IGG/IGM EACH: CPT | Mod: 59

## 2019-07-10 PROCEDURE — 86334 IMMUNOFIX E-PHORESIS SERUM: CPT | Mod: 26,,, | Performed by: PATHOLOGY

## 2019-07-10 PROCEDURE — 86334 PATHOLOGIST INTERPRETATION IFE: ICD-10-PCS | Mod: 26,,, | Performed by: PATHOLOGY

## 2019-07-10 PROCEDURE — 99215 PR OFFICE/OUTPT VISIT, EST, LEVL V, 40-54 MIN: ICD-10-PCS | Mod: S$GLB,,, | Performed by: INTERNAL MEDICINE

## 2019-07-10 NOTE — PROGRESS NOTES
HEMATOLOGIC MALIGNANCIES PROGRESS NOTE    IDENTIFYING STATEMENT   Arik Bobo (Arik) is a 54 y.o. male with a  of 1965 from [place] with the diagnosis of multiple myeloma.      ONCOLOGY HISTORY:    1. Multiple myeloma s/p autologous stem cell transplant   A. Early : Progressive anemia. K/L ratio > 100, and was treated with fina-dex   B. 12/3/2015: M-protein 1.02 g/dl, IgG-kappa by JAYESH. Kappa 2.98 mg/dl, lambda 0.5 mg/dl, ratio 5.96. BMBx shows 60-70% cellular marrow with 20% plasma cells, consistent with plasma cell myeloma. Hgb 11.8 g/dl, Calcium 9.44 (corrected). Creatinine 0.8 mg/dl.    C. 2016: M-protein 2.23 g/dl. Kappa 4.36 mg/dl, lambda 0.08 mg/dl, ratio 54.5. Progressive disease.   D. VCD therapy locally.    E. 2017: M-protein 1.23 g/dl.    F. 2017: M-protein 0.41 g/dl. Kappa 2.33 mg/dl, lambda 0.5 mg/dl, ratio 4.66, consistent with partial response.    G. 2017: Autologous stem cell transplant with melphalan conditioning.   H. 2018: Day +100 restaging - M-protein 0.19 g/dl, kappa 0.76 mg/dl, lambda 0.53 mg/dl, ratio 1.43. BMBx shows no definitive evidence of myeloma. Consistent with very good partial response.    I. 3/8/2018: M-protein 0.18 g/dl   J. 2018: M-protein 0.16 g/dl   K. 2018: M-protein 0.2 g/dl   L. 2018: M-protein 0.14 g/dl; BMBx for 1-year re-evaluation shows 40% cellular marrow with 6-7% plasma cells and no evidence of clonality; thus, no plasma cell neoplasm evident. Findings consistent with ongoing VGPR.   M. 4/3/2019: M-protein < 0.1 g/dl.     N. 7/10/2019: M-protein 0.18 g/dl.    2. Depression  3. HTN  4. History of DVT in 2015 and 2017    INTERVAL HISTORY:      Mr. Bobo returns to clinic 1 year and 9 months after  autologous transplant for myeloma. He is working currently; however, he has more difficulty with heat tolerance. He therefore has had to change the nature of the kinds of jobs he accepts.     He is doing well. No  new bone pain. No neuropathy. No fevers or chills. He is tolerating lenalidomide well without adverse effect.     Past Medical History, Past Social History and Past Family History have been reviewed and are unchanged except as noted in the interval history.    MEDICATIONS:     Current Outpatient Medications on File Prior to Visit   Medication Sig Dispense Refill    amlodipine (NORVASC) 5 MG tablet Take 5 mg by mouth once daily.      apixaban 2.5 mg Tab Take 1 tablet (2.5 mg total) by mouth 2 (two) times daily. 60 tablet 3    citalopram (CELEXA) 40 MG tablet Take 40 mg by mouth.      hydroCHLOROthiazide (HYDRODIURIL) 25 MG tablet Take 25 mg by mouth once daily.      lenalidomide (REVLIMID) 10 mg Cap Take 10 mg by mouth once daily Auth# [id number] on 6/26/19. 21 each 0    lisinopril (PRINIVIL,ZESTRIL) 40 MG tablet   1    simvastatin (ZOCOR) 20 MG tablet   3     No current facility-administered medications on file prior to visit.        ALLERGIES:   Review of patient's allergies indicates:   Allergen Reactions    Pcn [penicillins]      Unknown last dose as an infant        ROS:       Review of Systems   Constitutional: Negative for diaphoresis, fatigue, fever and unexpected weight change.   HENT:   Negative for lump/mass and sore throat.    Eyes: Negative for icterus.   Respiratory: Negative for cough and shortness of breath.    Cardiovascular: Negative for chest pain and palpitations.   Gastrointestinal: Negative for abdominal distention, constipation, diarrhea, nausea and vomiting.   Genitourinary: Negative for dysuria and frequency.    Musculoskeletal: Negative for arthralgias, gait problem and myalgias.   Skin: Negative for rash.   Neurological: Negative for dizziness, gait problem and headaches.   Hematological: Negative for adenopathy. Does not bruise/bleed easily.   Psychiatric/Behavioral: The patient is not nervous/anxious.        PHYSICAL EXAM:  Vitals:    07/10/19 0828   BP: 123/83   Pulse: 60   Temp:  "98.2 °F (36.8 °C)   SpO2: 97%   Weight: 117.5 kg (259 lb 0.7 oz)   Height: 5' 8" (1.727 m)   PainSc: 0-No pain       KARNOFSKY PERFORMANCE STATUS 90%  ECOG 1    Physical Exam   Constitutional: He is oriented to person, place, and time. He appears well-developed and well-nourished. No distress.   HENT:   Head: Normocephalic and atraumatic.   Mouth/Throat: Mucous membranes are normal. No oral lesions.   Eyes: Conjunctivae are normal.   Neck: No thyromegaly present.   Cardiovascular: Regular rhythm and normal heart sounds.   No murmur heard.  Bradycardic   Pulmonary/Chest: Breath sounds normal. He has no wheezes. He has no rales.   Abdominal: Soft. He exhibits no distension and no mass. There is no splenomegaly or hepatomegaly. There is no tenderness.   Musculoskeletal: He exhibits edema.   Lymphadenopathy:     He has no cervical adenopathy.        Right cervical: No deep cervical adenopathy present.       Left cervical: No deep cervical adenopathy present.     He has no axillary adenopathy. No inguinal adenopathy noted on the right or left side.   Neurological: He is alert and oriented to person, place, and time. He has normal strength and normal reflexes. No cranial nerve deficit. Coordination normal.   Skin: No rash noted.       LAB:   Results for orders placed or performed in visit on 07/10/19   Rapid BMT CBC with Diff   Result Value Ref Range    WBC 3.19 (L) 3.90 - 12.70 K/uL    RBC 3.96 (L) 4.60 - 6.20 M/uL    Hemoglobin 11.6 (L) 14.0 - 18.0 g/dL    Hematocrit 34.9 (L) 40.0 - 54.0 %    Mean Corpuscular Volume 88 82 - 98 fL    Mean Corpuscular Hemoglobin 29.3 27.0 - 31.0 pg    Mean Corpuscular Hemoglobin Conc 33.2 32.0 - 36.0 g/dL    RDW 16.1 (H) 11.5 - 14.5 %    Platelets 153 150 - 350 K/uL    MPV 9.8 9.2 - 12.9 fL    Immature Granulocytes 0.6 (H) 0.0 - 0.5 %    Gran # (ANC) 1.4 (L) 1.8 - 7.7 K/uL    Immature Grans (Abs) 0.02 0.00 - 0.04 K/uL    Lymph # 1.1 1.0 - 4.8 K/uL    Mono # 0.4 0.3 - 1.0 K/uL    Eos # 0.2 " 0.0 - 0.5 K/uL    Baso # 0.03 0.00 - 0.20 K/uL    nRBC 0 0 /100 WBC    Gran% 45.3 38.0 - 73.0 %    Lymph% 35.4 18.0 - 48.0 %    Mono% 12.2 4.0 - 15.0 %    Eosinophil% 5.6 0.0 - 8.0 %    Basophil% 0.9 0.0 - 1.9 %    Differential Method Automated    Comprehensive metabolic panel   Result Value Ref Range    Sodium 140 136 - 145 mmol/L    Potassium 3.3 (L) 3.5 - 5.1 mmol/L    Chloride 100 95 - 110 mmol/L    CO2 31 (H) 23 - 29 mmol/L    Glucose 75 70 - 110 mg/dL    BUN, Bld 21 (H) 6 - 20 mg/dL    Creatinine 0.9 0.5 - 1.4 mg/dL    Calcium 9.8 8.7 - 10.5 mg/dL    Total Protein 6.2 6.0 - 8.4 g/dL    Albumin 3.4 (L) 3.5 - 5.2 g/dL    Total Bilirubin 0.5 0.1 - 1.0 mg/dL    Alkaline Phosphatase 86 55 - 135 U/L    AST 20 10 - 40 U/L    ALT 23 10 - 44 U/L    Anion Gap 9 8 - 16 mmol/L    eGFR if African American >60.0 >60 mL/min/1.73 m^2    eGFR if non African American >60.0 >60 mL/min/1.73 m^2   Protein electrophoresis, serum   Result Value Ref Range    Protein, Serum 5.9 (L) 6.0 - 8.4 g/dL    Albumin grams/dl 3.60 3.35 - 5.55 g/dL    Alpha-1 grams/dl 0.32 0.17 - 0.41 g/dL    Alpha-2 grams/dl 0.64 0.43 - 0.99 g/dL    Beta grams/dl 0.76 0.50 - 1.10 g/dL    Gamma grams/dl 0.57 (L) 0.67 - 1.58 g/dL   Immunofixation electrophoresis   Result Value Ref Range    Immunofix Interp. SEE COMMENT    Immunoglobulin free LT chains blood   Result Value Ref Range    Kappa Free Light Chains 1.61 0.33 - 1.94 mg/dL    Lambda Free Light Chains 1.10 0.57 - 2.63 mg/dL    Kappa/Lambda FLC Ratio 1.46 0.26 - 1.65   Immunoglobulins (IgG, IgA, IgM) Quantitative   Result Value Ref Range    IgG - Serum 579 (L) 650 - 1600 mg/dL    IgA 164 40 - 350 mg/dL    IgM 25 (L) 50 - 300 mg/dL   Pathologist Interpretation JAYESH   Result Value Ref Range    Pathologist Interpretation JAYESH REVIEWED    Pathologist Interpretation SPE   Result Value Ref Range    Pathologist Interpretation SPE REVIEWED        PROBLEMS ASSESSED THIS VISIT:    1. Multiple myeloma in remission    2.  H/O autologous stem cell transplant    3. History of DVT (deep vein thrombosis)    4. Anemia due to antineoplastic chemotherapy    5. Leukopenia due to antineoplastic chemotherapy        PLAN:       Multiple myeloma  Current M-protein 0.18 g/dl, consistent with ongoing very good partial remission (VGPR) from his multiple myeloma. He is tolerating lenalidomide maintenance well and will continue this.     H/O autologous stem cell transplant  He is now 1 year, 9 months post-transplant and doing well. He has some limitations in work but otherwise has resumed mostly normal activity.     History of DVT (deep vein thrombosis)  Continue low-dose apixaban.     Anemia due to antineoplastic chemotherapy  Mild. We will monitor.     Leukopenia due to antineoplastic chemotherapy  Mild. We will monitor on lenalidomide.     Follow-up in 3 months    Italo Bryant MD  Hematology and Stem Cell Transplant

## 2019-07-11 LAB
ALBUMIN SERPL ELPH-MCNC: 3.6 G/DL (ref 3.35–5.55)
ALPHA1 GLOB SERPL ELPH-MCNC: 0.32 G/DL (ref 0.17–0.41)
ALPHA2 GLOB SERPL ELPH-MCNC: 0.64 G/DL (ref 0.43–0.99)
B-GLOBULIN SERPL ELPH-MCNC: 0.76 G/DL (ref 0.5–1.1)
GAMMA GLOB SERPL ELPH-MCNC: 0.57 G/DL (ref 0.67–1.58)
INTERPRETATION SERPL IFE-IMP: NORMAL
KAPPA LC SER QL IA: 1.61 MG/DL (ref 0.33–1.94)
KAPPA LC/LAMBDA SER IA: 1.46 (ref 0.26–1.65)
LAMBDA LC SER QL IA: 1.1 MG/DL (ref 0.57–2.63)
PATHOLOGIST INTERPRETATION IFE: NORMAL
PATHOLOGIST INTERPRETATION SPE: NORMAL
PROT SERPL-MCNC: 5.9 G/DL (ref 6–8.4)

## 2019-07-16 PROBLEM — D70.1 LEUKOPENIA DUE TO ANTINEOPLASTIC CHEMOTHERAPY: Status: ACTIVE | Noted: 2019-07-16

## 2019-07-16 PROBLEM — D69.6 THROMBOCYTOPENIA: Status: RESOLVED | Noted: 2018-05-10 | Resolved: 2019-07-16

## 2019-07-16 PROBLEM — T45.1X5A LEUKOPENIA DUE TO ANTINEOPLASTIC CHEMOTHERAPY: Status: ACTIVE | Noted: 2019-07-16

## 2019-07-16 NOTE — ASSESSMENT & PLAN NOTE
He is now 1 year, 9 months post-transplant and doing well. He has some limitations in work but otherwise has resumed mostly normal activity.

## 2019-07-16 NOTE — ASSESSMENT & PLAN NOTE
Current M-protein 0.18 g/dl, consistent with ongoing very good partial remission (VGPR) from his multiple myeloma. He is tolerating lenalidomide maintenance well and will continue this.

## 2019-07-24 DIAGNOSIS — C90.01 MULTIPLE MYELOMA IN REMISSION: ICD-10-CM

## 2019-07-24 RX ORDER — LENALIDOMIDE 10 MG/1
10 CAPSULE ORAL DAILY
Qty: 21 EACH | Refills: 0 | Status: SHIPPED | OUTPATIENT
Start: 2019-07-24 | End: 2019-08-21 | Stop reason: SDUPTHER

## 2019-07-25 ENCOUNTER — PATIENT MESSAGE (OUTPATIENT)
Dept: HEMATOLOGY/ONCOLOGY | Facility: CLINIC | Age: 54
End: 2019-07-25

## 2019-08-21 DIAGNOSIS — C90.01 MULTIPLE MYELOMA IN REMISSION: ICD-10-CM

## 2019-08-21 RX ORDER — LENALIDOMIDE 10 MG/1
10 CAPSULE ORAL DAILY
Qty: 21 EACH | Refills: 0 | Status: SHIPPED | OUTPATIENT
Start: 2019-08-21 | End: 2019-09-27 | Stop reason: SDUPTHER

## 2019-08-22 ENCOUNTER — PATIENT MESSAGE (OUTPATIENT)
Dept: HEMATOLOGY/ONCOLOGY | Facility: CLINIC | Age: 54
End: 2019-08-22

## 2019-09-27 DIAGNOSIS — C90.01 MULTIPLE MYELOMA IN REMISSION: ICD-10-CM

## 2019-09-27 RX ORDER — LENALIDOMIDE 10 MG/1
10 CAPSULE ORAL DAILY
Qty: 21 EACH | Refills: 0 | Status: SHIPPED | OUTPATIENT
Start: 2019-09-27 | End: 2019-11-01 | Stop reason: SDUPTHER

## 2019-10-29 ENCOUNTER — TELEPHONE (OUTPATIENT)
Dept: HEMATOLOGY/ONCOLOGY | Facility: CLINIC | Age: 54
End: 2019-10-29

## 2019-10-30 ENCOUNTER — OFFICE VISIT (OUTPATIENT)
Dept: HEMATOLOGY/ONCOLOGY | Facility: CLINIC | Age: 54
End: 2019-10-30
Payer: COMMERCIAL

## 2019-10-30 ENCOUNTER — TELEPHONE (OUTPATIENT)
Dept: HEMATOLOGY/ONCOLOGY | Facility: CLINIC | Age: 54
End: 2019-10-30

## 2019-10-30 ENCOUNTER — EDUCATION (OUTPATIENT)
Dept: HEMATOLOGY/ONCOLOGY | Facility: CLINIC | Age: 54
End: 2019-10-30

## 2019-10-30 ENCOUNTER — LAB VISIT (OUTPATIENT)
Dept: LAB | Facility: HOSPITAL | Age: 54
End: 2019-10-30
Payer: COMMERCIAL

## 2019-10-30 ENCOUNTER — PATIENT MESSAGE (OUTPATIENT)
Dept: HEMATOLOGY/ONCOLOGY | Facility: CLINIC | Age: 54
End: 2019-10-30

## 2019-10-30 ENCOUNTER — CLINICAL SUPPORT (OUTPATIENT)
Dept: INFECTIOUS DISEASES | Facility: CLINIC | Age: 54
End: 2019-10-30
Payer: COMMERCIAL

## 2019-10-30 VITALS
RESPIRATION RATE: 17 BRPM | HEART RATE: 72 BPM | TEMPERATURE: 98 F | DIASTOLIC BLOOD PRESSURE: 83 MMHG | BODY MASS INDEX: 39.13 KG/M2 | HEIGHT: 68 IN | SYSTOLIC BLOOD PRESSURE: 141 MMHG | OXYGEN SATURATION: 98 % | WEIGHT: 258.19 LBS

## 2019-10-30 DIAGNOSIS — Z86.718 HISTORY OF DVT (DEEP VEIN THROMBOSIS): ICD-10-CM

## 2019-10-30 DIAGNOSIS — C90.01 MULTIPLE MYELOMA IN REMISSION: ICD-10-CM

## 2019-10-30 DIAGNOSIS — Z94.84 H/O AUTOLOGOUS STEM CELL TRANSPLANT: Primary | ICD-10-CM

## 2019-10-30 DIAGNOSIS — T45.1X5A ANEMIA DUE TO ANTINEOPLASTIC CHEMOTHERAPY: ICD-10-CM

## 2019-10-30 DIAGNOSIS — T45.1X5A LEUKOPENIA DUE TO ANTINEOPLASTIC CHEMOTHERAPY: ICD-10-CM

## 2019-10-30 DIAGNOSIS — D64.81 ANEMIA DUE TO ANTINEOPLASTIC CHEMOTHERAPY: ICD-10-CM

## 2019-10-30 DIAGNOSIS — Z94.84 H/O AUTOLOGOUS STEM CELL TRANSPLANT: ICD-10-CM

## 2019-10-30 DIAGNOSIS — D70.1 LEUKOPENIA DUE TO ANTINEOPLASTIC CHEMOTHERAPY: ICD-10-CM

## 2019-10-30 LAB
ALBUMIN SERPL BCP-MCNC: 3.1 G/DL (ref 3.5–5.2)
ALP SERPL-CCNC: 93 U/L (ref 55–135)
ALT SERPL W/O P-5'-P-CCNC: 24 U/L (ref 10–44)
ANION GAP SERPL CALC-SCNC: 8 MMOL/L (ref 8–16)
AST SERPL-CCNC: 14 U/L (ref 10–40)
BASOPHILS # BLD AUTO: 0.04 K/UL (ref 0–0.2)
BASOPHILS NFR BLD: 1.5 % (ref 0–1.9)
BILIRUB SERPL-MCNC: 0.6 MG/DL (ref 0.1–1)
BUN SERPL-MCNC: 14 MG/DL (ref 6–20)
CALCIUM SERPL-MCNC: 9.1 MG/DL (ref 8.7–10.5)
CHLORIDE SERPL-SCNC: 105 MMOL/L (ref 95–110)
CO2 SERPL-SCNC: 29 MMOL/L (ref 23–29)
CREAT SERPL-MCNC: 0.9 MG/DL (ref 0.5–1.4)
DIFFERENTIAL METHOD: ABNORMAL
EOSINOPHIL # BLD AUTO: 0.2 K/UL (ref 0–0.5)
EOSINOPHIL NFR BLD: 8.7 % (ref 0–8)
ERYTHROCYTE [DISTWIDTH] IN BLOOD BY AUTOMATED COUNT: 15.7 % (ref 11.5–14.5)
EST. GFR  (AFRICAN AMERICAN): >60 ML/MIN/1.73 M^2
EST. GFR  (NON AFRICAN AMERICAN): >60 ML/MIN/1.73 M^2
GLUCOSE SERPL-MCNC: 96 MG/DL (ref 70–110)
HCT VFR BLD AUTO: 37.8 % (ref 40–54)
HGB BLD-MCNC: 11.8 G/DL (ref 14–18)
IGA SERPL-MCNC: 182 MG/DL (ref 40–350)
IGG SERPL-MCNC: 613 MG/DL (ref 650–1600)
IGM SERPL-MCNC: 31 MG/DL (ref 50–300)
IMM GRANULOCYTES # BLD AUTO: 0.01 K/UL (ref 0–0.04)
IMM GRANULOCYTES NFR BLD AUTO: 0.4 % (ref 0–0.5)
LYMPHOCYTES # BLD AUTO: 0.8 K/UL (ref 1–4.8)
LYMPHOCYTES NFR BLD: 29.9 % (ref 18–48)
MCH RBC QN AUTO: 28.5 PG (ref 27–31)
MCHC RBC AUTO-ENTMCNC: 31.2 G/DL (ref 32–36)
MCV RBC AUTO: 91 FL (ref 82–98)
MONOCYTES # BLD AUTO: 0.3 K/UL (ref 0.3–1)
MONOCYTES NFR BLD: 10.2 % (ref 4–15)
NEUTROPHILS # BLD AUTO: 1.3 K/UL (ref 1.8–7.7)
NEUTROPHILS NFR BLD: 49.3 % (ref 38–73)
NRBC BLD-RTO: 0 /100 WBC
PLATELET # BLD AUTO: 149 K/UL (ref 150–350)
PMV BLD AUTO: 10.1 FL (ref 9.2–12.9)
POTASSIUM SERPL-SCNC: 3.9 MMOL/L (ref 3.5–5.1)
PROT SERPL-MCNC: 6.1 G/DL (ref 6–8.4)
RBC # BLD AUTO: 4.14 M/UL (ref 4.6–6.2)
SODIUM SERPL-SCNC: 142 MMOL/L (ref 136–145)
WBC # BLD AUTO: 2.64 K/UL (ref 3.9–12.7)

## 2019-10-30 PROCEDURE — 90732 PPSV23 VACC 2 YRS+ SUBQ/IM: CPT | Mod: BMT,S$GLB,, | Performed by: INTERNAL MEDICINE

## 2019-10-30 PROCEDURE — 90746 HEPB VACCINE 3 DOSE ADULT IM: CPT | Mod: BMT,S$GLB,, | Performed by: INTERNAL MEDICINE

## 2019-10-30 PROCEDURE — 84165 PATHOLOGIST INTERPRETATION SPE: ICD-10-PCS | Mod: 26,BMT,, | Performed by: PATHOLOGY

## 2019-10-30 PROCEDURE — 99999 PR PBB SHADOW E&M-EST. PATIENT-LVL III: ICD-10-PCS | Mod: PBBFAC,BMT,, | Performed by: INTERNAL MEDICINE

## 2019-10-30 PROCEDURE — 83520 IMMUNOASSAY QUANT NOS NONAB: CPT

## 2019-10-30 PROCEDURE — 99215 OFFICE O/P EST HI 40 MIN: CPT | Mod: BMT,S$GLB,, | Performed by: INTERNAL MEDICINE

## 2019-10-30 PROCEDURE — 3077F PR MOST RECENT SYSTOLIC BLOOD PRESSURE >= 140 MM HG: ICD-10-PCS | Mod: BMT,CPTII,S$GLB, | Performed by: INTERNAL MEDICINE

## 2019-10-30 PROCEDURE — 90471 FLU VACCINE (QUAD) GREATER THAN OR EQUAL TO 3YO PRESERVATIVE FREE IM: ICD-10-PCS | Mod: BMT,S$GLB,, | Performed by: INTERNAL MEDICINE

## 2019-10-30 PROCEDURE — 90472 PNEUMOCOCCAL POLYSACCHARIDE VACCINE 23-VALENT =>2YO SQ IM: ICD-10-PCS | Mod: BMT,S$GLB,, | Performed by: INTERNAL MEDICINE

## 2019-10-30 PROCEDURE — 90746 HEPATITIS B VACCINE ADULT IM: ICD-10-PCS | Mod: BMT,S$GLB,, | Performed by: INTERNAL MEDICINE

## 2019-10-30 PROCEDURE — 3079F PR MOST RECENT DIASTOLIC BLOOD PRESSURE 80-89 MM HG: ICD-10-PCS | Mod: BMT,CPTII,S$GLB, | Performed by: INTERNAL MEDICINE

## 2019-10-30 PROCEDURE — 90686 IIV4 VACC NO PRSV 0.5 ML IM: CPT | Mod: BMT,S$GLB,, | Performed by: INTERNAL MEDICINE

## 2019-10-30 PROCEDURE — 84165 PROTEIN E-PHORESIS SERUM: CPT | Mod: 26,BMT,, | Performed by: PATHOLOGY

## 2019-10-30 PROCEDURE — 99215 PR OFFICE/OUTPT VISIT, EST, LEVL V, 40-54 MIN: ICD-10-PCS | Mod: BMT,S$GLB,, | Performed by: INTERNAL MEDICINE

## 2019-10-30 PROCEDURE — 3008F BODY MASS INDEX DOCD: CPT | Mod: BMT,CPTII,S$GLB, | Performed by: INTERNAL MEDICINE

## 2019-10-30 PROCEDURE — 90707 MMR VACCINE SC: CPT | Mod: BMT,S$GLB,, | Performed by: INTERNAL MEDICINE

## 2019-10-30 PROCEDURE — 99999 PR PBB SHADOW E&M-EST. PATIENT-LVL III: CPT | Mod: PBBFAC,BMT,, | Performed by: INTERNAL MEDICINE

## 2019-10-30 PROCEDURE — 85025 COMPLETE CBC W/AUTO DIFF WBC: CPT

## 2019-10-30 PROCEDURE — 3079F DIAST BP 80-89 MM HG: CPT | Mod: BMT,CPTII,S$GLB, | Performed by: INTERNAL MEDICINE

## 2019-10-30 PROCEDURE — 3077F SYST BP >= 140 MM HG: CPT | Mod: BMT,CPTII,S$GLB, | Performed by: INTERNAL MEDICINE

## 2019-10-30 PROCEDURE — 82784 ASSAY IGA/IGD/IGG/IGM EACH: CPT | Mod: 59

## 2019-10-30 PROCEDURE — 86334 IMMUNOFIX E-PHORESIS SERUM: CPT

## 2019-10-30 PROCEDURE — 84165 PROTEIN E-PHORESIS SERUM: CPT

## 2019-10-30 PROCEDURE — 90686 FLU VACCINE (QUAD) GREATER THAN OR EQUAL TO 3YO PRESERVATIVE FREE IM: ICD-10-PCS | Mod: BMT,S$GLB,, | Performed by: INTERNAL MEDICINE

## 2019-10-30 PROCEDURE — 80053 COMPREHEN METABOLIC PANEL: CPT

## 2019-10-30 PROCEDURE — 90732 PNEUMOCOCCAL POLYSACCHARIDE VACCINE 23-VALENT =>2YO SQ IM: ICD-10-PCS | Mod: BMT,S$GLB,, | Performed by: INTERNAL MEDICINE

## 2019-10-30 PROCEDURE — 90472 IMMUNIZATION ADMIN EACH ADD: CPT | Mod: BMT,S$GLB,, | Performed by: INTERNAL MEDICINE

## 2019-10-30 PROCEDURE — 86334 IMMUNOFIX E-PHORESIS SERUM: CPT | Mod: 26,BMT,, | Performed by: PATHOLOGY

## 2019-10-30 PROCEDURE — 3008F PR BODY MASS INDEX (BMI) DOCUMENTED: ICD-10-PCS | Mod: BMT,CPTII,S$GLB, | Performed by: INTERNAL MEDICINE

## 2019-10-30 PROCEDURE — 90471 IMMUNIZATION ADMIN: CPT | Mod: BMT,S$GLB,, | Performed by: INTERNAL MEDICINE

## 2019-10-30 PROCEDURE — 86334 PATHOLOGIST INTERPRETATION IFE: ICD-10-PCS | Mod: 26,BMT,, | Performed by: PATHOLOGY

## 2019-10-30 PROCEDURE — 90707 MMR VACCINE SQ: ICD-10-PCS | Mod: BMT,S$GLB,, | Performed by: INTERNAL MEDICINE

## 2019-10-30 NOTE — PROGRESS NOTES
HEMATOLOGIC MALIGNANCIES PROGRESS NOTE    IDENTIFYING STATEMENT   Arik Bobo (Arik) is a 54 y.o. male with a  of 1965 from [place] with the diagnosis of multiple myeloma.      ONCOLOGY HISTORY:    1. Multiple myeloma s/p autologous stem cell transplant   A. Early : Progressive anemia. K/L ratio > 100, and was treated with fina-dex   B. 12/3/2015: M-protein 1.02 g/dl, IgG-kappa by JAYESH. Kappa 2.98 mg/dl, lambda 0.5 mg/dl, ratio 5.96. BMBx shows 60-70% cellular marrow with 20% plasma cells, consistent with plasma cell myeloma. Hgb 11.8 g/dl, Calcium 9.44 (corrected). Creatinine 0.8 mg/dl.    C. 2016: M-protein 2.23 g/dl. Kappa 4.36 mg/dl, lambda 0.08 mg/dl, ratio 54.5. Progressive disease.   D. VCD therapy locally.    E. 2017: M-protein 1.23 g/dl.    F. 2017: M-protein 0.41 g/dl. Kappa 2.33 mg/dl, lambda 0.5 mg/dl, ratio 4.66, consistent with partial response.    G. 2017: Autologous stem cell transplant with melphalan conditioning.   H. 2018: Day +100 restaging - M-protein 0.19 g/dl, kappa 0.76 mg/dl, lambda 0.53 mg/dl, ratio 1.43. BMBx shows no definitive evidence of myeloma. Consistent with very good partial response.    I. 3/8/2018: M-protein 0.18 g/dl   J. 2018: M-protein 0.16 g/dl   K. 2018: M-protein 0.2 g/dl   L. 2018: M-protein 0.14 g/dl; BMBx for 1-year re-evaluation shows 40% cellular marrow with 6-7% plasma cells and no evidence of clonality; thus, no plasma cell neoplasm evident. Findings consistent with ongoing VGPR.   M. 4/3/2019: M-protein < 0.1 g/dl.     N. 7/10/2019: M-protein 0.18 g/dl.   O. 10/30/2019: M-protein 0.14 g/dl.     2. Depression  3. HTN  4. History of DVT in 2015 and 2017    INTERVAL HISTORY:      Mr. Bobo returns to clinic 2 years and 1 month after  autologous transplant for myeloma.     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 Very tired at end of day.     He is doing well. No new bone pain. No neuropathy. No fevers or  "chills. He is tolerating lenalidomide well without adverse effect.     Past Medical History, Past Social History and Past Family History have been reviewed and are unchanged except as noted in the interval history.    MEDICATIONS:     Current Outpatient Medications on File Prior to Visit   Medication Sig Dispense Refill    amlodipine (NORVASC) 5 MG tablet Take 10 mg by mouth once daily.       apixaban 2.5 mg Tab Take 1 tablet (2.5 mg total) by mouth 2 (two) times daily. 60 tablet 3    citalopram (CELEXA) 40 MG tablet Take 40 mg by mouth.      lisinopril (PRINIVIL,ZESTRIL) 40 MG tablet   1    simvastatin (ZOCOR) 20 MG tablet   3     No current facility-administered medications on file prior to visit.        ALLERGIES:   Review of patient's allergies indicates:   Allergen Reactions    Pcn [penicillins]      Unknown last dose as an infant        ROS:       Review of Systems   Constitutional: Negative for diaphoresis, fatigue, fever and unexpected weight change.   HENT:   Negative for lump/mass and sore throat.    Eyes: Negative for icterus.   Respiratory: Negative for cough and shortness of breath.    Cardiovascular: Negative for chest pain and palpitations.   Gastrointestinal: Negative for abdominal distention, constipation, diarrhea, nausea and vomiting.   Genitourinary: Negative for dysuria and frequency.    Musculoskeletal: Negative for arthralgias, gait problem and myalgias.   Skin: Negative for rash.   Neurological: Negative for dizziness, gait problem and headaches.   Hematological: Negative for adenopathy. Does not bruise/bleed easily.   Psychiatric/Behavioral: The patient is not nervous/anxious.        PHYSICAL EXAM:  Vitals:    10/30/19 0926   BP: (!) 141/83   Pulse: 72   Resp: 17   Temp: 98.1 °F (36.7 °C)   SpO2: 98%   Weight: 117.1 kg (258 lb 2.5 oz)   Height: 5' 8" (1.727 m)   PainSc:   2   PainLoc: Foot       KARNOFSKY PERFORMANCE STATUS 90%  ECOG 1    Physical Exam   Constitutional: He is oriented " to person, place, and time. He appears well-developed and well-nourished. No distress.   HENT:   Head: Normocephalic and atraumatic.   Mouth/Throat: Mucous membranes are normal. No oral lesions.   Eyes: Conjunctivae are normal.   Neck: No thyromegaly present.   Cardiovascular: Regular rhythm and normal heart sounds.   No murmur heard.  Bradycardic   Pulmonary/Chest: Breath sounds normal. He has no wheezes. He has no rales.   Abdominal: Soft. He exhibits no distension and no mass. There is no splenomegaly or hepatomegaly. There is no tenderness.   Musculoskeletal: He exhibits edema.   Lymphadenopathy:     He has no cervical adenopathy.        Right cervical: No deep cervical adenopathy present.       Left cervical: No deep cervical adenopathy present.     He has no axillary adenopathy. No inguinal adenopathy noted on the right or left side.   Neurological: He is alert and oriented to person, place, and time. He has normal strength and normal reflexes. No cranial nerve deficit. Coordination normal.   Skin: No rash noted.       LAB:   Results for orders placed or performed in visit on 10/30/19   Rapid BMT CBC with Diff   Result Value Ref Range    WBC 2.64 (L) 3.90 - 12.70 K/uL    RBC 4.14 (L) 4.60 - 6.20 M/uL    Hemoglobin 11.8 (L) 14.0 - 18.0 g/dL    Hematocrit 37.8 (L) 40.0 - 54.0 %    Mean Corpuscular Volume 91 82 - 98 fL    Mean Corpuscular Hemoglobin 28.5 27.0 - 31.0 pg    Mean Corpuscular Hemoglobin Conc 31.2 (L) 32.0 - 36.0 g/dL    RDW 15.7 (H) 11.5 - 14.5 %    Platelets 149 (L) 150 - 350 K/uL    MPV 10.1 9.2 - 12.9 fL    Immature Granulocytes 0.4 0.0 - 0.5 %    Gran # (ANC) 1.3 (L) 1.8 - 7.7 K/uL    Immature Grans (Abs) 0.01 0.00 - 0.04 K/uL    Lymph # 0.8 (L) 1.0 - 4.8 K/uL    Mono # 0.3 0.3 - 1.0 K/uL    Eos # 0.2 0.0 - 0.5 K/uL    Baso # 0.04 0.00 - 0.20 K/uL    nRBC 0 0 /100 WBC    Gran% 49.3 38.0 - 73.0 %    Lymph% 29.9 18.0 - 48.0 %    Mono% 10.2 4.0 - 15.0 %    Eosinophil% 8.7 (H) 0.0 - 8.0 %     Basophil% 1.5 0.0 - 1.9 %    Differential Method Automated    Comprehensive metabolic panel   Result Value Ref Range    Sodium 142 136 - 145 mmol/L    Potassium 3.9 3.5 - 5.1 mmol/L    Chloride 105 95 - 110 mmol/L    CO2 29 23 - 29 mmol/L    Glucose 96 70 - 110 mg/dL    BUN, Bld 14 6 - 20 mg/dL    Creatinine 0.9 0.5 - 1.4 mg/dL    Calcium 9.1 8.7 - 10.5 mg/dL    Total Protein 6.1 6.0 - 8.4 g/dL    Albumin 3.1 (L) 3.5 - 5.2 g/dL    Total Bilirubin 0.6 0.1 - 1.0 mg/dL    Alkaline Phosphatase 93 55 - 135 U/L    AST 14 10 - 40 U/L    ALT 24 10 - 44 U/L    Anion Gap 8 8 - 16 mmol/L    eGFR if African American >60.0 >60 mL/min/1.73 m^2    eGFR if non African American >60.0 >60 mL/min/1.73 m^2   Protein electrophoresis, serum   Result Value Ref Range    Protein, Serum 5.7 (L) 6.0 - 8.4 g/dL    Albumin grams/dl 3.28 (L) 3.35 - 5.55 g/dL    Alpha-1 grams/dl 0.35 0.17 - 0.41 g/dL    Alpha-2 grams/dl 0.75 0.43 - 0.99 g/dL    Beta grams/dl 0.76 0.50 - 1.10 g/dL    Gamma grams/dl 0.56 (L) 0.67 - 1.58 g/dL   Immunofixation electrophoresis   Result Value Ref Range    Immunofix Interp. SEE COMMENT    Immunoglobulin free LT chains blood   Result Value Ref Range    Kappa Free Light Chains 1.52 0.33 - 1.94 mg/dL    Lambda Free Light Chains 1.16 0.57 - 2.63 mg/dL    Kappa/Lambda FLC Ratio 1.31 0.26 - 1.65   Immunoglobulins (IgG, IgA, IgM) Quantitative   Result Value Ref Range    IgG - Serum 613 (L) 650 - 1600 mg/dL    IgA 182 40 - 350 mg/dL    IgM 31 (L) 50 - 300 mg/dL   Pathologist Interpretation JAYESH   Result Value Ref Range    Pathologist Interpretation JAYESH REVIEWED    Pathologist Interpretation SPE   Result Value Ref Range    Pathologist Interpretation SPE REVIEWED        PROBLEMS ASSESSED THIS VISIT:    1. H/O autologous stem cell transplant    2. Multiple myeloma in remission    3. History of DVT (deep vein thrombosis)    4. Anemia due to antineoplastic chemotherapy    5. Leukopenia due to antineoplastic chemotherapy        PLAN:        Multiple myeloma in remission  Mr. Bobo has stable M-protein at 0.14 g/dl and remains in a very good partial remission (VGPR) 2 years and 1 month post autologous stem cell transplant. He is tolerating lenalidomide maintenance well and should continue this.     H/O autologous stem cell transplant  2 years and 1 month post transplant. Current disease status is VGPR.     History of DVT (deep vein thrombosis)  Continue low dose apixaban for secondary prevention of VTE.     Anemia due to antineoplastic chemotherapy  Mild. Due to lenalidomide. Monitor.     Leukopenia due to antineoplastic chemotherapy  Mild. Due to lenalidomide. Monitor.     Follow-up in 3 months    Italo Bryant MD  Hematology and Stem Cell Transplant

## 2019-10-30 NOTE — TELEPHONE ENCOUNTER
Faxed last progress notes from Dr. Bryant and patient's immunization record (up to today 10/30/19) documenting all post transplant immunizations to patient's PCP at  [facility].  phone [phone]  TRISHA Zarco RN, BMTCN

## 2019-10-30 NOTE — PROGRESS NOTES
2 year post transplant immunization education today.  We discussed 2 year vaccines including MMR, PCV 23 booster, flu vaccine and hepatitis B.  We discussed his negative sero conversion to 3 vaccines of Hep B so we are offering a fourth and at the discretion of Dr. Bryant we may offer a fifth vaccine.  I informed him that after today's vaccines he will be up to date on all post transplant immunizations and will only require regular adult boosters as recommended by the CDC and ACIP.  He verbalized understanding and was given the opportunity to ask questions.    OKSANA Zarco RN, BMTCN

## 2019-10-31 LAB
ALBUMIN SERPL ELPH-MCNC: 3.28 G/DL (ref 3.35–5.55)
ALPHA1 GLOB SERPL ELPH-MCNC: 0.35 G/DL (ref 0.17–0.41)
ALPHA2 GLOB SERPL ELPH-MCNC: 0.75 G/DL (ref 0.43–0.99)
B-GLOBULIN SERPL ELPH-MCNC: 0.76 G/DL (ref 0.5–1.1)
GAMMA GLOB SERPL ELPH-MCNC: 0.56 G/DL (ref 0.67–1.58)
INTERPRETATION SERPL IFE-IMP: NORMAL
KAPPA LC SER QL IA: 1.52 MG/DL (ref 0.33–1.94)
KAPPA LC/LAMBDA SER IA: 1.31 (ref 0.26–1.65)
LAMBDA LC SER QL IA: 1.16 MG/DL (ref 0.57–2.63)
PATHOLOGIST INTERPRETATION IFE: NORMAL
PATHOLOGIST INTERPRETATION SPE: NORMAL
PROT SERPL-MCNC: 5.7 G/DL (ref 6–8.4)

## 2019-11-01 DIAGNOSIS — C90.01 MULTIPLE MYELOMA IN REMISSION: ICD-10-CM

## 2019-11-04 ENCOUNTER — PATIENT MESSAGE (OUTPATIENT)
Dept: HEMATOLOGY/ONCOLOGY | Facility: CLINIC | Age: 54
End: 2019-11-04

## 2019-11-04 RX ORDER — LENALIDOMIDE 10 MG/1
10 CAPSULE ORAL DAILY
Qty: 21 EACH | Refills: 0 | Status: SHIPPED | OUTPATIENT
Start: 2019-11-04 | End: 2019-11-27 | Stop reason: SDUPTHER

## 2019-11-05 NOTE — ASSESSMENT & PLAN NOTE
Mr. Bobo has stable M-protein at 0.14 g/dl and remains in a very good partial remission (VGPR) 2 years and 1 month post autologous stem cell transplant. He is tolerating lenalidomide maintenance well and should continue this.

## 2019-11-27 DIAGNOSIS — C90.01 MULTIPLE MYELOMA IN REMISSION: ICD-10-CM

## 2019-11-28 RX ORDER — LENALIDOMIDE 10 MG/1
10 CAPSULE ORAL DAILY
Qty: 21 EACH | Refills: 0 | Status: SHIPPED | OUTPATIENT
Start: 2019-11-28 | End: 2019-12-31 | Stop reason: SDUPTHER

## 2019-12-31 ENCOUNTER — PATIENT MESSAGE (OUTPATIENT)
Dept: HEMATOLOGY/ONCOLOGY | Facility: CLINIC | Age: 54
End: 2019-12-31

## 2019-12-31 DIAGNOSIS — C90.01 MULTIPLE MYELOMA IN REMISSION: ICD-10-CM

## 2019-12-31 RX ORDER — LENALIDOMIDE 10 MG/1
10 CAPSULE ORAL DAILY
Qty: 21 EACH | Refills: 0 | Status: SHIPPED | OUTPATIENT
Start: 2019-12-31 | End: 2020-01-13 | Stop reason: SDUPTHER

## 2020-01-09 ENCOUNTER — TELEPHONE (OUTPATIENT)
Dept: HEMATOLOGY/ONCOLOGY | Facility: CLINIC | Age: 55
End: 2020-01-09

## 2020-01-09 NOTE — TELEPHONE ENCOUNTER
Attempted to return call. Busy signal.    ----- Message from Cathy Moore sent at 1/9/2020 12:04 PM CST -----  Contact: Self  Pt is calling to speak with Staff regarding authorization of his medication refill of lenalidomide (REVLIMID) 10 mg Cap.      He can be reached at [phone].    Thank you.

## 2020-01-13 DIAGNOSIS — C90.01 MULTIPLE MYELOMA IN REMISSION: ICD-10-CM

## 2020-01-13 NOTE — TELEPHONE ENCOUNTER
"----- Message from Corinna Hirsch sent at 1/13/2020  4:23 PM CST -----  Contact: [facility]   Pharmacy Assistant     Rx:  -  lenalidomide (REVLIMID) 10 mg Cap     Name of caller: Mojgan   Provider/Physician?: Italo Bryant MD   Pharmacy name and phone number?:  [facility] - [address]  What do they need to clarify?:   - has been requesting a prior authorization for the Revlimid since 12/12.       Additional Information:  "Thank you for all that you do for our patients'"    "

## 2020-01-14 RX ORDER — LENALIDOMIDE 10 MG/1
10 CAPSULE ORAL DAILY
Qty: 21 EACH | Refills: 0 | Status: SHIPPED | OUTPATIENT
Start: 2020-01-14 | End: 2020-01-23 | Stop reason: SDUPTHER

## 2020-01-14 NOTE — TELEPHONE ENCOUNTER
Spoke to patient. Informed him that prior auth has been started for reauthorization.  Verbalized understanding      ----- Message from Jesika Goodwin sent at 1/14/2020  9:17 AM CST -----  Contact: patient  Patient called to speak with a nurse concerning chemo.    He would like a callback at [phone]    Thanks  KB

## 2020-01-16 ENCOUNTER — TELEPHONE (OUTPATIENT)
Dept: PHARMACY | Facility: CLINIC | Age: 55
End: 2020-01-16

## 2020-01-16 NOTE — TELEPHONE ENCOUNTER
No answer/VM to inform patient that [facility] received prescription for Revlimid and prior authorization is required.  [facility] will be back in touch once insurance determination is received.

## 2020-01-20 NOTE — TELEPHONE ENCOUNTER
DOCUMENTATION ONLY:  Prior authorization for Revlimid 10 mg Capsule #21/28  approved from 01/17/2020 to 01/16/2021  Case ID: [id number]    Co-pay: $80.00  Patient Assistance IS required. Sending to the financial assistance team to investigate assistance options. Selena GOODMAN

## 2020-01-22 NOTE — TELEPHONE ENCOUNTER
Revlimid is approved by the patients insurance with a $80.00 copay. This medication is a limited distribution drug, and cannot be filled with [facility]. Please send a new prescription for Revlimid to [facility], which is listed in the patients Epic profile. The patient has been notified and provided with phone number and information needed to schedule a refill.    To complete this in EPIC, the original order MUST be discontinued and re-typed as a new prescription with the updated pharmacy listed. Clicking reorder will continue to route the Rx to [facility], even if the pharmacy is changed. Please opt the patient out of [facility] when the BPA is fired.    Please enroll the patient in REMS. Sending a staff message to Dr. Bryant regarding Revlimid approval and transfer to [facility].

## 2020-01-23 ENCOUNTER — PATIENT MESSAGE (OUTPATIENT)
Dept: HEMATOLOGY/ONCOLOGY | Facility: CLINIC | Age: 55
End: 2020-01-23

## 2020-01-23 DIAGNOSIS — C90.01 MULTIPLE MYELOMA IN REMISSION: Primary | ICD-10-CM

## 2020-01-23 RX ORDER — LENALIDOMIDE 10 MG/1
10 CAPSULE ORAL DAILY
Qty: 21 EACH | Refills: 0 | Status: SHIPPED | OUTPATIENT
Start: 2020-01-23 | End: 2020-02-11 | Stop reason: SDUPTHER

## 2020-01-23 NOTE — TELEPHONE ENCOUNTER
"----- Message from Margarita Moeller sent at 1/22/2020  5:57 PM CST -----  Regarding: Revlimid Approval  FYI: Revlimid is approved by the patients insurance with a $80.00 copay. This medication is a limited distribution drug, and cannot be filled with [facility]. Please send a new prescription for Revlimid to [facility] pharmacy, which is listed in the patients Epic profile. The patient has been notified and provided with phone number and information needed to schedule a refill.    To complete this in EPIC, the original order MUST be discontinued and re-typed as a new prescription with the updated pharmacy listed. Clicking "reorder" will continue to route the Rx to [facility], even if the pharmacy is changed. Please opt the patient out of [facility] when the BPA is fired.    Please enroll the patient in REMS.    Thank you,  Margarita"

## 2020-01-27 ENCOUNTER — TELEPHONE (OUTPATIENT)
Dept: HEMATOLOGY/ONCOLOGY | Facility: CLINIC | Age: 55
End: 2020-01-27

## 2020-01-28 ENCOUNTER — LAB VISIT (OUTPATIENT)
Dept: LAB | Facility: HOSPITAL | Age: 55
End: 2020-01-28
Payer: COMMERCIAL

## 2020-01-28 ENCOUNTER — OFFICE VISIT (OUTPATIENT)
Dept: HEMATOLOGY/ONCOLOGY | Facility: CLINIC | Age: 55
End: 2020-01-28
Payer: COMMERCIAL

## 2020-01-28 VITALS
HEIGHT: 68 IN | DIASTOLIC BLOOD PRESSURE: 82 MMHG | SYSTOLIC BLOOD PRESSURE: 137 MMHG | BODY MASS INDEX: 38.52 KG/M2 | OXYGEN SATURATION: 97 % | HEART RATE: 61 BPM | TEMPERATURE: 99 F | WEIGHT: 254.19 LBS | RESPIRATION RATE: 18 BRPM

## 2020-01-28 DIAGNOSIS — D70.1 LEUKOPENIA DUE TO ANTINEOPLASTIC CHEMOTHERAPY: ICD-10-CM

## 2020-01-28 DIAGNOSIS — Z86.718 HISTORY OF DVT (DEEP VEIN THROMBOSIS): ICD-10-CM

## 2020-01-28 DIAGNOSIS — C90.01 MULTIPLE MYELOMA IN REMISSION: Primary | ICD-10-CM

## 2020-01-28 DIAGNOSIS — D64.81 ANEMIA DUE TO ANTINEOPLASTIC CHEMOTHERAPY: ICD-10-CM

## 2020-01-28 DIAGNOSIS — T45.1X5A LEUKOPENIA DUE TO ANTINEOPLASTIC CHEMOTHERAPY: ICD-10-CM

## 2020-01-28 DIAGNOSIS — Z94.84 H/O AUTOLOGOUS STEM CELL TRANSPLANT: ICD-10-CM

## 2020-01-28 DIAGNOSIS — T45.1X5A ANEMIA DUE TO ANTINEOPLASTIC CHEMOTHERAPY: ICD-10-CM

## 2020-01-28 DIAGNOSIS — C90.01 MULTIPLE MYELOMA IN REMISSION: ICD-10-CM

## 2020-01-28 LAB
ALBUMIN SERPL BCP-MCNC: 3.6 G/DL (ref 3.5–5.2)
ALP SERPL-CCNC: 122 U/L (ref 55–135)
ALT SERPL W/O P-5'-P-CCNC: 18 U/L (ref 10–44)
ANION GAP SERPL CALC-SCNC: 10 MMOL/L (ref 8–16)
AST SERPL-CCNC: 17 U/L (ref 10–40)
BASOPHILS # BLD AUTO: 0.03 K/UL (ref 0–0.2)
BASOPHILS NFR BLD: 0.8 % (ref 0–1.9)
BILIRUB SERPL-MCNC: 0.7 MG/DL (ref 0.1–1)
BUN SERPL-MCNC: 15 MG/DL (ref 6–20)
CALCIUM SERPL-MCNC: 9.3 MG/DL (ref 8.7–10.5)
CHLORIDE SERPL-SCNC: 104 MMOL/L (ref 95–110)
CO2 SERPL-SCNC: 29 MMOL/L (ref 23–29)
CREAT SERPL-MCNC: 1 MG/DL (ref 0.5–1.4)
DIFFERENTIAL METHOD: ABNORMAL
EOSINOPHIL # BLD AUTO: 0.1 K/UL (ref 0–0.5)
EOSINOPHIL NFR BLD: 2.1 % (ref 0–8)
ERYTHROCYTE [DISTWIDTH] IN BLOOD BY AUTOMATED COUNT: 14.4 % (ref 11.5–14.5)
EST. GFR  (AFRICAN AMERICAN): >60 ML/MIN/1.73 M^2
EST. GFR  (NON AFRICAN AMERICAN): >60 ML/MIN/1.73 M^2
GLUCOSE SERPL-MCNC: 92 MG/DL (ref 70–110)
HCT VFR BLD AUTO: 41.9 % (ref 40–54)
HGB BLD-MCNC: 13.3 G/DL (ref 14–18)
IGA SERPL-MCNC: 194 MG/DL (ref 40–350)
IGG SERPL-MCNC: 686 MG/DL (ref 650–1600)
IGM SERPL-MCNC: 23 MG/DL (ref 50–300)
IMM GRANULOCYTES # BLD AUTO: 0.02 K/UL (ref 0–0.04)
IMM GRANULOCYTES NFR BLD AUTO: 0.5 % (ref 0–0.5)
LYMPHOCYTES # BLD AUTO: 1.3 K/UL (ref 1–4.8)
LYMPHOCYTES NFR BLD: 33.9 % (ref 18–48)
MCH RBC QN AUTO: 29.7 PG (ref 27–31)
MCHC RBC AUTO-ENTMCNC: 31.7 G/DL (ref 32–36)
MCV RBC AUTO: 94 FL (ref 82–98)
MONOCYTES # BLD AUTO: 0.4 K/UL (ref 0.3–1)
MONOCYTES NFR BLD: 9.3 % (ref 4–15)
NEUTROPHILS # BLD AUTO: 2 K/UL (ref 1.8–7.7)
NEUTROPHILS NFR BLD: 53.4 % (ref 38–73)
NRBC BLD-RTO: 0 /100 WBC
PLATELET # BLD AUTO: 167 K/UL (ref 150–350)
PMV BLD AUTO: 9.7 FL (ref 9.2–12.9)
POTASSIUM SERPL-SCNC: 4.3 MMOL/L (ref 3.5–5.1)
PROT SERPL-MCNC: 6.6 G/DL (ref 6–8.4)
RBC # BLD AUTO: 4.48 M/UL (ref 4.6–6.2)
SODIUM SERPL-SCNC: 143 MMOL/L (ref 136–145)
WBC # BLD AUTO: 3.75 K/UL (ref 3.9–12.7)

## 2020-01-28 PROCEDURE — 84165 PROTEIN E-PHORESIS SERUM: CPT

## 2020-01-28 PROCEDURE — 3079F DIAST BP 80-89 MM HG: CPT | Mod: BMT,CPTII,S$GLB, | Performed by: INTERNAL MEDICINE

## 2020-01-28 PROCEDURE — 3008F BODY MASS INDEX DOCD: CPT | Mod: BMT,CPTII,S$GLB, | Performed by: INTERNAL MEDICINE

## 2020-01-28 PROCEDURE — 3079F PR MOST RECENT DIASTOLIC BLOOD PRESSURE 80-89 MM HG: ICD-10-PCS | Mod: BMT,CPTII,S$GLB, | Performed by: INTERNAL MEDICINE

## 2020-01-28 PROCEDURE — 84165 PATHOLOGIST INTERPRETATION SPE: ICD-10-PCS | Mod: 26,BMT,, | Performed by: PATHOLOGY

## 2020-01-28 PROCEDURE — 83520 IMMUNOASSAY QUANT NOS NONAB: CPT

## 2020-01-28 PROCEDURE — 99999 PR PBB SHADOW E&M-EST. PATIENT-LVL III: ICD-10-PCS | Mod: PBBFAC,BMT,, | Performed by: INTERNAL MEDICINE

## 2020-01-28 PROCEDURE — 86334 PATHOLOGIST INTERPRETATION IFE: ICD-10-PCS | Mod: 26,BMT,, | Performed by: PATHOLOGY

## 2020-01-28 PROCEDURE — 99999 PR PBB SHADOW E&M-EST. PATIENT-LVL III: CPT | Mod: PBBFAC,BMT,, | Performed by: INTERNAL MEDICINE

## 2020-01-28 PROCEDURE — 99215 PR OFFICE/OUTPT VISIT, EST, LEVL V, 40-54 MIN: ICD-10-PCS | Mod: BMT,S$GLB,, | Performed by: INTERNAL MEDICINE

## 2020-01-28 PROCEDURE — 84165 PROTEIN E-PHORESIS SERUM: CPT | Mod: 26,BMT,, | Performed by: PATHOLOGY

## 2020-01-28 PROCEDURE — 80053 COMPREHEN METABOLIC PANEL: CPT

## 2020-01-28 PROCEDURE — 3075F SYST BP GE 130 - 139MM HG: CPT | Mod: BMT,CPTII,S$GLB, | Performed by: INTERNAL MEDICINE

## 2020-01-28 PROCEDURE — 86334 IMMUNOFIX E-PHORESIS SERUM: CPT

## 2020-01-28 PROCEDURE — 86334 IMMUNOFIX E-PHORESIS SERUM: CPT | Mod: 26,BMT,, | Performed by: PATHOLOGY

## 2020-01-28 PROCEDURE — 99215 OFFICE O/P EST HI 40 MIN: CPT | Mod: BMT,S$GLB,, | Performed by: INTERNAL MEDICINE

## 2020-01-28 PROCEDURE — 82784 ASSAY IGA/IGD/IGG/IGM EACH: CPT | Mod: 59

## 2020-01-28 PROCEDURE — 85025 COMPLETE CBC W/AUTO DIFF WBC: CPT

## 2020-01-28 PROCEDURE — 3008F PR BODY MASS INDEX (BMI) DOCUMENTED: ICD-10-PCS | Mod: BMT,CPTII,S$GLB, | Performed by: INTERNAL MEDICINE

## 2020-01-28 PROCEDURE — 3075F PR MOST RECENT SYSTOLIC BLOOD PRESS GE 130-139MM HG: ICD-10-PCS | Mod: BMT,CPTII,S$GLB, | Performed by: INTERNAL MEDICINE

## 2020-01-29 LAB
ALBUMIN SERPL ELPH-MCNC: 3.91 G/DL (ref 3.35–5.55)
ALPHA1 GLOB SERPL ELPH-MCNC: 0.3 G/DL (ref 0.17–0.41)
ALPHA2 GLOB SERPL ELPH-MCNC: 0.67 G/DL (ref 0.43–0.99)
B-GLOBULIN SERPL ELPH-MCNC: 0.78 G/DL (ref 0.5–1.1)
GAMMA GLOB SERPL ELPH-MCNC: 0.64 G/DL (ref 0.67–1.58)
INTERPRETATION SERPL IFE-IMP: NORMAL
KAPPA LC SER QL IA: 0.85 MG/DL (ref 0.33–1.94)
KAPPA LC/LAMBDA SER IA: 1.29 (ref 0.26–1.65)
LAMBDA LC SER QL IA: 0.66 MG/DL (ref 0.57–2.63)
PATHOLOGIST INTERPRETATION IFE: NORMAL
PATHOLOGIST INTERPRETATION SPE: NORMAL
PROT SERPL-MCNC: 6.3 G/DL (ref 6–8.4)

## 2020-01-31 NOTE — ASSESSMENT & PLAN NOTE
2 years and 4 months post autologous stem cell transplant. Current disease status is complete remission (CR).

## 2020-01-31 NOTE — ASSESSMENT & PLAN NOTE
Mr. Bobo is doing well on lenalidomide maintenance. Findings today are consistent with complete response to therapy, deepening from prior response while on maintenance therapy. I congratulated him and have encouraged him to continue.

## 2020-02-11 ENCOUNTER — PATIENT MESSAGE (OUTPATIENT)
Dept: HEMATOLOGY/ONCOLOGY | Facility: CLINIC | Age: 55
End: 2020-02-11

## 2020-02-11 DIAGNOSIS — C90.01 MULTIPLE MYELOMA IN REMISSION: ICD-10-CM

## 2020-02-11 RX ORDER — LENALIDOMIDE 10 MG/1
10 CAPSULE ORAL DAILY
Qty: 21 EACH | Refills: 0 | Status: SHIPPED | OUTPATIENT
Start: 2020-02-11 | End: 2020-03-12 | Stop reason: SDUPTHER

## 2020-03-12 DIAGNOSIS — C90.01 MULTIPLE MYELOMA IN REMISSION: ICD-10-CM

## 2020-03-12 RX ORDER — LENALIDOMIDE 10 MG/1
10 CAPSULE ORAL DAILY
Qty: 21 EACH | Refills: 0 | Status: SHIPPED | OUTPATIENT
Start: 2020-03-12 | End: 2020-04-08 | Stop reason: SDUPTHER

## 2020-04-08 DIAGNOSIS — C90.01 MULTIPLE MYELOMA IN REMISSION: ICD-10-CM

## 2020-04-08 RX ORDER — LENALIDOMIDE 10 MG/1
10 CAPSULE ORAL DAILY
Qty: 21 EACH | Refills: 0 | Status: SHIPPED | OUTPATIENT
Start: 2020-04-08 | End: 2020-05-07 | Stop reason: SDUPTHER

## 2020-04-28 ENCOUNTER — TELEPHONE (OUTPATIENT)
Dept: HEMATOLOGY/ONCOLOGY | Facility: CLINIC | Age: 55
End: 2020-04-28

## 2020-04-28 NOTE — TELEPHONE ENCOUNTER
Called pt to discuss changing appt to virtual visit. Spoke with family member who states she will relay message for pt to call the office.

## 2020-05-07 ENCOUNTER — LAB VISIT (OUTPATIENT)
Dept: LAB | Facility: HOSPITAL | Age: 55
End: 2020-05-07
Attending: INTERNAL MEDICINE
Payer: COMMERCIAL

## 2020-05-07 DIAGNOSIS — C90.01 MULTIPLE MYELOMA IN REMISSION: ICD-10-CM

## 2020-05-07 LAB
ALBUMIN SERPL BCP-MCNC: 3.6 G/DL (ref 3.5–5.2)
ALP SERPL-CCNC: 115 U/L (ref 55–135)
ALT SERPL W/O P-5'-P-CCNC: 19 U/L (ref 10–44)
ANION GAP SERPL CALC-SCNC: 9 MMOL/L (ref 8–16)
AST SERPL-CCNC: 14 U/L (ref 10–40)
BASOPHILS # BLD AUTO: 0.05 K/UL (ref 0–0.2)
BASOPHILS NFR BLD: 1.3 % (ref 0–1.9)
BILIRUB SERPL-MCNC: 0.7 MG/DL (ref 0.1–1)
BUN SERPL-MCNC: 12 MG/DL (ref 6–20)
CALCIUM SERPL-MCNC: 9.1 MG/DL (ref 8.7–10.5)
CHLORIDE SERPL-SCNC: 104 MMOL/L (ref 95–110)
CO2 SERPL-SCNC: 28 MMOL/L (ref 23–29)
CREAT SERPL-MCNC: 0.9 MG/DL (ref 0.5–1.4)
DIFFERENTIAL METHOD: ABNORMAL
EOSINOPHIL # BLD AUTO: 0.1 K/UL (ref 0–0.5)
EOSINOPHIL NFR BLD: 3.6 % (ref 0–8)
ERYTHROCYTE [DISTWIDTH] IN BLOOD BY AUTOMATED COUNT: 13.7 % (ref 11.5–14.5)
EST. GFR  (AFRICAN AMERICAN): >60 ML/MIN/1.73 M^2
EST. GFR  (NON AFRICAN AMERICAN): >60 ML/MIN/1.73 M^2
GLUCOSE SERPL-MCNC: 96 MG/DL (ref 70–110)
HCT VFR BLD AUTO: 39.5 % (ref 40–54)
HGB BLD-MCNC: 12.8 G/DL (ref 14–18)
IGA SERPL-MCNC: 220 MG/DL (ref 40–350)
IGG SERPL-MCNC: 706 MG/DL (ref 650–1600)
IGM SERPL-MCNC: 29 MG/DL (ref 50–300)
IMM GRANULOCYTES # BLD AUTO: 0.04 K/UL (ref 0–0.04)
IMM GRANULOCYTES NFR BLD AUTO: 1 % (ref 0–0.5)
LYMPHOCYTES # BLD AUTO: 1.1 K/UL (ref 1–4.8)
LYMPHOCYTES NFR BLD: 29.2 % (ref 18–48)
MCH RBC QN AUTO: 30.3 PG (ref 27–31)
MCHC RBC AUTO-ENTMCNC: 32.4 G/DL (ref 32–36)
MCV RBC AUTO: 94 FL (ref 82–98)
MONOCYTES # BLD AUTO: 0.4 K/UL (ref 0.3–1)
MONOCYTES NFR BLD: 11.3 % (ref 4–15)
NEUTROPHILS # BLD AUTO: 2.1 K/UL (ref 1.8–7.7)
NEUTROPHILS NFR BLD: 53.6 % (ref 38–73)
NRBC BLD-RTO: 0 /100 WBC
PLATELET # BLD AUTO: 154 K/UL (ref 150–350)
PMV BLD AUTO: 9.7 FL (ref 9.2–12.9)
POTASSIUM SERPL-SCNC: 3.6 MMOL/L (ref 3.5–5.1)
PROT SERPL-MCNC: 6.7 G/DL (ref 6–8.4)
RBC # BLD AUTO: 4.22 M/UL (ref 4.6–6.2)
SODIUM SERPL-SCNC: 141 MMOL/L (ref 136–145)
WBC # BLD AUTO: 3.9 K/UL (ref 3.9–12.7)

## 2020-05-07 PROCEDURE — 86334 PATHOLOGIST INTERPRETATION IFE: ICD-10-PCS | Mod: 26,BMT,, | Performed by: PATHOLOGY

## 2020-05-07 PROCEDURE — 86334 IMMUNOFIX E-PHORESIS SERUM: CPT

## 2020-05-07 PROCEDURE — 82784 ASSAY IGA/IGD/IGG/IGM EACH: CPT | Mod: 59

## 2020-05-07 PROCEDURE — 84165 PROTEIN E-PHORESIS SERUM: CPT | Mod: 26,BMT,, | Performed by: PATHOLOGY

## 2020-05-07 PROCEDURE — 36415 COLL VENOUS BLD VENIPUNCTURE: CPT

## 2020-05-07 PROCEDURE — 84165 PATHOLOGIST INTERPRETATION SPE: ICD-10-PCS | Mod: 26,BMT,, | Performed by: PATHOLOGY

## 2020-05-07 PROCEDURE — 84165 PROTEIN E-PHORESIS SERUM: CPT

## 2020-05-07 PROCEDURE — 83520 IMMUNOASSAY QUANT NOS NONAB: CPT

## 2020-05-07 PROCEDURE — 80053 COMPREHEN METABOLIC PANEL: CPT

## 2020-05-07 PROCEDURE — 86334 IMMUNOFIX E-PHORESIS SERUM: CPT | Mod: 26,BMT,, | Performed by: PATHOLOGY

## 2020-05-07 PROCEDURE — 85025 COMPLETE CBC W/AUTO DIFF WBC: CPT

## 2020-05-07 RX ORDER — LENALIDOMIDE 10 MG/1
10 CAPSULE ORAL DAILY
Qty: 21 EACH | Refills: 0 | Status: SHIPPED | OUTPATIENT
Start: 2020-05-07 | End: 2020-06-10 | Stop reason: SDUPTHER

## 2020-05-08 ENCOUNTER — OFFICE VISIT (OUTPATIENT)
Dept: HEMATOLOGY/ONCOLOGY | Facility: CLINIC | Age: 55
End: 2020-05-08
Payer: COMMERCIAL

## 2020-05-08 DIAGNOSIS — T45.1X5A ANEMIA DUE TO ANTINEOPLASTIC CHEMOTHERAPY: ICD-10-CM

## 2020-05-08 DIAGNOSIS — C90.01 MULTIPLE MYELOMA IN REMISSION: Primary | ICD-10-CM

## 2020-05-08 DIAGNOSIS — D64.81 ANEMIA DUE TO ANTINEOPLASTIC CHEMOTHERAPY: ICD-10-CM

## 2020-05-08 LAB
ALBUMIN SERPL ELPH-MCNC: 4.14 G/DL (ref 3.35–5.55)
ALPHA1 GLOB SERPL ELPH-MCNC: 0.49 G/DL (ref 0.17–0.41)
ALPHA2 GLOB SERPL ELPH-MCNC: 0.75 G/DL (ref 0.43–0.99)
B-GLOBULIN SERPL ELPH-MCNC: 0.89 G/DL (ref 0.5–1.1)
GAMMA GLOB SERPL ELPH-MCNC: 0.73 G/DL (ref 0.67–1.58)
INTERPRETATION SERPL IFE-IMP: NORMAL
KAPPA LC SER QL IA: 1.58 MG/DL (ref 0.33–1.94)
KAPPA LC/LAMBDA SER IA: 1.61 (ref 0.26–1.65)
LAMBDA LC SER QL IA: 0.98 MG/DL (ref 0.57–2.63)
PATHOLOGIST INTERPRETATION IFE: NORMAL
PATHOLOGIST INTERPRETATION SPE: NORMAL
PROT SERPL-MCNC: 7 G/DL (ref 6–8.4)

## 2020-05-08 PROCEDURE — 99213 PR OFFICE/OUTPT VISIT, EST, LEVL III, 20-29 MIN: ICD-10-PCS | Mod: 95,,, | Performed by: INTERNAL MEDICINE

## 2020-05-08 PROCEDURE — 99213 OFFICE O/P EST LOW 20 MIN: CPT | Mod: 95,,, | Performed by: INTERNAL MEDICINE

## 2020-05-08 NOTE — ASSESSMENT & PLAN NOTE
Mr. Bobo remains in complete remission with respect to his multiple myeloma. M-protein and JAYESH are undetectable. We will continue lenalidomide maintenance, which is well-tolerated.

## 2020-05-08 NOTE — PROGRESS NOTES
HEMATOLOGIC MALIGNANCIES PROGRESS NOTE    IDENTIFYING STATEMENT   Arik Bobo (Arik) is a 54 y.o. male with a  of 1965 from [place], [place] with the diagnosis of multiple myeloma.      TELEMEDICINE DOCUMENTATION    The patient location is: home  The chief complaint leading to consultation is: multiple myeloma  Visit type: audiovisual  Total time spent with patient: 15 minutes  Each patient to whom he or she provides medical services by telemedicine is:  (1) informed of the relationship between the physician and patient and the respective role of any other health care provider with respect to management of the patient; and (2) notified that he or she may decline to receive medical services by telemedicine and may withdraw from such care at any time.    Notes:       ONCOLOGY HISTORY:    1. Multiple myeloma s/p autologous stem cell transplant   A. Early : Progressive anemia. K/L ratio > 100, and was treated with fina-dex   B. 12/3/2015: M-protein 1.02 g/dl, IgG-kappa by JAYESH. Kappa 2.98 mg/dl, lambda 0.5 mg/dl, ratio 5.96. BMBx shows 60-70% cellular marrow with 20% plasma cells, consistent with plasma cell myeloma. Hgb 11.8 g/dl, Calcium 9.44 (corrected). Creatinine 0.8 mg/dl.    C. 2016: M-protein 2.23 g/dl. Kappa 4.36 mg/dl, lambda 0.08 mg/dl, ratio 54.5. Progressive disease.   D. VCD therapy locally.    E. 2017: M-protein 1.23 g/dl.    F. 2017: M-protein 0.41 g/dl. Kappa 2.33 mg/dl, lambda 0.5 mg/dl, ratio 4.66, consistent with partial response.    G. 2017: Autologous stem cell transplant with melphalan conditioning.   H. 2018: Day +100 restaging - M-protein 0.19 g/dl, kappa 0.76 mg/dl, lambda 0.53 mg/dl, ratio 1.43. BMBx shows no definitive evidence of myeloma. Consistent with very good partial response.    I. 3/8/2018: M-protein 0.18 g/dl   J. 2018: M-protein 0.16 g/dl   K. 2018: M-protein 0.2 g/dl   L. 2018: M-protein 0.14 g/dl; BMBx for 1-year re-evaluation  shows 40% cellular marrow with 6-7% plasma cells and no evidence of clonality; thus, no plasma cell neoplasm evident. Findings consistent with ongoing VGPR.   M. 4/3/2019: M-protein < 0.1 g/dl.     N. 7/10/2019: M-protein 0.18 g/dl.   O. 10/30/2019: M-protein 0.14 g/dl.    P. 1/28/2020: M-protein negative; JAYESH negative; findings consistent with complete response to therapy.     2. Depression  3. HTN  4. History of DVT in 7/2015 and 2/2017    INTERVAL HISTORY:      Mr. Bobo returns to clinic 2 years and 7 months after  autologous transplant for myeloma.     Reports no problems. Feeling well. Denies any adverse effects of lenalidomide; occasional diarrhea. Reports feeling lightheaded with exertion.    Currently at home and not working due to disability.     Past Medical History, Past Social History and Past Family History have been reviewed and are unchanged except as noted in the interval history.    MEDICATIONS:     Current Outpatient Medications on File Prior to Visit   Medication Sig Dispense Refill    amlodipine (NORVASC) 5 MG tablet Take 10 mg by mouth once daily.       apixaban 2.5 mg Tab Take 1 tablet (2.5 mg total) by mouth 2 (two) times daily. 60 tablet 3    citalopram (CELEXA) 40 MG tablet Take 40 mg by mouth.      lenalidomide (REVLIMID) 10 mg Cap Take 10 mg by mouth once daily Take days 1-21 on a 28 day cycle. auth #[id number] on 5/7/2020. 21 each 0    lisinopril (PRINIVIL,ZESTRIL) 40 MG tablet   1    simvastatin (ZOCOR) 20 MG tablet   3     No current facility-administered medications on file prior to visit.        ALLERGIES:   Review of patient's allergies indicates:   Allergen Reactions    Pcn [penicillins]      Unknown last dose as an infant        ROS:       Review of Systems   Constitutional: Negative for diaphoresis, fatigue, fever and unexpected weight change.   HENT:   Negative for lump/mass and sore throat.    Eyes: Negative for icterus.   Respiratory: Negative for cough and shortness of  breath.    Cardiovascular: Negative for chest pain and palpitations.   Gastrointestinal: Negative for abdominal distention, constipation, diarrhea, nausea and vomiting.   Genitourinary: Negative for dysuria and frequency.    Musculoskeletal: Negative for arthralgias, gait problem and myalgias.   Skin: Negative for rash.   Neurological: Negative for dizziness, gait problem and headaches.   Hematological: Negative for adenopathy. Does not bruise/bleed easily.   Psychiatric/Behavioral: The patient is not nervous/anxious.        PHYSICAL EXAM:  There were no vitals filed for this visit.    Physical Exam   Head and neck visualized and normal in appearance.     LAB:   Results for orders placed or performed in visit on 05/07/20   Rapid BMT CBC with Diff   Result Value Ref Range    WBC 3.90 3.90 - 12.70 K/uL    RBC 4.22 (L) 4.60 - 6.20 M/uL    Hemoglobin 12.8 (L) 14.0 - 18.0 g/dL    Hematocrit 39.5 (L) 40.0 - 54.0 %    Mean Corpuscular Volume 94 82 - 98 fL    Mean Corpuscular Hemoglobin 30.3 27.0 - 31.0 pg    Mean Corpuscular Hemoglobin Conc 32.4 32.0 - 36.0 g/dL    RDW 13.7 11.5 - 14.5 %    Platelets 154 150 - 350 K/uL    MPV 9.7 9.2 - 12.9 fL    Immature Granulocytes 1.0 (H) 0.0 - 0.5 %    Gran # (ANC) 2.1 1.8 - 7.7 K/uL    Immature Grans (Abs) 0.04 0.00 - 0.04 K/uL    Lymph # 1.1 1.0 - 4.8 K/uL    Mono # 0.4 0.3 - 1.0 K/uL    Eos # 0.1 0.0 - 0.5 K/uL    Baso # 0.05 0.00 - 0.20 K/uL    nRBC 0 0 /100 WBC    Gran% 53.6 38.0 - 73.0 %    Lymph% 29.2 18.0 - 48.0 %    Mono% 11.3 4.0 - 15.0 %    Eosinophil% 3.6 0.0 - 8.0 %    Basophil% 1.3 0.0 - 1.9 %    Differential Method Automated    Comprehensive metabolic panel   Result Value Ref Range    Sodium 141 136 - 145 mmol/L    Potassium 3.6 3.5 - 5.1 mmol/L    Chloride 104 95 - 110 mmol/L    CO2 28 23 - 29 mmol/L    Glucose 96 70 - 110 mg/dL    BUN, Bld 12 6 - 20 mg/dL    Creatinine 0.9 0.5 - 1.4 mg/dL    Calcium 9.1 8.7 - 10.5 mg/dL    Total Protein 6.7 6.0 - 8.4 g/dL    Albumin  3.6 3.5 - 5.2 g/dL    Total Bilirubin 0.7 0.1 - 1.0 mg/dL    Alkaline Phosphatase 115 55 - 135 U/L    AST 14 10 - 40 U/L    ALT 19 10 - 44 U/L    Anion Gap 9 8 - 16 mmol/L    eGFR if African American >60.0 >60 mL/min/1.73 m^2    eGFR if non African American >60.0 >60 mL/min/1.73 m^2   Protein electrophoresis, serum   Result Value Ref Range    Protein, Serum 7.0 6.0 - 8.4 g/dL    Albumin grams/dl 4.14 3.35 - 5.55 g/dL    Alpha-1 grams/dl 0.49 (H) 0.17 - 0.41 g/dL    Alpha-2 grams/dl 0.75 0.43 - 0.99 g/dL    Beta grams/dl 0.89 0.50 - 1.10 g/dL    Gamma grams/dl 0.73 0.67 - 1.58 g/dL   Immunofixation electrophoresis   Result Value Ref Range    Immunofix Interp. SEE COMMENT    Immunoglobulin free LT chains blood   Result Value Ref Range    Kappa Free Light Chains 1.58 0.33 - 1.94 mg/dL    Lambda Free Light Chains 0.98 0.57 - 2.63 mg/dL    Kappa/Lambda FLC Ratio 1.61 0.26 - 1.65   Immunoglobulins (IgG, IgA, IgM) Quantitative   Result Value Ref Range    IgG - Serum 706 650 - 1600 mg/dL    IgA 220 40 - 350 mg/dL    IgM 29 (L) 50 - 300 mg/dL   Pathologist Interpretation SPE   Result Value Ref Range    Pathologist Interpretation SPE REVIEWED    Pathologist Interpretation JAYESH   Result Value Ref Range    Pathologist Interpretation JAYESH REVIEWED        PROBLEMS ASSESSED THIS VISIT:    1. Multiple myeloma in remission    2. Anemia due to antineoplastic chemotherapy        PLAN:       Multiple myeloma in remission  Mr. Bobo remains in complete remission with respect to his multiple myeloma. M-protein and JAYESH are undetectable. We will continue lenalidomide maintenance, which is well-tolerated.     Anemia due to antineoplastic chemotherapy  Mild and related to lenalidomide. Monitor on therapy.     Follow-up in 3 months    Italo Bryant MD  Hematology and Stem Cell Transplant

## 2020-06-10 DIAGNOSIS — C90.01 MULTIPLE MYELOMA IN REMISSION: ICD-10-CM

## 2020-06-10 RX ORDER — LENALIDOMIDE 10 MG/1
10 CAPSULE ORAL DAILY
Qty: 21 EACH | Refills: 0 | Status: SHIPPED | OUTPATIENT
Start: 2020-06-10 | End: 2020-07-09

## 2020-08-06 DIAGNOSIS — C90.01 MULTIPLE MYELOMA IN REMISSION: ICD-10-CM

## 2020-08-06 RX ORDER — LENALIDOMIDE 10 MG/1
CAPSULE ORAL
Qty: 21 EACH | Refills: 0 | Status: SHIPPED | OUTPATIENT
Start: 2020-08-06 | End: 2020-09-04 | Stop reason: SDUPTHER

## 2020-08-28 ENCOUNTER — OFFICE VISIT (OUTPATIENT)
Dept: HEMATOLOGY/ONCOLOGY | Facility: CLINIC | Age: 55
End: 2020-08-28
Payer: COMMERCIAL

## 2020-08-28 ENCOUNTER — LAB VISIT (OUTPATIENT)
Dept: LAB | Facility: HOSPITAL | Age: 55
End: 2020-08-28
Payer: COMMERCIAL

## 2020-08-28 VITALS
SYSTOLIC BLOOD PRESSURE: 127 MMHG | HEIGHT: 68 IN | HEART RATE: 63 BPM | DIASTOLIC BLOOD PRESSURE: 81 MMHG | BODY MASS INDEX: 38.46 KG/M2 | TEMPERATURE: 99 F | WEIGHT: 253.75 LBS

## 2020-08-28 DIAGNOSIS — C90.01 MULTIPLE MYELOMA IN REMISSION: Primary | ICD-10-CM

## 2020-08-28 DIAGNOSIS — Z94.84 H/O AUTOLOGOUS STEM CELL TRANSPLANT: ICD-10-CM

## 2020-08-28 DIAGNOSIS — D70.1 LEUKOPENIA DUE TO ANTINEOPLASTIC CHEMOTHERAPY: ICD-10-CM

## 2020-08-28 DIAGNOSIS — C90.01 MULTIPLE MYELOMA IN REMISSION: ICD-10-CM

## 2020-08-28 DIAGNOSIS — T45.1X5A LEUKOPENIA DUE TO ANTINEOPLASTIC CHEMOTHERAPY: ICD-10-CM

## 2020-08-28 LAB
ALBUMIN SERPL BCP-MCNC: 4.2 G/DL (ref 3.5–5.2)
ALP SERPL-CCNC: 126 U/L (ref 55–135)
ALT SERPL W/O P-5'-P-CCNC: 26 U/L (ref 10–44)
ANION GAP SERPL CALC-SCNC: 9 MMOL/L (ref 8–16)
AST SERPL-CCNC: 19 U/L (ref 10–40)
BASOPHILS # BLD AUTO: 0.05 K/UL (ref 0–0.2)
BASOPHILS NFR BLD: 1.4 % (ref 0–1.9)
BILIRUB SERPL-MCNC: 0.8 MG/DL (ref 0.1–1)
BUN SERPL-MCNC: 13 MG/DL (ref 6–20)
CALCIUM SERPL-MCNC: 10 MG/DL (ref 8.7–10.5)
CHLORIDE SERPL-SCNC: 104 MMOL/L (ref 95–110)
CO2 SERPL-SCNC: 28 MMOL/L (ref 23–29)
CREAT SERPL-MCNC: 1.1 MG/DL (ref 0.5–1.4)
DIFFERENTIAL METHOD: ABNORMAL
EOSINOPHIL # BLD AUTO: 0.1 K/UL (ref 0–0.5)
EOSINOPHIL NFR BLD: 3.2 % (ref 0–8)
ERYTHROCYTE [DISTWIDTH] IN BLOOD BY AUTOMATED COUNT: 13.4 % (ref 11.5–14.5)
EST. GFR  (AFRICAN AMERICAN): >60 ML/MIN/1.73 M^2
EST. GFR  (NON AFRICAN AMERICAN): >60 ML/MIN/1.73 M^2
GLUCOSE SERPL-MCNC: 90 MG/DL (ref 70–110)
HCT VFR BLD AUTO: 44.3 % (ref 40–54)
HGB BLD-MCNC: 14.5 G/DL (ref 14–18)
IGA SERPL-MCNC: 235 MG/DL (ref 40–350)
IGG SERPL-MCNC: 721 MG/DL (ref 650–1600)
IGM SERPL-MCNC: 29 MG/DL (ref 50–300)
IMM GRANULOCYTES # BLD AUTO: 0.02 K/UL (ref 0–0.04)
IMM GRANULOCYTES NFR BLD AUTO: 0.6 % (ref 0–0.5)
LYMPHOCYTES # BLD AUTO: 1.2 K/UL (ref 1–4.8)
LYMPHOCYTES NFR BLD: 34.7 % (ref 18–48)
MCH RBC QN AUTO: 30.3 PG (ref 27–31)
MCHC RBC AUTO-ENTMCNC: 32.7 G/DL (ref 32–36)
MCV RBC AUTO: 93 FL (ref 82–98)
MONOCYTES # BLD AUTO: 0.2 K/UL (ref 0.3–1)
MONOCYTES NFR BLD: 5.5 % (ref 4–15)
NEUTROPHILS # BLD AUTO: 1.9 K/UL (ref 1.8–7.7)
NEUTROPHILS NFR BLD: 54.6 % (ref 38–73)
NRBC BLD-RTO: 0 /100 WBC
PLATELET # BLD AUTO: 186 K/UL (ref 150–350)
PMV BLD AUTO: 10.6 FL (ref 9.2–12.9)
POTASSIUM SERPL-SCNC: 4.3 MMOL/L (ref 3.5–5.1)
PROT SERPL-MCNC: 7.3 G/DL (ref 6–8.4)
RBC # BLD AUTO: 4.79 M/UL (ref 4.6–6.2)
SODIUM SERPL-SCNC: 141 MMOL/L (ref 136–145)
WBC # BLD AUTO: 3.46 K/UL (ref 3.9–12.7)

## 2020-08-28 PROCEDURE — 36415 COLL VENOUS BLD VENIPUNCTURE: CPT

## 2020-08-28 PROCEDURE — 3074F PR MOST RECENT SYSTOLIC BLOOD PRESSURE < 130 MM HG: ICD-10-PCS | Mod: BMT,CPTII,S$GLB, | Performed by: INTERNAL MEDICINE

## 2020-08-28 PROCEDURE — 80053 COMPREHEN METABOLIC PANEL: CPT

## 2020-08-28 PROCEDURE — 3074F SYST BP LT 130 MM HG: CPT | Mod: BMT,CPTII,S$GLB, | Performed by: INTERNAL MEDICINE

## 2020-08-28 PROCEDURE — 86334 IMMUNOFIX E-PHORESIS SERUM: CPT | Mod: 26,BMT,, | Performed by: PATHOLOGY

## 2020-08-28 PROCEDURE — 3079F PR MOST RECENT DIASTOLIC BLOOD PRESSURE 80-89 MM HG: ICD-10-PCS | Mod: BMT,CPTII,S$GLB, | Performed by: INTERNAL MEDICINE

## 2020-08-28 PROCEDURE — 86334 PATHOLOGIST INTERPRETATION IFE: ICD-10-PCS | Mod: 26,BMT,, | Performed by: PATHOLOGY

## 2020-08-28 PROCEDURE — 86334 IMMUNOFIX E-PHORESIS SERUM: CPT

## 2020-08-28 PROCEDURE — 99999 PR PBB SHADOW E&M-EST. PATIENT-LVL III: ICD-10-PCS | Mod: PBBFAC,BMT,, | Performed by: INTERNAL MEDICINE

## 2020-08-28 PROCEDURE — 99214 PR OFFICE/OUTPT VISIT, EST, LEVL IV, 30-39 MIN: ICD-10-PCS | Mod: BMT,S$GLB,, | Performed by: INTERNAL MEDICINE

## 2020-08-28 PROCEDURE — 82784 ASSAY IGA/IGD/IGG/IGM EACH: CPT | Mod: 59

## 2020-08-28 PROCEDURE — 84165 PROTEIN E-PHORESIS SERUM: CPT | Mod: 26,BMT,, | Performed by: PATHOLOGY

## 2020-08-28 PROCEDURE — 3079F DIAST BP 80-89 MM HG: CPT | Mod: BMT,CPTII,S$GLB, | Performed by: INTERNAL MEDICINE

## 2020-08-28 PROCEDURE — 99214 OFFICE O/P EST MOD 30 MIN: CPT | Mod: BMT,S$GLB,, | Performed by: INTERNAL MEDICINE

## 2020-08-28 PROCEDURE — 84165 PROTEIN E-PHORESIS SERUM: CPT

## 2020-08-28 PROCEDURE — 3008F BODY MASS INDEX DOCD: CPT | Mod: BMT,CPTII,S$GLB, | Performed by: INTERNAL MEDICINE

## 2020-08-28 PROCEDURE — 83520 IMMUNOASSAY QUANT NOS NONAB: CPT

## 2020-08-28 PROCEDURE — 99999 PR PBB SHADOW E&M-EST. PATIENT-LVL III: CPT | Mod: PBBFAC,BMT,, | Performed by: INTERNAL MEDICINE

## 2020-08-28 PROCEDURE — 3008F PR BODY MASS INDEX (BMI) DOCUMENTED: ICD-10-PCS | Mod: BMT,CPTII,S$GLB, | Performed by: INTERNAL MEDICINE

## 2020-08-28 PROCEDURE — 85025 COMPLETE CBC W/AUTO DIFF WBC: CPT

## 2020-08-28 PROCEDURE — 84165 PATHOLOGIST INTERPRETATION SPE: ICD-10-PCS | Mod: 26,BMT,, | Performed by: PATHOLOGY

## 2020-08-28 NOTE — PROGRESS NOTES
HEMATOLOGIC MALIGNANCIES PROGRESS NOTE    IDENTIFYING STATEMENT   Arik Bobo (Arik) is a 55 y.o. male with a  of 1965 from [place] [place] with the diagnosis of multiple myeloma.      ONCOLOGY HISTORY:    1. Multiple myeloma s/p autologous stem cell transplant   A. Early : Progressive anemia. K/L ratio > 100, and was treated with fina-dex   B. 12/3/2015: M-protein 1.02 g/dl, IgG-kappa by JAYESH. Kappa 2.98 mg/dl, lambda 0.5 mg/dl, ratio 5.96. BMBx shows 60-70% cellular marrow with 20% plasma cells, consistent with plasma cell myeloma. Hgb 11.8 g/dl, Calcium 9.44 (corrected). Creatinine 0.8 mg/dl.    C. 2016: M-protein 2.23 g/dl. Kappa 4.36 mg/dl, lambda 0.08 mg/dl, ratio 54.5. Progressive disease.   D. VCD therapy locally.    E. 2017: M-protein 1.23 g/dl.    F. 2017: M-protein 0.41 g/dl. Kappa 2.33 mg/dl, lambda 0.5 mg/dl, ratio 4.66, consistent with partial response.    G. 2017: Autologous stem cell transplant with melphalan conditioning.   H. 2018: Day +100 restaging - M-protein 0.19 g/dl, kappa 0.76 mg/dl, lambda 0.53 mg/dl, ratio 1.43. BMBx shows no definitive evidence of myeloma. Consistent with very good partial response.    I. 3/8/2018: M-protein 0.18 g/dl   J. 2018: M-protein 0.16 g/dl   K. 2018: M-protein 0.2 g/dl   L. 2018: M-protein 0.14 g/dl; BMBx for 1-year re-evaluation shows 40% cellular marrow with 6-7% plasma cells and no evidence of clonality; thus, no plasma cell neoplasm evident. Findings consistent with ongoing VGPR.   M. 4/3/2019: M-protein < 0.1 g/dl.     N. 7/10/2019: M-protein 0.18 g/dl.   O. 10/30/2019: M-protein 0.14 g/dl.    P. 2020: M-protein negative; JAYESH negative; findings consistent with complete response to therapy.     2. Depression  3. HTN  4. History of DVT in 2015 and 2017    INTERVAL HISTORY:      Mr. Bobo returns to clinic 2 years and 11 months after  autologous transplant for myeloma.     Back pain from strain. He  "is otherwise continuing to work without difficulty. He feels good. Denies bone pain. No weight loss or night sweats.     Past Medical History, Past Social History and Past Family History have been reviewed and are unchanged except as noted in the interval history.    MEDICATIONS:     Current Outpatient Medications on File Prior to Visit   Medication Sig Dispense Refill    amlodipine (NORVASC) 5 MG tablet Take 10 mg by mouth once daily.       apixaban 2.5 mg Tab Take 1 tablet (2.5 mg total) by mouth 2 (two) times daily. 60 tablet 3    citalopram (CELEXA) 40 MG tablet Take 40 mg by mouth.      lenalidomide 10 mg Cap TAKE 1 CAPSULE ONCE DAILY DAYS 1 THROUGH 21 ON A 28 DAY CYCLE. 21 each 0    lisinopril (PRINIVIL,ZESTRIL) 40 MG tablet   1    simvastatin (ZOCOR) 20 MG tablet   3     No current facility-administered medications on file prior to visit.        ALLERGIES:   Review of patient's allergies indicates:   Allergen Reactions    Pcn [penicillins]      Unknown last dose as an infant        ROS:       Review of Systems   Constitutional: Negative for diaphoresis, fatigue, fever and unexpected weight change.   HENT:   Negative for lump/mass and sore throat.    Eyes: Negative for icterus.   Respiratory: Negative for cough and shortness of breath.    Cardiovascular: Negative for chest pain and palpitations.   Gastrointestinal: Negative for abdominal distention, constipation, diarrhea, nausea and vomiting.   Genitourinary: Negative for dysuria and frequency.    Musculoskeletal: Negative for arthralgias, gait problem and myalgias.   Skin: Negative for rash.   Neurological: Negative for dizziness, gait problem and headaches.   Hematological: Negative for adenopathy. Does not bruise/bleed easily.   Psychiatric/Behavioral: The patient is not nervous/anxious.        PHYSICAL EXAM:  Vitals:    08/28/20 1342   BP: 127/81   Pulse: 63   Temp: 99 °F (37.2 °C)   TempSrc: Oral   Weight: 115.1 kg (253 lb 12 oz)   Height: 5' 8" " (1.727 m)   PainSc:   5   PainLoc: Back       Physical Exam  Constitutional:       General: He is not in acute distress.     Appearance: He is well-developed.   HENT:      Head: Normocephalic and atraumatic.      Mouth/Throat:      Mouth: No oral lesions.   Eyes:      Conjunctiva/sclera: Conjunctivae normal.   Neck:      Thyroid: No thyromegaly.   Cardiovascular:      Rate and Rhythm: Normal rate and regular rhythm.      Heart sounds: Normal heart sounds. No murmur.   Pulmonary:      Breath sounds: Normal breath sounds. No wheezing or rales.   Abdominal:      General: There is no distension.      Palpations: Abdomen is soft. There is no hepatomegaly, splenomegaly or mass.      Tenderness: There is no abdominal tenderness.   Lymphadenopathy:      Cervical: No cervical adenopathy.      Right cervical: No deep cervical adenopathy.     Left cervical: No deep cervical adenopathy.   Skin:     Findings: No rash.   Neurological:      Mental Status: He is alert and oriented to person, place, and time.      Cranial Nerves: No cranial nerve deficit.      Coordination: Coordination normal.      Deep Tendon Reflexes: Reflexes are normal and symmetric.         LAB:   Results for orders placed or performed in visit on 08/28/20   CBC auto differential   Result Value Ref Range    WBC 3.46 (L) 3.90 - 12.70 K/uL    RBC 4.79 4.60 - 6.20 M/uL    Hemoglobin 14.5 14.0 - 18.0 g/dL    Hematocrit 44.3 40.0 - 54.0 %    Mean Corpuscular Volume 93 82 - 98 fL    Mean Corpuscular Hemoglobin 30.3 27.0 - 31.0 pg    Mean Corpuscular Hemoglobin Conc 32.7 32.0 - 36.0 g/dL    RDW 13.4 11.5 - 14.5 %    Platelets 186 150 - 350 K/uL    MPV 10.6 9.2 - 12.9 fL    Immature Granulocytes 0.6 (H) 0.0 - 0.5 %    Gran # (ANC) 1.9 1.8 - 7.7 K/uL    Immature Grans (Abs) 0.02 0.00 - 0.04 K/uL    Lymph # 1.2 1.0 - 4.8 K/uL    Mono # 0.2 (L) 0.3 - 1.0 K/uL    Eos # 0.1 0.0 - 0.5 K/uL    Baso # 0.05 0.00 - 0.20 K/uL    nRBC 0 0 /100 WBC    Gran% 54.6 38.0 - 73.0 %     Lymph% 34.7 18.0 - 48.0 %    Mono% 5.5 4.0 - 15.0 %    Eosinophil% 3.2 0.0 - 8.0 %    Basophil% 1.4 0.0 - 1.9 %    Differential Method Automated    Comprehensive metabolic panel   Result Value Ref Range    Sodium 141 136 - 145 mmol/L    Potassium 4.3 3.5 - 5.1 mmol/L    Chloride 104 95 - 110 mmol/L    CO2 28 23 - 29 mmol/L    Glucose 90 70 - 110 mg/dL    BUN, Bld 13 6 - 20 mg/dL    Creatinine 1.1 0.5 - 1.4 mg/dL    Calcium 10.0 8.7 - 10.5 mg/dL    Total Protein 7.3 6.0 - 8.4 g/dL    Albumin 4.2 3.5 - 5.2 g/dL    Total Bilirubin 0.8 0.1 - 1.0 mg/dL    Alkaline Phosphatase 126 55 - 135 U/L    AST 19 10 - 40 U/L    ALT 26 10 - 44 U/L    Anion Gap 9 8 - 16 mmol/L    eGFR if African American >60.0 >60 mL/min/1.73 m^2    eGFR if non African American >60.0 >60 mL/min/1.73 m^2   Protein electrophoresis, serum   Result Value Ref Range    Protein, Serum 6.7 6.0 - 8.4 g/dL    Albumin grams/dl 4.17 3.35 - 5.55 g/dL    Alpha-1 grams/dl 0.33 0.17 - 0.41 g/dL    Alpha-2 grams/dl 0.72 0.43 - 0.99 g/dL    Beta grams/dl 0.85 0.50 - 1.10 g/dL    Gamma grams/dl 0.63 (L) 0.67 - 1.58 g/dL   Immunofixation electrophoresis   Result Value Ref Range    Immunofix Interp. SEE COMMENT    Immunoglobulin Free LT Chains Blood   Result Value Ref Range    Kappa Free Light Chains 1.45 0.33 - 1.94 mg/dL    Lambda Free Light Chains 0.92 0.57 - 2.63 mg/dL    Kappa/Lambda FLC Ratio 1.58 0.26 - 1.65   Immunoglobulins (IgG, IgA, IgM) Quantitative   Result Value Ref Range    IgG - Serum 721 650 - 1600 mg/dL    IgA 235 40 - 350 mg/dL    IgM 29 (L) 50 - 300 mg/dL   Pathologist Interpretation JAYESH   Result Value Ref Range    Pathologist Interpretation JAYESH REVIEWED    Pathologist Interpretation SPE   Result Value Ref Range    Pathologist Interpretation SPE REVIEWED        PROBLEMS ASSESSED THIS VISIT:    1. Multiple myeloma in remission    2. H/O autologous stem cell transplant    3. Leukopenia due to antineoplastic chemotherapy        PLAN:       Multiple  myeloma    Mr. Bobo remains in complete remission from multiple myeloma. We will continue lenalidomide maintenance, which is well tolerated.    History of autologous stem cell transplant    He is 2 years, 11 months post ASCT. Current disease status is complete remission. No evidence of relapse to date since ASCT.    Leukopenia    Due to lenalidomide. Mild and clinically inconsequential. Monitor.     Follow-up in 3 months    Italo Bryant MD  Hematology and Stem Cell Transplant

## 2020-08-28 NOTE — Clinical Note
Follow-up in 3 months with CBC, CMP, SPEP, JAYESH, free light chains, immunoglobulins PT DAILY TREATMENT NOTE - [facility] 2-15    Patient Name: Annella Kocher  Date:1/3/2020  : 2004  [x]  Patient  Verified  Payor: SELF PAY / [facility] / Product Type: Self Pay /    In time:725a  Out time: 835a  Total Treatment Time (min):75  Total Timed Codes (min): 75  Visit #:  19    Treatment Area: Left knee pain [M25.562]    SUBJECTIVE  Pain Level (0-10 scale): 0/10  Any medication changes, allergies to medications, adverse drug reactions, diagnosis change, or new procedure performed?: [x] No    [] Yes (see summary sheet for update)  Subjective functional status/changes:   [] No changes reported  Knee is feeling good. He reports trying to do more at home (but no specifics reported when asked)     OBJECTIVE          75 min Therapeutic Exercise:  [x] See flow sheet:  passive stretching knee extension and flexion in prone    Rationale: increase ROM, increase strength and improve coordination to improve the patients ability to increase function and mobility          With   [] TE   [] TA   [] neuro   [] other: Patient Education: [x] Review HEP    [] Progressed/Changed HEP based on:   [] positioning   [] body mechanics   [] transfers   [] heat/ice application    [] other:       Other Objective/Functional Measures: PROM knee flex Prone 139        Pain Level (0-10 scale) post treatment: 0/10     ASSESSMENT/Changes in Function:   Able to progress to 3# with SLR and did well also adding in cable column hip abduction and steamboats were challenging. Balance continues to show limitation and unsteadiness.     Patient will continue to benefit from skilled PT services to modify and progress therapeutic interventions, address functional mobility deficits, address ROM deficits, address strength deficits, analyze and address soft tissue restrictions, analyze and cue movement patterns, analyze and modify body mechanics/ergonomics and assess and modify postural abnormalities to attain remaining goals.     [x]  See Plan of Care  []  See progress note/recertification  []  See Discharge Summary     Progress towards goals / Updated goals:  Short Term Goals: To be accomplished in 2 weeks:  1. Patient will gain full extension of L knee to match right of -1 degrees. 2. Patient will weight shift to increase proprioception on L knee without pain. 3. Patient will increase knee flexion to 90 degrees. Long Term Goals: To be accomplished in 8 weeks:  1. Patient will demonstrate quad control as evidenced by ability to perform exercises. 2. Patient will increase knee flexion to >100 degrees  3. Patient will have reduced knee effusion by ~5cm. Frequency / Duration: Patient to be seen 3 times per week for 8 weeks.       PLAN  [x]  Upgrade activities as tolerated     [x]  Continue plan of care  []  Update interventions per flow sheet       []  Discharge due to:_  []  Other:_      Iker Rabago, PT  1/3/2020

## 2020-08-31 LAB
ALBUMIN SERPL ELPH-MCNC: 4.17 G/DL (ref 3.35–5.55)
ALPHA1 GLOB SERPL ELPH-MCNC: 0.33 G/DL (ref 0.17–0.41)
ALPHA2 GLOB SERPL ELPH-MCNC: 0.72 G/DL (ref 0.43–0.99)
B-GLOBULIN SERPL ELPH-MCNC: 0.85 G/DL (ref 0.5–1.1)
GAMMA GLOB SERPL ELPH-MCNC: 0.63 G/DL (ref 0.67–1.58)
INTERPRETATION SERPL IFE-IMP: NORMAL
KAPPA LC SER QL IA: 1.45 MG/DL (ref 0.33–1.94)
KAPPA LC/LAMBDA SER IA: 1.58 (ref 0.26–1.65)
LAMBDA LC SER QL IA: 0.92 MG/DL (ref 0.57–2.63)
PROT SERPL-MCNC: 6.7 G/DL (ref 6–8.4)

## 2020-09-01 LAB
PATHOLOGIST INTERPRETATION IFE: NORMAL
PATHOLOGIST INTERPRETATION SPE: NORMAL

## 2020-09-04 DIAGNOSIS — C90.01 MULTIPLE MYELOMA IN REMISSION: ICD-10-CM

## 2020-09-04 RX ORDER — LENALIDOMIDE 10 MG/1
CAPSULE ORAL
Qty: 21 EACH | Refills: 0 | Status: SHIPPED | OUTPATIENT
Start: 2020-09-04 | End: 2020-10-07 | Stop reason: SDUPTHER

## 2020-10-07 DIAGNOSIS — C90.01 MULTIPLE MYELOMA IN REMISSION: ICD-10-CM

## 2020-10-07 RX ORDER — LENALIDOMIDE 10 MG/1
CAPSULE ORAL
Qty: 21 EACH | Refills: 0 | Status: SHIPPED | OUTPATIENT
Start: 2020-10-07 | End: 2020-11-06

## 2020-11-25 ENCOUNTER — LAB VISIT (OUTPATIENT)
Dept: LAB | Facility: HOSPITAL | Age: 55
End: 2020-11-25
Payer: COMMERCIAL

## 2020-11-25 ENCOUNTER — OFFICE VISIT (OUTPATIENT)
Dept: HEMATOLOGY/ONCOLOGY | Facility: CLINIC | Age: 55
End: 2020-11-25
Payer: COMMERCIAL

## 2020-11-25 VITALS
SYSTOLIC BLOOD PRESSURE: 133 MMHG | HEART RATE: 65 BPM | OXYGEN SATURATION: 96 % | DIASTOLIC BLOOD PRESSURE: 87 MMHG | RESPIRATION RATE: 16 BRPM | TEMPERATURE: 98 F | BODY MASS INDEX: 38.14 KG/M2 | HEIGHT: 68 IN | WEIGHT: 251.63 LBS

## 2020-11-25 DIAGNOSIS — C90.01 MULTIPLE MYELOMA IN REMISSION: ICD-10-CM

## 2020-11-25 DIAGNOSIS — E66.9 OBESITY, CLASS II, BMI 35-39.9: ICD-10-CM

## 2020-11-25 DIAGNOSIS — Z94.84 H/O AUTOLOGOUS STEM CELL TRANSPLANT: ICD-10-CM

## 2020-11-25 DIAGNOSIS — C90.01 MULTIPLE MYELOMA IN REMISSION: Primary | ICD-10-CM

## 2020-11-25 LAB
ALBUMIN SERPL BCP-MCNC: 3.8 G/DL (ref 3.5–5.2)
ALP SERPL-CCNC: 106 U/L (ref 55–135)
ALT SERPL W/O P-5'-P-CCNC: 25 U/L (ref 10–44)
ANION GAP SERPL CALC-SCNC: 9 MMOL/L (ref 8–16)
AST SERPL-CCNC: 23 U/L (ref 10–40)
BASOPHILS # BLD AUTO: 0.02 K/UL (ref 0–0.2)
BASOPHILS NFR BLD: 0.5 % (ref 0–1.9)
BILIRUB SERPL-MCNC: 0.8 MG/DL (ref 0.1–1)
BUN SERPL-MCNC: 12 MG/DL (ref 6–20)
CALCIUM SERPL-MCNC: 9.2 MG/DL (ref 8.7–10.5)
CHLORIDE SERPL-SCNC: 105 MMOL/L (ref 95–110)
CO2 SERPL-SCNC: 29 MMOL/L (ref 23–29)
CREAT SERPL-MCNC: 1.1 MG/DL (ref 0.5–1.4)
DIFFERENTIAL METHOD: ABNORMAL
EOSINOPHIL # BLD AUTO: 0.1 K/UL (ref 0–0.5)
EOSINOPHIL NFR BLD: 3 % (ref 0–8)
ERYTHROCYTE [DISTWIDTH] IN BLOOD BY AUTOMATED COUNT: 14.8 % (ref 11.5–14.5)
EST. GFR  (AFRICAN AMERICAN): >60 ML/MIN/1.73 M^2
EST. GFR  (NON AFRICAN AMERICAN): >60 ML/MIN/1.73 M^2
GLUCOSE SERPL-MCNC: 91 MG/DL (ref 70–110)
HCT VFR BLD AUTO: 40.7 % (ref 40–54)
HGB BLD-MCNC: 13.1 G/DL (ref 14–18)
IGA SERPL-MCNC: 227 MG/DL (ref 40–350)
IGG SERPL-MCNC: 606 MG/DL (ref 650–1600)
IGM SERPL-MCNC: 25 MG/DL (ref 50–300)
IMM GRANULOCYTES # BLD AUTO: 0.01 K/UL (ref 0–0.04)
IMM GRANULOCYTES NFR BLD AUTO: 0.3 % (ref 0–0.5)
LYMPHOCYTES # BLD AUTO: 1.2 K/UL (ref 1–4.8)
LYMPHOCYTES NFR BLD: 31 % (ref 18–48)
MCH RBC QN AUTO: 30.5 PG (ref 27–31)
MCHC RBC AUTO-ENTMCNC: 32.2 G/DL (ref 32–36)
MCV RBC AUTO: 95 FL (ref 82–98)
MONOCYTES # BLD AUTO: 0.3 K/UL (ref 0.3–1)
MONOCYTES NFR BLD: 7.3 % (ref 4–15)
NEUTROPHILS # BLD AUTO: 2.3 K/UL (ref 1.8–7.7)
NEUTROPHILS NFR BLD: 57.9 % (ref 38–73)
NRBC BLD-RTO: 0 /100 WBC
PLATELET # BLD AUTO: 182 K/UL (ref 150–350)
PMV BLD AUTO: 10.4 FL (ref 9.2–12.9)
POTASSIUM SERPL-SCNC: 4.1 MMOL/L (ref 3.5–5.1)
PROT SERPL-MCNC: 6.7 G/DL (ref 6–8.4)
RBC # BLD AUTO: 4.3 M/UL (ref 4.6–6.2)
SODIUM SERPL-SCNC: 143 MMOL/L (ref 136–145)
WBC # BLD AUTO: 3.97 K/UL (ref 3.9–12.7)

## 2020-11-25 PROCEDURE — 3079F PR MOST RECENT DIASTOLIC BLOOD PRESSURE 80-89 MM HG: ICD-10-PCS | Mod: BMT,CPTII,S$GLB, | Performed by: INTERNAL MEDICINE

## 2020-11-25 PROCEDURE — 82784 ASSAY IGA/IGD/IGG/IGM EACH: CPT | Mod: 59

## 2020-11-25 PROCEDURE — 84165 PROTEIN E-PHORESIS SERUM: CPT

## 2020-11-25 PROCEDURE — 86334 PATHOLOGIST INTERPRETATION IFE: ICD-10-PCS | Mod: 26,BMT,, | Performed by: PATHOLOGY

## 2020-11-25 PROCEDURE — 84165 PATHOLOGIST INTERPRETATION SPE: ICD-10-PCS | Mod: 26,BMT,, | Performed by: PATHOLOGY

## 2020-11-25 PROCEDURE — 1126F PR PAIN SEVERITY QUANTIFIED, NO PAIN PRESENT: ICD-10-PCS | Mod: BMT,S$GLB,, | Performed by: INTERNAL MEDICINE

## 2020-11-25 PROCEDURE — 1126F AMNT PAIN NOTED NONE PRSNT: CPT | Mod: BMT,S$GLB,, | Performed by: INTERNAL MEDICINE

## 2020-11-25 PROCEDURE — 99214 PR OFFICE/OUTPT VISIT, EST, LEVL IV, 30-39 MIN: ICD-10-PCS | Mod: BMT,S$GLB,, | Performed by: INTERNAL MEDICINE

## 2020-11-25 PROCEDURE — 3075F PR MOST RECENT SYSTOLIC BLOOD PRESS GE 130-139MM HG: ICD-10-PCS | Mod: BMT,CPTII,S$GLB, | Performed by: INTERNAL MEDICINE

## 2020-11-25 PROCEDURE — 84165 PROTEIN E-PHORESIS SERUM: CPT | Mod: 26,BMT,, | Performed by: PATHOLOGY

## 2020-11-25 PROCEDURE — 99999 PR PBB SHADOW E&M-EST. PATIENT-LVL III: CPT | Mod: PBBFAC,BMT,, | Performed by: INTERNAL MEDICINE

## 2020-11-25 PROCEDURE — 86334 IMMUNOFIX E-PHORESIS SERUM: CPT

## 2020-11-25 PROCEDURE — 3008F PR BODY MASS INDEX (BMI) DOCUMENTED: ICD-10-PCS | Mod: BMT,CPTII,S$GLB, | Performed by: INTERNAL MEDICINE

## 2020-11-25 PROCEDURE — 3075F SYST BP GE 130 - 139MM HG: CPT | Mod: BMT,CPTII,S$GLB, | Performed by: INTERNAL MEDICINE

## 2020-11-25 PROCEDURE — 36415 COLL VENOUS BLD VENIPUNCTURE: CPT

## 2020-11-25 PROCEDURE — 80053 COMPREHEN METABOLIC PANEL: CPT

## 2020-11-25 PROCEDURE — 3079F DIAST BP 80-89 MM HG: CPT | Mod: BMT,CPTII,S$GLB, | Performed by: INTERNAL MEDICINE

## 2020-11-25 PROCEDURE — 99999 PR PBB SHADOW E&M-EST. PATIENT-LVL III: ICD-10-PCS | Mod: PBBFAC,BMT,, | Performed by: INTERNAL MEDICINE

## 2020-11-25 PROCEDURE — 99214 OFFICE O/P EST MOD 30 MIN: CPT | Mod: BMT,S$GLB,, | Performed by: INTERNAL MEDICINE

## 2020-11-25 PROCEDURE — 83520 IMMUNOASSAY QUANT NOS NONAB: CPT | Mod: 59

## 2020-11-25 PROCEDURE — 3008F BODY MASS INDEX DOCD: CPT | Mod: BMT,CPTII,S$GLB, | Performed by: INTERNAL MEDICINE

## 2020-11-25 PROCEDURE — 86334 IMMUNOFIX E-PHORESIS SERUM: CPT | Mod: 26,BMT,, | Performed by: PATHOLOGY

## 2020-11-25 PROCEDURE — 85025 COMPLETE CBC W/AUTO DIFF WBC: CPT

## 2020-11-25 RX ORDER — CYCLOBENZAPRINE HCL 10 MG
TABLET ORAL
COMMUNITY
Start: 2020-08-25 | End: 2021-12-17

## 2020-11-25 NOTE — PROGRESS NOTES
HEMATOLOGIC MALIGNANCIES PROGRESS NOTE    IDENTIFYING STATEMENT   Arik Bobo (Arik) is a 55 y.o. male with a  of 1965 from [place] with the diagnosis of multiple myeloma.      ONCOLOGY HISTORY:    1. Multiple myeloma s/p autologous stem cell transplant   A. Early : Progressive anemia. K/L ratio > 100, and was treated with fina-dex   B. 12/3/2015: M-protein 1.02 g/dl, IgG-kappa by JAYESH. Kappa 2.98 mg/dl, lambda 0.5 mg/dl, ratio 5.96. BMBx shows 60-70% cellular marrow with 20% plasma cells, consistent with plasma cell myeloma. Hgb 11.8 g/dl, Calcium 9.44 (corrected). Creatinine 0.8 mg/dl.    C. 2016: M-protein 2.23 g/dl. Kappa 4.36 mg/dl, lambda 0.08 mg/dl, ratio 54.5. Progressive disease.   D. VCD therapy locally.    E. 2017: M-protein 1.23 g/dl.    F. 2017: M-protein 0.41 g/dl. Kappa 2.33 mg/dl, lambda 0.5 mg/dl, ratio 4.66, consistent with partial response.    G. 2017: Autologous stem cell transplant with melphalan conditioning.   H. 2018: Day +100 restaging - M-protein 0.19 g/dl, kappa 0.76 mg/dl, lambda 0.53 mg/dl, ratio 1.43. BMBx shows no definitive evidence of myeloma. Consistent with very good partial response.    I. 3/8/2018: M-protein 0.18 g/dl   J. 2018: M-protein 0.16 g/dl   K. 2018: M-protein 0.2 g/dl   L. 2018: M-protein 0.14 g/dl; BMBx for 1-year re-evaluation shows 40% cellular marrow with 6-7% plasma cells and no evidence of clonality; thus, no plasma cell neoplasm evident. Findings consistent with ongoing VGPR.   M. 4/3/2019: M-protein < 0.1 g/dl.     N. 7/10/2019: M-protein 0.18 g/dl.   O. 10/30/2019: M-protein 0.14 g/dl.    P. 2020: M-protein negative; JAYESH negative; findings consistent with complete response to therapy.     2. Depression  3. HTN  4. History of DVT in 2015 and 2017    INTERVAL HISTORY:      Mr. Bobo returns to clinic 3 years and 2 months after  autologous transplant for myeloma.     Son  in September.  Now expecting a child, first grandchild for Mr. Bobo. He is otherwise feeling well at this time. No bone pain. No fevers, chills, night sweats, weight loss. Tolerating lenalidomide well.     Past Medical History, Past Social History and Past Family History have been reviewed and are unchanged except as noted in the interval history.    MEDICATIONS:     Current Outpatient Medications on File Prior to Visit   Medication Sig Dispense Refill    amlodipine (NORVASC) 5 MG tablet Take 10 mg by mouth once daily.       apixaban 2.5 mg Tab Take 1 tablet (2.5 mg total) by mouth 2 (two) times daily. 60 tablet 3    citalopram (CELEXA) 40 MG tablet Take 40 mg by mouth.      lenalidomide (REVLIMID) 10 mg Cap TAKE 1 CAPSULE ONCE DAILY DAYS 1 THROUGH 21 ON A 28 DAY CYCLE. 21 each 0    lisinopril (PRINIVIL,ZESTRIL) 40 MG tablet   1    simvastatin (ZOCOR) 20 MG tablet   3    cyclobenzaprine (FLEXERIL) 10 MG tablet        No current facility-administered medications on file prior to visit.        ALLERGIES:     Review of patient's allergies indicates:   Allergen Reactions    Pcn [penicillins]      Unknown last dose as an infant         ROS:       Review of Systems   Constitutional: Negative for diaphoresis, fatigue, fever and unexpected weight change.   HENT:   Negative for lump/mass and sore throat.    Eyes: Negative for icterus.   Respiratory: Negative for cough and shortness of breath.    Cardiovascular: Negative for chest pain and palpitations.   Gastrointestinal: Negative for abdominal distention, constipation, diarrhea, nausea and vomiting.   Genitourinary: Negative for dysuria and frequency.    Musculoskeletal: Negative for arthralgias, gait problem and myalgias.   Skin: Negative for rash.   Neurological: Negative for dizziness, gait problem and headaches.   Hematological: Negative for adenopathy. Does not bruise/bleed easily.   Psychiatric/Behavioral: The patient is not nervous/anxious.        PHYSICAL EXAM:  Vitals:  "   11/25/20 1438   BP: 133/87   Pulse: 65   Resp: 16   Temp: 98.3 °F (36.8 °C)   TempSrc: Oral   SpO2: 96%   Weight: 114.1 kg (251 lb 10.5 oz)   Height: 5' 8" (1.727 m)   PainSc: 0-No pain   Body mass index is 38.26 kg/m².      Physical Exam  Constitutional:       General: He is not in acute distress.     Appearance: He is well-developed.   HENT:      Head: Normocephalic and atraumatic.      Mouth/Throat:      Mouth: No oral lesions.   Eyes:      Conjunctiva/sclera: Conjunctivae normal.   Neck:      Thyroid: No thyromegaly.   Cardiovascular:      Rate and Rhythm: Normal rate and regular rhythm.      Heart sounds: Normal heart sounds. No murmur.   Pulmonary:      Breath sounds: Normal breath sounds. No wheezing or rales.   Abdominal:      General: There is no distension.      Palpations: Abdomen is soft. There is no hepatomegaly, splenomegaly or mass.      Tenderness: There is no abdominal tenderness.   Lymphadenopathy:      Cervical: No cervical adenopathy.      Right cervical: No deep cervical adenopathy.     Left cervical: No deep cervical adenopathy.   Skin:     Findings: No rash.   Neurological:      Mental Status: He is alert and oriented to person, place, and time.      Cranial Nerves: No cranial nerve deficit.      Coordination: Coordination normal.      Deep Tendon Reflexes: Reflexes are normal and symmetric.         LAB:   Results for orders placed or performed in visit on 11/25/20   CBC auto differential   Result Value Ref Range    WBC 3.97 3.90 - 12.70 K/uL    RBC 4.30 (L) 4.60 - 6.20 M/uL    Hemoglobin 13.1 (L) 14.0 - 18.0 g/dL    Hematocrit 40.7 40.0 - 54.0 %    MCV 95 82 - 98 fL    MCH 30.5 27.0 - 31.0 pg    MCHC 32.2 32.0 - 36.0 g/dL    RDW 14.8 (H) 11.5 - 14.5 %    Platelets 182 150 - 350 K/uL    MPV 10.4 9.2 - 12.9 fL    Immature Granulocytes 0.3 0.0 - 0.5 %    Gran # (ANC) 2.3 1.8 - 7.7 K/uL    Immature Grans (Abs) 0.01 0.00 - 0.04 K/uL    Lymph # 1.2 1.0 - 4.8 K/uL    Mono # 0.3 0.3 - 1.0 K/uL    " Eos # 0.1 0.0 - 0.5 K/uL    Baso # 0.02 0.00 - 0.20 K/uL    nRBC 0 0 /100 WBC    Gran % 57.9 38.0 - 73.0 %    Lymph % 31.0 18.0 - 48.0 %    Mono % 7.3 4.0 - 15.0 %    Eosinophil % 3.0 0.0 - 8.0 %    Basophil % 0.5 0.0 - 1.9 %    Differential Method Automated    Comprehensive metabolic panel   Result Value Ref Range    Sodium 143 136 - 145 mmol/L    Potassium 4.1 3.5 - 5.1 mmol/L    Chloride 105 95 - 110 mmol/L    CO2 29 23 - 29 mmol/L    Glucose 91 70 - 110 mg/dL    BUN 12 6 - 20 mg/dL    Creatinine 1.1 0.5 - 1.4 mg/dL    Calcium 9.2 8.7 - 10.5 mg/dL    Total Protein 6.7 6.0 - 8.4 g/dL    Albumin 3.8 3.5 - 5.2 g/dL    Total Bilirubin 0.8 0.1 - 1.0 mg/dL    Alkaline Phosphatase 106 55 - 135 U/L    AST 23 10 - 40 U/L    ALT 25 10 - 44 U/L    Anion Gap 9 8 - 16 mmol/L    eGFR if African American >60.0 >60 mL/min/1.73 m^2    eGFR if non African American >60.0 >60 mL/min/1.73 m^2   Immunofixation Electrophoresis   Result Value Ref Range    Immunofix Interp. SEE COMMENT    Protein electrophoresis, serum   Result Value Ref Range    Protein, Serum 6.2 6.0 - 8.4 g/dL    Albumin 3.85 3.35 - 5.55 g/dL    Alpha-1 0.32 0.17 - 0.41 g/dL    Alpha-2 0.72 0.43 - 0.99 g/dL    Beta 0.74 0.50 - 1.10 g/dL    Gamma 0.57 (L) 0.67 - 1.58 g/dL   Immunoglobulin Free LT Chains Blood   Result Value Ref Range    Kappa Free Light Chains 1.16 0.33 - 1.94 mg/dL    Lambda Free Light Chains 0.73 0.57 - 2.63 mg/dL    Kappa/Lambda FLC Ratio 1.59 0.26 - 1.65   Immunoglobulins (IgG, IgA, IgM) Quantitative   Result Value Ref Range    IgG 606 (L) 650 - 1600 mg/dL    IgA 227 40 - 350 mg/dL    IgM 25 (L) 50 - 300 mg/dL   Pathologist Interpretation JAYESH   Result Value Ref Range    Pathologist Interpretation JAYESH REVIEWED    Pathologist Interpretation SPE   Result Value Ref Range    Pathologist Interpretation SPE REVIEWED        PROBLEMS ASSESSED THIS VISIT:    1. Multiple myeloma in remission    2. Obesity, Class II, BMI 35-39.9    3. H/O autologous stem cell  transplant        PLAN:       Multiple myeloma    Mr. Bobo remains in complete remission from multiple myeloma. We will continue lenalidomide maintenance, which is well tolerated.    History of autologous stem cell transplant    He is 3 years, 2 months post ASCT. Current disease status is complete remission. No evidence of relapse to date since ASCT.    Class 2 obesity  Weight has gradually trended up over last two years. He should consider weight loss intervention.     Follow-up in 3 months    Italo Bryant MD  Hematology and Stem Cell Transplant

## 2020-11-27 LAB
ALBUMIN SERPL ELPH-MCNC: 3.85 G/DL (ref 3.35–5.55)
ALPHA1 GLOB SERPL ELPH-MCNC: 0.32 G/DL (ref 0.17–0.41)
ALPHA2 GLOB SERPL ELPH-MCNC: 0.72 G/DL (ref 0.43–0.99)
B-GLOBULIN SERPL ELPH-MCNC: 0.74 G/DL (ref 0.5–1.1)
GAMMA GLOB SERPL ELPH-MCNC: 0.57 G/DL (ref 0.67–1.58)
INTERPRETATION SERPL IFE-IMP: NORMAL
KAPPA LC SER QL IA: 1.16 MG/DL (ref 0.33–1.94)
KAPPA LC/LAMBDA SER IA: 1.59 (ref 0.26–1.65)
LAMBDA LC SER QL IA: 0.73 MG/DL (ref 0.57–2.63)
PROT SERPL-MCNC: 6.2 G/DL (ref 6–8.4)

## 2020-11-29 LAB
PATHOLOGIST INTERPRETATION IFE: NORMAL
PATHOLOGIST INTERPRETATION SPE: NORMAL

## 2020-12-09 DIAGNOSIS — C90.01 MULTIPLE MYELOMA IN REMISSION: ICD-10-CM

## 2020-12-09 RX ORDER — LENALIDOMIDE 10 MG/1
CAPSULE ORAL
Qty: 21 EACH | Refills: 0 | Status: SHIPPED | OUTPATIENT
Start: 2020-12-09 | End: 2021-01-06 | Stop reason: SDUPTHER

## 2021-01-06 DIAGNOSIS — C90.01 MULTIPLE MYELOMA IN REMISSION: ICD-10-CM

## 2021-01-06 RX ORDER — LENALIDOMIDE 10 MG/1
CAPSULE ORAL
Qty: 21 EACH | Refills: 0 | Status: SHIPPED | OUTPATIENT
Start: 2021-01-06 | End: 2021-02-17 | Stop reason: SDUPTHER

## 2021-01-22 ENCOUNTER — TELEPHONE (OUTPATIENT)
Dept: HEMATOLOGY/ONCOLOGY | Facility: CLINIC | Age: 56
End: 2021-01-22

## 2021-01-27 ENCOUNTER — TELEPHONE (OUTPATIENT)
Dept: HEMATOLOGY/ONCOLOGY | Facility: CLINIC | Age: 56
End: 2021-01-27

## 2021-02-17 DIAGNOSIS — C90.01 MULTIPLE MYELOMA IN REMISSION: ICD-10-CM

## 2021-02-17 RX ORDER — LENALIDOMIDE 10 MG/1
CAPSULE ORAL
Qty: 21 EACH | Refills: 0 | Status: SHIPPED | OUTPATIENT
Start: 2021-02-17 | End: 2021-03-23 | Stop reason: SDUPTHER

## 2021-03-03 ENCOUNTER — OFFICE VISIT (OUTPATIENT)
Dept: HEMATOLOGY/ONCOLOGY | Facility: CLINIC | Age: 56
End: 2021-03-03
Payer: COMMERCIAL

## 2021-03-03 ENCOUNTER — LAB VISIT (OUTPATIENT)
Dept: LAB | Facility: HOSPITAL | Age: 56
End: 2021-03-03
Attending: INTERNAL MEDICINE
Payer: COMMERCIAL

## 2021-03-03 VITALS
TEMPERATURE: 98 F | WEIGHT: 255.5 LBS | RESPIRATION RATE: 16 BRPM | BODY MASS INDEX: 38.72 KG/M2 | DIASTOLIC BLOOD PRESSURE: 75 MMHG | HEART RATE: 58 BPM | OXYGEN SATURATION: 97 % | HEIGHT: 68 IN | SYSTOLIC BLOOD PRESSURE: 127 MMHG

## 2021-03-03 DIAGNOSIS — Z94.84 H/O AUTOLOGOUS STEM CELL TRANSPLANT: ICD-10-CM

## 2021-03-03 DIAGNOSIS — C90.01 MULTIPLE MYELOMA IN REMISSION: ICD-10-CM

## 2021-03-03 DIAGNOSIS — C90.01 MULTIPLE MYELOMA IN REMISSION: Primary | ICD-10-CM

## 2021-03-03 LAB
ALBUMIN SERPL BCP-MCNC: 3.7 G/DL (ref 3.5–5.2)
ALP SERPL-CCNC: 97 U/L (ref 55–135)
ALT SERPL W/O P-5'-P-CCNC: 25 U/L (ref 10–44)
ANION GAP SERPL CALC-SCNC: 8 MMOL/L (ref 8–16)
AST SERPL-CCNC: 21 U/L (ref 10–40)
BASOPHILS # BLD AUTO: 0.06 K/UL (ref 0–0.2)
BASOPHILS NFR BLD: 1.6 % (ref 0–1.9)
BILIRUB SERPL-MCNC: 0.6 MG/DL (ref 0.1–1)
BUN SERPL-MCNC: 11 MG/DL (ref 6–20)
CALCIUM SERPL-MCNC: 9.3 MG/DL (ref 8.7–10.5)
CHLORIDE SERPL-SCNC: 105 MMOL/L (ref 95–110)
CO2 SERPL-SCNC: 28 MMOL/L (ref 23–29)
CREAT SERPL-MCNC: 0.9 MG/DL (ref 0.5–1.4)
DIFFERENTIAL METHOD: ABNORMAL
EOSINOPHIL # BLD AUTO: 0.1 K/UL (ref 0–0.5)
EOSINOPHIL NFR BLD: 3.8 % (ref 0–8)
ERYTHROCYTE [DISTWIDTH] IN BLOOD BY AUTOMATED COUNT: 13.8 % (ref 11.5–14.5)
EST. GFR  (AFRICAN AMERICAN): >60 ML/MIN/1.73 M^2
EST. GFR  (NON AFRICAN AMERICAN): >60 ML/MIN/1.73 M^2
GLUCOSE SERPL-MCNC: 94 MG/DL (ref 70–110)
HCT VFR BLD AUTO: 42.4 % (ref 40–54)
HGB BLD-MCNC: 14.1 G/DL (ref 14–18)
IGA SERPL-MCNC: 254 MG/DL (ref 40–350)
IGG SERPL-MCNC: 712 MG/DL (ref 650–1600)
IGM SERPL-MCNC: 36 MG/DL (ref 50–300)
IMM GRANULOCYTES # BLD AUTO: 0.02 K/UL (ref 0–0.04)
IMM GRANULOCYTES NFR BLD AUTO: 0.5 % (ref 0–0.5)
LYMPHOCYTES # BLD AUTO: 1.2 K/UL (ref 1–4.8)
LYMPHOCYTES NFR BLD: 32.2 % (ref 18–48)
MCH RBC QN AUTO: 30.6 PG (ref 27–31)
MCHC RBC AUTO-ENTMCNC: 33.3 G/DL (ref 32–36)
MCV RBC AUTO: 92 FL (ref 82–98)
MONOCYTES # BLD AUTO: 0.4 K/UL (ref 0.3–1)
MONOCYTES NFR BLD: 10.5 % (ref 4–15)
NEUTROPHILS # BLD AUTO: 1.9 K/UL (ref 1.8–7.7)
NEUTROPHILS NFR BLD: 51.4 % (ref 38–73)
NRBC BLD-RTO: 0 /100 WBC
PLATELET # BLD AUTO: 168 K/UL (ref 150–350)
PMV BLD AUTO: 10.3 FL (ref 9.2–12.9)
POTASSIUM SERPL-SCNC: 4.4 MMOL/L (ref 3.5–5.1)
PROT SERPL-MCNC: 6.9 G/DL (ref 6–8.4)
RBC # BLD AUTO: 4.61 M/UL (ref 4.6–6.2)
SODIUM SERPL-SCNC: 141 MMOL/L (ref 136–145)
WBC # BLD AUTO: 3.7 K/UL (ref 3.9–12.7)

## 2021-03-03 PROCEDURE — 99999 PR PBB SHADOW E&M-EST. PATIENT-LVL III: ICD-10-PCS | Mod: PBBFAC,BMT,, | Performed by: INTERNAL MEDICINE

## 2021-03-03 PROCEDURE — 84165 PATHOLOGIST INTERPRETATION SPE: ICD-10-PCS | Mod: 26,BMT,, | Performed by: PATHOLOGY

## 2021-03-03 PROCEDURE — 3074F PR MOST RECENT SYSTOLIC BLOOD PRESSURE < 130 MM HG: ICD-10-PCS | Mod: BMT,CPTII,S$GLB, | Performed by: INTERNAL MEDICINE

## 2021-03-03 PROCEDURE — 3078F DIAST BP <80 MM HG: CPT | Mod: BMT,CPTII,S$GLB, | Performed by: INTERNAL MEDICINE

## 2021-03-03 PROCEDURE — 86334 PATHOLOGIST INTERPRETATION IFE: ICD-10-PCS | Mod: 26,BMT,, | Performed by: PATHOLOGY

## 2021-03-03 PROCEDURE — 80053 COMPREHEN METABOLIC PANEL: CPT | Performed by: INTERNAL MEDICINE

## 2021-03-03 PROCEDURE — 3078F PR MOST RECENT DIASTOLIC BLOOD PRESSURE < 80 MM HG: ICD-10-PCS | Mod: BMT,CPTII,S$GLB, | Performed by: INTERNAL MEDICINE

## 2021-03-03 PROCEDURE — 83520 IMMUNOASSAY QUANT NOS NONAB: CPT | Mod: 59 | Performed by: INTERNAL MEDICINE

## 2021-03-03 PROCEDURE — 86334 IMMUNOFIX E-PHORESIS SERUM: CPT | Mod: 26,BMT,, | Performed by: PATHOLOGY

## 2021-03-03 PROCEDURE — 84165 PROTEIN E-PHORESIS SERUM: CPT | Mod: 26,BMT,, | Performed by: PATHOLOGY

## 2021-03-03 PROCEDURE — 99214 OFFICE O/P EST MOD 30 MIN: CPT | Mod: BMT,S$GLB,, | Performed by: INTERNAL MEDICINE

## 2021-03-03 PROCEDURE — 82784 ASSAY IGA/IGD/IGG/IGM EACH: CPT | Mod: 59 | Performed by: INTERNAL MEDICINE

## 2021-03-03 PROCEDURE — 86334 IMMUNOFIX E-PHORESIS SERUM: CPT | Performed by: INTERNAL MEDICINE

## 2021-03-03 PROCEDURE — 1126F PR PAIN SEVERITY QUANTIFIED, NO PAIN PRESENT: ICD-10-PCS | Mod: BMT,S$GLB,, | Performed by: INTERNAL MEDICINE

## 2021-03-03 PROCEDURE — 3074F SYST BP LT 130 MM HG: CPT | Mod: BMT,CPTII,S$GLB, | Performed by: INTERNAL MEDICINE

## 2021-03-03 PROCEDURE — 36415 COLL VENOUS BLD VENIPUNCTURE: CPT | Performed by: INTERNAL MEDICINE

## 2021-03-03 PROCEDURE — 3008F BODY MASS INDEX DOCD: CPT | Mod: BMT,CPTII,S$GLB, | Performed by: INTERNAL MEDICINE

## 2021-03-03 PROCEDURE — 99999 PR PBB SHADOW E&M-EST. PATIENT-LVL III: CPT | Mod: PBBFAC,BMT,, | Performed by: INTERNAL MEDICINE

## 2021-03-03 PROCEDURE — 1126F AMNT PAIN NOTED NONE PRSNT: CPT | Mod: BMT,S$GLB,, | Performed by: INTERNAL MEDICINE

## 2021-03-03 PROCEDURE — 99214 PR OFFICE/OUTPT VISIT, EST, LEVL IV, 30-39 MIN: ICD-10-PCS | Mod: BMT,S$GLB,, | Performed by: INTERNAL MEDICINE

## 2021-03-03 PROCEDURE — 84165 PROTEIN E-PHORESIS SERUM: CPT | Performed by: INTERNAL MEDICINE

## 2021-03-03 PROCEDURE — 85025 COMPLETE CBC W/AUTO DIFF WBC: CPT | Performed by: INTERNAL MEDICINE

## 2021-03-03 PROCEDURE — 3008F PR BODY MASS INDEX (BMI) DOCUMENTED: ICD-10-PCS | Mod: BMT,CPTII,S$GLB, | Performed by: INTERNAL MEDICINE

## 2021-03-03 RX ORDER — AMLODIPINE BESYLATE 10 MG/1
TABLET ORAL
COMMUNITY
Start: 2021-02-23

## 2021-03-03 RX ORDER — LISINOPRIL 10 MG/1
TABLET ORAL
COMMUNITY
Start: 2021-02-23

## 2021-03-04 LAB
ALBUMIN SERPL ELPH-MCNC: 3.94 G/DL (ref 3.35–5.55)
ALPHA1 GLOB SERPL ELPH-MCNC: 0.33 G/DL (ref 0.17–0.41)
ALPHA2 GLOB SERPL ELPH-MCNC: 0.7 G/DL (ref 0.43–0.99)
B-GLOBULIN SERPL ELPH-MCNC: 0.85 G/DL (ref 0.5–1.1)
GAMMA GLOB SERPL ELPH-MCNC: 0.69 G/DL (ref 0.67–1.58)
INTERPRETATION SERPL IFE-IMP: NORMAL
KAPPA LC SER QL IA: 1.7 MG/DL (ref 0.33–1.94)
KAPPA LC/LAMBDA SER IA: 1.49 (ref 0.26–1.65)
LAMBDA LC SER QL IA: 1.14 MG/DL (ref 0.57–2.63)
PATHOLOGIST INTERPRETATION IFE: NORMAL
PATHOLOGIST INTERPRETATION SPE: NORMAL
PROT SERPL-MCNC: 6.5 G/DL (ref 6–8.4)

## 2021-03-23 DIAGNOSIS — C90.01 MULTIPLE MYELOMA IN REMISSION: ICD-10-CM

## 2021-03-23 RX ORDER — LENALIDOMIDE 10 MG/1
CAPSULE ORAL
Qty: 21 EACH | Refills: 0 | Status: SHIPPED | OUTPATIENT
Start: 2021-03-23 | End: 2021-04-16 | Stop reason: SDUPTHER

## 2021-04-16 DIAGNOSIS — C90.01 MULTIPLE MYELOMA IN REMISSION: ICD-10-CM

## 2021-04-16 RX ORDER — LENALIDOMIDE 10 MG/1
CAPSULE ORAL
Qty: 21 EACH | Refills: 0 | Status: SHIPPED | OUTPATIENT
Start: 2021-04-16 | End: 2021-05-14 | Stop reason: SDUPTHER

## 2021-05-14 DIAGNOSIS — C90.01 MULTIPLE MYELOMA IN REMISSION: ICD-10-CM

## 2021-05-14 RX ORDER — LENALIDOMIDE 10 MG/1
CAPSULE ORAL
Qty: 21 EACH | Refills: 0 | Status: SHIPPED | OUTPATIENT
Start: 2021-05-14 | End: 2021-06-16 | Stop reason: SDUPTHER

## 2021-06-04 ENCOUNTER — OFFICE VISIT (OUTPATIENT)
Dept: HEMATOLOGY/ONCOLOGY | Facility: CLINIC | Age: 56
End: 2021-06-04
Payer: COMMERCIAL

## 2021-06-04 ENCOUNTER — LAB VISIT (OUTPATIENT)
Dept: LAB | Facility: HOSPITAL | Age: 56
End: 2021-06-04
Attending: INTERNAL MEDICINE
Payer: COMMERCIAL

## 2021-06-04 VITALS
HEART RATE: 54 BPM | DIASTOLIC BLOOD PRESSURE: 91 MMHG | BODY MASS INDEX: 38.41 KG/M2 | RESPIRATION RATE: 16 BRPM | TEMPERATURE: 98 F | SYSTOLIC BLOOD PRESSURE: 155 MMHG | WEIGHT: 253.44 LBS | HEIGHT: 68 IN | OXYGEN SATURATION: 97 %

## 2021-06-04 DIAGNOSIS — Z94.84 H/O AUTOLOGOUS STEM CELL TRANSPLANT: ICD-10-CM

## 2021-06-04 DIAGNOSIS — I10 ESSENTIAL HYPERTENSION: ICD-10-CM

## 2021-06-04 DIAGNOSIS — C90.01 MULTIPLE MYELOMA IN REMISSION: Primary | ICD-10-CM

## 2021-06-04 DIAGNOSIS — C90.01 MULTIPLE MYELOMA IN REMISSION: ICD-10-CM

## 2021-06-04 DIAGNOSIS — T45.1X5A LEUKOPENIA DUE TO ANTINEOPLASTIC CHEMOTHERAPY: ICD-10-CM

## 2021-06-04 DIAGNOSIS — D70.1 LEUKOPENIA DUE TO ANTINEOPLASTIC CHEMOTHERAPY: ICD-10-CM

## 2021-06-04 LAB
ALBUMIN SERPL BCP-MCNC: 3.7 G/DL (ref 3.5–5.2)
ALP SERPL-CCNC: 96 U/L (ref 55–135)
ALT SERPL W/O P-5'-P-CCNC: 24 U/L (ref 10–44)
ANION GAP SERPL CALC-SCNC: 8 MMOL/L (ref 8–16)
AST SERPL-CCNC: 21 U/L (ref 10–40)
BASOPHILS # BLD AUTO: 0.02 K/UL (ref 0–0.2)
BASOPHILS NFR BLD: 0.6 % (ref 0–1.9)
BILIRUB SERPL-MCNC: 0.7 MG/DL (ref 0.1–1)
BUN SERPL-MCNC: 15 MG/DL (ref 6–20)
CALCIUM SERPL-MCNC: 9.4 MG/DL (ref 8.7–10.5)
CHLORIDE SERPL-SCNC: 105 MMOL/L (ref 95–110)
CO2 SERPL-SCNC: 27 MMOL/L (ref 23–29)
CREAT SERPL-MCNC: 0.9 MG/DL (ref 0.5–1.4)
DIFFERENTIAL METHOD: ABNORMAL
EOSINOPHIL # BLD AUTO: 0.1 K/UL (ref 0–0.5)
EOSINOPHIL NFR BLD: 2.1 % (ref 0–8)
ERYTHROCYTE [DISTWIDTH] IN BLOOD BY AUTOMATED COUNT: 13.7 % (ref 11.5–14.5)
EST. GFR  (AFRICAN AMERICAN): >60 ML/MIN/1.73 M^2
EST. GFR  (NON AFRICAN AMERICAN): >60 ML/MIN/1.73 M^2
GLUCOSE SERPL-MCNC: 92 MG/DL (ref 70–110)
HCT VFR BLD AUTO: 40.9 % (ref 40–54)
HGB BLD-MCNC: 13.5 G/DL (ref 14–18)
IGA SERPL-MCNC: 206 MG/DL (ref 40–350)
IGG SERPL-MCNC: 673 MG/DL (ref 650–1600)
IGM SERPL-MCNC: 27 MG/DL (ref 50–300)
IMM GRANULOCYTES # BLD AUTO: 0.01 K/UL (ref 0–0.04)
IMM GRANULOCYTES NFR BLD AUTO: 0.3 % (ref 0–0.5)
LYMPHOCYTES # BLD AUTO: 1 K/UL (ref 1–4.8)
LYMPHOCYTES NFR BLD: 29.8 % (ref 18–48)
MCH RBC QN AUTO: 30.5 PG (ref 27–31)
MCHC RBC AUTO-ENTMCNC: 33 G/DL (ref 32–36)
MCV RBC AUTO: 93 FL (ref 82–98)
MONOCYTES # BLD AUTO: 0.3 K/UL (ref 0.3–1)
MONOCYTES NFR BLD: 8.5 % (ref 4–15)
NEUTROPHILS # BLD AUTO: 1.9 K/UL (ref 1.8–7.7)
NEUTROPHILS NFR BLD: 58.7 % (ref 38–73)
NRBC BLD-RTO: 0 /100 WBC
PLATELET # BLD AUTO: 153 K/UL (ref 150–450)
PLATELET BLD QL SMEAR: ABNORMAL
PMV BLD AUTO: 10.1 FL (ref 9.2–12.9)
POTASSIUM SERPL-SCNC: 4.3 MMOL/L (ref 3.5–5.1)
PROT SERPL-MCNC: 6.9 G/DL (ref 6–8.4)
RBC # BLD AUTO: 4.42 M/UL (ref 4.6–6.2)
SODIUM SERPL-SCNC: 140 MMOL/L (ref 136–145)
WBC # BLD AUTO: 3.29 K/UL (ref 3.9–12.7)

## 2021-06-04 PROCEDURE — 99214 PR OFFICE/OUTPT VISIT, EST, LEVL IV, 30-39 MIN: ICD-10-PCS | Mod: BMT,S$GLB,, | Performed by: INTERNAL MEDICINE

## 2021-06-04 PROCEDURE — 85025 COMPLETE CBC W/AUTO DIFF WBC: CPT | Performed by: INTERNAL MEDICINE

## 2021-06-04 PROCEDURE — 99999 PR PBB SHADOW E&M-EST. PATIENT-LVL III: ICD-10-PCS | Mod: PBBFAC,BMT,, | Performed by: INTERNAL MEDICINE

## 2021-06-04 PROCEDURE — 84165 PROTEIN E-PHORESIS SERUM: CPT | Performed by: INTERNAL MEDICINE

## 2021-06-04 PROCEDURE — 3008F BODY MASS INDEX DOCD: CPT | Mod: BMT,CPTII,S$GLB, | Performed by: INTERNAL MEDICINE

## 2021-06-04 PROCEDURE — 84165 PATHOLOGIST INTERPRETATION SPE: ICD-10-PCS | Mod: 26,BMT,, | Performed by: PATHOLOGY

## 2021-06-04 PROCEDURE — 99214 OFFICE O/P EST MOD 30 MIN: CPT | Mod: BMT,S$GLB,, | Performed by: INTERNAL MEDICINE

## 2021-06-04 PROCEDURE — 3008F PR BODY MASS INDEX (BMI) DOCUMENTED: ICD-10-PCS | Mod: BMT,CPTII,S$GLB, | Performed by: INTERNAL MEDICINE

## 2021-06-04 PROCEDURE — 86334 IMMUNOFIX E-PHORESIS SERUM: CPT | Performed by: INTERNAL MEDICINE

## 2021-06-04 PROCEDURE — 1126F PR PAIN SEVERITY QUANTIFIED, NO PAIN PRESENT: ICD-10-PCS | Mod: BMT,S$GLB,, | Performed by: INTERNAL MEDICINE

## 2021-06-04 PROCEDURE — 84165 PROTEIN E-PHORESIS SERUM: CPT | Mod: 26,BMT,, | Performed by: PATHOLOGY

## 2021-06-04 PROCEDURE — 86334 PATHOLOGIST INTERPRETATION IFE: ICD-10-PCS | Mod: 26,BMT,, | Performed by: PATHOLOGY

## 2021-06-04 PROCEDURE — 82784 ASSAY IGA/IGD/IGG/IGM EACH: CPT | Mod: 59 | Performed by: INTERNAL MEDICINE

## 2021-06-04 PROCEDURE — 83520 IMMUNOASSAY QUANT NOS NONAB: CPT | Performed by: INTERNAL MEDICINE

## 2021-06-04 PROCEDURE — 80053 COMPREHEN METABOLIC PANEL: CPT | Performed by: INTERNAL MEDICINE

## 2021-06-04 PROCEDURE — 36415 COLL VENOUS BLD VENIPUNCTURE: CPT | Performed by: INTERNAL MEDICINE

## 2021-06-04 PROCEDURE — 99999 PR PBB SHADOW E&M-EST. PATIENT-LVL III: CPT | Mod: PBBFAC,BMT,, | Performed by: INTERNAL MEDICINE

## 2021-06-04 PROCEDURE — 1126F AMNT PAIN NOTED NONE PRSNT: CPT | Mod: BMT,S$GLB,, | Performed by: INTERNAL MEDICINE

## 2021-06-04 PROCEDURE — 86334 IMMUNOFIX E-PHORESIS SERUM: CPT | Mod: 26,BMT,, | Performed by: PATHOLOGY

## 2021-06-07 LAB
ALBUMIN SERPL ELPH-MCNC: 3.94 G/DL (ref 3.35–5.55)
ALPHA1 GLOB SERPL ELPH-MCNC: 0.33 G/DL (ref 0.17–0.41)
ALPHA2 GLOB SERPL ELPH-MCNC: 0.75 G/DL (ref 0.43–0.99)
B-GLOBULIN SERPL ELPH-MCNC: 0.83 G/DL (ref 0.5–1.1)
GAMMA GLOB SERPL ELPH-MCNC: 0.66 G/DL (ref 0.67–1.58)
INTERPRETATION SERPL IFE-IMP: NORMAL
KAPPA LC SER QL IA: 1.02 MG/DL (ref 0.33–1.94)
KAPPA LC/LAMBDA SER IA: 2.13 (ref 0.26–1.65)
LAMBDA LC SER QL IA: 0.48 MG/DL (ref 0.57–2.63)
PATHOLOGIST INTERPRETATION IFE: NORMAL
PATHOLOGIST INTERPRETATION SPE: NORMAL
PROT SERPL-MCNC: 6.5 G/DL (ref 6–8.4)

## 2021-06-16 DIAGNOSIS — C90.01 MULTIPLE MYELOMA IN REMISSION: ICD-10-CM

## 2021-06-16 RX ORDER — LENALIDOMIDE 10 MG/1
CAPSULE ORAL
Qty: 21 EACH | Refills: 0 | Status: SHIPPED | OUTPATIENT
Start: 2021-06-16 | End: 2021-07-15

## 2021-07-15 DIAGNOSIS — C90.01 MULTIPLE MYELOMA IN REMISSION: ICD-10-CM

## 2021-07-16 RX ORDER — LENALIDOMIDE 10 MG/1
CAPSULE ORAL
Qty: 21 EACH | Refills: 3 | Status: SHIPPED | OUTPATIENT
Start: 2021-07-16 | End: 2021-08-24

## 2021-08-04 ENCOUNTER — TELEPHONE (OUTPATIENT)
Dept: HEMATOLOGY/ONCOLOGY | Facility: CLINIC | Age: 56
End: 2021-08-04

## 2021-08-04 DIAGNOSIS — U07.1 COVID-19: ICD-10-CM

## 2021-08-04 DIAGNOSIS — C90.01 MULTIPLE MYELOMA IN REMISSION: Primary | ICD-10-CM

## 2021-08-26 DIAGNOSIS — C90.01 MULTIPLE MYELOMA IN REMISSION: ICD-10-CM

## 2021-08-27 RX ORDER — LENALIDOMIDE 10 MG/1
CAPSULE ORAL
Qty: 21 EACH | Refills: 2 | Status: SHIPPED | OUTPATIENT
Start: 2021-08-27 | End: 2021-10-01

## 2021-08-28 NOTE — PROGRESS NOTES
[facility]  Hematology  Bone Marrow Transplant  Progress Note    Patient Name: Arik Bobo  Admission Date: 9/25/2017  Hospital Length of Stay: 11 days  Code Status: Full Code    Subjective:     Interval History:   - Day + 9 HD Melphalan after auto SCT.   - Remains febrile over the last 24 hours with T Max 101.9 last night, repeated panculture. Initial CXR showed bilateral pneumonia; repeat with continue bilat PNA and some improvement in interstitial lung disease. Blood cx ngtd. D/C'd vancomycin. Currently on zosyn and donnell. 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 Crypto negative.  - Sore throat and diarrhea continue. Denies tenderness or cramping. Using [place]. C. Diff negative from 10/4.   - States he is feeling much better today and breathing better    Objective:     Vital Signs (Most Recent):  Temp: 98.4 °F (36.9 °C) (10/06/17 1206)  Pulse: 84 (10/06/17 1207)  Resp: 20 (10/06/17 1206)  BP: (!) 121/57 (10/06/17 1207)  SpO2: (!) 91 % (10/06/17 1207) Vital Signs (24h Range):  Temp:  [98.4 °F (36.9 °C)-101.9 °F (38.8 °C)] 98.4 °F (36.9 °C)  Pulse:  [78-93] 84  Resp:  [16-28] 20  SpO2:  [88 %-97 %] 91 %  BP: (120-158)/(57-74) 121/57     Weight: 116.4 kg (256 lb 11.6 oz)  Body mass index is 39.03 kg/m².  Body surface area is 2.36 meters squared.    ECOG SCORE         [unfilled]    Intake/Output - Last 3 Shifts       10/04 0700 - 10/05 0659 10/05 0700 - 10/06 0659 10/06 0700 - 10/07 0659    P.O. 1320 1140 320    I.V. (mL/kg) 1531 (13.2) 1167.9 (10) 657 (5.6)    Blood  242 233    IV Piggyback 975 1600 100    Total Intake(mL/kg) 3826 (33) 4149.9 (35.7) 1310 (11.3)    Urine (mL/kg/hr) 1425 (0.5) 150 (0.1) 0 (0)    Stool 225 (0.1) 950 (0.3) 600 (1)    Total Output 1650 1100 600    Net +2176 +3049.9 +710           Urine Occurrence  6 x 2 x    Stool Occurrence 4 x 1 x           Physical Exam   Constitutional: He is oriented to person, place, and time. He appears well-developed and  well-nourished. No distress. Nasal cannula in place.   HENT:   Head: Normocephalic and atraumatic.   Nose: Nose normal.   Mouth/Throat: Oral lesions (mucositis, getting dukes) present. Oropharyngeal exudate and posterior oropharyngeal erythema present.   Eyes: Pupils are equal, round, and reactive to light.   Neck: Neck supple.   Cardiovascular: Normal rate, regular rhythm and normal heart sounds.    Pulmonary/Chest: Effort normal. He has decreased breath sounds.   Abdominal: Soft. Bowel sounds are normal. He exhibits no distension. There is no tenderness (none noted on exam).   Musculoskeletal: He exhibits no edema.   Neurological: He is alert and oriented to person, place, and time.   Skin: Skin is warm and dry. No rash noted.   Psychiatric: He has a normal mood and affect. His behavior is normal.   Nursing note and vitals reviewed.      Significant Labs:   CBC:     Recent Labs  Lab 10/05/17  0331 10/05/17  1249 10/06/17  0342   WBC 0.09*  --  0.12*   HGB 8.9*  --  8.5*   HCT 25.5*  --  24.1*   PLT 3* 12* 5*    and CMP:     Recent Labs  Lab 10/05/17  0331 10/06/17  0342   * 134*   K 3.5 3.5    106   CO2 21* 20*   * 123*   BUN 8 9   CREATININE 0.7 0.8   CALCIUM 7.6* 7.8*   PROT 5.3* 5.3*   ALBUMIN 2.6* 2.4*   BILITOT 0.8 0.6   ALKPHOS 84 80   AST 33 27   ALT 30 24   ANIONGAP 8 8   EGFRNONAA >60.0 >60.0       Diagnostic Results:    Repeat cxy 10/6/17: Abnormal chest radiograph consistent with bilateral pneumonia.  Improvement in interstitial lung disease since 10/4/2017 suggests interval diuresis.  No new disease identified.    Blood cx 10/3/17 and 10/6/17 NGTD    Urine cx 10/3/17 no growth and 10/6/17 in process    Assessment/Plan:     * H/O autologous stem cell transplant    - Today is day +9  - Melphalan given on 9/26/17 without complications  - Auto SCT day 0 on 9/27/17; received 5 bags with a CD 34 count of 4.71 x 10^6/kg - tolerated well   - ppx antimicrobials to start per protocol  -  Neupogen started day + 3   - with possible mucositis, ordered [place] (10/3/17)  - with n/v, continue ativan    - hospital course complicated with neutropenic fever r/t PNA and possibly early engraftment         Multiple myeloma in remission    - Completed 4 cycles of CyBorD   - Was treated with lenalidomide and dex  - Restaging marrow 8/21/17 showed a 10-50% cellular marrow with trilineage hematopoiesis and 5% residual  kappa-restricted plasma cells (6% by morphology).  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  Hence, he has achieved a partial remission (PR1) and is ready to proceed with transplant.    65% EF on 8/2/17   - Possible need to consider an allogeneic transplant as a cure in future as pt is young. This is not an immediate consideration but since he has 4 siblings there is a 68% chance of finding a sibling match.   - s/p auto SCT as above          Neutropenic fever    - Tmax in 24 hours 101.9 on 10/5/17 at 2345  - Blood cx x 2, chest xray, urine cx all ordered on 10/3/17 ngtd and do not give source of infection; ordered chest xray PA/LAT 10/4/17 (bilateral PNA)  - Repeat pancultures on 10/6/17, chest xray shows same bilateral PNA and improving interstitial lung disease  - Cefepime from 10/3/17 to 10/5/17  - Vanc from 10/4/17-10/6/17  - Zosyn started 10/5/17 and continues  - fever curve improving  - ID consulted 10/5/17 and following; appreciate recommendations   - Fungal markers in process, crypto negative  - Will re culture q 24 hours if continues to be febrile        Pancytopenia due to chemotherapy    - WBC 0.12 k/uL; ANC 11; with monocytes  - Hemoglobin 8.5 grams  - Plts 5K; transfusing today  - Transfuse for hemoglobin <7 and platelets <10,000         Chemotherapy induced diarrhea    - Cdiff negative initially; recheck cdiff 10/4/17 negative  - Repeat cdiff negative, give scheduled loperamide and prn lomotil          Hyperlipidemia    - Will hold statin with admission and may resume at discharge           Hypertension, essential    - Continue home amlodipine, bystolic and lisinopril          History of gout    Pt suffers from gout and is not currently on uric acid suppression medication.  This may have contributed to his hip degeneration.        Depression    - Continue home Celexa           Burning with urination    - started 10/2/17; improved  - UA done and negative for nitrites and leukocytes; positive for 1+ occult blood   - will continue to monitor             VTE Risk Mitigation         Ordered     heparin (porcine) injection 1,600 Units  As needed (PRN)     Route:  Intravenous        09/25/17 1733     High Risk of VTE  Once      09/25/17 1257          Disposition:  Pending engraftment of cells and clinical improvement of symptoms and fever. Discharge possibly early next week.    Magdalena Miller, ROMAINE  Bone Marrow Transplant  [facility]       English

## 2021-09-24 ENCOUNTER — LAB VISIT (OUTPATIENT)
Dept: LAB | Facility: HOSPITAL | Age: 56
End: 2021-09-24
Payer: COMMERCIAL

## 2021-09-24 ENCOUNTER — OFFICE VISIT (OUTPATIENT)
Dept: HEMATOLOGY/ONCOLOGY | Facility: CLINIC | Age: 56
End: 2021-09-24
Payer: COMMERCIAL

## 2021-09-24 VITALS
RESPIRATION RATE: 18 BRPM | SYSTOLIC BLOOD PRESSURE: 172 MMHG | HEIGHT: 68 IN | DIASTOLIC BLOOD PRESSURE: 90 MMHG | TEMPERATURE: 98 F | OXYGEN SATURATION: 99 % | HEART RATE: 51 BPM | WEIGHT: 251.63 LBS | BODY MASS INDEX: 38.14 KG/M2

## 2021-09-24 DIAGNOSIS — D70.1 LEUKOPENIA DUE TO ANTINEOPLASTIC CHEMOTHERAPY: ICD-10-CM

## 2021-09-24 DIAGNOSIS — C90.01 MULTIPLE MYELOMA IN REMISSION: Primary | ICD-10-CM

## 2021-09-24 DIAGNOSIS — D64.81 ANEMIA DUE TO ANTINEOPLASTIC CHEMOTHERAPY: ICD-10-CM

## 2021-09-24 DIAGNOSIS — T45.1X5A LEUKOPENIA DUE TO ANTINEOPLASTIC CHEMOTHERAPY: ICD-10-CM

## 2021-09-24 DIAGNOSIS — T45.1X5A ANEMIA DUE TO ANTINEOPLASTIC CHEMOTHERAPY: ICD-10-CM

## 2021-09-24 DIAGNOSIS — C90.01 MULTIPLE MYELOMA IN REMISSION: ICD-10-CM

## 2021-09-24 LAB
ALBUMIN SERPL BCP-MCNC: 3.6 G/DL (ref 3.5–5.2)
ALP SERPL-CCNC: 79 U/L (ref 55–135)
ALT SERPL W/O P-5'-P-CCNC: 19 U/L (ref 10–44)
ANION GAP SERPL CALC-SCNC: 12 MMOL/L (ref 8–16)
AST SERPL-CCNC: 21 U/L (ref 10–40)
BASOPHILS # BLD AUTO: 0.01 K/UL (ref 0–0.2)
BASOPHILS NFR BLD: 0.3 % (ref 0–1.9)
BILIRUB SERPL-MCNC: 0.8 MG/DL (ref 0.1–1)
BUN SERPL-MCNC: 14 MG/DL (ref 6–20)
CALCIUM SERPL-MCNC: 9.4 MG/DL (ref 8.7–10.5)
CHLORIDE SERPL-SCNC: 106 MMOL/L (ref 95–110)
CO2 SERPL-SCNC: 24 MMOL/L (ref 23–29)
CREAT SERPL-MCNC: 0.9 MG/DL (ref 0.5–1.4)
DIFFERENTIAL METHOD: ABNORMAL
EOSINOPHIL # BLD AUTO: 0.1 K/UL (ref 0–0.5)
EOSINOPHIL NFR BLD: 3.3 % (ref 0–8)
ERYTHROCYTE [DISTWIDTH] IN BLOOD BY AUTOMATED COUNT: 14.6 % (ref 11.5–14.5)
EST. GFR  (AFRICAN AMERICAN): >60 ML/MIN/1.73 M^2
EST. GFR  (NON AFRICAN AMERICAN): >60 ML/MIN/1.73 M^2
GLUCOSE SERPL-MCNC: 90 MG/DL (ref 70–110)
HCT VFR BLD AUTO: 37.5 % (ref 40–54)
HGB BLD-MCNC: 12.8 G/DL (ref 14–18)
IGA SERPL-MCNC: 213 MG/DL (ref 40–350)
IGG SERPL-MCNC: 556 MG/DL (ref 650–1600)
IGM SERPL-MCNC: 27 MG/DL (ref 50–300)
IMM GRANULOCYTES # BLD AUTO: 0.02 K/UL (ref 0–0.04)
IMM GRANULOCYTES NFR BLD AUTO: 0.7 % (ref 0–0.5)
LYMPHOCYTES # BLD AUTO: 0.8 K/UL (ref 1–4.8)
LYMPHOCYTES NFR BLD: 25.8 % (ref 18–48)
MCH RBC QN AUTO: 32.7 PG (ref 27–31)
MCHC RBC AUTO-ENTMCNC: 34.1 G/DL (ref 32–36)
MCV RBC AUTO: 96 FL (ref 82–98)
MONOCYTES # BLD AUTO: 0.4 K/UL (ref 0.3–1)
MONOCYTES NFR BLD: 13.1 % (ref 4–15)
NEUTROPHILS # BLD AUTO: 1.7 K/UL (ref 1.8–7.7)
NEUTROPHILS NFR BLD: 56.8 % (ref 38–73)
NRBC BLD-RTO: 0 /100 WBC
PLATELET # BLD AUTO: 162 K/UL (ref 150–450)
PMV BLD AUTO: 10.1 FL (ref 9.2–12.9)
POTASSIUM SERPL-SCNC: 4 MMOL/L (ref 3.5–5.1)
PROT SERPL-MCNC: 6.3 G/DL (ref 6–8.4)
RBC # BLD AUTO: 3.91 M/UL (ref 4.6–6.2)
SODIUM SERPL-SCNC: 142 MMOL/L (ref 136–145)
WBC # BLD AUTO: 3.06 K/UL (ref 3.9–12.7)

## 2021-09-24 PROCEDURE — 1160F RVW MEDS BY RX/DR IN RCRD: CPT | Mod: BMT,CPTII,S$GLB, | Performed by: INTERNAL MEDICINE

## 2021-09-24 PROCEDURE — 99215 PR OFFICE/OUTPT VISIT, EST, LEVL V, 40-54 MIN: ICD-10-PCS | Mod: BMT,S$GLB,, | Performed by: INTERNAL MEDICINE

## 2021-09-24 PROCEDURE — 80053 COMPREHEN METABOLIC PANEL: CPT | Performed by: INTERNAL MEDICINE

## 2021-09-24 PROCEDURE — 99215 OFFICE O/P EST HI 40 MIN: CPT | Mod: BMT,S$GLB,, | Performed by: INTERNAL MEDICINE

## 2021-09-24 PROCEDURE — 3080F DIAST BP >= 90 MM HG: CPT | Mod: BMT,CPTII,S$GLB, | Performed by: INTERNAL MEDICINE

## 2021-09-24 PROCEDURE — 1159F MED LIST DOCD IN RCRD: CPT | Mod: BMT,CPTII,S$GLB, | Performed by: INTERNAL MEDICINE

## 2021-09-24 PROCEDURE — 85025 COMPLETE CBC W/AUTO DIFF WBC: CPT | Performed by: INTERNAL MEDICINE

## 2021-09-24 PROCEDURE — 86334 IMMUNOFIX E-PHORESIS SERUM: CPT | Mod: 26,BMT,, | Performed by: PATHOLOGY

## 2021-09-24 PROCEDURE — 3080F PR MOST RECENT DIASTOLIC BLOOD PRESSURE >= 90 MM HG: ICD-10-PCS | Mod: BMT,CPTII,S$GLB, | Performed by: INTERNAL MEDICINE

## 2021-09-24 PROCEDURE — 4010F ACE/ARB THERAPY RXD/TAKEN: CPT | Mod: BMT,CPTII,S$GLB, | Performed by: INTERNAL MEDICINE

## 2021-09-24 PROCEDURE — 99999 PR PBB SHADOW E&M-EST. PATIENT-LVL III: CPT | Mod: PBBFAC,BMT,, | Performed by: INTERNAL MEDICINE

## 2021-09-24 PROCEDURE — 86334 IMMUNOFIX E-PHORESIS SERUM: CPT | Performed by: INTERNAL MEDICINE

## 2021-09-24 PROCEDURE — 3077F SYST BP >= 140 MM HG: CPT | Mod: BMT,CPTII,S$GLB, | Performed by: INTERNAL MEDICINE

## 2021-09-24 PROCEDURE — 4010F PR ACE/ARB THEARPY RXD/TAKEN: ICD-10-PCS | Mod: BMT,CPTII,S$GLB, | Performed by: INTERNAL MEDICINE

## 2021-09-24 PROCEDURE — 99999 PR PBB SHADOW E&M-EST. PATIENT-LVL III: ICD-10-PCS | Mod: PBBFAC,BMT,, | Performed by: INTERNAL MEDICINE

## 2021-09-24 PROCEDURE — 1160F PR REVIEW ALL MEDS BY PRESCRIBER/CLIN PHARMACIST DOCUMENTED: ICD-10-PCS | Mod: BMT,CPTII,S$GLB, | Performed by: INTERNAL MEDICINE

## 2021-09-24 PROCEDURE — 3077F PR MOST RECENT SYSTOLIC BLOOD PRESSURE >= 140 MM HG: ICD-10-PCS | Mod: BMT,CPTII,S$GLB, | Performed by: INTERNAL MEDICINE

## 2021-09-24 PROCEDURE — 84165 PROTEIN E-PHORESIS SERUM: CPT | Performed by: INTERNAL MEDICINE

## 2021-09-24 PROCEDURE — 86334 PATHOLOGIST INTERPRETATION IFE: ICD-10-PCS | Mod: 26,BMT,, | Performed by: PATHOLOGY

## 2021-09-24 PROCEDURE — 3008F PR BODY MASS INDEX (BMI) DOCUMENTED: ICD-10-PCS | Mod: BMT,CPTII,S$GLB, | Performed by: INTERNAL MEDICINE

## 2021-09-24 PROCEDURE — 84165 PROTEIN E-PHORESIS SERUM: CPT | Mod: 26,BMT,, | Performed by: PATHOLOGY

## 2021-09-24 PROCEDURE — 1159F PR MEDICATION LIST DOCUMENTED IN MEDICAL RECORD: ICD-10-PCS | Mod: BMT,CPTII,S$GLB, | Performed by: INTERNAL MEDICINE

## 2021-09-24 PROCEDURE — 3008F BODY MASS INDEX DOCD: CPT | Mod: BMT,CPTII,S$GLB, | Performed by: INTERNAL MEDICINE

## 2021-09-24 PROCEDURE — 36415 COLL VENOUS BLD VENIPUNCTURE: CPT | Performed by: INTERNAL MEDICINE

## 2021-09-24 PROCEDURE — 82784 ASSAY IGA/IGD/IGG/IGM EACH: CPT | Mod: 59 | Performed by: INTERNAL MEDICINE

## 2021-09-24 PROCEDURE — 83520 IMMUNOASSAY QUANT NOS NONAB: CPT | Mod: 59 | Performed by: INTERNAL MEDICINE

## 2021-09-24 PROCEDURE — 84165 PATHOLOGIST INTERPRETATION SPE: ICD-10-PCS | Mod: 26,BMT,, | Performed by: PATHOLOGY

## 2021-09-27 LAB
ALBUMIN SERPL ELPH-MCNC: 3.55 G/DL (ref 3.35–5.55)
ALPHA1 GLOB SERPL ELPH-MCNC: 0.3 G/DL (ref 0.17–0.41)
ALPHA2 GLOB SERPL ELPH-MCNC: 0.73 G/DL (ref 0.43–0.99)
B-GLOBULIN SERPL ELPH-MCNC: 0.75 G/DL (ref 0.5–1.1)
GAMMA GLOB SERPL ELPH-MCNC: 0.57 G/DL (ref 0.67–1.58)
INTERPRETATION SERPL IFE-IMP: NORMAL
KAPPA LC SER QL IA: 1.21 MG/DL (ref 0.33–1.94)
KAPPA LC/LAMBDA SER IA: 1.64 (ref 0.26–1.65)
LAMBDA LC SER QL IA: 0.74 MG/DL (ref 0.57–2.63)
PROT SERPL-MCNC: 5.9 G/DL (ref 6–8.4)

## 2021-09-28 LAB
PATHOLOGIST INTERPRETATION IFE: NORMAL
PATHOLOGIST INTERPRETATION SPE: NORMAL

## 2021-10-01 DIAGNOSIS — C90.01 MULTIPLE MYELOMA IN REMISSION: ICD-10-CM

## 2021-10-06 RX ORDER — LENALIDOMIDE 10 MG/1
CAPSULE ORAL
Qty: 21 EACH | Refills: 0 | Status: SHIPPED | OUTPATIENT
Start: 2021-10-06 | End: 2021-11-05

## 2021-12-04 DIAGNOSIS — C90.01 MULTIPLE MYELOMA IN REMISSION: ICD-10-CM

## 2021-12-06 RX ORDER — LENALIDOMIDE 10 MG/1
CAPSULE ORAL
Qty: 21 EACH | Refills: 0 | Status: SHIPPED | OUTPATIENT
Start: 2021-12-06 | End: 2022-03-18

## 2021-12-17 ENCOUNTER — OFFICE VISIT (OUTPATIENT)
Dept: HEMATOLOGY/ONCOLOGY | Facility: CLINIC | Age: 56
End: 2021-12-17
Payer: COMMERCIAL

## 2021-12-17 ENCOUNTER — LAB VISIT (OUTPATIENT)
Dept: LAB | Facility: HOSPITAL | Age: 56
End: 2021-12-17
Payer: COMMERCIAL

## 2021-12-17 VITALS
BODY MASS INDEX: 38.88 KG/M2 | HEART RATE: 55 BPM | OXYGEN SATURATION: 98 % | TEMPERATURE: 98 F | HEIGHT: 68 IN | RESPIRATION RATE: 18 BRPM | WEIGHT: 256.5 LBS | SYSTOLIC BLOOD PRESSURE: 158 MMHG | DIASTOLIC BLOOD PRESSURE: 80 MMHG

## 2021-12-17 DIAGNOSIS — C90.01 MULTIPLE MYELOMA IN REMISSION: ICD-10-CM

## 2021-12-17 DIAGNOSIS — R25.3 EYE MUSCLE TWITCHES: ICD-10-CM

## 2021-12-17 DIAGNOSIS — C90.01 MULTIPLE MYELOMA IN REMISSION: Primary | ICD-10-CM

## 2021-12-17 DIAGNOSIS — D61.818 PANCYTOPENIA: ICD-10-CM

## 2021-12-17 LAB
ALBUMIN SERPL BCP-MCNC: 3.5 G/DL (ref 3.5–5.2)
ALP SERPL-CCNC: 77 U/L (ref 55–135)
ALT SERPL W/O P-5'-P-CCNC: 18 U/L (ref 10–44)
ANION GAP SERPL CALC-SCNC: 5 MMOL/L (ref 8–16)
AST SERPL-CCNC: 19 U/L (ref 10–40)
BASOPHILS # BLD AUTO: 0.05 K/UL (ref 0–0.2)
BASOPHILS NFR BLD: 1.8 % (ref 0–1.9)
BILIRUB SERPL-MCNC: 0.8 MG/DL (ref 0.1–1)
BUN SERPL-MCNC: 16 MG/DL (ref 6–20)
CALCIUM SERPL-MCNC: 9.2 MG/DL (ref 8.7–10.5)
CHLORIDE SERPL-SCNC: 106 MMOL/L (ref 95–110)
CO2 SERPL-SCNC: 30 MMOL/L (ref 23–29)
CREAT SERPL-MCNC: 0.8 MG/DL (ref 0.5–1.4)
DIFFERENTIAL METHOD: ABNORMAL
EOSINOPHIL # BLD AUTO: 0.1 K/UL (ref 0–0.5)
EOSINOPHIL NFR BLD: 2.5 % (ref 0–8)
ERYTHROCYTE [DISTWIDTH] IN BLOOD BY AUTOMATED COUNT: 12.9 % (ref 11.5–14.5)
EST. GFR  (AFRICAN AMERICAN): >60 ML/MIN/1.73 M^2
EST. GFR  (NON AFRICAN AMERICAN): >60 ML/MIN/1.73 M^2
GLUCOSE SERPL-MCNC: 101 MG/DL (ref 70–110)
HCT VFR BLD AUTO: 37.7 % (ref 40–54)
HGB BLD-MCNC: 12.7 G/DL (ref 14–18)
IGA SERPL-MCNC: 211 MG/DL (ref 40–350)
IGG SERPL-MCNC: 599 MG/DL (ref 650–1600)
IGM SERPL-MCNC: 22 MG/DL (ref 50–300)
IMM GRANULOCYTES # BLD AUTO: 0 K/UL (ref 0–0.04)
IMM GRANULOCYTES NFR BLD AUTO: 0 % (ref 0–0.5)
LYMPHOCYTES # BLD AUTO: 1.2 K/UL (ref 1–4.8)
LYMPHOCYTES NFR BLD: 42.1 % (ref 18–48)
MCH RBC QN AUTO: 31.6 PG (ref 27–31)
MCHC RBC AUTO-ENTMCNC: 33.7 G/DL (ref 32–36)
MCV RBC AUTO: 94 FL (ref 82–98)
MONOCYTES # BLD AUTO: 0.4 K/UL (ref 0.3–1)
MONOCYTES NFR BLD: 13.7 % (ref 4–15)
NEUTROPHILS # BLD AUTO: 1.1 K/UL (ref 1.8–7.7)
NEUTROPHILS NFR BLD: 39.9 % (ref 38–73)
NRBC BLD-RTO: 0 /100 WBC
PLATELET # BLD AUTO: 145 K/UL (ref 150–450)
PMV BLD AUTO: 9.2 FL (ref 9.2–12.9)
POTASSIUM SERPL-SCNC: 4.3 MMOL/L (ref 3.5–5.1)
PROT SERPL-MCNC: 6.3 G/DL (ref 6–8.4)
RBC # BLD AUTO: 4.02 M/UL (ref 4.6–6.2)
SODIUM SERPL-SCNC: 141 MMOL/L (ref 136–145)
WBC # BLD AUTO: 2.78 K/UL (ref 3.9–12.7)

## 2021-12-17 PROCEDURE — 86334 IMMUNOFIX E-PHORESIS SERUM: CPT | Mod: 26,BMT,, | Performed by: PATHOLOGY

## 2021-12-17 PROCEDURE — 85025 COMPLETE CBC W/AUTO DIFF WBC: CPT | Performed by: INTERNAL MEDICINE

## 2021-12-17 PROCEDURE — 86334 PATHOLOGIST INTERPRETATION IFE: ICD-10-PCS | Mod: 26,BMT,, | Performed by: PATHOLOGY

## 2021-12-17 PROCEDURE — 99999 PR PBB SHADOW E&M-EST. PATIENT-LVL IV: CPT | Mod: PBBFAC,BMT,, | Performed by: INTERNAL MEDICINE

## 2021-12-17 PROCEDURE — 4010F PR ACE/ARB THEARPY RXD/TAKEN: ICD-10-PCS | Mod: BMT,CPTII,S$GLB, | Performed by: INTERNAL MEDICINE

## 2021-12-17 PROCEDURE — 84165 PATHOLOGIST INTERPRETATION SPE: ICD-10-PCS | Mod: 26,BMT,, | Performed by: PATHOLOGY

## 2021-12-17 PROCEDURE — 86334 IMMUNOFIX E-PHORESIS SERUM: CPT | Performed by: INTERNAL MEDICINE

## 2021-12-17 PROCEDURE — 80053 COMPREHEN METABOLIC PANEL: CPT | Performed by: INTERNAL MEDICINE

## 2021-12-17 PROCEDURE — 99214 OFFICE O/P EST MOD 30 MIN: CPT | Mod: BMT,S$GLB,, | Performed by: INTERNAL MEDICINE

## 2021-12-17 PROCEDURE — 99214 PR OFFICE/OUTPT VISIT, EST, LEVL IV, 30-39 MIN: ICD-10-PCS | Mod: BMT,S$GLB,, | Performed by: INTERNAL MEDICINE

## 2021-12-17 PROCEDURE — 84165 PROTEIN E-PHORESIS SERUM: CPT | Performed by: INTERNAL MEDICINE

## 2021-12-17 PROCEDURE — 36415 COLL VENOUS BLD VENIPUNCTURE: CPT | Performed by: INTERNAL MEDICINE

## 2021-12-17 PROCEDURE — 4010F ACE/ARB THERAPY RXD/TAKEN: CPT | Mod: BMT,CPTII,S$GLB, | Performed by: INTERNAL MEDICINE

## 2021-12-17 PROCEDURE — 82784 ASSAY IGA/IGD/IGG/IGM EACH: CPT | Performed by: INTERNAL MEDICINE

## 2021-12-17 PROCEDURE — 99999 PR PBB SHADOW E&M-EST. PATIENT-LVL IV: ICD-10-PCS | Mod: PBBFAC,BMT,, | Performed by: INTERNAL MEDICINE

## 2021-12-17 PROCEDURE — 84165 PROTEIN E-PHORESIS SERUM: CPT | Mod: 26,BMT,, | Performed by: PATHOLOGY

## 2021-12-17 PROCEDURE — 83520 IMMUNOASSAY QUANT NOS NONAB: CPT | Performed by: INTERNAL MEDICINE

## 2021-12-17 RX ORDER — BACLOFEN 5 MG/1
5 TABLET ORAL 3 TIMES DAILY
Qty: 90 TABLET | Refills: 0 | Status: SHIPPED | OUTPATIENT
Start: 2021-12-17 | End: 2022-01-25

## 2021-12-20 LAB
INTERPRETATION SERPL IFE-IMP: NORMAL
KAPPA LC SER QL IA: 1.29 MG/DL (ref 0.33–1.94)
KAPPA LC/LAMBDA SER IA: 1.55 (ref 0.26–1.65)
LAMBDA LC SER QL IA: 0.83 MG/DL (ref 0.57–2.63)
PATHOLOGIST INTERPRETATION IFE: NORMAL

## 2021-12-21 LAB
ALBUMIN SERPL ELPH-MCNC: 3.52 G/DL (ref 3.35–5.55)
ALPHA1 GLOB SERPL ELPH-MCNC: 0.31 G/DL (ref 0.17–0.41)
ALPHA2 GLOB SERPL ELPH-MCNC: 0.71 G/DL (ref 0.43–0.99)
B-GLOBULIN SERPL ELPH-MCNC: 0.74 G/DL (ref 0.5–1.1)
GAMMA GLOB SERPL ELPH-MCNC: 0.62 G/DL (ref 0.67–1.58)
PATHOLOGIST INTERPRETATION SPE: NORMAL
PROT SERPL-MCNC: 5.9 G/DL (ref 6–8.4)

## 2022-01-11 DIAGNOSIS — C90.01 MULTIPLE MYELOMA IN REMISSION: ICD-10-CM

## 2022-01-12 RX ORDER — LENALIDOMIDE 10 MG/1
CAPSULE ORAL
Qty: 21 EACH | Refills: 0 | Status: SHIPPED | OUTPATIENT
Start: 2022-01-12 | End: 2022-03-18 | Stop reason: SDUPTHER

## 2022-03-18 ENCOUNTER — OFFICE VISIT (OUTPATIENT)
Dept: HEMATOLOGY/ONCOLOGY | Facility: CLINIC | Age: 57
End: 2022-03-18
Payer: COMMERCIAL

## 2022-03-18 ENCOUNTER — LAB VISIT (OUTPATIENT)
Dept: LAB | Facility: HOSPITAL | Age: 57
End: 2022-03-18
Payer: COMMERCIAL

## 2022-03-18 VITALS
HEART RATE: 60 BPM | HEIGHT: 68 IN | RESPIRATION RATE: 16 BRPM | WEIGHT: 253.06 LBS | DIASTOLIC BLOOD PRESSURE: 87 MMHG | BODY MASS INDEX: 38.35 KG/M2 | SYSTOLIC BLOOD PRESSURE: 155 MMHG | OXYGEN SATURATION: 98 %

## 2022-03-18 DIAGNOSIS — D70.1 LEUKOPENIA DUE TO ANTINEOPLASTIC CHEMOTHERAPY: ICD-10-CM

## 2022-03-18 DIAGNOSIS — D64.81 ANEMIA DUE TO ANTINEOPLASTIC CHEMOTHERAPY: ICD-10-CM

## 2022-03-18 DIAGNOSIS — T45.1X5A ANEMIA DUE TO ANTINEOPLASTIC CHEMOTHERAPY: ICD-10-CM

## 2022-03-18 DIAGNOSIS — C90.01 MULTIPLE MYELOMA IN REMISSION: ICD-10-CM

## 2022-03-18 DIAGNOSIS — T45.1X5A LEUKOPENIA DUE TO ANTINEOPLASTIC CHEMOTHERAPY: ICD-10-CM

## 2022-03-18 DIAGNOSIS — C90.01 MULTIPLE MYELOMA IN REMISSION: Primary | ICD-10-CM

## 2022-03-18 LAB
ALBUMIN SERPL BCP-MCNC: 3.6 G/DL (ref 3.5–5.2)
ALP SERPL-CCNC: 83 U/L (ref 55–135)
ALT SERPL W/O P-5'-P-CCNC: 18 U/L (ref 10–44)
ANION GAP SERPL CALC-SCNC: 6 MMOL/L (ref 8–16)
AST SERPL-CCNC: 16 U/L (ref 10–40)
BASOPHILS # BLD AUTO: 0.06 K/UL (ref 0–0.2)
BASOPHILS NFR BLD: 2.3 % (ref 0–1.9)
BILIRUB SERPL-MCNC: 0.6 MG/DL (ref 0.1–1)
BUN SERPL-MCNC: 15 MG/DL (ref 6–20)
CALCIUM SERPL-MCNC: 9 MG/DL (ref 8.7–10.5)
CHLORIDE SERPL-SCNC: 106 MMOL/L (ref 95–110)
CO2 SERPL-SCNC: 30 MMOL/L (ref 23–29)
CREAT SERPL-MCNC: 0.8 MG/DL (ref 0.5–1.4)
DIFFERENTIAL METHOD: ABNORMAL
EOSINOPHIL # BLD AUTO: 0.1 K/UL (ref 0–0.5)
EOSINOPHIL NFR BLD: 2.7 % (ref 0–8)
ERYTHROCYTE [DISTWIDTH] IN BLOOD BY AUTOMATED COUNT: 13.4 % (ref 11.5–14.5)
EST. GFR  (AFRICAN AMERICAN): >60 ML/MIN/1.73 M^2
EST. GFR  (NON AFRICAN AMERICAN): >60 ML/MIN/1.73 M^2
GLUCOSE SERPL-MCNC: 73 MG/DL (ref 70–110)
HCT VFR BLD AUTO: 38.9 % (ref 40–54)
HGB BLD-MCNC: 12.8 G/DL (ref 14–18)
IGA SERPL-MCNC: 184 MG/DL (ref 40–350)
IGG SERPL-MCNC: 601 MG/DL (ref 650–1600)
IGM SERPL-MCNC: 22 MG/DL (ref 50–300)
IMM GRANULOCYTES # BLD AUTO: 0 K/UL (ref 0–0.04)
IMM GRANULOCYTES NFR BLD AUTO: 0 % (ref 0–0.5)
LYMPHOCYTES # BLD AUTO: 0.9 K/UL (ref 1–4.8)
LYMPHOCYTES NFR BLD: 35.1 % (ref 18–48)
MCH RBC QN AUTO: 31.1 PG (ref 27–31)
MCHC RBC AUTO-ENTMCNC: 32.9 G/DL (ref 32–36)
MCV RBC AUTO: 95 FL (ref 82–98)
MONOCYTES # BLD AUTO: 0.3 K/UL (ref 0.3–1)
MONOCYTES NFR BLD: 12.4 % (ref 4–15)
NEUTROPHILS # BLD AUTO: 1.2 K/UL (ref 1.8–7.7)
NEUTROPHILS NFR BLD: 47.5 % (ref 38–73)
NRBC BLD-RTO: 0 /100 WBC
PLATELET # BLD AUTO: 156 K/UL (ref 150–450)
PMV BLD AUTO: 9.6 FL (ref 9.2–12.9)
POTASSIUM SERPL-SCNC: 4 MMOL/L (ref 3.5–5.1)
PROT SERPL-MCNC: 6.2 G/DL (ref 6–8.4)
RBC # BLD AUTO: 4.11 M/UL (ref 4.6–6.2)
SODIUM SERPL-SCNC: 142 MMOL/L (ref 136–145)
WBC # BLD AUTO: 2.59 K/UL (ref 3.9–12.7)

## 2022-03-18 PROCEDURE — 99999 PR PBB SHADOW E&M-EST. PATIENT-LVL III: CPT | Mod: PBBFAC,,, | Performed by: INTERNAL MEDICINE

## 2022-03-18 PROCEDURE — 3079F DIAST BP 80-89 MM HG: CPT | Mod: CPTII,S$GLB,, | Performed by: INTERNAL MEDICINE

## 2022-03-18 PROCEDURE — 86334 PATHOLOGIST INTERPRETATION IFE: ICD-10-PCS | Mod: 26,,, | Performed by: PATHOLOGY

## 2022-03-18 PROCEDURE — 84165 PROTEIN E-PHORESIS SERUM: CPT | Performed by: INTERNAL MEDICINE

## 2022-03-18 PROCEDURE — 1160F RVW MEDS BY RX/DR IN RCRD: CPT | Mod: CPTII,S$GLB,, | Performed by: INTERNAL MEDICINE

## 2022-03-18 PROCEDURE — 99215 PR OFFICE/OUTPT VISIT, EST, LEVL V, 40-54 MIN: ICD-10-PCS | Mod: S$GLB,,, | Performed by: INTERNAL MEDICINE

## 2022-03-18 PROCEDURE — 3077F SYST BP >= 140 MM HG: CPT | Mod: CPTII,S$GLB,, | Performed by: INTERNAL MEDICINE

## 2022-03-18 PROCEDURE — 84165 PATHOLOGIST INTERPRETATION SPE: ICD-10-PCS | Mod: 26,,, | Performed by: PATHOLOGY

## 2022-03-18 PROCEDURE — 1160F PR REVIEW ALL MEDS BY PRESCRIBER/CLIN PHARMACIST DOCUMENTED: ICD-10-PCS | Mod: CPTII,S$GLB,, | Performed by: INTERNAL MEDICINE

## 2022-03-18 PROCEDURE — 86334 IMMUNOFIX E-PHORESIS SERUM: CPT | Mod: 26,,, | Performed by: PATHOLOGY

## 2022-03-18 PROCEDURE — 3008F PR BODY MASS INDEX (BMI) DOCUMENTED: ICD-10-PCS | Mod: CPTII,S$GLB,, | Performed by: INTERNAL MEDICINE

## 2022-03-18 PROCEDURE — 4010F PR ACE/ARB THEARPY RXD/TAKEN: ICD-10-PCS | Mod: CPTII,S$GLB,, | Performed by: INTERNAL MEDICINE

## 2022-03-18 PROCEDURE — 1159F PR MEDICATION LIST DOCUMENTED IN MEDICAL RECORD: ICD-10-PCS | Mod: CPTII,S$GLB,, | Performed by: INTERNAL MEDICINE

## 2022-03-18 PROCEDURE — 85025 COMPLETE CBC W/AUTO DIFF WBC: CPT | Performed by: INTERNAL MEDICINE

## 2022-03-18 PROCEDURE — 80053 COMPREHEN METABOLIC PANEL: CPT | Performed by: INTERNAL MEDICINE

## 2022-03-18 PROCEDURE — 83520 IMMUNOASSAY QUANT NOS NONAB: CPT | Performed by: INTERNAL MEDICINE

## 2022-03-18 PROCEDURE — 1159F MED LIST DOCD IN RCRD: CPT | Mod: CPTII,S$GLB,, | Performed by: INTERNAL MEDICINE

## 2022-03-18 PROCEDURE — 86334 IMMUNOFIX E-PHORESIS SERUM: CPT | Performed by: INTERNAL MEDICINE

## 2022-03-18 PROCEDURE — 36415 COLL VENOUS BLD VENIPUNCTURE: CPT | Performed by: INTERNAL MEDICINE

## 2022-03-18 PROCEDURE — 82784 ASSAY IGA/IGD/IGG/IGM EACH: CPT | Performed by: INTERNAL MEDICINE

## 2022-03-18 PROCEDURE — 4010F ACE/ARB THERAPY RXD/TAKEN: CPT | Mod: CPTII,S$GLB,, | Performed by: INTERNAL MEDICINE

## 2022-03-18 PROCEDURE — 3077F PR MOST RECENT SYSTOLIC BLOOD PRESSURE >= 140 MM HG: ICD-10-PCS | Mod: CPTII,S$GLB,, | Performed by: INTERNAL MEDICINE

## 2022-03-18 PROCEDURE — 3079F PR MOST RECENT DIASTOLIC BLOOD PRESSURE 80-89 MM HG: ICD-10-PCS | Mod: CPTII,S$GLB,, | Performed by: INTERNAL MEDICINE

## 2022-03-18 PROCEDURE — 99215 OFFICE O/P EST HI 40 MIN: CPT | Mod: S$GLB,,, | Performed by: INTERNAL MEDICINE

## 2022-03-18 PROCEDURE — 84165 PROTEIN E-PHORESIS SERUM: CPT | Mod: 26,,, | Performed by: PATHOLOGY

## 2022-03-18 PROCEDURE — 3008F BODY MASS INDEX DOCD: CPT | Mod: CPTII,S$GLB,, | Performed by: INTERNAL MEDICINE

## 2022-03-18 PROCEDURE — 99999 PR PBB SHADOW E&M-EST. PATIENT-LVL III: ICD-10-PCS | Mod: PBBFAC,,, | Performed by: INTERNAL MEDICINE

## 2022-03-18 NOTE — PROGRESS NOTES
HEMATOLOGIC MALIGNANCIES PROGRESS NOTE    IDENTIFYING STATEMENT   Arki Hoang) is a 56 y.o. male with a  of 1965 from [place] with the diagnosis of multiple myeloma.      ONCOLOGY HISTORY:    1. Multiple myeloma s/p autologous stem cell transplant   A. Early : Progressive anemia. K/L ratio > 100, and was treated with fina-dex   B. 12/3/2015: M-protein 1.02 g/dl, IgG-kappa by JAYESH. Kappa 2.98 mg/dl, lambda 0.5 mg/dl, ratio 5.96. BMBx shows 60-70% cellular marrow with 20% plasma cells, consistent with plasma cell myeloma. Hgb 11.8 g/dl, Calcium 9.44 (corrected). Creatinine 0.8 mg/dl.    C. 2016: M-protein 2.23 g/dl. Kappa 4.36 mg/dl, lambda 0.08 mg/dl, ratio 54.5. Progressive disease.   D. VCD therapy locally.    E. 2017: M-protein 1.23 g/dl.    F. 2017: M-protein 0.41 g/dl. Kappa 2.33 mg/dl, lambda 0.5 mg/dl, ratio 4.66, consistent with partial response.    G. 2017: Autologous stem cell transplant with melphalan conditioning.   H. 2018: Day +100 restaging - M-protein 0.19 g/dl, kappa 0.76 mg/dl, lambda 0.53 mg/dl, ratio 1.43. BMBx shows no definitive evidence of myeloma. Consistent with very good partial response.    I. 3/8/2018: M-protein 0.18 g/dl   J. 2018: M-protein 0.16 g/dl   K. 2018: M-protein 0.2 g/dl   L. 2018: M-protein 0.14 g/dl; BMBx for 1-year re-evaluation shows 40% cellular marrow with 6-7% plasma cells and no evidence of clonality; thus, no plasma cell neoplasm evident. Findings consistent with ongoing VGPR.   M. 4/3/2019: M-protein < 0.1 g/dl.     N. 7/10/2019: M-protein 0.18 g/dl.   O. 10/30/2019: M-protein 0.14 g/dl.    P. 2020: M-protein negative; JAYESH negative; findings consistent with complete response to therapy.     2. Depression  3. HTN  4. History of DVT in 2015 and 2017    INTERVAL HISTORY:      Mr. Bobo returns to clinic 4 years and 5 months after  autologous transplant for myeloma.     He is tolerating  lenalidomide well. He notes his facial twitching is worsening despite baclofen. In addition to his right eye, he has twitching along the right side of his face as well.     Past Medical History, Past Social History and Past Family History have been reviewed and are unchanged except as noted in the interval history.    MEDICATIONS:     Current Outpatient Medications on File Prior to Visit   Medication Sig Dispense Refill    amLODIPine (NORVASC) 10 MG tablet       apixaban 2.5 mg Tab Take 1 tablet (2.5 mg total) by mouth 2 (two) times daily. 60 tablet 3    baclofen (LIORESAL) 5 mg Tab tablet TAKE ONE TABLET BY MOUTH THREE TIMES A DAY. 90 tablet 0    citalopram (CELEXA) 40 MG tablet Take 40 mg by mouth.      lenalidomide (REVLIMID) 10 mg Cap TAKE 1 CAPSULE ONCE DAILY ON DAYS 1 THROUGH 21 IN A 28 DAY CYCLE. 21 each 0    lisinopriL 10 MG tablet       simvastatin (ZOCOR) 20 MG tablet   3    [DISCONTINUED] lenalidomide (REVLIMID) 10 mg Cap TAKE 1 CAPSULE ONCE DAILY ON DAYS 1 THROUGH 21 IN A 28 DAY CYCLE. 21 each 0    [DISCONTINUED] lenalidomide (REVLIMID) 10 mg Cap TAKE 1 CAPSULE DAILY ON DAYS 1 THROUGH 21 OF A 28 DAY CYCLE 21 each 0     No current facility-administered medications on file prior to visit.       ALLERGIES:     Review of patient's allergies indicates:   Allergen Reactions    Pcn [penicillins]      Unknown last dose as an infant         ROS:       Review of Systems   Constitutional: Negative for diaphoresis, fatigue, fever and unexpected weight change.   HENT:   Negative for lump/mass and sore throat.    Eyes: Negative for icterus.   Respiratory: Negative for cough and shortness of breath.    Cardiovascular: Negative for chest pain and palpitations.   Gastrointestinal: Negative for abdominal distention, constipation, diarrhea, nausea and vomiting.   Genitourinary: Negative for dysuria and frequency.    Musculoskeletal: Negative for arthralgias, gait problem and myalgias.   Skin: Negative for rash.  "  Neurological: Negative for dizziness, gait problem and headaches.        Facial twitching   Hematological: Negative for adenopathy. Does not bruise/bleed easily.   Psychiatric/Behavioral: The patient is not nervous/anxious.        PHYSICAL EXAM:  Vitals:    03/18/22 0936   BP: (!) 155/87   Pulse: 60   Resp: 16   SpO2: 98%   Weight: 114.8 kg (253 lb 1.4 oz)   Height: 5' 8" (1.727 m)   PainSc: 0-No pain   Body mass index is 38.48 kg/m².      Physical Exam  Constitutional:       General: He is not in acute distress.     Appearance: He is well-developed.   HENT:      Head: Normocephalic and atraumatic.      Mouth/Throat:      Mouth: No oral lesions.   Eyes:      Conjunctiva/sclera: Conjunctivae normal.      Comments: Right upper eyelid twitch   Neck:      Thyroid: No thyromegaly.   Cardiovascular:      Rate and Rhythm: Normal rate and regular rhythm.      Heart sounds: Normal heart sounds. No murmur heard.  Pulmonary:      Breath sounds: Normal breath sounds. No wheezing or rales.   Abdominal:      General: There is no distension.      Palpations: Abdomen is soft. There is no hepatomegaly, splenomegaly or mass.      Tenderness: There is no abdominal tenderness.   Lymphadenopathy:      Cervical: No cervical adenopathy.      Right cervical: No deep cervical adenopathy.     Left cervical: No deep cervical adenopathy.   Skin:     Findings: No rash.   Neurological:      Mental Status: He is alert and oriented to person, place, and time.      Cranial Nerves: No cranial nerve deficit.      Coordination: Coordination normal.      Deep Tendon Reflexes: Reflexes are normal and symmetric.         LAB:   Results for orders placed or performed in visit on 03/18/22   CBC auto differential   Result Value Ref Range    WBC 2.59 (L) 3.90 - 12.70 K/uL    RBC 4.11 (L) 4.60 - 6.20 M/uL    Hemoglobin 12.8 (L) 14.0 - 18.0 g/dL    Hematocrit 38.9 (L) 40.0 - 54.0 %    MCV 95 82 - 98 fL    MCH 31.1 (H) 27.0 - 31.0 pg    MCHC 32.9 32.0 - 36.0 " g/dL    RDW 13.4 11.5 - 14.5 %    Platelets 156 150 - 450 K/uL    MPV 9.6 9.2 - 12.9 fL    Immature Granulocytes 0.0 0.0 - 0.5 %    Gran # (ANC) 1.2 (L) 1.8 - 7.7 K/uL    Immature Grans (Abs) 0.00 0.00 - 0.04 K/uL    Lymph # 0.9 (L) 1.0 - 4.8 K/uL    Mono # 0.3 0.3 - 1.0 K/uL    Eos # 0.1 0.0 - 0.5 K/uL    Baso # 0.06 0.00 - 0.20 K/uL    nRBC 0 0 /100 WBC    Gran % 47.5 38.0 - 73.0 %    Lymph % 35.1 18.0 - 48.0 %    Mono % 12.4 4.0 - 15.0 %    Eosinophil % 2.7 0.0 - 8.0 %    Basophil % 2.3 (H) 0.0 - 1.9 %    Differential Method Automated    Comprehensive Metabolic Panel   Result Value Ref Range    Sodium 142 136 - 145 mmol/L    Potassium 4.0 3.5 - 5.1 mmol/L    Chloride 106 95 - 110 mmol/L    CO2 30 (H) 23 - 29 mmol/L    Glucose 73 70 - 110 mg/dL    BUN 15 6 - 20 mg/dL    Creatinine 0.8 0.5 - 1.4 mg/dL    Calcium 9.0 8.7 - 10.5 mg/dL    Total Protein 6.2 6.0 - 8.4 g/dL    Albumin 3.6 3.5 - 5.2 g/dL    Total Bilirubin 0.6 0.1 - 1.0 mg/dL    Alkaline Phosphatase 83 55 - 135 U/L    AST 16 10 - 40 U/L    ALT 18 10 - 44 U/L    Anion Gap 6 (L) 8 - 16 mmol/L    eGFR if African American >60.0 >60 mL/min/1.73 m^2    eGFR if non African American >60.0 >60 mL/min/1.73 m^2   Protein Electrophoresis, Serum   Result Value Ref Range    Protein, Serum 5.8 (L) 6.0 - 8.4 g/dL   Immunoglobulins (IgG, IgA, IgM) Quantitative   Result Value Ref Range    IgG 601 (L) 650 - 1600 mg/dL    IgA 184 40 - 350 mg/dL    IgM 22 (L) 50 - 300 mg/dL       PROBLEMS ASSESSED THIS VISIT:    1. Multiple myeloma in remission    2. Leukopenia due to antineoplastic chemotherapy    3. Anemia due to antineoplastic chemotherapy        PLAN:       Multiple myeloma    Serologic studies from this visit are pending. Findings at last visit consistent with ongoing CR. Kappa/lambda ratio normal. Continue maintenance lenalidomide and low dose apixaban for thromboprophylaxis.     History of autologous stem cell transplant    He is 4 years, 5 months post ASCT. Current  disease status is complete remission. No evidence of relapse to date since ASCT.    Cytopenias  Mild. Monitor on lenalidomide.     Eye twitch  Prescribed baclofen previously, but this did not help. I offered referral to neurology, but he would prefer to do this closer to home. He will discuss with his PCP (Dr. Paolo Blair) regarding a local neurology referral.     Follow-up in 3 months    Italo Bryant MD  Hematology and Stem Cell Transplant

## 2022-03-21 LAB
ALBUMIN SERPL ELPH-MCNC: 3.57 G/DL (ref 3.35–5.55)
ALPHA1 GLOB SERPL ELPH-MCNC: 0.3 G/DL (ref 0.17–0.41)
ALPHA2 GLOB SERPL ELPH-MCNC: 0.63 G/DL (ref 0.43–0.99)
B-GLOBULIN SERPL ELPH-MCNC: 0.73 G/DL (ref 0.5–1.1)
GAMMA GLOB SERPL ELPH-MCNC: 0.57 G/DL (ref 0.67–1.58)
INTERPRETATION SERPL IFE-IMP: NORMAL
KAPPA LC SER QL IA: 1.21 MG/DL (ref 0.33–1.94)
KAPPA LC/LAMBDA SER IA: 1.36 (ref 0.26–1.65)
LAMBDA LC SER QL IA: 0.89 MG/DL (ref 0.57–2.63)
PATHOLOGIST INTERPRETATION IFE: NORMAL
PATHOLOGIST INTERPRETATION SPE: NORMAL
PROT SERPL-MCNC: 5.8 G/DL (ref 6–8.4)

## 2022-03-21 RX ORDER — LENALIDOMIDE 10 MG/1
CAPSULE ORAL
Qty: 21 EACH | Refills: 0 | Status: SHIPPED | OUTPATIENT
Start: 2022-03-21 | End: 2022-04-12 | Stop reason: SDUPTHER

## 2022-04-12 DIAGNOSIS — C90.01 MULTIPLE MYELOMA IN REMISSION: ICD-10-CM

## 2022-04-12 RX ORDER — LENALIDOMIDE 10 MG/1
CAPSULE ORAL
Qty: 21 EACH | Refills: 0 | Status: SHIPPED | OUTPATIENT
Start: 2022-04-12 | End: 2022-05-14

## 2022-05-17 DIAGNOSIS — C90.01 MULTIPLE MYELOMA IN REMISSION: ICD-10-CM

## 2022-05-17 RX ORDER — LENALIDOMIDE 10 MG/1
CAPSULE ORAL
Qty: 21 EACH | Refills: 0 | Status: SHIPPED | OUTPATIENT
Start: 2022-05-17 | End: 2022-06-08

## 2022-06-27 ENCOUNTER — OFFICE VISIT (OUTPATIENT)
Dept: HEMATOLOGY/ONCOLOGY | Facility: CLINIC | Age: 57
End: 2022-06-27
Payer: COMMERCIAL

## 2022-06-27 ENCOUNTER — LAB VISIT (OUTPATIENT)
Dept: LAB | Facility: HOSPITAL | Age: 57
End: 2022-06-27
Attending: INTERNAL MEDICINE
Payer: COMMERCIAL

## 2022-06-27 VITALS
TEMPERATURE: 98 F | HEART RATE: 51 BPM | WEIGHT: 249.31 LBS | HEIGHT: 68 IN | OXYGEN SATURATION: 98 % | SYSTOLIC BLOOD PRESSURE: 144 MMHG | RESPIRATION RATE: 16 BRPM | BODY MASS INDEX: 37.79 KG/M2 | DIASTOLIC BLOOD PRESSURE: 81 MMHG

## 2022-06-27 DIAGNOSIS — D61.810 PANCYTOPENIA DUE TO ANTINEOPLASTIC CHEMOTHERAPY: ICD-10-CM

## 2022-06-27 DIAGNOSIS — T45.1X5A PANCYTOPENIA DUE TO ANTINEOPLASTIC CHEMOTHERAPY: ICD-10-CM

## 2022-06-27 DIAGNOSIS — C90.01 MULTIPLE MYELOMA IN REMISSION: Primary | ICD-10-CM

## 2022-06-27 DIAGNOSIS — C90.01 MULTIPLE MYELOMA IN REMISSION: ICD-10-CM

## 2022-06-27 LAB
ALBUMIN SERPL BCP-MCNC: 3.4 G/DL (ref 3.5–5.2)
ALP SERPL-CCNC: 83 U/L (ref 55–135)
ALT SERPL W/O P-5'-P-CCNC: 23 U/L (ref 10–44)
ANION GAP SERPL CALC-SCNC: 10 MMOL/L (ref 8–16)
AST SERPL-CCNC: 18 U/L (ref 10–40)
BASOPHILS # BLD AUTO: 0.05 K/UL (ref 0–0.2)
BASOPHILS NFR BLD: 1.9 % (ref 0–1.9)
BILIRUB SERPL-MCNC: 0.8 MG/DL (ref 0.1–1)
BUN SERPL-MCNC: 11 MG/DL (ref 6–20)
CALCIUM SERPL-MCNC: 8.9 MG/DL (ref 8.7–10.5)
CHLORIDE SERPL-SCNC: 105 MMOL/L (ref 95–110)
CO2 SERPL-SCNC: 28 MMOL/L (ref 23–29)
CREAT SERPL-MCNC: 1 MG/DL (ref 0.5–1.4)
DIFFERENTIAL METHOD: ABNORMAL
EOSINOPHIL # BLD AUTO: 0.1 K/UL (ref 0–0.5)
EOSINOPHIL NFR BLD: 3.4 % (ref 0–8)
ERYTHROCYTE [DISTWIDTH] IN BLOOD BY AUTOMATED COUNT: 13.5 % (ref 11.5–14.5)
EST. GFR  (AFRICAN AMERICAN): >60 ML/MIN/1.73 M^2
EST. GFR  (NON AFRICAN AMERICAN): >60 ML/MIN/1.73 M^2
GLUCOSE SERPL-MCNC: 89 MG/DL (ref 70–110)
HCT VFR BLD AUTO: 40.3 % (ref 40–54)
HGB BLD-MCNC: 13.3 G/DL (ref 14–18)
IGA SERPL-MCNC: 169 MG/DL (ref 40–350)
IGG SERPL-MCNC: 528 MG/DL (ref 650–1600)
IGM SERPL-MCNC: 19 MG/DL (ref 50–300)
IMM GRANULOCYTES # BLD AUTO: 0.01 K/UL (ref 0–0.04)
IMM GRANULOCYTES NFR BLD AUTO: 0.4 % (ref 0–0.5)
LYMPHOCYTES # BLD AUTO: 0.7 K/UL (ref 1–4.8)
LYMPHOCYTES NFR BLD: 26.1 % (ref 18–48)
MCH RBC QN AUTO: 31.2 PG (ref 27–31)
MCHC RBC AUTO-ENTMCNC: 33 G/DL (ref 32–36)
MCV RBC AUTO: 95 FL (ref 82–98)
MONOCYTES # BLD AUTO: 0.2 K/UL (ref 0.3–1)
MONOCYTES NFR BLD: 5.6 % (ref 4–15)
NEUTROPHILS # BLD AUTO: 1.7 K/UL (ref 1.8–7.7)
NEUTROPHILS NFR BLD: 62.6 % (ref 38–73)
NRBC BLD-RTO: 0 /100 WBC
PLATELET # BLD AUTO: 149 K/UL (ref 150–450)
PMV BLD AUTO: 10.2 FL (ref 9.2–12.9)
POTASSIUM SERPL-SCNC: 3.6 MMOL/L (ref 3.5–5.1)
PROT SERPL-MCNC: 6 G/DL (ref 6–8.4)
RBC # BLD AUTO: 4.26 M/UL (ref 4.6–6.2)
SODIUM SERPL-SCNC: 143 MMOL/L (ref 136–145)
WBC # BLD AUTO: 2.68 K/UL (ref 3.9–12.7)

## 2022-06-27 PROCEDURE — 99999 PR PBB SHADOW E&M-EST. PATIENT-LVL IV: ICD-10-PCS | Mod: PBBFAC,,, | Performed by: INTERNAL MEDICINE

## 2022-06-27 PROCEDURE — 84165 PROTEIN E-PHORESIS SERUM: CPT | Mod: 26,,, | Performed by: PATHOLOGY

## 2022-06-27 PROCEDURE — 84165 PROTEIN E-PHORESIS SERUM: CPT | Performed by: INTERNAL MEDICINE

## 2022-06-27 PROCEDURE — 3008F PR BODY MASS INDEX (BMI) DOCUMENTED: ICD-10-PCS | Mod: CPTII,S$GLB,, | Performed by: INTERNAL MEDICINE

## 2022-06-27 PROCEDURE — 3079F DIAST BP 80-89 MM HG: CPT | Mod: CPTII,S$GLB,, | Performed by: INTERNAL MEDICINE

## 2022-06-27 PROCEDURE — 80053 COMPREHEN METABOLIC PANEL: CPT | Performed by: INTERNAL MEDICINE

## 2022-06-27 PROCEDURE — 99999 PR PBB SHADOW E&M-EST. PATIENT-LVL IV: CPT | Mod: PBBFAC,,, | Performed by: INTERNAL MEDICINE

## 2022-06-27 PROCEDURE — 86334 PATHOLOGIST INTERPRETATION IFE: ICD-10-PCS | Mod: 26,,, | Performed by: PATHOLOGY

## 2022-06-27 PROCEDURE — 3008F BODY MASS INDEX DOCD: CPT | Mod: CPTII,S$GLB,, | Performed by: INTERNAL MEDICINE

## 2022-06-27 PROCEDURE — 84165 PATHOLOGIST INTERPRETATION SPE: ICD-10-PCS | Mod: 26,,, | Performed by: PATHOLOGY

## 2022-06-27 PROCEDURE — 4010F ACE/ARB THERAPY RXD/TAKEN: CPT | Mod: CPTII,S$GLB,, | Performed by: INTERNAL MEDICINE

## 2022-06-27 PROCEDURE — 1160F PR REVIEW ALL MEDS BY PRESCRIBER/CLIN PHARMACIST DOCUMENTED: ICD-10-PCS | Mod: CPTII,S$GLB,, | Performed by: INTERNAL MEDICINE

## 2022-06-27 PROCEDURE — 3077F SYST BP >= 140 MM HG: CPT | Mod: CPTII,S$GLB,, | Performed by: INTERNAL MEDICINE

## 2022-06-27 PROCEDURE — 3077F PR MOST RECENT SYSTOLIC BLOOD PRESSURE >= 140 MM HG: ICD-10-PCS | Mod: CPTII,S$GLB,, | Performed by: INTERNAL MEDICINE

## 2022-06-27 PROCEDURE — 4010F PR ACE/ARB THEARPY RXD/TAKEN: ICD-10-PCS | Mod: CPTII,S$GLB,, | Performed by: INTERNAL MEDICINE

## 2022-06-27 PROCEDURE — 3079F PR MOST RECENT DIASTOLIC BLOOD PRESSURE 80-89 MM HG: ICD-10-PCS | Mod: CPTII,S$GLB,, | Performed by: INTERNAL MEDICINE

## 2022-06-27 PROCEDURE — 36415 COLL VENOUS BLD VENIPUNCTURE: CPT | Performed by: INTERNAL MEDICINE

## 2022-06-27 PROCEDURE — 86334 IMMUNOFIX E-PHORESIS SERUM: CPT | Performed by: INTERNAL MEDICINE

## 2022-06-27 PROCEDURE — 85025 COMPLETE CBC W/AUTO DIFF WBC: CPT | Performed by: INTERNAL MEDICINE

## 2022-06-27 PROCEDURE — 83520 IMMUNOASSAY QUANT NOS NONAB: CPT | Mod: 59 | Performed by: INTERNAL MEDICINE

## 2022-06-27 PROCEDURE — 1159F PR MEDICATION LIST DOCUMENTED IN MEDICAL RECORD: ICD-10-PCS | Mod: CPTII,S$GLB,, | Performed by: INTERNAL MEDICINE

## 2022-06-27 PROCEDURE — 1159F MED LIST DOCD IN RCRD: CPT | Mod: CPTII,S$GLB,, | Performed by: INTERNAL MEDICINE

## 2022-06-27 PROCEDURE — 86334 IMMUNOFIX E-PHORESIS SERUM: CPT | Mod: 26,,, | Performed by: PATHOLOGY

## 2022-06-27 PROCEDURE — 1160F RVW MEDS BY RX/DR IN RCRD: CPT | Mod: CPTII,S$GLB,, | Performed by: INTERNAL MEDICINE

## 2022-06-27 PROCEDURE — 82784 ASSAY IGA/IGD/IGG/IGM EACH: CPT | Performed by: INTERNAL MEDICINE

## 2022-06-27 PROCEDURE — 99215 PR OFFICE/OUTPT VISIT, EST, LEVL V, 40-54 MIN: ICD-10-PCS | Mod: S$GLB,,, | Performed by: INTERNAL MEDICINE

## 2022-06-27 PROCEDURE — 99215 OFFICE O/P EST HI 40 MIN: CPT | Mod: S$GLB,,, | Performed by: INTERNAL MEDICINE

## 2022-06-28 LAB
ALBUMIN SERPL ELPH-MCNC: 3.61 G/DL (ref 3.35–5.55)
ALPHA1 GLOB SERPL ELPH-MCNC: 0.31 G/DL (ref 0.17–0.41)
ALPHA2 GLOB SERPL ELPH-MCNC: 0.64 G/DL (ref 0.43–0.99)
B-GLOBULIN SERPL ELPH-MCNC: 0.71 G/DL (ref 0.5–1.1)
GAMMA GLOB SERPL ELPH-MCNC: 0.53 G/DL (ref 0.67–1.58)
INTERPRETATION SERPL IFE-IMP: NORMAL
KAPPA LC SER QL IA: 1.44 MG/DL (ref 0.33–1.94)
KAPPA LC/LAMBDA SER IA: 1.36 (ref 0.26–1.65)
LAMBDA LC SER QL IA: 1.06 MG/DL (ref 0.57–2.63)
PROT SERPL-MCNC: 5.8 G/DL (ref 6–8.4)

## 2022-06-29 NOTE — PROGRESS NOTES
HEMATOLOGIC MALIGNANCIES PROGRESS NOTE    IDENTIFYING STATEMENT   Arik Bobo (Arik) is a 57 y.o. male with a  of 1965 from [place] with the diagnosis of multiple myeloma.      ONCOLOGY HISTORY:    1. Multiple myeloma s/p autologous stem cell transplant   A. Early : Progressive anemia. K/L ratio > 100, and was treated with fina-dex   B. 12/3/2015: M-protein 1.02 g/dl, IgG-kappa by JAYESH. Kappa 2.98 mg/dl, lambda 0.5 mg/dl, ratio 5.96. BMBx shows 60-70% cellular marrow with 20% plasma cells, consistent with plasma cell myeloma. Hgb 11.8 g/dl, Calcium 9.44 (corrected). Creatinine 0.8 mg/dl.    C. 2016: M-protein 2.23 g/dl. Kappa 4.36 mg/dl, lambda 0.08 mg/dl, ratio 54.5. Progressive disease.   D. VCD therapy locally.    E. 2017: M-protein 1.23 g/dl.    F. 2017: M-protein 0.41 g/dl. Kappa 2.33 mg/dl, lambda 0.5 mg/dl, ratio 4.66, consistent with partial response.    G. 2017: Autologous stem cell transplant with melphalan conditioning.   H. 2018: Day +100 restaging - M-protein 0.19 g/dl, kappa 0.76 mg/dl, lambda 0.53 mg/dl, ratio 1.43. BMBx shows no definitive evidence of myeloma. Consistent with very good partial response.    I. 3/8/2018: M-protein 0.18 g/dl   J. 2018: M-protein 0.16 g/dl   K. 2018: M-protein 0.2 g/dl   L. 2018: M-protein 0.14 g/dl; BMBx for 1-year re-evaluation shows 40% cellular marrow with 6-7% plasma cells and no evidence of clonality; thus, no plasma cell neoplasm evident. Findings consistent with ongoing VGPR.   M. 4/3/2019: M-protein < 0.1 g/dl.     N. 7/10/2019: M-protein 0.18 g/dl.   O. 10/30/2019: M-protein 0.14 g/dl.    P. 2020: M-protein negative; JAYESH negative; findings consistent with complete response to therapy.     2. Depression  3. HTN  4. History of DVT in 2015 and 2017    INTERVAL HISTORY:      Mr. Bobo returns to clinic 4 years and 9 months after  autologous transplant for myeloma.     He is tolerating  lenalidomide well. He continues to have facial twitching. Otherwise, he has no complaints.     Past Medical History, Past Social History and Past Family History have been reviewed and are unchanged except as noted in the interval history.    MEDICATIONS:     Current Outpatient Medications on File Prior to Visit   Medication Sig Dispense Refill    amLODIPine (NORVASC) 10 MG tablet       apixaban 2.5 mg Tab Take 1 tablet (2.5 mg total) by mouth 2 (two) times daily. 60 tablet 3    baclofen (LIORESAL) 5 mg Tab tablet TAKE ONE TABLET BY MOUTH THREE TIMES A DAY. 90 tablet 0    citalopram (CELEXA) 40 MG tablet Take 40 mg by mouth.      lenalidomide (REVLIMID) 10 mg Cap TAKE 1 CAPSULE ONCE DAILY ON DAYS 1 THROUGH 21 IN A 28 DAY CYCLE. 21 each 0    lisinopriL 10 MG tablet       simvastatin (ZOCOR) 20 MG tablet   3     No current facility-administered medications on file prior to visit.       ALLERGIES:     Review of patient's allergies indicates:   Allergen Reactions    Pcn [penicillins]      Unknown last dose as an infant         ROS:       Review of Systems   Constitutional: Negative for diaphoresis, fatigue, fever and unexpected weight change.   HENT:   Negative for lump/mass and sore throat.    Eyes: Negative for icterus.   Respiratory: Negative for cough and shortness of breath.    Cardiovascular: Negative for chest pain and palpitations.   Gastrointestinal: Negative for abdominal distention, constipation, diarrhea, nausea and vomiting.   Genitourinary: Negative for dysuria and frequency.    Musculoskeletal: Negative for arthralgias, gait problem and myalgias.   Skin: Negative for rash.   Neurological: Negative for dizziness, gait problem and headaches.        Facial twitching   Hematological: Negative for adenopathy. Does not bruise/bleed easily.   Psychiatric/Behavioral: The patient is not nervous/anxious.        PHYSICAL EXAM:  Vitals:    06/27/22 0843   BP: (!) 144/81   Pulse: (!) 51   Resp: 16   Temp: 98.2  "°F (36.8 °C)   TempSrc: Oral   SpO2: 98%   Weight: 113.1 kg (249 lb 5.4 oz)   Height: 5' 8" (1.727 m)   PainSc: 0-No pain   Body mass index is 37.91 kg/m².      Physical Exam  Constitutional:       General: He is not in acute distress.     Appearance: He is well-developed.   HENT:      Head: Normocephalic and atraumatic.      Mouth/Throat:      Mouth: No oral lesions.   Eyes:      Conjunctiva/sclera: Conjunctivae normal.      Comments: Right upper eyelid twitch   Neck:      Thyroid: No thyromegaly.   Cardiovascular:      Rate and Rhythm: Normal rate and regular rhythm.      Heart sounds: Normal heart sounds. No murmur heard.  Pulmonary:      Breath sounds: Normal breath sounds. No wheezing or rales.   Abdominal:      General: There is no distension.      Palpations: Abdomen is soft. There is no hepatomegaly, splenomegaly or mass.      Tenderness: There is no abdominal tenderness.   Lymphadenopathy:      Cervical: No cervical adenopathy.      Right cervical: No deep cervical adenopathy.     Left cervical: No deep cervical adenopathy.   Skin:     Findings: No rash.   Neurological:      Mental Status: He is alert and oriented to person, place, and time.      Cranial Nerves: No cranial nerve deficit.      Coordination: Coordination normal.      Deep Tendon Reflexes: Reflexes are normal and symmetric.       LAB:   Results for orders placed or performed in visit on 06/27/22   CBC auto differential   Result Value Ref Range    WBC 2.68 (L) 3.90 - 12.70 K/uL    RBC 4.26 (L) 4.60 - 6.20 M/uL    Hemoglobin 13.3 (L) 14.0 - 18.0 g/dL    Hematocrit 40.3 40.0 - 54.0 %    MCV 95 82 - 98 fL    MCH 31.2 (H) 27.0 - 31.0 pg    MCHC 33.0 32.0 - 36.0 g/dL    RDW 13.5 11.5 - 14.5 %    Platelets 149 (L) 150 - 450 K/uL    MPV 10.2 9.2 - 12.9 fL    Immature Granulocytes 0.4 0.0 - 0.5 %    Gran # (ANC) 1.7 (L) 1.8 - 7.7 K/uL    Immature Grans (Abs) 0.01 0.00 - 0.04 K/uL    Lymph # 0.7 (L) 1.0 - 4.8 K/uL    Mono # 0.2 (L) 0.3 - 1.0 K/uL    Eos " # 0.1 0.0 - 0.5 K/uL    Baso # 0.05 0.00 - 0.20 K/uL    nRBC 0 0 /100 WBC    Gran % 62.6 38.0 - 73.0 %    Lymph % 26.1 18.0 - 48.0 %    Mono % 5.6 4.0 - 15.0 %    Eosinophil % 3.4 0.0 - 8.0 %    Basophil % 1.9 0.0 - 1.9 %    Differential Method Automated    Comprehensive Metabolic Panel   Result Value Ref Range    Sodium 143 136 - 145 mmol/L    Potassium 3.6 3.5 - 5.1 mmol/L    Chloride 105 95 - 110 mmol/L    CO2 28 23 - 29 mmol/L    Glucose 89 70 - 110 mg/dL    BUN 11 6 - 20 mg/dL    Creatinine 1.0 0.5 - 1.4 mg/dL    Calcium 8.9 8.7 - 10.5 mg/dL    Total Protein 6.0 6.0 - 8.4 g/dL    Albumin 3.4 (L) 3.5 - 5.2 g/dL    Total Bilirubin 0.8 0.1 - 1.0 mg/dL    Alkaline Phosphatase 83 55 - 135 U/L    AST 18 10 - 40 U/L    ALT 23 10 - 44 U/L    Anion Gap 10 8 - 16 mmol/L    eGFR if African American >60.0 >60 mL/min/1.73 m^2    eGFR if non African American >60.0 >60 mL/min/1.73 m^2   Immunofixation Electrophoresis   Result Value Ref Range    Immunofix Interp. SEE COMMENT    Protein Electrophoresis, Serum   Result Value Ref Range    Protein, Serum 5.8 (L) 6.0 - 8.4 g/dL    Albumin 3.61 3.35 - 5.55 g/dL    Alpha-1 0.31 0.17 - 0.41 g/dL    Alpha-2 0.64 0.43 - 0.99 g/dL    Beta 0.71 0.50 - 1.10 g/dL    Gamma 0.53 (L) 0.67 - 1.58 g/dL   Immunoglobulins (IgG, IgA, IgM) Quantitative   Result Value Ref Range    IgG 528 (L) 650 - 1600 mg/dL    IgA 169 40 - 350 mg/dL    IgM 19 (L) 50 - 300 mg/dL   Immunoglobulin Free LT Chains Blood   Result Value Ref Range    Kappa Free Light Chains 1.44 0.33 - 1.94 mg/dL    Lambda Free Light Chains 1.06 0.57 - 2.63 mg/dL    Kappa/Lambda FLC Ratio 1.36 0.26 - 1.65       PROBLEMS ASSESSED THIS VISIT:    1. Multiple myeloma in remission    2. Pancytopenia due to antineoplastic chemotherapy        PLAN:       Multiple myeloma    Serologic studies are consistent with ongoing CR. Kappa/lambda ratio normal. Continue maintenance lenalidomide and low dose apixaban for thromboprophylaxis.     History of  autologous stem cell transplant    He is 4 years, 9 months post ASCT. Current disease status is complete remission. No evidence of relapse to date since ASCT.    Cytopenias  Mild. Monitor on lenalidomide.     Follow-up in 3 months    Route Chart for Scheduling    BMT Chart Routing      Follow up with physician 3 months.   Follow up with [facility]    Infusion scheduling note    Injection scheduling note    Labs CBC, CMP, immunofixation, free light chains, immunoglobulins and SPEP   Lab interval:     Imaging    Pharmacy appointment    Other referrals        Italo Bryant MD  Hematology and Stem Cell Transplant

## 2022-07-01 LAB
PATHOLOGIST INTERPRETATION IFE: NORMAL
PATHOLOGIST INTERPRETATION SPE: NORMAL

## 2022-07-13 DIAGNOSIS — C90.01 MULTIPLE MYELOMA IN REMISSION: ICD-10-CM

## 2022-07-15 RX ORDER — LENALIDOMIDE 10 MG/1
CAPSULE ORAL
Qty: 21 EACH | Refills: 0 | Status: SHIPPED | OUTPATIENT
Start: 2022-07-15 | End: 2022-08-18 | Stop reason: SDUPTHER

## 2022-08-18 DIAGNOSIS — C90.01 MULTIPLE MYELOMA IN REMISSION: ICD-10-CM

## 2022-08-19 NOTE — TELEPHONE ENCOUNTER
"----- Message from Светланаsupriya Guido sent at 8/19/2022  1:47 PM CDT -----  Regarding: Prior Authiorization  Consult/Advisory:           Name Of Caller: self      What is the nature of the call?: needing a prior authorization called to [facility] for his [place]           Additional Notes:  "Thank you for all that you do for our patients'"     "

## 2022-08-26 ENCOUNTER — TELEPHONE (OUTPATIENT)
Dept: HEMATOLOGY/ONCOLOGY | Facility: CLINIC | Age: 57
End: 2022-08-26
Payer: COMMERCIAL

## 2022-08-26 RX ORDER — LENALIDOMIDE 10 MG/1
CAPSULE ORAL
Qty: 21 EACH | Refills: 0 | Status: SHIPPED | OUTPATIENT
Start: 2022-08-26 | End: 2022-09-20 | Stop reason: SDUPTHER

## 2022-08-26 NOTE — TELEPHONE ENCOUNTER
Spoke with patient. Updated that I confirmed the pharmacy has the revlimid rx and that it is processed with insurance without issue. PA valid through 2025.

## 2022-09-20 DIAGNOSIS — C90.01 MULTIPLE MYELOMA IN REMISSION: ICD-10-CM

## 2022-09-20 RX ORDER — LENALIDOMIDE 10 MG/1
CAPSULE ORAL
Qty: 21 EACH | Refills: 0 | Status: SHIPPED | OUTPATIENT
Start: 2022-09-20 | End: 2022-09-22

## 2022-09-26 ENCOUNTER — OFFICE VISIT (OUTPATIENT)
Dept: HEMATOLOGY/ONCOLOGY | Facility: CLINIC | Age: 57
End: 2022-09-26
Payer: COMMERCIAL

## 2022-09-26 ENCOUNTER — LAB VISIT (OUTPATIENT)
Dept: LAB | Facility: HOSPITAL | Age: 57
End: 2022-09-26
Payer: COMMERCIAL

## 2022-09-26 VITALS
RESPIRATION RATE: 16 BRPM | HEIGHT: 68 IN | TEMPERATURE: 98 F | BODY MASS INDEX: 37.29 KG/M2 | OXYGEN SATURATION: 99 % | HEART RATE: 47 BPM | WEIGHT: 246.06 LBS | DIASTOLIC BLOOD PRESSURE: 75 MMHG | SYSTOLIC BLOOD PRESSURE: 134 MMHG

## 2022-09-26 DIAGNOSIS — R00.1 BRADYCARDIA: ICD-10-CM

## 2022-09-26 DIAGNOSIS — C90.01 MULTIPLE MYELOMA IN REMISSION: ICD-10-CM

## 2022-09-26 DIAGNOSIS — T45.1X5A ANEMIA ASSOCIATED WITH CHEMOTHERAPY: ICD-10-CM

## 2022-09-26 DIAGNOSIS — D64.81 ANEMIA ASSOCIATED WITH CHEMOTHERAPY: ICD-10-CM

## 2022-09-26 DIAGNOSIS — C90.01 MULTIPLE MYELOMA IN REMISSION: Primary | ICD-10-CM

## 2022-09-26 LAB
ALBUMIN SERPL BCP-MCNC: 3.6 G/DL (ref 3.5–5.2)
ALP SERPL-CCNC: 84 U/L (ref 55–135)
ALT SERPL W/O P-5'-P-CCNC: 21 U/L (ref 10–44)
ANION GAP SERPL CALC-SCNC: 7 MMOL/L (ref 8–16)
AST SERPL-CCNC: 18 U/L (ref 10–40)
BASOPHILS # BLD AUTO: 0.07 K/UL (ref 0–0.2)
BASOPHILS NFR BLD: 1.8 % (ref 0–1.9)
BILIRUB SERPL-MCNC: 0.8 MG/DL (ref 0.1–1)
BUN SERPL-MCNC: 12 MG/DL (ref 6–20)
CALCIUM SERPL-MCNC: 8.8 MG/DL (ref 8.7–10.5)
CHLORIDE SERPL-SCNC: 106 MMOL/L (ref 95–110)
CO2 SERPL-SCNC: 26 MMOL/L (ref 23–29)
CREAT SERPL-MCNC: 0.9 MG/DL (ref 0.5–1.4)
DIFFERENTIAL METHOD: ABNORMAL
EOSINOPHIL # BLD AUTO: 0.1 K/UL (ref 0–0.5)
EOSINOPHIL NFR BLD: 1.8 % (ref 0–8)
ERYTHROCYTE [DISTWIDTH] IN BLOOD BY AUTOMATED COUNT: 13.5 % (ref 11.5–14.5)
EST. GFR  (NO RACE VARIABLE): >60 ML/MIN/1.73 M^2
GLUCOSE SERPL-MCNC: 88 MG/DL (ref 70–110)
HCT VFR BLD AUTO: 39.4 % (ref 40–54)
HGB BLD-MCNC: 13.2 G/DL (ref 14–18)
IGA SERPL-MCNC: 187 MG/DL (ref 40–350)
IGG SERPL-MCNC: 646 MG/DL (ref 650–1600)
IGM SERPL-MCNC: 23 MG/DL (ref 50–300)
IMM GRANULOCYTES # BLD AUTO: 0.01 K/UL (ref 0–0.04)
IMM GRANULOCYTES NFR BLD AUTO: 0.3 % (ref 0–0.5)
LYMPHOCYTES # BLD AUTO: 1.4 K/UL (ref 1–4.8)
LYMPHOCYTES NFR BLD: 36 % (ref 18–48)
MCH RBC QN AUTO: 30.6 PG (ref 27–31)
MCHC RBC AUTO-ENTMCNC: 33.5 G/DL (ref 32–36)
MCV RBC AUTO: 91 FL (ref 82–98)
MONOCYTES # BLD AUTO: 0.4 K/UL (ref 0.3–1)
MONOCYTES NFR BLD: 10.2 % (ref 4–15)
NEUTROPHILS # BLD AUTO: 2 K/UL (ref 1.8–7.7)
NEUTROPHILS NFR BLD: 49.9 % (ref 38–73)
NRBC BLD-RTO: 0 /100 WBC
PLATELET # BLD AUTO: 170 K/UL (ref 150–450)
PMV BLD AUTO: 10.1 FL (ref 9.2–12.9)
POTASSIUM SERPL-SCNC: 3.7 MMOL/L (ref 3.5–5.1)
PROT SERPL-MCNC: 6.2 G/DL (ref 6–8.4)
RBC # BLD AUTO: 4.32 M/UL (ref 4.6–6.2)
SODIUM SERPL-SCNC: 139 MMOL/L (ref 136–145)
WBC # BLD AUTO: 3.92 K/UL (ref 3.9–12.7)

## 2022-09-26 PROCEDURE — 3075F PR MOST RECENT SYSTOLIC BLOOD PRESS GE 130-139MM HG: ICD-10-PCS | Mod: CPTII,S$GLB,, | Performed by: INTERNAL MEDICINE

## 2022-09-26 PROCEDURE — 3008F BODY MASS INDEX DOCD: CPT | Mod: CPTII,S$GLB,, | Performed by: INTERNAL MEDICINE

## 2022-09-26 PROCEDURE — 86334 IMMUNOFIX E-PHORESIS SERUM: CPT | Performed by: INTERNAL MEDICINE

## 2022-09-26 PROCEDURE — 99215 PR OFFICE/OUTPT VISIT, EST, LEVL V, 40-54 MIN: ICD-10-PCS | Mod: S$GLB,,, | Performed by: INTERNAL MEDICINE

## 2022-09-26 PROCEDURE — 84165 PROTEIN E-PHORESIS SERUM: CPT | Mod: 26,,, | Performed by: PATHOLOGY

## 2022-09-26 PROCEDURE — 80053 COMPREHEN METABOLIC PANEL: CPT | Performed by: INTERNAL MEDICINE

## 2022-09-26 PROCEDURE — 99215 OFFICE O/P EST HI 40 MIN: CPT | Mod: S$GLB,,, | Performed by: INTERNAL MEDICINE

## 2022-09-26 PROCEDURE — 86334 IMMUNOFIX E-PHORESIS SERUM: CPT | Mod: 26,,, | Performed by: PATHOLOGY

## 2022-09-26 PROCEDURE — 3075F SYST BP GE 130 - 139MM HG: CPT | Mod: CPTII,S$GLB,, | Performed by: INTERNAL MEDICINE

## 2022-09-26 PROCEDURE — 3078F DIAST BP <80 MM HG: CPT | Mod: CPTII,S$GLB,, | Performed by: INTERNAL MEDICINE

## 2022-09-26 PROCEDURE — 86334 PATHOLOGIST INTERPRETATION IFE: ICD-10-PCS | Mod: 26,,, | Performed by: PATHOLOGY

## 2022-09-26 PROCEDURE — 3008F PR BODY MASS INDEX (BMI) DOCUMENTED: ICD-10-PCS | Mod: CPTII,S$GLB,, | Performed by: INTERNAL MEDICINE

## 2022-09-26 PROCEDURE — 83520 IMMUNOASSAY QUANT NOS NONAB: CPT | Mod: 59 | Performed by: INTERNAL MEDICINE

## 2022-09-26 PROCEDURE — 99999 PR PBB SHADOW E&M-EST. PATIENT-LVL III: CPT | Mod: PBBFAC,,, | Performed by: INTERNAL MEDICINE

## 2022-09-26 PROCEDURE — 1160F PR REVIEW ALL MEDS BY PRESCRIBER/CLIN PHARMACIST DOCUMENTED: ICD-10-PCS | Mod: CPTII,S$GLB,, | Performed by: INTERNAL MEDICINE

## 2022-09-26 PROCEDURE — 1159F MED LIST DOCD IN RCRD: CPT | Mod: CPTII,S$GLB,, | Performed by: INTERNAL MEDICINE

## 2022-09-26 PROCEDURE — 3078F PR MOST RECENT DIASTOLIC BLOOD PRESSURE < 80 MM HG: ICD-10-PCS | Mod: CPTII,S$GLB,, | Performed by: INTERNAL MEDICINE

## 2022-09-26 PROCEDURE — 84165 PROTEIN E-PHORESIS SERUM: CPT | Performed by: INTERNAL MEDICINE

## 2022-09-26 PROCEDURE — 85025 COMPLETE CBC W/AUTO DIFF WBC: CPT | Performed by: INTERNAL MEDICINE

## 2022-09-26 PROCEDURE — 4010F ACE/ARB THERAPY RXD/TAKEN: CPT | Mod: CPTII,S$GLB,, | Performed by: INTERNAL MEDICINE

## 2022-09-26 PROCEDURE — 1160F RVW MEDS BY RX/DR IN RCRD: CPT | Mod: CPTII,S$GLB,, | Performed by: INTERNAL MEDICINE

## 2022-09-26 PROCEDURE — 4010F PR ACE/ARB THEARPY RXD/TAKEN: ICD-10-PCS | Mod: CPTII,S$GLB,, | Performed by: INTERNAL MEDICINE

## 2022-09-26 PROCEDURE — 1159F PR MEDICATION LIST DOCUMENTED IN MEDICAL RECORD: ICD-10-PCS | Mod: CPTII,S$GLB,, | Performed by: INTERNAL MEDICINE

## 2022-09-26 PROCEDURE — 99999 PR PBB SHADOW E&M-EST. PATIENT-LVL III: ICD-10-PCS | Mod: PBBFAC,,, | Performed by: INTERNAL MEDICINE

## 2022-09-26 PROCEDURE — 82784 ASSAY IGA/IGD/IGG/IGM EACH: CPT | Performed by: INTERNAL MEDICINE

## 2022-09-26 PROCEDURE — 84165 PATHOLOGIST INTERPRETATION SPE: ICD-10-PCS | Mod: 26,,, | Performed by: PATHOLOGY

## 2022-09-26 PROCEDURE — 36415 COLL VENOUS BLD VENIPUNCTURE: CPT | Performed by: INTERNAL MEDICINE

## 2022-09-26 NOTE — PROGRESS NOTES
HEMATOLOGIC MALIGNANCIES PROGRESS NOTE    IDENTIFYING STATEMENT   Arik Bobo (Arik) is a 57 y.o. male with a  of 1965 from [place] with the diagnosis of multiple myeloma.      ONCOLOGY HISTORY:    1. Multiple myeloma s/p autologous stem cell transplant   A. Early : Progressive anemia. K/L ratio > 100, and was treated with fina-dex   B. 12/3/2015: M-protein 1.02 g/dl, IgG-kappa by JAYESH. Kappa 2.98 mg/dl, lambda 0.5 mg/dl, ratio 5.96. BMBx shows 60-70% cellular marrow with 20% plasma cells, consistent with plasma cell myeloma. Hgb 11.8 g/dl, Calcium 9.44 (corrected). Creatinine 0.8 mg/dl.    C. 2016: M-protein 2.23 g/dl. Kappa 4.36 mg/dl, lambda 0.08 mg/dl, ratio 54.5. Progressive disease.   D. VCD therapy locally.    E. 2017: M-protein 1.23 g/dl.    F. 2017: M-protein 0.41 g/dl. Kappa 2.33 mg/dl, lambda 0.5 mg/dl, ratio 4.66, consistent with partial response.    G. 2017: Autologous stem cell transplant with melphalan conditioning.   H. 2018: Day +100 restaging - M-protein 0.19 g/dl, kappa 0.76 mg/dl, lambda 0.53 mg/dl, ratio 1.43. BMBx shows no definitive evidence of myeloma. Consistent with very good partial response.    I. 3/8/2018: M-protein 0.18 g/dl   J. 2018: M-protein 0.16 g/dl   K. 2018: M-protein 0.2 g/dl   L. 2018: M-protein 0.14 g/dl; BMBx for 1-year re-evaluation shows 40% cellular marrow with 6-7% plasma cells and no evidence of clonality; thus, no plasma cell neoplasm evident. Findings consistent with ongoing VGPR.   M. 4/3/2019: M-protein < 0.1 g/dl.     N. 7/10/2019: M-protein 0.18 g/dl.   O. 10/30/2019: M-protein 0.14 g/dl.    P. 2020: M-protein negative; JAYESH negative; findings consistent with complete response to therapy.     2. Depression  3. HTN  4. History of DVT in 2015 and 2017    INTERVAL HISTORY:      Mr. Bobo returns to clinic 4 years and 11 months after  autologous transplant for myeloma.     He is tolerating  lenalidomide well. He continues to have facial twitching. Otherwise, he has no complaints.     Did feel lightheaded with yard work in the heat.     Past Medical History, Past Social History and Past Family History have been reviewed and are unchanged except as noted in the interval history.    MEDICATIONS:     Current Outpatient Medications on File Prior to Visit   Medication Sig Dispense Refill    amLODIPine (NORVASC) 10 MG tablet       apixaban 2.5 mg Tab Take 1 tablet (2.5 mg total) by mouth 2 (two) times daily. 60 tablet 3    baclofen (LIORESAL) 5 mg Tab tablet TAKE ONE TABLET BY MOUTH THREE TIMES A DAY. 90 tablet 0    citalopram (CELEXA) 40 MG tablet Take 40 mg by mouth.      lisinopriL 10 MG tablet       simvastatin (ZOCOR) 20 MG tablet   3    lenalidomide (REVLIMID) 10 mg Cap TAKE 1 CAPSULE ONCE DAILY ON DAYS 1 THROUGH 21 IN A 28 DAY CYCLE. 21 each 0     No current facility-administered medications on file prior to visit.       ALLERGIES:     Review of patient's allergies indicates:   Allergen Reactions    Pcn [penicillins]      Unknown last dose as an infant         ROS:       Review of Systems   Constitutional:  Negative for diaphoresis, fatigue, fever and unexpected weight change.   HENT:   Negative for lump/mass and sore throat.    Eyes:  Negative for icterus.   Respiratory:  Negative for cough and shortness of breath.    Cardiovascular:  Negative for chest pain and palpitations.   Gastrointestinal:  Negative for abdominal distention, constipation, diarrhea, nausea and vomiting.   Genitourinary:  Negative for dysuria and frequency.    Musculoskeletal:  Negative for arthralgias, gait problem and myalgias.   Skin:  Negative for rash.   Neurological:  Negative for dizziness, gait problem and headaches.        Facial twitching   Hematological:  Negative for adenopathy. Does not bruise/bleed easily.   Psychiatric/Behavioral:  The patient is not nervous/anxious.      PHYSICAL EXAM:  Vitals:    09/26/22 1120  "  BP: 134/75   Pulse: (!) 47   Resp: 16   Temp: 97.8 °F (36.6 °C)   SpO2: 99%   Weight: 111.6 kg (246 lb 0.5 oz)   Height: 5' 8" (1.727 m)   PainSc: 0-No pain   Body mass index is 37.41 kg/m².      Physical Exam  Constitutional:       General: He is not in acute distress.     Appearance: He is well-developed.   HENT:      Head: Normocephalic and atraumatic.      Mouth/Throat:      Mouth: No oral lesions.   Eyes:      Conjunctiva/sclera: Conjunctivae normal.      Comments: Right upper eyelid twitch   Neck:      Thyroid: No thyromegaly.   Cardiovascular:      Rate and Rhythm: Regular rhythm. Bradycardia present.      Heart sounds: Normal heart sounds. No murmur heard.  Pulmonary:      Breath sounds: Normal breath sounds. No wheezing or rales.   Abdominal:      General: There is no distension.      Palpations: Abdomen is soft. There is no hepatomegaly, splenomegaly or mass.      Tenderness: There is no abdominal tenderness.   Lymphadenopathy:      Cervical: No cervical adenopathy.      Right cervical: No deep cervical adenopathy.     Left cervical: No deep cervical adenopathy.   Skin:     Findings: No rash.   Neurological:      Mental Status: He is alert and oriented to person, place, and time.      Cranial Nerves: No cranial nerve deficit.      Coordination: Coordination normal.      Deep Tendon Reflexes: Reflexes are normal and symmetric.     LAB:   Results for orders placed or performed in visit on 09/26/22   CBC Auto Differential   Result Value Ref Range    WBC 3.92 3.90 - 12.70 K/uL    RBC 4.32 (L) 4.60 - 6.20 M/uL    Hemoglobin 13.2 (L) 14.0 - 18.0 g/dL    Hematocrit 39.4 (L) 40.0 - 54.0 %    MCV 91 82 - 98 fL    MCH 30.6 27.0 - 31.0 pg    MCHC 33.5 32.0 - 36.0 g/dL    RDW 13.5 11.5 - 14.5 %    Platelets 170 150 - 450 K/uL    MPV 10.1 9.2 - 12.9 fL    Immature Granulocytes 0.3 0.0 - 0.5 %    Gran # (ANC) 2.0 1.8 - 7.7 K/uL    Immature Grans (Abs) 0.01 0.00 - 0.04 K/uL    Lymph # 1.4 1.0 - 4.8 K/uL    Mono # 0.4 " 0.3 - 1.0 K/uL    Eos # 0.1 0.0 - 0.5 K/uL    Baso # 0.07 0.00 - 0.20 K/uL    nRBC 0 0 /100 WBC    Gran % 49.9 38.0 - 73.0 %    Lymph % 36.0 18.0 - 48.0 %    Mono % 10.2 4.0 - 15.0 %    Eosinophil % 1.8 0.0 - 8.0 %    Basophil % 1.8 0.0 - 1.9 %    Differential Method Automated    Comprehensive Metabolic Panel   Result Value Ref Range    Sodium 139 136 - 145 mmol/L    Potassium 3.7 3.5 - 5.1 mmol/L    Chloride 106 95 - 110 mmol/L    CO2 26 23 - 29 mmol/L    Glucose 88 70 - 110 mg/dL    BUN 12 6 - 20 mg/dL    Creatinine 0.9 0.5 - 1.4 mg/dL    Calcium 8.8 8.7 - 10.5 mg/dL    Total Protein 6.2 6.0 - 8.4 g/dL    Albumin 3.6 3.5 - 5.2 g/dL    Total Bilirubin 0.8 0.1 - 1.0 mg/dL    Alkaline Phosphatase 84 55 - 135 U/L    AST 18 10 - 40 U/L    ALT 21 10 - 44 U/L    Anion Gap 7 (L) 8 - 16 mmol/L    eGFR >60.0 >60 mL/min/1.73 m^2   Immunofixation Electrophoresis   Result Value Ref Range    Immunofix Interp. SEE COMMENT    Protein Electrophoresis, Serum   Result Value Ref Range    Protein, Serum 6.0 6.0 - 8.4 g/dL    Albumin 3.72 3.35 - 5.55 g/dL    Alpha-1 0.29 0.17 - 0.41 g/dL    Alpha-2 0.61 0.43 - 0.99 g/dL    Beta 0.77 0.50 - 1.10 g/dL    Gamma 0.62 (L) 0.67 - 1.58 g/dL   Immunoglobulin Free LT Chains Blood   Result Value Ref Range    Kappa Free Light Chains 1.45 0.33 - 1.94 mg/dL    Lambda Free Light Chains 0.96 0.57 - 2.63 mg/dL    Kappa/Lambda FLC Ratio 1.51 0.26 - 1.65   Immunoglobulins (IgG, IgA, IgM) Quantitative   Result Value Ref Range    IgG 646 (L) 650 - 1600 mg/dL    IgA 187 40 - 350 mg/dL    IgM 23 (L) 50 - 300 mg/dL       PROBLEMS ASSESSED THIS VISIT:    1. Multiple myeloma in remission    2. Anemia associated with chemotherapy    3. Bradycardia          PLAN:       Multiple myeloma    SPEP and JAYESH pending. Previous serologic studies showed 0.2 g/dl paraprotein and faint free lambda light chain on JAYESH.  Kappa/lambda ratio normal. He was originally diagnosed with IgG-kappa multiple myeloma, so the significance  of the prior findings is unclear. We will follow-up current testing results.     Continue maintenance lenalidomide and low dose apixaban for thromboprophylaxis.     History of autologous stem cell transplant    He is 4 years, 11 months post ASCT. Current disease status is pending serologic reassessment. No evidence of clinical relapse to date since ASCT.    Cytopenias  Mild. Monitor on lenalidomide.     Bradycardia   Follow-up with Dr. Blair re: baclofen and whether to have cardiac monitor. He is asymptomatic otherwise.     Follow-up in 3 months    Italo Bryant MD  Hematology and Stem Cell Transplant

## 2022-09-26 NOTE — PROGRESS NOTES
Route Chart for Scheduling    BMT Chart Routing      Follow up with physician 3 months.   Follow up with ROQUE    Infusion scheduling note    Injection scheduling note    Labs CBC, CMP, free light chains, immunofixation, immunoglobulins and SPEP   Lab interval:  at time of return visit   Imaging    Pharmacy appointment    Other referrals

## 2022-09-27 LAB
ALBUMIN SERPL ELPH-MCNC: 3.72 G/DL (ref 3.35–5.55)
ALPHA1 GLOB SERPL ELPH-MCNC: 0.29 G/DL (ref 0.17–0.41)
ALPHA2 GLOB SERPL ELPH-MCNC: 0.61 G/DL (ref 0.43–0.99)
B-GLOBULIN SERPL ELPH-MCNC: 0.77 G/DL (ref 0.5–1.1)
GAMMA GLOB SERPL ELPH-MCNC: 0.62 G/DL (ref 0.67–1.58)
INTERPRETATION SERPL IFE-IMP: NORMAL
KAPPA LC SER QL IA: 1.45 MG/DL (ref 0.33–1.94)
KAPPA LC/LAMBDA SER IA: 1.51 (ref 0.26–1.65)
LAMBDA LC SER QL IA: 0.96 MG/DL (ref 0.57–2.63)
PROT SERPL-MCNC: 6 G/DL (ref 6–8.4)

## 2022-09-28 LAB
PATHOLOGIST INTERPRETATION IFE: NORMAL
PATHOLOGIST INTERPRETATION SPE: NORMAL

## 2022-10-13 DIAGNOSIS — C90.01 MULTIPLE MYELOMA IN REMISSION: ICD-10-CM

## 2022-10-13 RX ORDER — LENALIDOMIDE 10 MG/1
CAPSULE ORAL
Qty: 21 EACH | Refills: 0 | Status: SHIPPED | OUTPATIENT
Start: 2022-10-13 | End: 2022-11-10 | Stop reason: SDUPTHER

## 2022-11-10 DIAGNOSIS — C90.01 MULTIPLE MYELOMA IN REMISSION: ICD-10-CM

## 2022-11-14 RX ORDER — LENALIDOMIDE 10 MG/1
CAPSULE ORAL
Qty: 21 EACH | Refills: 0 | Status: SHIPPED | OUTPATIENT
Start: 2022-11-14 | End: 2022-11-18

## 2022-12-13 DIAGNOSIS — C90.01 MULTIPLE MYELOMA IN REMISSION: ICD-10-CM

## 2022-12-13 RX ORDER — LENALIDOMIDE 10 MG/1
CAPSULE ORAL
Qty: 21 EACH | Refills: 0 | Status: SHIPPED | OUTPATIENT
Start: 2022-12-13 | End: 2022-12-19

## 2022-12-30 ENCOUNTER — LAB VISIT (OUTPATIENT)
Dept: LAB | Facility: HOSPITAL | Age: 57
End: 2022-12-30
Payer: COMMERCIAL

## 2022-12-30 ENCOUNTER — OFFICE VISIT (OUTPATIENT)
Dept: HEMATOLOGY/ONCOLOGY | Facility: CLINIC | Age: 57
End: 2022-12-30
Payer: COMMERCIAL

## 2022-12-30 VITALS
HEIGHT: 68 IN | BODY MASS INDEX: 37.19 KG/M2 | RESPIRATION RATE: 18 BRPM | DIASTOLIC BLOOD PRESSURE: 77 MMHG | WEIGHT: 245.38 LBS | HEART RATE: 57 BPM | SYSTOLIC BLOOD PRESSURE: 132 MMHG | OXYGEN SATURATION: 98 % | TEMPERATURE: 98 F

## 2022-12-30 DIAGNOSIS — D64.81 ANEMIA ASSOCIATED WITH CHEMOTHERAPY: ICD-10-CM

## 2022-12-30 DIAGNOSIS — T45.1X5A LEUKOPENIA DUE TO ANTINEOPLASTIC CHEMOTHERAPY: ICD-10-CM

## 2022-12-30 DIAGNOSIS — C90.01 MULTIPLE MYELOMA IN REMISSION: Primary | ICD-10-CM

## 2022-12-30 DIAGNOSIS — C90.01 MULTIPLE MYELOMA IN REMISSION: ICD-10-CM

## 2022-12-30 DIAGNOSIS — T45.1X5A ANEMIA ASSOCIATED WITH CHEMOTHERAPY: ICD-10-CM

## 2022-12-30 DIAGNOSIS — D70.1 LEUKOPENIA DUE TO ANTINEOPLASTIC CHEMOTHERAPY: ICD-10-CM

## 2022-12-30 LAB
ALBUMIN SERPL BCP-MCNC: 3.5 G/DL (ref 3.5–5.2)
ALP SERPL-CCNC: 85 U/L (ref 55–135)
ALT SERPL W/O P-5'-P-CCNC: 18 U/L (ref 10–44)
ANION GAP SERPL CALC-SCNC: 9 MMOL/L (ref 8–16)
AST SERPL-CCNC: 22 U/L (ref 10–40)
BASOPHILS # BLD AUTO: 0.05 K/UL (ref 0–0.2)
BASOPHILS NFR BLD: 1.7 % (ref 0–1.9)
BILIRUB SERPL-MCNC: 1.1 MG/DL (ref 0.1–1)
BUN SERPL-MCNC: 12 MG/DL (ref 6–20)
CALCIUM SERPL-MCNC: 8.8 MG/DL (ref 8.7–10.5)
CHLORIDE SERPL-SCNC: 106 MMOL/L (ref 95–110)
CO2 SERPL-SCNC: 26 MMOL/L (ref 23–29)
CREAT SERPL-MCNC: 0.9 MG/DL (ref 0.5–1.4)
DIFFERENTIAL METHOD: ABNORMAL
EOSINOPHIL # BLD AUTO: 0.1 K/UL (ref 0–0.5)
EOSINOPHIL NFR BLD: 2 % (ref 0–8)
ERYTHROCYTE [DISTWIDTH] IN BLOOD BY AUTOMATED COUNT: 14.6 % (ref 11.5–14.5)
EST. GFR  (NO RACE VARIABLE): >60 ML/MIN/1.73 M^2
GLUCOSE SERPL-MCNC: 87 MG/DL (ref 70–110)
HCT VFR BLD AUTO: 37.9 % (ref 40–54)
HGB BLD-MCNC: 12.6 G/DL (ref 14–18)
IGA SERPL-MCNC: 190 MG/DL (ref 40–350)
IGG SERPL-MCNC: 607 MG/DL (ref 650–1600)
IGM SERPL-MCNC: 22 MG/DL (ref 50–300)
IMM GRANULOCYTES # BLD AUTO: 0.01 K/UL (ref 0–0.04)
IMM GRANULOCYTES NFR BLD AUTO: 0.3 % (ref 0–0.5)
LYMPHOCYTES # BLD AUTO: 0.8 K/UL (ref 1–4.8)
LYMPHOCYTES NFR BLD: 26.4 % (ref 18–48)
MCH RBC QN AUTO: 31 PG (ref 27–31)
MCHC RBC AUTO-ENTMCNC: 33.2 G/DL (ref 32–36)
MCV RBC AUTO: 93 FL (ref 82–98)
MONOCYTES # BLD AUTO: 0.5 K/UL (ref 0.3–1)
MONOCYTES NFR BLD: 16.3 % (ref 4–15)
NEUTROPHILS # BLD AUTO: 1.6 K/UL (ref 1.8–7.7)
NEUTROPHILS NFR BLD: 53.3 % (ref 38–73)
NRBC BLD-RTO: 0 /100 WBC
PLATELET # BLD AUTO: 130 K/UL (ref 150–450)
PMV BLD AUTO: 9.9 FL (ref 9.2–12.9)
POTASSIUM SERPL-SCNC: 3.9 MMOL/L (ref 3.5–5.1)
PROT SERPL-MCNC: 6.3 G/DL (ref 6–8.4)
RBC # BLD AUTO: 4.07 M/UL (ref 4.6–6.2)
SODIUM SERPL-SCNC: 141 MMOL/L (ref 136–145)
WBC # BLD AUTO: 2.95 K/UL (ref 3.9–12.7)

## 2022-12-30 PROCEDURE — 3078F DIAST BP <80 MM HG: CPT | Mod: CPTII,S$GLB,, | Performed by: INTERNAL MEDICINE

## 2022-12-30 PROCEDURE — 80053 COMPREHEN METABOLIC PANEL: CPT | Performed by: INTERNAL MEDICINE

## 2022-12-30 PROCEDURE — 99999 PR PBB SHADOW E&M-EST. PATIENT-LVL IV: ICD-10-PCS | Mod: PBBFAC,,, | Performed by: INTERNAL MEDICINE

## 2022-12-30 PROCEDURE — 3075F SYST BP GE 130 - 139MM HG: CPT | Mod: CPTII,S$GLB,, | Performed by: INTERNAL MEDICINE

## 2022-12-30 PROCEDURE — 1159F MED LIST DOCD IN RCRD: CPT | Mod: CPTII,S$GLB,, | Performed by: INTERNAL MEDICINE

## 2022-12-30 PROCEDURE — 36415 COLL VENOUS BLD VENIPUNCTURE: CPT | Performed by: INTERNAL MEDICINE

## 2022-12-30 PROCEDURE — 3008F BODY MASS INDEX DOCD: CPT | Mod: CPTII,S$GLB,, | Performed by: INTERNAL MEDICINE

## 2022-12-30 PROCEDURE — 82784 ASSAY IGA/IGD/IGG/IGM EACH: CPT | Performed by: INTERNAL MEDICINE

## 2022-12-30 PROCEDURE — 85025 COMPLETE CBC W/AUTO DIFF WBC: CPT | Performed by: INTERNAL MEDICINE

## 2022-12-30 PROCEDURE — 86334 IMMUNOFIX E-PHORESIS SERUM: CPT | Performed by: INTERNAL MEDICINE

## 2022-12-30 PROCEDURE — 99215 OFFICE O/P EST HI 40 MIN: CPT | Mod: S$GLB,,, | Performed by: INTERNAL MEDICINE

## 2022-12-30 PROCEDURE — 86334 IMMUNOFIX E-PHORESIS SERUM: CPT | Mod: 26,,, | Performed by: PATHOLOGY

## 2022-12-30 PROCEDURE — 3075F PR MOST RECENT SYSTOLIC BLOOD PRESS GE 130-139MM HG: ICD-10-PCS | Mod: CPTII,S$GLB,, | Performed by: INTERNAL MEDICINE

## 2022-12-30 PROCEDURE — 86334 PATHOLOGIST INTERPRETATION IFE: ICD-10-PCS | Mod: 26,,, | Performed by: PATHOLOGY

## 2022-12-30 PROCEDURE — 1160F PR REVIEW ALL MEDS BY PRESCRIBER/CLIN PHARMACIST DOCUMENTED: ICD-10-PCS | Mod: CPTII,S$GLB,, | Performed by: INTERNAL MEDICINE

## 2022-12-30 PROCEDURE — 83521 IG LIGHT CHAINS FREE EACH: CPT | Mod: 59 | Performed by: INTERNAL MEDICINE

## 2022-12-30 PROCEDURE — 84165 PROTEIN E-PHORESIS SERUM: CPT | Performed by: INTERNAL MEDICINE

## 2022-12-30 PROCEDURE — 84165 PATHOLOGIST INTERPRETATION SPE: ICD-10-PCS | Mod: 26,,, | Performed by: PATHOLOGY

## 2022-12-30 PROCEDURE — 99999 PR PBB SHADOW E&M-EST. PATIENT-LVL IV: CPT | Mod: PBBFAC,,, | Performed by: INTERNAL MEDICINE

## 2022-12-30 PROCEDURE — 99215 PR OFFICE/OUTPT VISIT, EST, LEVL V, 40-54 MIN: ICD-10-PCS | Mod: S$GLB,,, | Performed by: INTERNAL MEDICINE

## 2022-12-30 PROCEDURE — 3078F PR MOST RECENT DIASTOLIC BLOOD PRESSURE < 80 MM HG: ICD-10-PCS | Mod: CPTII,S$GLB,, | Performed by: INTERNAL MEDICINE

## 2022-12-30 PROCEDURE — 1160F RVW MEDS BY RX/DR IN RCRD: CPT | Mod: CPTII,S$GLB,, | Performed by: INTERNAL MEDICINE

## 2022-12-30 PROCEDURE — 84165 PROTEIN E-PHORESIS SERUM: CPT | Mod: 26,,, | Performed by: PATHOLOGY

## 2022-12-30 PROCEDURE — 1159F PR MEDICATION LIST DOCUMENTED IN MEDICAL RECORD: ICD-10-PCS | Mod: CPTII,S$GLB,, | Performed by: INTERNAL MEDICINE

## 2022-12-30 PROCEDURE — 3008F PR BODY MASS INDEX (BMI) DOCUMENTED: ICD-10-PCS | Mod: CPTII,S$GLB,, | Performed by: INTERNAL MEDICINE

## 2022-12-30 RX ORDER — BACLOFEN 20 MG/1
20 TABLET ORAL 2 TIMES DAILY
COMMUNITY
Start: 2022-12-09

## 2022-12-30 NOTE — PROGRESS NOTES
Route Chart for Scheduling    BMT Chart Routing      Follow up with physician 3 months.   Follow up with ROQUE    Provider visit type Malignant hem   Infusion scheduling note    Injection scheduling note    Labs CBC, CMP, SPEP, immunofixation, free light chains and immunoglobulins   Lab interval:  at time of return visit   Imaging    Pharmacy appointment    Other referrals

## 2023-01-03 LAB
ALBUMIN SERPL ELPH-MCNC: 3.58 G/DL (ref 3.35–5.55)
ALPHA1 GLOB SERPL ELPH-MCNC: 0.31 G/DL (ref 0.17–0.41)
ALPHA2 GLOB SERPL ELPH-MCNC: 0.62 G/DL (ref 0.43–0.99)
B-GLOBULIN SERPL ELPH-MCNC: 0.72 G/DL (ref 0.5–1.1)
GAMMA GLOB SERPL ELPH-MCNC: 0.57 G/DL (ref 0.67–1.58)
INTERPRETATION SERPL IFE-IMP: NORMAL
KAPPA LC SER QL IA: 1.36 MG/DL (ref 0.33–1.94)
KAPPA LC/LAMBDA SER IA: 1.18 (ref 0.26–1.65)
LAMBDA LC SER QL IA: 1.15 MG/DL (ref 0.57–2.63)
PATHOLOGIST INTERPRETATION IFE: NORMAL
PATHOLOGIST INTERPRETATION SPE: NORMAL
PROT SERPL-MCNC: 5.8 G/DL (ref 6–8.4)

## 2023-01-04 NOTE — PROGRESS NOTES
HEMATOLOGIC MALIGNANCIES PROGRESS NOTE    IDENTIFYING STATEMENT   Arik Bobo (Arik) is a 57 y.o. male with a  of 1965 from [place] with the diagnosis of multiple myeloma.      ONCOLOGY HISTORY:    1. Multiple myeloma s/p autologous stem cell transplant   A. Early : Progressive anemia. K/L ratio > 100, and was treated with fina-dex   B. 12/3/2015: M-protein 1.02 g/dl, IgG-kappa by JAYESH. Kappa 2.98 mg/dl, lambda 0.5 mg/dl, ratio 5.96. BMBx shows 60-70% cellular marrow with 20% plasma cells, consistent with plasma cell myeloma. Hgb 11.8 g/dl, Calcium 9.44 (corrected). Creatinine 0.8 mg/dl.    C. 2016: M-protein 2.23 g/dl. Kappa 4.36 mg/dl, lambda 0.08 mg/dl, ratio 54.5. Progressive disease.   D. VCd therapy locally.    E. 2017: M-protein 1.23 g/dl.    F. 2017: M-protein 0.41 g/dl. Kappa 2.33 mg/dl, lambda 0.5 mg/dl, ratio 4.66, consistent with partial response.    G. 2017: Autologous stem cell transplant with melphalan conditioning.   H. 2018: Day +100 restaging - M-protein 0.19 g/dl, kappa 0.76 mg/dl, lambda 0.53 mg/dl, ratio 1.43. BMBx shows no definitive evidence of myeloma. Consistent with very good partial response.    I. 3/8/2018: M-protein 0.18 g/dl   J. 2018: M-protein 0.16 g/dl   K. 2018: M-protein 0.2 g/dl   L. 2018: M-protein 0.14 g/dl; BMBx for 1-year re-evaluation shows 40% cellular marrow with 6-7% plasma cells and no evidence of clonality; thus, no plasma cell neoplasm evident. Findings consistent with ongoing VGPR.   M. 4/3/2019: M-protein < 0.1 g/dl.     N. 7/10/2019: M-protein 0.18 g/dl.   O. 10/30/2019: M-protein 0.14 g/dl.    P. 2020: M-protein negative; JAYESH negative; findings consistent with complete response to therapy.     2. Depression  3. HTN  4. History of DVT in 2015 and 2017    INTERVAL HISTORY:      Mr. Bobo returns to clinic 5 years and 3 months after  autologous transplant for myeloma.     He is tolerating  lenalidomide well. He continues to have facial twitching. Otherwise, he has no complaints.     Past Medical History, Past Social History and Past Family History have been reviewed and are unchanged except as noted in the interval history.    MEDICATIONS:     Current Outpatient Medications on File Prior to Visit   Medication Sig Dispense Refill    amLODIPine (NORVASC) 10 MG tablet       apixaban 2.5 mg Tab Take 1 tablet (2.5 mg total) by mouth 2 (two) times daily. 60 tablet 3    baclofen (LIORESAL) 20 MG tablet Take 20 mg by mouth 2 (two) times daily.      citalopram (CELEXA) 40 MG tablet Take 40 mg by mouth.      lenalidomide 10 mg Cap TAKE 1 CAPSULE ONCE DAILY ON DAYS 1 THROUGH 21 IN A 28 DAY CYCLE. 21 each 0    lisinopriL 10 MG tablet       simvastatin (ZOCOR) 20 MG tablet   3     No current facility-administered medications on file prior to visit.       ALLERGIES:     Review of patient's allergies indicates:   Allergen Reactions    Pcn [penicillins]      Unknown last dose as an infant         ROS:       Review of Systems   Constitutional:  Negative for diaphoresis, fatigue, fever and unexpected weight change.   HENT:   Negative for lump/mass and sore throat.    Eyes:  Negative for icterus.   Respiratory:  Negative for cough and shortness of breath.    Cardiovascular:  Negative for chest pain and palpitations.   Gastrointestinal:  Negative for abdominal distention, constipation, diarrhea, nausea and vomiting.   Genitourinary:  Negative for dysuria and frequency.    Musculoskeletal:  Negative for arthralgias, gait problem and myalgias.   Skin:  Negative for rash.   Neurological:  Negative for dizziness, gait problem and headaches.        Facial twitching   Hematological:  Negative for adenopathy. Does not bruise/bleed easily.   Psychiatric/Behavioral:  The patient is not nervous/anxious.      PHYSICAL EXAM:  Vitals:    12/30/22 1034   BP: 132/77   Pulse: (!) 57   Resp: 18   Temp: 98.4 °F (36.9 °C)   SpO2: 98%  "  Weight: 111.3 kg (245 lb 6 oz)   Height: 5' 8" (1.727 m)   PainSc: 0-No pain   Body mass index is 37.31 kg/m².      Physical Exam  Constitutional:       General: He is not in acute distress.     Appearance: He is well-developed.   HENT:      Head: Normocephalic and atraumatic.      Mouth/Throat:      Mouth: No oral lesions.   Eyes:      Conjunctiva/sclera: Conjunctivae normal.      Comments: Right upper eyelid twitch   Neck:      Thyroid: No thyromegaly.   Cardiovascular:      Rate and Rhythm: Regular rhythm. Bradycardia present.      Heart sounds: Normal heart sounds. No murmur heard.  Pulmonary:      Breath sounds: Normal breath sounds. No wheezing or rales.   Abdominal:      General: There is no distension.      Palpations: Abdomen is soft. There is no hepatomegaly, splenomegaly or mass.      Tenderness: There is no abdominal tenderness.   Lymphadenopathy:      Cervical: No cervical adenopathy.      Right cervical: No deep cervical adenopathy.     Left cervical: No deep cervical adenopathy.   Skin:     Findings: No rash.   Neurological:      Mental Status: He is alert and oriented to person, place, and time.      Cranial Nerves: No cranial nerve deficit.      Coordination: Coordination normal.      Deep Tendon Reflexes: Reflexes are normal and symmetric.     LAB:   Results for orders placed or performed in visit on 12/30/22   CBC Auto Differential   Result Value Ref Range    WBC 2.95 (L) 3.90 - 12.70 K/uL    RBC 4.07 (L) 4.60 - 6.20 M/uL    Hemoglobin 12.6 (L) 14.0 - 18.0 g/dL    Hematocrit 37.9 (L) 40.0 - 54.0 %    MCV 93 82 - 98 fL    MCH 31.0 27.0 - 31.0 pg    MCHC 33.2 32.0 - 36.0 g/dL    RDW 14.6 (H) 11.5 - 14.5 %    Platelets 130 (L) 150 - 450 K/uL    MPV 9.9 9.2 - 12.9 fL    Immature Granulocytes 0.3 0.0 - 0.5 %    Gran # (ANC) 1.6 (L) 1.8 - 7.7 K/uL    Immature Grans (Abs) 0.01 0.00 - 0.04 K/uL    Lymph # 0.8 (L) 1.0 - 4.8 K/uL    Mono # 0.5 0.3 - 1.0 K/uL    Eos # 0.1 0.0 - 0.5 K/uL    Baso # 0.05 0.00 " - 0.20 K/uL    nRBC 0 0 /100 WBC    Gran % 53.3 38.0 - 73.0 %    Lymph % 26.4 18.0 - 48.0 %    Mono % 16.3 (H) 4.0 - 15.0 %    Eosinophil % 2.0 0.0 - 8.0 %    Basophil % 1.7 0.0 - 1.9 %    Differential Method Automated    Comprehensive Metabolic Panel   Result Value Ref Range    Sodium 141 136 - 145 mmol/L    Potassium 3.9 3.5 - 5.1 mmol/L    Chloride 106 95 - 110 mmol/L    CO2 26 23 - 29 mmol/L    Glucose 87 70 - 110 mg/dL    BUN 12 6 - 20 mg/dL    Creatinine 0.9 0.5 - 1.4 mg/dL    Calcium 8.8 8.7 - 10.5 mg/dL    Total Protein 6.3 6.0 - 8.4 g/dL    Albumin 3.5 3.5 - 5.2 g/dL    Total Bilirubin 1.1 (H) 0.1 - 1.0 mg/dL    Alkaline Phosphatase 85 55 - 135 U/L    AST 22 10 - 40 U/L    ALT 18 10 - 44 U/L    Anion Gap 9 8 - 16 mmol/L    eGFR >60.0 >60 mL/min/1.73 m^2   Immunofixation Electrophoresis   Result Value Ref Range    Immunofix Interp. SEE COMMENT    Protein Electrophoresis, Serum   Result Value Ref Range    Protein, Serum 5.8 (L) 6.0 - 8.4 g/dL    Albumin 3.58 3.35 - 5.55 g/dL    Alpha-1 0.31 0.17 - 0.41 g/dL    Alpha-2 0.62 0.43 - 0.99 g/dL    Beta 0.72 0.50 - 1.10 g/dL    Gamma 0.57 (L) 0.67 - 1.58 g/dL   Immunoglobulins (IgG, IgA, IgM) Quantitative   Result Value Ref Range    IgG 607 (L) 650 - 1600 mg/dL    IgA 190 40 - 350 mg/dL    IgM 22 (L) 50 - 300 mg/dL   Immunoglobulin Free LT Chains Blood   Result Value Ref Range    Kappa Free Light Chains 1.36 0.33 - 1.94 mg/dL    Lambda Free Light Chains 1.15 0.57 - 2.63 mg/dL    Kappa/Lambda FLC Ratio 1.18 0.26 - 1.65   Pathologist Interpretation JAYESH   Result Value Ref Range    Pathologist Interpretation JAYESH REVIEWED    Pathologist Interpretation SPE   Result Value Ref Range    Pathologist Interpretation SPE REVIEWED        PROBLEMS ASSESSED THIS VISIT:    1. Multiple myeloma in remission    2. Anemia associated with chemotherapy    3. Leukopenia due to antineoplastic chemotherapy          PLAN:       Multiple myeloma    SPEP and JAYESH negative. Kappa/lambda ratio  normal. Findings consistent with ongoing complete response. Prior positive SPEP is not clinically significant.    Continue maintenance lenalidomide and low dose apixaban for thromboprophylaxis.     History of autologous stem cell transplant    He is 5 years, 3 months post ASCT. Current disease status is pending serologic reassessment. No evidence of clinical relapse to date since ASCT.    Cytopenias  Mild. Monitor on lenalidomide.     Follow-up in 3 months    Italo Bryant MD  Hematology and Stem Cell Transplant

## 2023-02-07 NOTE — ASSESSMENT & PLAN NOTE
- Completed 4 cycles of CyBorD   - Was treated with lenalidomide and dex  - Restaging marrow 8/21/17 showed a 10-50% cellular marrow with trilineage hematopoiesis and 5% residual  kappa-restricted plasma cells (6% by morphology).  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  Hence, he has achieved a partial remission (PR1) and is ready to proceed with transplant.    65% EF on 8/2/17   - Possible need to consider an allogeneic transplant as a cure in future as pt is young. This is not an immediate consideration but since he has 4 siblings there is a 68% chance of finding a sibling match.   - s/p auto SCT as above    Detail Level: Detailed I performed a face-to-face evaluation of the patient and performed a history and physical examination. I agree with the history and physical examination.    Doing well. Less vertigo. Describes 3 yrs GONCALVES going up stairs. Stress test in 1/19 neg. Awaiting MRI results. Cancel stress test.

## 2023-03-17 NOTE — TELEPHONE ENCOUNTER
----- Message from Kolby Huerta sent at 4/28/2020  2:48 PM CDT -----  Contact: Patient  Returning a call     Communication preference:: [phone]    Additional info::   67.1

## 2023-03-31 ENCOUNTER — LAB VISIT (OUTPATIENT)
Dept: LAB | Facility: HOSPITAL | Age: 58
End: 2023-03-31
Payer: COMMERCIAL

## 2023-03-31 ENCOUNTER — OFFICE VISIT (OUTPATIENT)
Dept: HEMATOLOGY/ONCOLOGY | Facility: CLINIC | Age: 58
End: 2023-03-31
Payer: COMMERCIAL

## 2023-03-31 VITALS
SYSTOLIC BLOOD PRESSURE: 133 MMHG | HEART RATE: 48 BPM | RESPIRATION RATE: 20 BRPM | TEMPERATURE: 98 F | WEIGHT: 252.19 LBS | HEIGHT: 68 IN | BODY MASS INDEX: 38.22 KG/M2 | OXYGEN SATURATION: 99 % | DIASTOLIC BLOOD PRESSURE: 70 MMHG

## 2023-03-31 DIAGNOSIS — D61.818 PANCYTOPENIA: ICD-10-CM

## 2023-03-31 DIAGNOSIS — C90.01 MULTIPLE MYELOMA IN REMISSION: Primary | ICD-10-CM

## 2023-03-31 DIAGNOSIS — C90.01 MULTIPLE MYELOMA IN REMISSION: ICD-10-CM

## 2023-03-31 LAB
ALBUMIN SERPL BCP-MCNC: 3.6 G/DL (ref 3.5–5.2)
ALP SERPL-CCNC: 86 U/L (ref 55–135)
ALT SERPL W/O P-5'-P-CCNC: 23 U/L (ref 10–44)
ANION GAP SERPL CALC-SCNC: 9 MMOL/L (ref 8–16)
AST SERPL-CCNC: 22 U/L (ref 10–40)
BASOPHILS # BLD AUTO: 0.04 K/UL (ref 0–0.2)
BASOPHILS NFR BLD: 1.3 % (ref 0–1.9)
BILIRUB SERPL-MCNC: 0.9 MG/DL (ref 0.1–1)
BUN SERPL-MCNC: 12 MG/DL (ref 6–20)
CALCIUM SERPL-MCNC: 8.8 MG/DL (ref 8.7–10.5)
CHLORIDE SERPL-SCNC: 106 MMOL/L (ref 95–110)
CO2 SERPL-SCNC: 29 MMOL/L (ref 23–29)
CREAT SERPL-MCNC: 0.9 MG/DL (ref 0.5–1.4)
DIFFERENTIAL METHOD: ABNORMAL
EOSINOPHIL # BLD AUTO: 0.1 K/UL (ref 0–0.5)
EOSINOPHIL NFR BLD: 4.3 % (ref 0–8)
ERYTHROCYTE [DISTWIDTH] IN BLOOD BY AUTOMATED COUNT: 13 % (ref 11.5–14.5)
EST. GFR  (NO RACE VARIABLE): >60 ML/MIN/1.73 M^2
GLUCOSE SERPL-MCNC: 96 MG/DL (ref 70–110)
HCT VFR BLD AUTO: 38.8 % (ref 40–54)
HGB BLD-MCNC: 12.8 G/DL (ref 14–18)
IGA SERPL-MCNC: 190 MG/DL (ref 40–350)
IGG SERPL-MCNC: 611 MG/DL (ref 650–1600)
IGM SERPL-MCNC: 32 MG/DL (ref 50–300)
IMM GRANULOCYTES # BLD AUTO: 0.01 K/UL (ref 0–0.04)
IMM GRANULOCYTES NFR BLD AUTO: 0.3 % (ref 0–0.5)
LYMPHOCYTES # BLD AUTO: 1 K/UL (ref 1–4.8)
LYMPHOCYTES NFR BLD: 33.9 % (ref 18–48)
MCH RBC QN AUTO: 31.1 PG (ref 27–31)
MCHC RBC AUTO-ENTMCNC: 33 G/DL (ref 32–36)
MCV RBC AUTO: 94 FL (ref 82–98)
MONOCYTES # BLD AUTO: 0.4 K/UL (ref 0.3–1)
MONOCYTES NFR BLD: 12.2 % (ref 4–15)
NEUTROPHILS # BLD AUTO: 1.5 K/UL (ref 1.8–7.7)
NEUTROPHILS NFR BLD: 48 % (ref 38–73)
NRBC BLD-RTO: 0 /100 WBC
PLATELET # BLD AUTO: 137 K/UL (ref 150–450)
PMV BLD AUTO: 10.7 FL (ref 9.2–12.9)
POTASSIUM SERPL-SCNC: 3.7 MMOL/L (ref 3.5–5.1)
PROT SERPL-MCNC: 6.1 G/DL (ref 6–8.4)
RBC # BLD AUTO: 4.12 M/UL (ref 4.6–6.2)
SODIUM SERPL-SCNC: 144 MMOL/L (ref 136–145)
WBC # BLD AUTO: 3.04 K/UL (ref 3.9–12.7)

## 2023-03-31 PROCEDURE — 99999 PR PBB SHADOW E&M-EST. PATIENT-LVL IV: CPT | Mod: PBBFAC,,, | Performed by: INTERNAL MEDICINE

## 2023-03-31 PROCEDURE — 3075F SYST BP GE 130 - 139MM HG: CPT | Mod: CPTII,S$GLB,, | Performed by: INTERNAL MEDICINE

## 2023-03-31 PROCEDURE — 4010F PR ACE/ARB THEARPY RXD/TAKEN: ICD-10-PCS | Mod: CPTII,S$GLB,, | Performed by: INTERNAL MEDICINE

## 2023-03-31 PROCEDURE — 86334 IMMUNOFIX E-PHORESIS SERUM: CPT | Performed by: INTERNAL MEDICINE

## 2023-03-31 PROCEDURE — 82784 ASSAY IGA/IGD/IGG/IGM EACH: CPT | Mod: 59 | Performed by: INTERNAL MEDICINE

## 2023-03-31 PROCEDURE — 3078F PR MOST RECENT DIASTOLIC BLOOD PRESSURE < 80 MM HG: ICD-10-PCS | Mod: CPTII,S$GLB,, | Performed by: INTERNAL MEDICINE

## 2023-03-31 PROCEDURE — 84165 PROTEIN E-PHORESIS SERUM: CPT | Mod: 26,,, | Performed by: PATHOLOGY

## 2023-03-31 PROCEDURE — 99214 PR OFFICE/OUTPT VISIT, EST, LEVL IV, 30-39 MIN: ICD-10-PCS | Mod: S$GLB,,, | Performed by: INTERNAL MEDICINE

## 2023-03-31 PROCEDURE — 1159F MED LIST DOCD IN RCRD: CPT | Mod: CPTII,S$GLB,, | Performed by: INTERNAL MEDICINE

## 2023-03-31 PROCEDURE — 85025 COMPLETE CBC W/AUTO DIFF WBC: CPT | Performed by: INTERNAL MEDICINE

## 2023-03-31 PROCEDURE — 4010F ACE/ARB THERAPY RXD/TAKEN: CPT | Mod: CPTII,S$GLB,, | Performed by: INTERNAL MEDICINE

## 2023-03-31 PROCEDURE — 3078F DIAST BP <80 MM HG: CPT | Mod: CPTII,S$GLB,, | Performed by: INTERNAL MEDICINE

## 2023-03-31 PROCEDURE — 1159F PR MEDICATION LIST DOCUMENTED IN MEDICAL RECORD: ICD-10-PCS | Mod: CPTII,S$GLB,, | Performed by: INTERNAL MEDICINE

## 2023-03-31 PROCEDURE — 3008F BODY MASS INDEX DOCD: CPT | Mod: CPTII,S$GLB,, | Performed by: INTERNAL MEDICINE

## 2023-03-31 PROCEDURE — 84165 PATHOLOGIST INTERPRETATION SPE: ICD-10-PCS | Mod: 26,,, | Performed by: PATHOLOGY

## 2023-03-31 PROCEDURE — 80053 COMPREHEN METABOLIC PANEL: CPT | Performed by: INTERNAL MEDICINE

## 2023-03-31 PROCEDURE — 1160F RVW MEDS BY RX/DR IN RCRD: CPT | Mod: CPTII,S$GLB,, | Performed by: INTERNAL MEDICINE

## 2023-03-31 PROCEDURE — 86334 PATHOLOGIST INTERPRETATION IFE: ICD-10-PCS | Mod: 26,,, | Performed by: PATHOLOGY

## 2023-03-31 PROCEDURE — 86334 IMMUNOFIX E-PHORESIS SERUM: CPT | Mod: 26,,, | Performed by: PATHOLOGY

## 2023-03-31 PROCEDURE — 99214 OFFICE O/P EST MOD 30 MIN: CPT | Mod: S$GLB,,, | Performed by: INTERNAL MEDICINE

## 2023-03-31 PROCEDURE — 36415 COLL VENOUS BLD VENIPUNCTURE: CPT | Performed by: INTERNAL MEDICINE

## 2023-03-31 PROCEDURE — 99999 PR PBB SHADOW E&M-EST. PATIENT-LVL IV: ICD-10-PCS | Mod: PBBFAC,,, | Performed by: INTERNAL MEDICINE

## 2023-03-31 PROCEDURE — 83521 IG LIGHT CHAINS FREE EACH: CPT | Performed by: INTERNAL MEDICINE

## 2023-03-31 PROCEDURE — 84165 PROTEIN E-PHORESIS SERUM: CPT | Performed by: INTERNAL MEDICINE

## 2023-03-31 PROCEDURE — 1160F PR REVIEW ALL MEDS BY PRESCRIBER/CLIN PHARMACIST DOCUMENTED: ICD-10-PCS | Mod: CPTII,S$GLB,, | Performed by: INTERNAL MEDICINE

## 2023-03-31 PROCEDURE — 3008F PR BODY MASS INDEX (BMI) DOCUMENTED: ICD-10-PCS | Mod: CPTII,S$GLB,, | Performed by: INTERNAL MEDICINE

## 2023-03-31 PROCEDURE — 3075F PR MOST RECENT SYSTOLIC BLOOD PRESS GE 130-139MM HG: ICD-10-PCS | Mod: CPTII,S$GLB,, | Performed by: INTERNAL MEDICINE

## 2023-03-31 NOTE — PROGRESS NOTES
Route Chart for Scheduling    BMT Chart Routing      Follow up with physician 3 months.   Follow up with ROQUE    Provider visit type Malignant hem   Infusion scheduling note    Injection scheduling note    Labs CBC, CMP, SPEP, immunofixation, free light chains and immunoglobulins   Scheduling:  Preferred lab:  Lab interval:  on day of return visit   Imaging ECHO   on day of return visit (before visit if possible)   Pharmacy appointment    Other referrals

## 2023-04-03 LAB
ALBUMIN SERPL ELPH-MCNC: 3.64 G/DL (ref 3.35–5.55)
ALPHA1 GLOB SERPL ELPH-MCNC: 0.29 G/DL (ref 0.17–0.41)
ALPHA2 GLOB SERPL ELPH-MCNC: 0.55 G/DL (ref 0.43–0.99)
B-GLOBULIN SERPL ELPH-MCNC: 0.73 G/DL (ref 0.5–1.1)
GAMMA GLOB SERPL ELPH-MCNC: 0.6 G/DL (ref 0.67–1.58)
INTERPRETATION SERPL IFE-IMP: NORMAL
KAPPA LC SER QL IA: 1.78 MG/DL (ref 0.33–1.94)
KAPPA LC/LAMBDA SER IA: 1.35 (ref 0.26–1.65)
LAMBDA LC SER QL IA: 1.32 MG/DL (ref 0.57–2.63)
PATHOLOGIST INTERPRETATION IFE: NORMAL
PATHOLOGIST INTERPRETATION SPE: NORMAL
PROT SERPL-MCNC: 5.8 G/DL (ref 6–8.4)

## 2023-04-04 NOTE — PROGRESS NOTES
HEMATOLOGIC MALIGNANCIES PROGRESS NOTE    IDENTIFYING STATEMENT   Arik Bobo (Arik) is a 57 y.o. male with a  of 1965 from [place] with the diagnosis of multiple myeloma.      ONCOLOGY HISTORY:    1. Multiple myeloma s/p autologous stem cell transplant   A. Early : Progressive anemia. K/L ratio > 100, and was treated with fina-dex   B. 12/3/2015: M-protein 1.02 g/dl, IgG-kappa by JAYESH. Kappa 2.98 mg/dl, lambda 0.5 mg/dl, ratio 5.96. BMBx shows 60-70% cellular marrow with 20% plasma cells, consistent with plasma cell myeloma. Hgb 11.8 g/dl, Calcium 9.44 (corrected). Creatinine 0.8 mg/dl.    C. 2016: M-protein 2.23 g/dl. Kappa 4.36 mg/dl, lambda 0.08 mg/dl, ratio 54.5. Progressive disease.   D. VCd therapy locally.    E. 2017: M-protein 1.23 g/dl.    F. 2017: M-protein 0.41 g/dl. Kappa 2.33 mg/dl, lambda 0.5 mg/dl, ratio 4.66, consistent with partial response.    G. 2017: Autologous stem cell transplant with melphalan conditioning.   H. 2018: Day +100 restaging - M-protein 0.19 g/dl, kappa 0.76 mg/dl, lambda 0.53 mg/dl, ratio 1.43. BMBx shows no definitive evidence of myeloma. Consistent with very good partial response.    I. 3/8/2018: M-protein 0.18 g/dl   J. 2018: M-protein 0.16 g/dl   K. 2018: M-protein 0.2 g/dl   L. 2018: M-protein 0.14 g/dl; BMBx for 1-year re-evaluation shows 40% cellular marrow with 6-7% plasma cells and no evidence of clonality; thus, no plasma cell neoplasm evident. Findings consistent with ongoing VGPR.   M. 4/3/2019: M-protein < 0.1 g/dl.     N. 7/10/2019: M-protein 0.18 g/dl.   O. 10/30/2019: M-protein 0.14 g/dl.    P. 2020: M-protein negative; JAYESH negative; findings consistent with complete response to therapy.     2. Depression  3. HTN  4. History of DVT in 2015 and 2017    INTERVAL HISTORY:      Mr. Bobo returns to clinic 5 years and 6 months after  autologous transplant for myeloma.     He is tolerating  lenalidomide well. He continues to have facial twitching. Otherwise, he has no complaints.     Past Medical History, Past Social History and Past Family History have been reviewed and are unchanged except as noted in the interval history.    MEDICATIONS:     Current Outpatient Medications on File Prior to Visit   Medication Sig Dispense Refill    amLODIPine (NORVASC) 10 MG tablet       apixaban 2.5 mg Tab Take 1 tablet (2.5 mg total) by mouth 2 (two) times daily. 60 tablet 3    baclofen (LIORESAL) 20 MG tablet Take 20 mg by mouth 2 (two) times daily.      citalopram (CELEXA) 40 MG tablet Take 40 mg by mouth.      lenalidomide (REVLIMID) 10 mg Cap TAKE 1 CAPSULE DAILY ON DAYS 1 THROUGH 21 OF 28 DAY CYCLE. 21 each 0    lisinopriL 10 MG tablet       simvastatin (ZOCOR) 20 MG tablet   3     No current facility-administered medications on file prior to visit.       ALLERGIES:     Review of patient's allergies indicates:   Allergen Reactions    Pcn [penicillins]      Unknown last dose as an infant         ROS:       Review of Systems   Constitutional:  Negative for diaphoresis, fatigue, fever and unexpected weight change.   HENT:   Negative for lump/mass and sore throat.    Eyes:  Negative for icterus.   Respiratory:  Negative for cough and shortness of breath.    Cardiovascular:  Negative for chest pain and palpitations.   Gastrointestinal:  Negative for abdominal distention, constipation, diarrhea, nausea and vomiting.   Genitourinary:  Negative for dysuria and frequency.    Musculoskeletal:  Negative for arthralgias, gait problem and myalgias.   Skin:  Negative for rash.   Neurological:  Negative for dizziness, gait problem and headaches.        Facial twitching   Hematological:  Negative for adenopathy. Does not bruise/bleed easily.   Psychiatric/Behavioral:  The patient is not nervous/anxious.      PHYSICAL EXAM:  Vitals:    03/31/23 1021   BP: 133/70   Pulse: (!) 48   Resp: 20   Temp: 98.3 °F (36.8 °C)   TempSrc:  "Oral   SpO2: 99%   Weight: 114.4 kg (252 lb 3.3 oz)   Height: 5' 8" (1.727 m)   PainSc: 0-No pain   Body mass index is 38.35 kg/m².      Physical Exam  Constitutional:       General: He is not in acute distress.     Appearance: He is well-developed.   HENT:      Head: Normocephalic and atraumatic.      Mouth/Throat:      Mouth: No oral lesions.   Eyes:      Conjunctiva/sclera: Conjunctivae normal.      Comments: Right upper eyelid twitch   Neck:      Thyroid: No thyromegaly.   Cardiovascular:      Rate and Rhythm: Regular rhythm. Bradycardia present.      Heart sounds: Normal heart sounds. No murmur heard.  Pulmonary:      Breath sounds: Normal breath sounds. No wheezing or rales.   Abdominal:      General: There is no distension.      Palpations: Abdomen is soft. There is no hepatomegaly, splenomegaly or mass.      Tenderness: There is no abdominal tenderness.   Lymphadenopathy:      Cervical: No cervical adenopathy.      Right cervical: No deep cervical adenopathy.     Left cervical: No deep cervical adenopathy.   Skin:     Findings: No rash.   Neurological:      Mental Status: He is alert and oriented to person, place, and time.      Cranial Nerves: No cranial nerve deficit.      Coordination: Coordination normal.      Deep Tendon Reflexes: Reflexes are normal and symmetric.     LAB:   Results for orders placed or performed in visit on 03/31/23   CBC Auto Differential   Result Value Ref Range    WBC 3.04 (L) 3.90 - 12.70 K/uL    RBC 4.12 (L) 4.60 - 6.20 M/uL    Hemoglobin 12.8 (L) 14.0 - 18.0 g/dL    Hematocrit 38.8 (L) 40.0 - 54.0 %    MCV 94 82 - 98 fL    MCH 31.1 (H) 27.0 - 31.0 pg    MCHC 33.0 32.0 - 36.0 g/dL    RDW 13.0 11.5 - 14.5 %    Platelets 137 (L) 150 - 450 K/uL    MPV 10.7 9.2 - 12.9 fL    Immature Granulocytes 0.3 0.0 - 0.5 %    Gran # (ANC) 1.5 (L) 1.8 - 7.7 K/uL    Immature Grans (Abs) 0.01 0.00 - 0.04 K/uL    Lymph # 1.0 1.0 - 4.8 K/uL    Mono # 0.4 0.3 - 1.0 K/uL    Eos # 0.1 0.0 - 0.5 K/uL    " Baso # 0.04 0.00 - 0.20 K/uL    nRBC 0 0 /100 WBC    Gran % 48.0 38.0 - 73.0 %    Lymph % 33.9 18.0 - 48.0 %    Mono % 12.2 4.0 - 15.0 %    Eosinophil % 4.3 0.0 - 8.0 %    Basophil % 1.3 0.0 - 1.9 %    Differential Method Automated    Comprehensive Metabolic Panel   Result Value Ref Range    Sodium 144 136 - 145 mmol/L    Potassium 3.7 3.5 - 5.1 mmol/L    Chloride 106 95 - 110 mmol/L    CO2 29 23 - 29 mmol/L    Glucose 96 70 - 110 mg/dL    BUN 12 6 - 20 mg/dL    Creatinine 0.9 0.5 - 1.4 mg/dL    Calcium 8.8 8.7 - 10.5 mg/dL    Total Protein 6.1 6.0 - 8.4 g/dL    Albumin 3.6 3.5 - 5.2 g/dL    Total Bilirubin 0.9 0.1 - 1.0 mg/dL    Alkaline Phosphatase 86 55 - 135 U/L    AST 22 10 - 40 U/L    ALT 23 10 - 44 U/L    Anion Gap 9 8 - 16 mmol/L    eGFR >60.0 >60 mL/min/1.73 m^2   Immunofixation Electrophoresis   Result Value Ref Range    Immunofix Interp. SEE COMMENT    Protein Electrophoresis, Serum   Result Value Ref Range    Protein, Serum 5.8 (L) 6.0 - 8.4 g/dL    Albumin 3.64 3.35 - 5.55 g/dL    Alpha-1 0.29 0.17 - 0.41 g/dL    Alpha-2 0.55 0.43 - 0.99 g/dL    Beta 0.73 0.50 - 1.10 g/dL    Gamma 0.60 (L) 0.67 - 1.58 g/dL   Immunoglobulins (IgG, IgA, IgM) Quantitative   Result Value Ref Range    IgG 611 (L) 650 - 1600 mg/dL    IgA 190 40 - 350 mg/dL    IgM 32 (L) 50 - 300 mg/dL   Immunoglobulin Free LT Chains Blood   Result Value Ref Range    Kappa Free Light Chains 1.78 0.33 - 1.94 mg/dL    Lambda Free Light Chains 1.32 0.57 - 2.63 mg/dL    Kappa/Lambda FLC Ratio 1.35 0.26 - 1.65   Pathologist Interpretation JAYESH   Result Value Ref Range    Pathologist Interpretation JAYESH REVIEWED    Pathologist Interpretation SPE   Result Value Ref Range    Pathologist Interpretation SPE REVIEWED        PROBLEMS ASSESSED THIS VISIT:    1. Multiple myeloma in remission    2. Pancytopenia      PLAN:       Multiple myeloma    SPEP and JAYESH negative. Kappa/lambda ratio normal. Findings consistent with ongoing complete response.    Continue  maintenance lenalidomide and low dose apixaban for thromboprophylaxis.     History of autologous stem cell transplant    He is 5 years, 6 months post ASCT. Current disease status is pending serologic reassessment. No evidence of clinical relapse to date since ASCT.    Cytopenias  Mild. Monitor on lenalidomide.     Follow-up in 3 months    Italo Bryant MD  Hematology and Stem Cell Transplant

## 2023-04-14 ENCOUNTER — TELEPHONE (OUTPATIENT)
Dept: HEMATOLOGY/ONCOLOGY | Facility: CLINIC | Age: 58
End: 2023-04-14
Payer: COMMERCIAL

## 2023-04-14 NOTE — TELEPHONE ENCOUNTER
----- Message from Cristy Gray sent at 4/14/2023 11:33 AM CDT -----  Regarding: Questions and concerns  Contact: [phone]  Jose echeverria/Dr Blair calling. [phone] Need patients last lab results faxed. F# [phone]    Thank You

## 2023-05-11 DIAGNOSIS — C90.01 MULTIPLE MYELOMA IN REMISSION: ICD-10-CM

## 2023-05-11 RX ORDER — LENALIDOMIDE 10 MG/1
CAPSULE ORAL
Qty: 21 EACH | Refills: 0 | Status: SHIPPED | OUTPATIENT
Start: 2023-05-11 | End: 2023-06-08

## 2023-06-08 DIAGNOSIS — C90.01 MULTIPLE MYELOMA IN REMISSION: ICD-10-CM

## 2023-06-08 RX ORDER — LENALIDOMIDE 10 MG/1
CAPSULE ORAL
Qty: 21 EACH | Refills: 0 | Status: SHIPPED | OUTPATIENT
Start: 2023-06-08 | End: 2023-07-06 | Stop reason: SDUPTHER

## 2023-06-30 ENCOUNTER — OFFICE VISIT (OUTPATIENT)
Dept: HEMATOLOGY/ONCOLOGY | Facility: CLINIC | Age: 58
End: 2023-06-30
Payer: COMMERCIAL

## 2023-06-30 ENCOUNTER — HOSPITAL ENCOUNTER (OUTPATIENT)
Dept: CARDIOLOGY | Facility: HOSPITAL | Age: 58
Discharge: HOME | End: 2023-06-30
Attending: INTERNAL MEDICINE
Payer: COMMERCIAL

## 2023-06-30 VITALS
WEIGHT: 252 LBS | BODY MASS INDEX: 38.19 KG/M2 | HEIGHT: 68 IN | DIASTOLIC BLOOD PRESSURE: 70 MMHG | HEART RATE: 70 BPM | SYSTOLIC BLOOD PRESSURE: 130 MMHG

## 2023-06-30 VITALS
OXYGEN SATURATION: 99 % | SYSTOLIC BLOOD PRESSURE: 134 MMHG | WEIGHT: 244.06 LBS | DIASTOLIC BLOOD PRESSURE: 69 MMHG | HEART RATE: 46 BPM | BODY MASS INDEX: 36.99 KG/M2 | TEMPERATURE: 98 F | RESPIRATION RATE: 18 BRPM | HEIGHT: 68 IN

## 2023-06-30 DIAGNOSIS — T45.1X5A PANCYTOPENIA DUE TO ANTINEOPLASTIC CHEMOTHERAPY: ICD-10-CM

## 2023-06-30 DIAGNOSIS — C90.01 MULTIPLE MYELOMA IN REMISSION: ICD-10-CM

## 2023-06-30 DIAGNOSIS — Z94.84 H/O AUTOLOGOUS STEM CELL TRANSPLANT: ICD-10-CM

## 2023-06-30 DIAGNOSIS — C90.01 MULTIPLE MYELOMA IN REMISSION: Primary | ICD-10-CM

## 2023-06-30 DIAGNOSIS — D61.810 PANCYTOPENIA DUE TO ANTINEOPLASTIC CHEMOTHERAPY: ICD-10-CM

## 2023-06-30 DIAGNOSIS — D61.818 PANCYTOPENIA: ICD-10-CM

## 2023-06-30 DIAGNOSIS — E66.01 SEVERE OBESITY (BMI 35.0-39.9) WITH COMORBIDITY: ICD-10-CM

## 2023-06-30 LAB
ASCENDING AORTA: 4.02 CM
AV INDEX (PROSTH): 0.88
AV MEAN GRADIENT: 4 MMHG
AV PEAK GRADIENT: 7 MMHG
AV VALVE AREA: 3.41 CM2
AV VELOCITY RATIO: 0.91
BSA FOR ECHO PROCEDURE: 2.34 M2
CV ECHO LV RWT: 0.38 CM
DOP CALC AO PEAK VEL: 1.32 M/S
DOP CALC AO VTI: 35.45 CM
DOP CALC LVOT AREA: 3.9 CM2
DOP CALC LVOT DIAMETER: 2.22 CM
DOP CALC LVOT PEAK VEL: 1.2 M/S
DOP CALC LVOT STROKE VOLUME: 121.02 CM3
DOP CALCLVOT PEAK VEL VTI: 31.28 CM
E WAVE DECELERATION TIME: 158.8 MSEC
E/A RATIO: 1.59
E/E' RATIO: 10.75 M/S
ECHO LV POSTERIOR WALL: 0.98 CM (ref 0.6–1.1)
EJECTION FRACTION: 60 %
FRACTIONAL SHORTENING: 32 % (ref 28–44)
INTERVENTRICULAR SEPTUM: 0.96 CM (ref 0.6–1.1)
IVRT: 98.95 MSEC
LA MAJOR: 5.64 CM
LA MINOR: 5.44 CM
LA WIDTH: 5.06 CM
LEFT ATRIUM SIZE: 4.24 CM
LEFT ATRIUM VOLUME INDEX MOD: 53.5 ML/M2
LEFT ATRIUM VOLUME INDEX: 44.9 ML/M2
LEFT ATRIUM VOLUME MOD: 120.41 CM3
LEFT ATRIUM VOLUME: 101 CM3
LEFT INTERNAL DIMENSION IN SYSTOLE: 3.52 CM (ref 2.1–4)
LEFT VENTRICLE DIASTOLIC VOLUME INDEX: 56.11 ML/M2
LEFT VENTRICLE DIASTOLIC VOLUME: 126.24 ML
LEFT VENTRICLE MASS INDEX: 81 G/M2
LEFT VENTRICLE SYSTOLIC VOLUME INDEX: 22.9 ML/M2
LEFT VENTRICLE SYSTOLIC VOLUME: 51.43 ML
LEFT VENTRICULAR INTERNAL DIMENSION IN DIASTOLE: 5.14 CM (ref 3.5–6)
LEFT VENTRICULAR MASS: 182.89 G
LV LATERAL E/E' RATIO: 9.56 M/S
LV SEPTAL E/E' RATIO: 12.29 M/S
MV A" WAVE DURATION": 20.17 MSEC
MV PEAK A VEL: 0.54 M/S
MV PEAK E VEL: 0.86 M/S
MV STENOSIS PRESSURE HALF TIME: 46.05 MS
MV VALVE AREA P 1/2 METHOD: 4.78 CM2
PISA TR MAX VEL: 2.6 M/S
PULM VEIN S/D RATIO: 1.22
PV PEAK D VEL: 0.37 M/S
PV PEAK S VEL: 0.45 M/S
QEF: 61 %
RA MAJOR: 5.07 CM
RA PRESSURE: 8 MMHG
RA WIDTH: 4.59 CM
RIGHT VENTRICULAR END-DIASTOLIC DIMENSION: 4.15 CM
SINUS: 4.15 CM
STJ: 3.32 CM
TDI LATERAL: 0.09 M/S
TDI SEPTAL: 0.07 M/S
TDI: 0.08 M/S
TR MAX PG: 27 MMHG
TRICUSPID ANNULAR PLANE SYSTOLIC EXCURSION: 2.85 CM
TV REST PULMONARY ARTERY PRESSURE: 35 MMHG

## 2023-06-30 PROCEDURE — 1160F RVW MEDS BY RX/DR IN RCRD: CPT | Mod: CPTII,S$GLB,, | Performed by: INTERNAL MEDICINE

## 2023-06-30 PROCEDURE — 4010F PR ACE/ARB THEARPY RXD/TAKEN: ICD-10-PCS | Mod: CPTII,S$GLB,, | Performed by: INTERNAL MEDICINE

## 2023-06-30 PROCEDURE — 3078F PR MOST RECENT DIASTOLIC BLOOD PRESSURE < 80 MM HG: ICD-10-PCS | Mod: CPTII,S$GLB,, | Performed by: INTERNAL MEDICINE

## 2023-06-30 PROCEDURE — 1160F PR REVIEW ALL MEDS BY PRESCRIBER/CLIN PHARMACIST DOCUMENTED: ICD-10-PCS | Mod: CPTII,S$GLB,, | Performed by: INTERNAL MEDICINE

## 2023-06-30 PROCEDURE — 93306 TTE W/DOPPLER COMPLETE: CPT | Mod: 26,,, | Performed by: INTERNAL MEDICINE

## 2023-06-30 PROCEDURE — 93356 MYOCRD STRAIN IMG SPCKL TRCK: CPT | Mod: ,,, | Performed by: INTERNAL MEDICINE

## 2023-06-30 PROCEDURE — 3044F HG A1C LEVEL LT 7.0%: CPT | Mod: CPTII,S$GLB,, | Performed by: INTERNAL MEDICINE

## 2023-06-30 PROCEDURE — 3075F PR MOST RECENT SYSTOLIC BLOOD PRESS GE 130-139MM HG: ICD-10-PCS | Mod: CPTII,S$GLB,, | Performed by: INTERNAL MEDICINE

## 2023-06-30 PROCEDURE — 99215 PR OFFICE/OUTPT VISIT, EST, LEVL V, 40-54 MIN: ICD-10-PCS | Mod: S$GLB,,, | Performed by: INTERNAL MEDICINE

## 2023-06-30 PROCEDURE — 3008F PR BODY MASS INDEX (BMI) DOCUMENTED: ICD-10-PCS | Mod: CPTII,S$GLB,, | Performed by: INTERNAL MEDICINE

## 2023-06-30 PROCEDURE — 93306 ECHO (CUPID ONLY): ICD-10-PCS | Mod: 26,,, | Performed by: INTERNAL MEDICINE

## 2023-06-30 PROCEDURE — 3008F BODY MASS INDEX DOCD: CPT | Mod: CPTII,S$GLB,, | Performed by: INTERNAL MEDICINE

## 2023-06-30 PROCEDURE — 3075F SYST BP GE 130 - 139MM HG: CPT | Mod: CPTII,S$GLB,, | Performed by: INTERNAL MEDICINE

## 2023-06-30 PROCEDURE — 99999 PR PBB SHADOW E&M-EST. PATIENT-LVL IV: ICD-10-PCS | Mod: PBBFAC,,, | Performed by: INTERNAL MEDICINE

## 2023-06-30 PROCEDURE — 3044F PR MOST RECENT HEMOGLOBIN A1C LEVEL <7.0%: ICD-10-PCS | Mod: CPTII,S$GLB,, | Performed by: INTERNAL MEDICINE

## 2023-06-30 PROCEDURE — 1159F PR MEDICATION LIST DOCUMENTED IN MEDICAL RECORD: ICD-10-PCS | Mod: CPTII,S$GLB,, | Performed by: INTERNAL MEDICINE

## 2023-06-30 PROCEDURE — 99215 OFFICE O/P EST HI 40 MIN: CPT | Mod: S$GLB,,, | Performed by: INTERNAL MEDICINE

## 2023-06-30 PROCEDURE — 93356 MYOCRD STRAIN IMG SPCKL TRCK: CPT

## 2023-06-30 PROCEDURE — 99999 PR PBB SHADOW E&M-EST. PATIENT-LVL IV: CPT | Mod: PBBFAC,,, | Performed by: INTERNAL MEDICINE

## 2023-06-30 PROCEDURE — 4010F ACE/ARB THERAPY RXD/TAKEN: CPT | Mod: CPTII,S$GLB,, | Performed by: INTERNAL MEDICINE

## 2023-06-30 PROCEDURE — 3078F DIAST BP <80 MM HG: CPT | Mod: CPTII,S$GLB,, | Performed by: INTERNAL MEDICINE

## 2023-06-30 PROCEDURE — 1159F MED LIST DOCD IN RCRD: CPT | Mod: CPTII,S$GLB,, | Performed by: INTERNAL MEDICINE

## 2023-06-30 PROCEDURE — 93356 ECHO (CUPID ONLY): ICD-10-PCS | Mod: ,,, | Performed by: INTERNAL MEDICINE

## 2023-06-30 NOTE — PROGRESS NOTES
Route Chart for Scheduling    BMT Chart Routing      Follow up with physician 3 months.   Follow up with ROQUE    Provider visit type Malignant hem   Infusion scheduling note    Injection scheduling note    Labs CBC, CMP, SPEP, immunofixation, free light chains and immunoglobulins   Scheduling:  Preferred lab:  Lab interval:  labs at time of return visit   Imaging    Pharmacy appointment    Other referrals

## 2023-07-03 PROBLEM — E66.01 SEVERE OBESITY (BMI 35.0-39.9) WITH COMORBIDITY: Status: ACTIVE | Noted: 2023-07-03

## 2023-07-03 PROBLEM — D61.818 PANCYTOPENIA: Status: ACTIVE | Noted: 2023-07-03

## 2023-07-03 NOTE — PROGRESS NOTES
HEMATOLOGIC MALIGNANCIES PROGRESS NOTE    IDENTIFYING STATEMENT   Arik Bobo (Arik) is a 58 y.o. male with a  of 1965 from [place] with the diagnosis of multiple myeloma.      ONCOLOGY HISTORY:    1. Multiple myeloma s/p autologous stem cell transplant   A. Early : Progressive anemia. K/L ratio > 100, and was treated with fina-dex   B. 12/3/2015: M-protein 1.02 g/dl, IgG-kappa by JAYESH. Kappa 2.98 mg/dl, lambda 0.5 mg/dl, ratio 5.96. BMBx shows 60-70% cellular marrow with 20% plasma cells, consistent with plasma cell myeloma. Hgb 11.8 g/dl, Calcium 9.44 (corrected). Creatinine 0.8 mg/dl.    C. 2016: M-protein 2.23 g/dl. Kappa 4.36 mg/dl, lambda 0.08 mg/dl, ratio 54.5. Progressive disease.   D. VCd therapy locally.    E. 2017: M-protein 1.23 g/dl.    F. 2017: M-protein 0.41 g/dl. Kappa 2.33 mg/dl, lambda 0.5 mg/dl, ratio 4.66, consistent with partial response.    G. 2017: Autologous stem cell transplant with melphalan conditioning.   H. 2018: Day +100 restaging - M-protein 0.19 g/dl, kappa 0.76 mg/dl, lambda 0.53 mg/dl, ratio 1.43. BMBx shows no definitive evidence of myeloma. Consistent with very good partial response.    I. 3/8/2018: M-protein 0.18 g/dl   J. 2018: M-protein 0.16 g/dl   K. 2018: M-protein 0.2 g/dl   L. 2018: M-protein 0.14 g/dl; BMBx for 1-year re-evaluation shows 40% cellular marrow with 6-7% plasma cells and no evidence of clonality; thus, no plasma cell neoplasm evident. Findings consistent with ongoing VGPR.   M. 4/3/2019: M-protein < 0.1 g/dl.     N. 7/10/2019: M-protein 0.18 g/dl.   O. 10/30/2019: M-protein 0.14 g/dl.    P. 2020: M-protein negative; JAYESH negative; findings consistent with complete response to therapy.     2. Depression  3. HTN  4. History of DVT in 2015 and 2017    INTERVAL HISTORY:      Mr. Bobo returns to clinic 5 years and 9 months after  autologous transplant for myeloma.     He is tolerating  lenalidomide well. He has no complaints. He vacationed recently to [place], [place] with his family.     Past Medical History, Past Social History and Past Family History have been reviewed and are unchanged except as noted in the interval history.    MEDICATIONS:     Current Outpatient Medications on File Prior to Visit   Medication Sig Dispense Refill    amLODIPine (NORVASC) 10 MG tablet       apixaban 2.5 mg Tab Take 1 tablet (2.5 mg total) by mouth 2 (two) times daily. 60 tablet 3    baclofen (LIORESAL) 20 MG tablet Take 20 mg by mouth 2 (two) times daily.      citalopram (CELEXA) 40 MG tablet Take 40 mg by mouth.      lenalidomide (REVLIMID) 10 mg Cap TAKE 1 CAPSULE DAILY ON DAYS 1 THROUGH 21 OF 28 DAY CYCLE. 21 each 0    lisinopriL 10 MG tablet       simvastatin (ZOCOR) 20 MG tablet   3     No current facility-administered medications on file prior to visit.       ALLERGIES:     Review of patient's allergies indicates:   Allergen Reactions    Pcn [penicillins]      Unknown last dose as an infant         ROS:       Review of Systems   Constitutional:  Negative for diaphoresis, fatigue, fever and unexpected weight change.   HENT:   Negative for lump/mass and sore throat.    Eyes:  Negative for icterus.   Respiratory:  Negative for cough and shortness of breath.    Cardiovascular:  Negative for chest pain and palpitations.   Gastrointestinal:  Negative for abdominal distention, constipation, diarrhea, nausea and vomiting.   Genitourinary:  Negative for dysuria and frequency.    Musculoskeletal:  Negative for arthralgias, gait problem and myalgias.   Skin:  Negative for rash.   Neurological:  Negative for dizziness, gait problem and headaches.        Facial twitching   Hematological:  Negative for adenopathy. Does not bruise/bleed easily.   Psychiatric/Behavioral:  The patient is not nervous/anxious.      PHYSICAL EXAM:  Vitals:    06/30/23 1056   BP: 134/69   Pulse: (!) 46   Resp: 18   Temp: 98.2 °F (36.8 °C)  "  TempSrc: Oral   SpO2: 99%   Weight: 110.7 kg (244 lb 0.8 oz)   Height: 5' 8" (1.727 m)   PainSc: 0-No pain   Body mass index is 37.11 kg/m².      Physical Exam  Constitutional:       General: He is not in acute distress.     Appearance: He is well-developed.   HENT:      Head: Normocephalic and atraumatic.      Mouth/Throat:      Mouth: No oral lesions.   Eyes:      Conjunctiva/sclera: Conjunctivae normal.      Comments: Right upper eyelid twitch   Neck:      Thyroid: No thyromegaly.   Cardiovascular:      Rate and Rhythm: Regular rhythm. Bradycardia present.      Heart sounds: Normal heart sounds. No murmur heard.  Pulmonary:      Breath sounds: Normal breath sounds. No wheezing or rales.   Abdominal:      General: There is no distension.      Palpations: Abdomen is soft. There is no hepatomegaly, splenomegaly or mass.      Tenderness: There is no abdominal tenderness.   Lymphadenopathy:      Cervical: No cervical adenopathy.      Right cervical: No deep cervical adenopathy.     Left cervical: No deep cervical adenopathy.   Skin:     Findings: No rash.   Neurological:      Mental Status: He is alert and oriented to person, place, and time.      Cranial Nerves: No cranial nerve deficit.      Coordination: Coordination normal.      Deep Tendon Reflexes: Reflexes are normal and symmetric.     LAB:   Results for orders placed or performed during the hospital encounter of 06/30/23   Echo   Result Value Ref Range    BSA 2.34 m2    TDI SEPTAL 0.07 m/s    LV LATERAL E/E' RATIO 9.56 m/s    LV SEPTAL E/E' RATIO 12.29 m/s    LA WIDTH 5.06 cm    TDI LATERAL 0.09 m/s    LVIDd 5.14 3.5 - 6.0 cm    IVS 0.96 0.6 - 1.1 cm    Posterior Wall 0.98 0.6 - 1.1 cm    LVIDs 3.52 2.1 - 4.0 cm    FS 32 28 - 44 %    LA volume 101.00 cm3    Sinus 4.15 cm    STJ 3.32 cm    Ascending aorta 4.02 cm    LV mass 182.89 g    LA size 4.24 cm    RVDD 4.15 cm    TAPSE 2.85 cm    Left Ventricle Relative Wall Thickness 0.38 cm    AV mean gradient 4 mmHg " "   AV valve area 3.41 cm2    AV Velocity Ratio 0.91     AV index (prosthetic) 0.88     MV valve area p 1/2 method 4.78 cm2    E/A ratio 1.59     Mean e' 0.08 m/s    E wave deceleration time 158.80 msec    IVRT 98.95 msec    MV "A" wave duration 20.17 msec    Pulm vein S/D ratio 1.22     LVOT diameter 2.22 cm    LVOT area 3.9 cm2    LVOT peak guilherme 1.20 m/s    LVOT peak VTI 31.28 cm    Ao peak guilherme 1.32 m/s    Ao VTI 35.45 cm    LVOT stroke volume 121.02 cm3    AV peak gradient 7 mmHg    E/E' ratio 10.75 m/s    MV Peak E Guilherme 0.86 m/s    TR Max Guilherme 2.60 m/s    MV stenosis pressure 1/2 time 46.05 ms    MV Peak A Guilherme 0.54 m/s    PV Peak S Guilherme 0.45 m/s    PV Peak D Guilherme 0.37 m/s    LV Systolic Volume 51.43 mL    LV Systolic Volume Index 22.9 mL/m2    LV Diastolic Volume 126.24 mL    LV Diastolic Volume Index 56.11 mL/m2    LA Volume Index 44.9 mL/m2    LV Mass Index 81 g/m2    RA Major Axis 5.07 cm    Left Atrium Minor Axis 5.44 cm    Left Atrium Major Axis 5.64 cm    Triscuspid Valve Regurgitation Peak Gradient 27 mmHg    LA Volume Index (Mod) 53.5 mL/m2    LA volume (mod) 120.41 cm3    RA Width 4.59 cm    Right Atrial Pressure (from IVC) 8 mmHg    QEF 61 %    EF 60 %    TV rest pulmonary artery pressure 35 mmHg       PROBLEMS ASSESSED THIS VISIT:    1. Multiple myeloma in remission    2. Pancytopenia due to antineoplastic chemotherapy    3. Severe obesity (BMI 35.0-39.9) with comorbidity    4. Pancytopenia    5. H/O autologous stem cell transplant      PLAN:       Multiple myeloma    SPEP and JAYESH are pending, previously negative. Kappa/lambda ratio normal. Findings consistent with ongoing complete response.    Continue maintenance lenalidomide and low dose apixaban for thromboprophylaxis.     History of autologous stem cell transplant    He is 5 years, 9 months post ASCT. Current disease status is pending serologic reassessment. No evidence of clinical relapse to date since ASCT.    Cytopenias  Mild. Monitor on " lenalidomide.     Leg swelling  Possibly due to amlodipine. Recommended he discuss with Dr. Blair (PCP).     Obesity   Assessed this visit. Consider weight loss options with PCP.     Follow-up in 3 months    Italo Bryant MD  Hematology and Stem Cell Transplant

## 2023-07-06 DIAGNOSIS — C90.01 MULTIPLE MYELOMA IN REMISSION: ICD-10-CM

## 2023-07-06 RX ORDER — LENALIDOMIDE 10 MG/1
CAPSULE ORAL
Qty: 21 EACH | Refills: 0 | Status: SHIPPED | OUTPATIENT
Start: 2023-07-06 | End: 2023-08-03

## 2023-09-29 ENCOUNTER — LAB VISIT (OUTPATIENT)
Dept: LAB | Facility: HOSPITAL | Age: 58
End: 2023-09-29
Payer: COMMERCIAL

## 2023-09-29 ENCOUNTER — OFFICE VISIT (OUTPATIENT)
Dept: HEMATOLOGY/ONCOLOGY | Facility: CLINIC | Age: 58
End: 2023-09-29
Payer: COMMERCIAL

## 2023-09-29 VITALS
RESPIRATION RATE: 16 BRPM | OXYGEN SATURATION: 99 % | HEART RATE: 48 BPM | WEIGHT: 241.06 LBS | DIASTOLIC BLOOD PRESSURE: 76 MMHG | HEIGHT: 68 IN | SYSTOLIC BLOOD PRESSURE: 133 MMHG | TEMPERATURE: 98 F | BODY MASS INDEX: 36.53 KG/M2

## 2023-09-29 DIAGNOSIS — D61.810 PANCYTOPENIA DUE TO ANTINEOPLASTIC CHEMOTHERAPY: ICD-10-CM

## 2023-09-29 DIAGNOSIS — C90.01 MULTIPLE MYELOMA IN REMISSION: ICD-10-CM

## 2023-09-29 DIAGNOSIS — T45.1X5A PANCYTOPENIA DUE TO ANTINEOPLASTIC CHEMOTHERAPY: ICD-10-CM

## 2023-09-29 DIAGNOSIS — D69.6 THROMBOCYTOPENIA, UNSPECIFIED: ICD-10-CM

## 2023-09-29 DIAGNOSIS — Z94.84 H/O AUTOLOGOUS STEM CELL TRANSPLANT: ICD-10-CM

## 2023-09-29 DIAGNOSIS — C90.01 MULTIPLE MYELOMA IN REMISSION: Primary | ICD-10-CM

## 2023-09-29 LAB
ALBUMIN SERPL BCP-MCNC: 3.6 G/DL (ref 3.5–5.2)
ALP SERPL-CCNC: 77 U/L (ref 55–135)
ALT SERPL W/O P-5'-P-CCNC: 21 U/L (ref 10–44)
ANION GAP SERPL CALC-SCNC: 5 MMOL/L (ref 8–16)
AST SERPL-CCNC: 23 U/L (ref 10–40)
BASOPHILS # BLD AUTO: 0.03 K/UL (ref 0–0.2)
BASOPHILS NFR BLD: 1.1 % (ref 0–1.9)
BILIRUB SERPL-MCNC: 1.2 MG/DL (ref 0.1–1)
BUN SERPL-MCNC: 12 MG/DL (ref 6–20)
CALCIUM SERPL-MCNC: 8.6 MG/DL (ref 8.7–10.5)
CHLORIDE SERPL-SCNC: 105 MMOL/L (ref 95–110)
CO2 SERPL-SCNC: 29 MMOL/L (ref 23–29)
CREAT SERPL-MCNC: 0.9 MG/DL (ref 0.5–1.4)
DIFFERENTIAL METHOD: ABNORMAL
EOSINOPHIL # BLD AUTO: 0.2 K/UL (ref 0–0.5)
EOSINOPHIL NFR BLD: 5.7 % (ref 0–8)
ERYTHROCYTE [DISTWIDTH] IN BLOOD BY AUTOMATED COUNT: 13.5 % (ref 11.5–14.5)
EST. GFR  (NO RACE VARIABLE): >60 ML/MIN/1.73 M^2
GLUCOSE SERPL-MCNC: 94 MG/DL (ref 70–110)
HCT VFR BLD AUTO: 39.7 % (ref 40–54)
HGB BLD-MCNC: 13.6 G/DL (ref 14–18)
IGA SERPL-MCNC: 221 MG/DL (ref 40–350)
IGG SERPL-MCNC: 758 MG/DL (ref 650–1600)
IGM SERPL-MCNC: 21 MG/DL (ref 50–300)
IMM GRANULOCYTES # BLD AUTO: 0.01 K/UL (ref 0–0.04)
IMM GRANULOCYTES NFR BLD AUTO: 0.4 % (ref 0–0.5)
LYMPHOCYTES # BLD AUTO: 1.1 K/UL (ref 1–4.8)
LYMPHOCYTES NFR BLD: 38.1 % (ref 18–48)
MCH RBC QN AUTO: 32.2 PG (ref 27–31)
MCHC RBC AUTO-ENTMCNC: 34.3 G/DL (ref 32–36)
MCV RBC AUTO: 94 FL (ref 82–98)
MONOCYTES # BLD AUTO: 0.4 K/UL (ref 0.3–1)
MONOCYTES NFR BLD: 12.5 % (ref 4–15)
NEUTROPHILS # BLD AUTO: 1.2 K/UL (ref 1.8–7.7)
NEUTROPHILS NFR BLD: 42.2 % (ref 38–73)
NRBC BLD-RTO: 0 /100 WBC
PLATELET # BLD AUTO: 122 K/UL (ref 150–450)
PMV BLD AUTO: 10.5 FL (ref 9.2–12.9)
POTASSIUM SERPL-SCNC: 4.1 MMOL/L (ref 3.5–5.1)
PROT SERPL-MCNC: 6.2 G/DL (ref 6–8.4)
RBC # BLD AUTO: 4.23 M/UL (ref 4.6–6.2)
SODIUM SERPL-SCNC: 139 MMOL/L (ref 136–145)
WBC # BLD AUTO: 2.81 K/UL (ref 3.9–12.7)

## 2023-09-29 PROCEDURE — 99215 OFFICE O/P EST HI 40 MIN: CPT | Mod: S$GLB,,, | Performed by: INTERNAL MEDICINE

## 2023-09-29 PROCEDURE — 3044F PR MOST RECENT HEMOGLOBIN A1C LEVEL <7.0%: ICD-10-PCS | Mod: CPTII,S$GLB,, | Performed by: INTERNAL MEDICINE

## 2023-09-29 PROCEDURE — 4010F PR ACE/ARB THEARPY RXD/TAKEN: ICD-10-PCS | Mod: CPTII,S$GLB,, | Performed by: INTERNAL MEDICINE

## 2023-09-29 PROCEDURE — 84165 PROTEIN E-PHORESIS SERUM: CPT | Performed by: INTERNAL MEDICINE

## 2023-09-29 PROCEDURE — 3008F PR BODY MASS INDEX (BMI) DOCUMENTED: ICD-10-PCS | Mod: CPTII,S$GLB,, | Performed by: INTERNAL MEDICINE

## 2023-09-29 PROCEDURE — 3078F PR MOST RECENT DIASTOLIC BLOOD PRESSURE < 80 MM HG: ICD-10-PCS | Mod: CPTII,S$GLB,, | Performed by: INTERNAL MEDICINE

## 2023-09-29 PROCEDURE — 86334 IMMUNOFIX E-PHORESIS SERUM: CPT | Performed by: INTERNAL MEDICINE

## 2023-09-29 PROCEDURE — 99999 PR PBB SHADOW E&M-EST. PATIENT-LVL III: CPT | Mod: PBBFAC,,, | Performed by: INTERNAL MEDICINE

## 2023-09-29 PROCEDURE — 3075F PR MOST RECENT SYSTOLIC BLOOD PRESS GE 130-139MM HG: ICD-10-PCS | Mod: CPTII,S$GLB,, | Performed by: INTERNAL MEDICINE

## 2023-09-29 PROCEDURE — 36415 COLL VENOUS BLD VENIPUNCTURE: CPT | Performed by: INTERNAL MEDICINE

## 2023-09-29 PROCEDURE — 3078F DIAST BP <80 MM HG: CPT | Mod: CPTII,S$GLB,, | Performed by: INTERNAL MEDICINE

## 2023-09-29 PROCEDURE — 3008F BODY MASS INDEX DOCD: CPT | Mod: CPTII,S$GLB,, | Performed by: INTERNAL MEDICINE

## 2023-09-29 PROCEDURE — 1159F PR MEDICATION LIST DOCUMENTED IN MEDICAL RECORD: ICD-10-PCS | Mod: CPTII,S$GLB,, | Performed by: INTERNAL MEDICINE

## 2023-09-29 PROCEDURE — 1159F MED LIST DOCD IN RCRD: CPT | Mod: CPTII,S$GLB,, | Performed by: INTERNAL MEDICINE

## 2023-09-29 PROCEDURE — 1160F RVW MEDS BY RX/DR IN RCRD: CPT | Mod: CPTII,S$GLB,, | Performed by: INTERNAL MEDICINE

## 2023-09-29 PROCEDURE — 99215 PR OFFICE/OUTPT VISIT, EST, LEVL V, 40-54 MIN: ICD-10-PCS | Mod: S$GLB,,, | Performed by: INTERNAL MEDICINE

## 2023-09-29 PROCEDURE — 80053 COMPREHEN METABOLIC PANEL: CPT | Performed by: INTERNAL MEDICINE

## 2023-09-29 PROCEDURE — 84165 PROTEIN E-PHORESIS SERUM: CPT | Mod: 26,,, | Performed by: PATHOLOGY

## 2023-09-29 PROCEDURE — 86334 PATHOLOGIST INTERPRETATION IFE: ICD-10-PCS | Mod: 26,,, | Performed by: PATHOLOGY

## 2023-09-29 PROCEDURE — 86334 IMMUNOFIX E-PHORESIS SERUM: CPT | Mod: 26,,, | Performed by: PATHOLOGY

## 2023-09-29 PROCEDURE — 3075F SYST BP GE 130 - 139MM HG: CPT | Mod: CPTII,S$GLB,, | Performed by: INTERNAL MEDICINE

## 2023-09-29 PROCEDURE — 82784 ASSAY IGA/IGD/IGG/IGM EACH: CPT | Performed by: INTERNAL MEDICINE

## 2023-09-29 PROCEDURE — 4010F ACE/ARB THERAPY RXD/TAKEN: CPT | Mod: CPTII,S$GLB,, | Performed by: INTERNAL MEDICINE

## 2023-09-29 PROCEDURE — 3044F HG A1C LEVEL LT 7.0%: CPT | Mod: CPTII,S$GLB,, | Performed by: INTERNAL MEDICINE

## 2023-09-29 PROCEDURE — 1160F PR REVIEW ALL MEDS BY PRESCRIBER/CLIN PHARMACIST DOCUMENTED: ICD-10-PCS | Mod: CPTII,S$GLB,, | Performed by: INTERNAL MEDICINE

## 2023-09-29 PROCEDURE — 99999 PR PBB SHADOW E&M-EST. PATIENT-LVL III: ICD-10-PCS | Mod: PBBFAC,,, | Performed by: INTERNAL MEDICINE

## 2023-09-29 PROCEDURE — 85025 COMPLETE CBC W/AUTO DIFF WBC: CPT | Performed by: INTERNAL MEDICINE

## 2023-09-29 PROCEDURE — 83521 IG LIGHT CHAINS FREE EACH: CPT | Mod: 59 | Performed by: INTERNAL MEDICINE

## 2023-09-29 PROCEDURE — 84165 PATHOLOGIST INTERPRETATION SPE: ICD-10-PCS | Mod: 26,,, | Performed by: PATHOLOGY

## 2023-09-29 NOTE — PROGRESS NOTES
HEMATOLOGIC MALIGNANCIES PROGRESS NOTE    IDENTIFYING STATEMENT   Arik Bobo (Arik) is a 58 y.o. male with a  of 1965 from [place] with the diagnosis of multiple myeloma.      ONCOLOGY HISTORY:    1. Multiple myeloma s/p autologous stem cell transplant   A. Early : Progressive anemia. K/L ratio > 100, and was treated with fina-dex   B. 12/3/2015: M-protein 1.02 g/dl, IgG-kappa by JAYESH. Kappa 2.98 mg/dl, lambda 0.5 mg/dl, ratio 5.96. BMBx shows 60-70% cellular marrow with 20% plasma cells, consistent with plasma cell myeloma. Hgb 11.8 g/dl, Calcium 9.44 (corrected). Creatinine 0.8 mg/dl.    C. 2016: M-protein 2.23 g/dl. Kappa 4.36 mg/dl, lambda 0.08 mg/dl, ratio 54.5. Progressive disease.   D. VCd therapy locally.    E. 2017: M-protein 1.23 g/dl.    F. 2017: M-protein 0.41 g/dl. Kappa 2.33 mg/dl, lambda 0.5 mg/dl, ratio 4.66, consistent with partial response.    G. 2017: Autologous stem cell transplant with melphalan conditioning.   H. 2018: Day +100 restaging - M-protein 0.19 g/dl, kappa 0.76 mg/dl, lambda 0.53 mg/dl, ratio 1.43. BMBx shows no definitive evidence of myeloma. Consistent with very good partial response.    I. 3/8/2018: M-protein 0.18 g/dl   J. 2018: M-protein 0.16 g/dl   K. 2018: M-protein 0.2 g/dl   L. 2018: M-protein 0.14 g/dl; BMBx for 1-year re-evaluation shows 40% cellular marrow with 6-7% plasma cells and no evidence of clonality; thus, no plasma cell neoplasm evident. Findings consistent with ongoing VGPR.   M. 4/3/2019: M-protein < 0.1 g/dl.     N. 7/10/2019: M-protein 0.18 g/dl.   O. 10/30/2019: M-protein 0.14 g/dl.    P. 2020: M-protein negative; JAYESH negative; findings consistent with complete response to therapy.     2. Depression  3. HTN  4. History of DVT in 2015 and 2017    INTERVAL HISTORY:      Mr. Bobo returns to clinic 6 months after  autologous transplant for myeloma.     He is tolerating lenalidomide well. He  has no complaints. He has been enjoying time with his family.     Past Medical History, Past Social History and Past Family History have been reviewed and are unchanged except as noted in the interval history.    MEDICATIONS:     Current Outpatient Medications on File Prior to Visit   Medication Sig Dispense Refill    amLODIPine (NORVASC) 10 MG tablet       apixaban 2.5 mg Tab Take 1 tablet (2.5 mg total) by mouth 2 (two) times daily. 60 tablet 3    baclofen (LIORESAL) 20 MG tablet Take 20 mg by mouth 2 (two) times daily.      citalopram (CELEXA) 40 MG tablet Take 40 mg by mouth.      lenalidomide (REVLIMID) 10 mg Cap TAKE 1 CAPSULE DAILY ON DAYS 1 THROUGH 21 OF 28 DAY CYCLE.. 21 each 0    lisinopriL 10 MG tablet       simvastatin (ZOCOR) 20 MG tablet   3     No current facility-administered medications on file prior to visit.       ALLERGIES:     Review of patient's allergies indicates:   Allergen Reactions    Pcn [penicillins]      Unknown last dose as an infant         ROS:       Review of Systems   Constitutional:  Negative for diaphoresis, fatigue, fever and unexpected weight change.   HENT:   Negative for lump/mass and sore throat.    Eyes:  Negative for icterus.   Respiratory:  Negative for cough and shortness of breath.    Cardiovascular:  Negative for chest pain and palpitations.   Gastrointestinal:  Negative for abdominal distention, constipation, diarrhea, nausea and vomiting.   Genitourinary:  Negative for dysuria and frequency.    Musculoskeletal:  Negative for arthralgias, gait problem and myalgias.   Skin:  Negative for rash.   Neurological:  Negative for dizziness, gait problem and headaches.        Facial twitching   Hematological:  Negative for adenopathy. Does not bruise/bleed easily.   Psychiatric/Behavioral:  The patient is not nervous/anxious.        PHYSICAL EXAM:  Vitals:    09/29/23 1204   BP: 133/76   Pulse: (!) 48   Resp: 16   Temp: 98 °F (36.7 °C)   TempSrc: Oral   SpO2: 99%   Weight:  "109.4 kg (241 lb 1.2 oz)   Height: 5' 8" (1.727 m)   PainSc: 0-No pain   Body mass index is 36.66 kg/m².      Physical Exam  Constitutional:       General: He is not in acute distress.     Appearance: He is well-developed.   HENT:      Head: Normocephalic and atraumatic.      Mouth/Throat:      Mouth: No oral lesions.   Eyes:      Conjunctiva/sclera: Conjunctivae normal.      Comments: Right upper eyelid twitch   Neck:      Thyroid: No thyromegaly.   Cardiovascular:      Rate and Rhythm: Regular rhythm. Bradycardia present.      Heart sounds: Normal heart sounds. No murmur heard.  Pulmonary:      Breath sounds: Normal breath sounds. No wheezing or rales.   Abdominal:      General: There is no distension.      Palpations: Abdomen is soft. There is no hepatomegaly, splenomegaly or mass.      Tenderness: There is no abdominal tenderness.   Lymphadenopathy:      Cervical: No cervical adenopathy.      Right cervical: No deep cervical adenopathy.     Left cervical: No deep cervical adenopathy.   Skin:     Findings: No rash.   Neurological:      Mental Status: He is alert and oriented to person, place, and time.      Cranial Nerves: No cranial nerve deficit.      Coordination: Coordination normal.      Deep Tendon Reflexes: Reflexes are normal and symmetric.       LAB:   Results for orders placed or performed in visit on 09/29/23   CBC Auto Differential   Result Value Ref Range    WBC 2.81 (L) 3.90 - 12.70 K/uL    RBC 4.23 (L) 4.60 - 6.20 M/uL    Hemoglobin 13.6 (L) 14.0 - 18.0 g/dL    Hematocrit 39.7 (L) 40.0 - 54.0 %    MCV 94 82 - 98 fL    MCH 32.2 (H) 27.0 - 31.0 pg    MCHC 34.3 32.0 - 36.0 g/dL    RDW 13.5 11.5 - 14.5 %    Platelets 122 (L) 150 - 450 K/uL    MPV 10.5 9.2 - 12.9 fL    Immature Granulocytes 0.4 0.0 - 0.5 %    Gran # (ANC) 1.2 (L) 1.8 - 7.7 K/uL    Immature Grans (Abs) 0.01 0.00 - 0.04 K/uL    Lymph # 1.1 1.0 - 4.8 K/uL    Mono # 0.4 0.3 - 1.0 K/uL    Eos # 0.2 0.0 - 0.5 K/uL    Baso # 0.03 0.00 - 0.20 " K/uL    nRBC 0 0 /100 WBC    Gran % 42.2 38.0 - 73.0 %    Lymph % 38.1 18.0 - 48.0 %    Mono % 12.5 4.0 - 15.0 %    Eosinophil % 5.7 0.0 - 8.0 %    Basophil % 1.1 0.0 - 1.9 %    Differential Method Automated    Comprehensive Metabolic Panel   Result Value Ref Range    Sodium 139 136 - 145 mmol/L    Potassium 4.1 3.5 - 5.1 mmol/L    Chloride 105 95 - 110 mmol/L    CO2 29 23 - 29 mmol/L    Glucose 94 70 - 110 mg/dL    BUN 12 6 - 20 mg/dL    Creatinine 0.9 0.5 - 1.4 mg/dL    Calcium 8.6 (L) 8.7 - 10.5 mg/dL    Total Protein 6.2 6.0 - 8.4 g/dL    Albumin 3.6 3.5 - 5.2 g/dL    Total Bilirubin 1.2 (H) 0.1 - 1.0 mg/dL    Alkaline Phosphatase 77 55 - 135 U/L    AST 23 10 - 40 U/L    ALT 21 10 - 44 U/L    eGFR >60.0 >60 mL/min/1.73 m^2    Anion Gap 5 (L) 8 - 16 mmol/L   Protein Electrophoresis, Serum   Result Value Ref Range    Protein, Serum 5.8 (L) 6.0 - 8.4 g/dL   Immunoglobulins (IgG, IgA, IgM) Quantitative   Result Value Ref Range    IgG 758 650 - 1600 mg/dL    IgA 221 40 - 350 mg/dL    IgM 21 (L) 50 - 300 mg/dL       PROBLEMS ASSESSED THIS VISIT:    1. Multiple myeloma in remission      PLAN:       Multiple myeloma    SPEP and JAYESH are pending, previously negative. Kappa/lambda ratio normal. Findings consistent with ongoing complete response.    Continue maintenance lenalidomide and low dose apixaban for thromboprophylaxis.     History of autologous stem cell transplant    He is 6 months post ASCT. Current disease status is pending serologic reassessment. No evidence of clinical relapse to date since ASCT.    Cytopenias  Mild. Monitor on lenalidomide.     Leg swelling  Possibly due to amlodipine. Recommended he discuss with Dr. lBair (PCP).     Obesity   Assessed this visit. Consider weight loss options with PCP.     Follow-up in 3 months    Luis Enrique Cutler MD PGY-4  Hematology and Stem Cell Transplant

## 2023-10-02 DIAGNOSIS — C90.01 MULTIPLE MYELOMA IN REMISSION: ICD-10-CM

## 2023-10-02 LAB
ALBUMIN SERPL ELPH-MCNC: 3.57 G/DL (ref 3.35–5.55)
ALPHA1 GLOB SERPL ELPH-MCNC: 0.27 G/DL (ref 0.17–0.41)
ALPHA2 GLOB SERPL ELPH-MCNC: 0.55 G/DL (ref 0.43–0.99)
B-GLOBULIN SERPL ELPH-MCNC: 0.72 G/DL (ref 0.5–1.1)
GAMMA GLOB SERPL ELPH-MCNC: 0.7 G/DL (ref 0.67–1.58)
INTERPRETATION SERPL IFE-IMP: NORMAL
KAPPA LC SER QL IA: 2.72 MG/DL (ref 0.33–1.94)
KAPPA LC/LAMBDA SER IA: 2.05 (ref 0.26–1.65)
LAMBDA LC SER QL IA: 1.33 MG/DL (ref 0.57–2.63)
PROT SERPL-MCNC: 5.8 G/DL (ref 6–8.4)

## 2023-10-02 RX ORDER — LENALIDOMIDE 10 MG/1
CAPSULE ORAL
Qty: 21 EACH | Refills: 0 | Status: SHIPPED | OUTPATIENT
Start: 2023-10-02 | End: 2023-10-30 | Stop reason: SDUPTHER

## 2023-10-03 LAB
PATHOLOGIST INTERPRETATION IFE: NORMAL
PATHOLOGIST INTERPRETATION SPE: NORMAL

## 2023-10-05 PROBLEM — N17.9 AKI (ACUTE KIDNEY INJURY): Status: RESOLVED | Noted: 2017-10-07 | Resolved: 2023-10-05

## 2023-10-05 PROBLEM — T45.1X5A ANEMIA DUE TO ANTINEOPLASTIC CHEMOTHERAPY: Status: RESOLVED | Noted: 2018-05-10 | Resolved: 2023-10-05

## 2023-10-05 PROBLEM — D64.81 ANEMIA DUE TO ANTINEOPLASTIC CHEMOTHERAPY: Status: RESOLVED | Noted: 2018-05-10 | Resolved: 2023-10-05

## 2023-10-05 PROBLEM — C90.00 MULTIPLE MYELOMA: Status: RESOLVED | Noted: 2018-01-04 | Resolved: 2023-10-05

## 2023-10-05 PROBLEM — D70.1 LEUKOPENIA DUE TO ANTINEOPLASTIC CHEMOTHERAPY: Status: RESOLVED | Noted: 2019-07-16 | Resolved: 2023-10-05

## 2023-10-05 PROBLEM — T45.1X5A LEUKOPENIA DUE TO ANTINEOPLASTIC CHEMOTHERAPY: Status: RESOLVED | Noted: 2019-07-16 | Resolved: 2023-10-05

## 2023-10-05 PROBLEM — D61.818 PANCYTOPENIA: Status: RESOLVED | Noted: 2023-07-03 | Resolved: 2023-10-05

## 2023-10-05 PROBLEM — R30.0 BURNING WITH URINATION: Status: RESOLVED | Noted: 2017-10-03 | Resolved: 2023-10-05

## 2023-10-05 PROBLEM — C90.01 MULTIPLE MYELOMA IN REMISSION: Status: RESOLVED | Noted: 2017-09-25 | Resolved: 2023-10-05

## 2023-10-30 DIAGNOSIS — C90.01 MULTIPLE MYELOMA IN REMISSION: ICD-10-CM

## 2023-10-30 RX ORDER — LENALIDOMIDE 10 MG/1
CAPSULE ORAL
Qty: 21 EACH | Refills: 0 | Status: SHIPPED | OUTPATIENT
Start: 2023-10-30 | End: 2023-11-29

## 2023-11-28 DIAGNOSIS — C90.01 MULTIPLE MYELOMA IN REMISSION: ICD-10-CM

## 2023-11-29 RX ORDER — LENALIDOMIDE 10 MG/1
CAPSULE ORAL
Qty: 21 EACH | Refills: 0 | Status: SHIPPED | OUTPATIENT
Start: 2023-11-29 | End: 2023-12-27 | Stop reason: SDUPTHER

## 2023-12-15 ENCOUNTER — OFFICE VISIT (OUTPATIENT)
Dept: HEMATOLOGY/ONCOLOGY | Facility: CLINIC | Age: 58
End: 2023-12-15
Payer: COMMERCIAL

## 2023-12-15 ENCOUNTER — LAB VISIT (OUTPATIENT)
Dept: LAB | Facility: HOSPITAL | Age: 58
End: 2023-12-15
Attending: INTERNAL MEDICINE
Payer: COMMERCIAL

## 2023-12-15 VITALS
OXYGEN SATURATION: 97 % | WEIGHT: 248.81 LBS | RESPIRATION RATE: 18 BRPM | BODY MASS INDEX: 37.71 KG/M2 | HEART RATE: 61 BPM | DIASTOLIC BLOOD PRESSURE: 70 MMHG | SYSTOLIC BLOOD PRESSURE: 129 MMHG | HEIGHT: 68 IN | TEMPERATURE: 98 F

## 2023-12-15 DIAGNOSIS — D70.1 LEUKOPENIA DUE TO ANTINEOPLASTIC CHEMOTHERAPY: ICD-10-CM

## 2023-12-15 DIAGNOSIS — C90.01 MULTIPLE MYELOMA IN REMISSION: ICD-10-CM

## 2023-12-15 DIAGNOSIS — C90.01 MULTIPLE MYELOMA IN REMISSION: Primary | ICD-10-CM

## 2023-12-15 DIAGNOSIS — Z94.84 H/O AUTOLOGOUS STEM CELL TRANSPLANT: ICD-10-CM

## 2023-12-15 DIAGNOSIS — D69.6 THROMBOCYTOPENIA: ICD-10-CM

## 2023-12-15 DIAGNOSIS — T45.1X5A LEUKOPENIA DUE TO ANTINEOPLASTIC CHEMOTHERAPY: ICD-10-CM

## 2023-12-15 LAB
ALBUMIN SERPL BCP-MCNC: 3.5 G/DL (ref 3.5–5.2)
ALP SERPL-CCNC: 88 U/L (ref 55–135)
ALT SERPL W/O P-5'-P-CCNC: 21 U/L (ref 10–44)
ANION GAP SERPL CALC-SCNC: 8 MMOL/L (ref 8–16)
AST SERPL-CCNC: 21 U/L (ref 10–40)
BASOPHILS # BLD AUTO: 0.03 K/UL (ref 0–0.2)
BASOPHILS NFR BLD: 1 % (ref 0–1.9)
BILIRUB SERPL-MCNC: 0.7 MG/DL (ref 0.1–1)
BUN SERPL-MCNC: 13 MG/DL (ref 6–20)
CALCIUM SERPL-MCNC: 8.8 MG/DL (ref 8.7–10.5)
CHLORIDE SERPL-SCNC: 106 MMOL/L (ref 95–110)
CO2 SERPL-SCNC: 28 MMOL/L (ref 23–29)
CREAT SERPL-MCNC: 0.9 MG/DL (ref 0.5–1.4)
DIFFERENTIAL METHOD: ABNORMAL
EOSINOPHIL # BLD AUTO: 0.1 K/UL (ref 0–0.5)
EOSINOPHIL NFR BLD: 2.9 % (ref 0–8)
ERYTHROCYTE [DISTWIDTH] IN BLOOD BY AUTOMATED COUNT: 13.4 % (ref 11.5–14.5)
EST. GFR  (NO RACE VARIABLE): >60 ML/MIN/1.73 M^2
GLUCOSE SERPL-MCNC: 112 MG/DL (ref 70–110)
HCT VFR BLD AUTO: 40.4 % (ref 40–54)
HGB BLD-MCNC: 14 G/DL (ref 14–18)
IGA SERPL-MCNC: 245 MG/DL (ref 40–350)
IGG SERPL-MCNC: 828 MG/DL (ref 650–1600)
IGM SERPL-MCNC: 17 MG/DL (ref 50–300)
IMM GRANULOCYTES # BLD AUTO: 0.01 K/UL (ref 0–0.04)
IMM GRANULOCYTES NFR BLD AUTO: 0.3 % (ref 0–0.5)
LYMPHOCYTES # BLD AUTO: 1.1 K/UL (ref 1–4.8)
LYMPHOCYTES NFR BLD: 35 % (ref 18–48)
MCH RBC QN AUTO: 31.6 PG (ref 27–31)
MCHC RBC AUTO-ENTMCNC: 34.7 G/DL (ref 32–36)
MCV RBC AUTO: 91 FL (ref 82–98)
MONOCYTES # BLD AUTO: 0.2 K/UL (ref 0.3–1)
MONOCYTES NFR BLD: 6.2 % (ref 4–15)
NEUTROPHILS # BLD AUTO: 1.7 K/UL (ref 1.8–7.7)
NEUTROPHILS NFR BLD: 54.6 % (ref 38–73)
NRBC BLD-RTO: 0 /100 WBC
PLATELET # BLD AUTO: 122 K/UL (ref 150–450)
PMV BLD AUTO: 10.4 FL (ref 9.2–12.9)
POTASSIUM SERPL-SCNC: 3.7 MMOL/L (ref 3.5–5.1)
PROT SERPL-MCNC: 6.4 G/DL (ref 6–8.4)
RBC # BLD AUTO: 4.43 M/UL (ref 4.6–6.2)
SODIUM SERPL-SCNC: 142 MMOL/L (ref 136–145)
WBC # BLD AUTO: 3.06 K/UL (ref 3.9–12.7)

## 2023-12-15 PROCEDURE — 82784 ASSAY IGA/IGD/IGG/IGM EACH: CPT | Performed by: INTERNAL MEDICINE

## 2023-12-15 PROCEDURE — 84165 PATHOLOGIST INTERPRETATION SPE: ICD-10-PCS | Mod: 26,,, | Performed by: PATHOLOGY

## 2023-12-15 PROCEDURE — 83521 IG LIGHT CHAINS FREE EACH: CPT | Performed by: INTERNAL MEDICINE

## 2023-12-15 PROCEDURE — 85025 COMPLETE CBC W/AUTO DIFF WBC: CPT | Performed by: INTERNAL MEDICINE

## 2023-12-15 PROCEDURE — 36415 COLL VENOUS BLD VENIPUNCTURE: CPT | Performed by: INTERNAL MEDICINE

## 2023-12-15 PROCEDURE — 4010F PR ACE/ARB THEARPY RXD/TAKEN: ICD-10-PCS | Mod: CPTII,S$GLB,, | Performed by: INTERNAL MEDICINE

## 2023-12-15 PROCEDURE — 99999 PR PBB SHADOW E&M-EST. PATIENT-LVL IV: CPT | Mod: PBBFAC,,, | Performed by: INTERNAL MEDICINE

## 2023-12-15 PROCEDURE — 3044F HG A1C LEVEL LT 7.0%: CPT | Mod: CPTII,S$GLB,, | Performed by: INTERNAL MEDICINE

## 2023-12-15 PROCEDURE — 3008F PR BODY MASS INDEX (BMI) DOCUMENTED: ICD-10-PCS | Mod: CPTII,S$GLB,, | Performed by: INTERNAL MEDICINE

## 2023-12-15 PROCEDURE — 3008F BODY MASS INDEX DOCD: CPT | Mod: CPTII,S$GLB,, | Performed by: INTERNAL MEDICINE

## 2023-12-15 PROCEDURE — 99999 PR PBB SHADOW E&M-EST. PATIENT-LVL IV: ICD-10-PCS | Mod: PBBFAC,,, | Performed by: INTERNAL MEDICINE

## 2023-12-15 PROCEDURE — 99215 OFFICE O/P EST HI 40 MIN: CPT | Mod: S$GLB,,, | Performed by: INTERNAL MEDICINE

## 2023-12-15 PROCEDURE — 99215 PR OFFICE/OUTPT VISIT, EST, LEVL V, 40-54 MIN: ICD-10-PCS | Mod: S$GLB,,, | Performed by: INTERNAL MEDICINE

## 2023-12-15 PROCEDURE — 86334 PATHOLOGIST INTERPRETATION IFE: ICD-10-PCS | Mod: 26,,, | Performed by: PATHOLOGY

## 2023-12-15 PROCEDURE — 1159F PR MEDICATION LIST DOCUMENTED IN MEDICAL RECORD: ICD-10-PCS | Mod: CPTII,S$GLB,, | Performed by: INTERNAL MEDICINE

## 2023-12-15 PROCEDURE — 1160F RVW MEDS BY RX/DR IN RCRD: CPT | Mod: CPTII,S$GLB,, | Performed by: INTERNAL MEDICINE

## 2023-12-15 PROCEDURE — 1160F PR REVIEW ALL MEDS BY PRESCRIBER/CLIN PHARMACIST DOCUMENTED: ICD-10-PCS | Mod: CPTII,S$GLB,, | Performed by: INTERNAL MEDICINE

## 2023-12-15 PROCEDURE — 86334 IMMUNOFIX E-PHORESIS SERUM: CPT | Performed by: INTERNAL MEDICINE

## 2023-12-15 PROCEDURE — 3078F DIAST BP <80 MM HG: CPT | Mod: CPTII,S$GLB,, | Performed by: INTERNAL MEDICINE

## 2023-12-15 PROCEDURE — 84165 PROTEIN E-PHORESIS SERUM: CPT | Performed by: INTERNAL MEDICINE

## 2023-12-15 PROCEDURE — 1159F MED LIST DOCD IN RCRD: CPT | Mod: CPTII,S$GLB,, | Performed by: INTERNAL MEDICINE

## 2023-12-15 PROCEDURE — 4010F ACE/ARB THERAPY RXD/TAKEN: CPT | Mod: CPTII,S$GLB,, | Performed by: INTERNAL MEDICINE

## 2023-12-15 PROCEDURE — 3044F PR MOST RECENT HEMOGLOBIN A1C LEVEL <7.0%: ICD-10-PCS | Mod: CPTII,S$GLB,, | Performed by: INTERNAL MEDICINE

## 2023-12-15 PROCEDURE — 3074F PR MOST RECENT SYSTOLIC BLOOD PRESSURE < 130 MM HG: ICD-10-PCS | Mod: CPTII,S$GLB,, | Performed by: INTERNAL MEDICINE

## 2023-12-15 PROCEDURE — 84165 PROTEIN E-PHORESIS SERUM: CPT | Mod: 26,,, | Performed by: PATHOLOGY

## 2023-12-15 PROCEDURE — 3074F SYST BP LT 130 MM HG: CPT | Mod: CPTII,S$GLB,, | Performed by: INTERNAL MEDICINE

## 2023-12-15 PROCEDURE — 80053 COMPREHEN METABOLIC PANEL: CPT | Performed by: INTERNAL MEDICINE

## 2023-12-15 PROCEDURE — 3078F PR MOST RECENT DIASTOLIC BLOOD PRESSURE < 80 MM HG: ICD-10-PCS | Mod: CPTII,S$GLB,, | Performed by: INTERNAL MEDICINE

## 2023-12-15 PROCEDURE — 86334 IMMUNOFIX E-PHORESIS SERUM: CPT | Mod: 26,,, | Performed by: PATHOLOGY

## 2023-12-15 NOTE — PROGRESS NOTES
Route Chart for Scheduling    BMT Chart Routing      Follow up with physician 3 months.   Follow up with ROQUE    Provider visit type Malignant hem   Infusion scheduling note    Injection scheduling note    Labs CBC, CMP, SPEP, immunofixation, free light chains and immunoglobulins   Scheduling:  Preferred lab:  Lab interval:     Imaging    Pharmacy appointment    Other referrals

## 2023-12-18 LAB
ALBUMIN SERPL ELPH-MCNC: 3.71 G/DL (ref 3.35–5.55)
ALPHA1 GLOB SERPL ELPH-MCNC: 0.25 G/DL (ref 0.17–0.41)
ALPHA2 GLOB SERPL ELPH-MCNC: 0.56 G/DL (ref 0.43–0.99)
B-GLOBULIN SERPL ELPH-MCNC: 0.74 G/DL (ref 0.5–1.1)
GAMMA GLOB SERPL ELPH-MCNC: 0.74 G/DL (ref 0.67–1.58)
INTERPRETATION SERPL IFE-IMP: NORMAL
KAPPA LC SER QL IA: 2.06 MG/DL (ref 0.33–1.94)
KAPPA LC/LAMBDA SER IA: 1.63 (ref 0.26–1.65)
LAMBDA LC SER QL IA: 1.26 MG/DL (ref 0.57–2.63)
PROT SERPL-MCNC: 6 G/DL (ref 6–8.4)

## 2023-12-18 NOTE — PROGRESS NOTES
HEMATOLOGIC MALIGNANCIES PROGRESS NOTE    IDENTIFYING STATEMENT   Arik Bobo (Arik) is a 58 y.o. male with a  of 1965 from [place] with the diagnosis of multiple myeloma.      ONCOLOGY HISTORY:    1. Multiple myeloma s/p autologous stem cell transplant   A. Early : Progressive anemia. K/L ratio > 100, and was treated with fina-dex   B. 12/3/2015: M-protein 1.02 g/dl, IgG-kappa by JAYESH. Kappa 2.98 mg/dl, lambda 0.5 mg/dl, ratio 5.96. BMBx shows 60-70% cellular marrow with 20% plasma cells, consistent with plasma cell myeloma. Hgb 11.8 g/dl, Calcium 9.44 (corrected). Creatinine 0.8 mg/dl.    C. 2016: M-protein 2.23 g/dl. Kappa 4.36 mg/dl, lambda 0.08 mg/dl, ratio 54.5. Progressive disease.   D. VCd therapy locally.    E. 2017: M-protein 1.23 g/dl.    F. 2017: M-protein 0.41 g/dl. Kappa 2.33 mg/dl, lambda 0.5 mg/dl, ratio 4.66, consistent with partial response.    G. 2017: Autologous stem cell transplant with melphalan conditioning.   H. 2018: Day +100 restaging - M-protein 0.19 g/dl, kappa 0.76 mg/dl, lambda 0.53 mg/dl, ratio 1.43. BMBx shows no definitive evidence of myeloma. Consistent with very good partial response.    I. 3/8/2018: M-protein 0.18 g/dl   J. 2018: M-protein 0.16 g/dl   K. 2018: M-protein 0.2 g/dl   L. 2018: M-protein 0.14 g/dl; BMBx for 1-year re-evaluation shows 40% cellular marrow with 6-7% plasma cells and no evidence of clonality; thus, no plasma cell neoplasm evident. Findings consistent with ongoing VGPR.   M. 4/3/2019: M-protein < 0.1 g/dl.     N. 7/10/2019: M-protein 0.18 g/dl.   O. 10/30/2019: M-protein 0.14 g/dl.    P. 2020: M-protein negative; JAYESH negative; findings consistent with complete response to therapy.     2. Depression  3. HTN  4. History of DVT in 2015 and 2017    INTERVAL HISTORY:      Mr. Bobo returns to clinic 6 years, 2 months after  autologous transplant for myeloma.     He is tolerating lenalidomide  well. He has no complaints. He has been enjoying time with his family.     Past Medical History, Past Social History and Past Family History have been reviewed and are unchanged except as noted in the interval history.    MEDICATIONS:     Current Outpatient Medications on File Prior to Visit   Medication Sig Dispense Refill    amLODIPine (NORVASC) 10 MG tablet       apixaban 2.5 mg Tab Take 1 tablet (2.5 mg total) by mouth 2 (two) times daily. 60 tablet 3    baclofen (LIORESAL) 20 MG tablet Take 20 mg by mouth 2 (two) times daily.      citalopram (CELEXA) 40 MG tablet Take 40 mg by mouth.      lenalidomide 10 mg Cap TAKE 1 CAPSULE DAILY ON DAYS 1 THROUGH 21 OF A 28 DAY CYCLE. 21 each 0    lisinopriL 10 MG tablet       simvastatin (ZOCOR) 20 MG tablet   3     No current facility-administered medications on file prior to visit.       ALLERGIES:     Review of patient's allergies indicates:   Allergen Reactions    Pcn [penicillins]      Unknown last dose as an infant         ROS:       Review of Systems   Constitutional:  Negative for diaphoresis, fatigue, fever and unexpected weight change.   HENT:   Negative for lump/mass and sore throat.    Eyes:  Negative for icterus.   Respiratory:  Negative for cough and shortness of breath.    Cardiovascular:  Negative for chest pain and palpitations.   Gastrointestinal:  Negative for abdominal distention, constipation, diarrhea, nausea and vomiting.   Genitourinary:  Negative for dysuria and frequency.    Musculoskeletal:  Negative for arthralgias, gait problem and myalgias.   Skin:  Negative for rash.   Neurological:  Negative for dizziness, gait problem and headaches.        Facial twitching   Hematological:  Negative for adenopathy. Does not bruise/bleed easily.   Psychiatric/Behavioral:  The patient is not nervous/anxious.        PHYSICAL EXAM:  Vitals:    12/15/23 0903   BP: 129/70   Pulse: 61   Resp: 18   Temp: 98.4 °F (36.9 °C)   TempSrc: Oral   SpO2: 97%   Weight: 112.9  "kg (248 lb 12.6 oz)   Height: 5' 8" (1.727 m)   PainSc: 0-No pain   Body mass index is 37.83 kg/m².      Physical Exam  Constitutional:       General: He is not in acute distress.     Appearance: He is well-developed.   HENT:      Head: Normocephalic and atraumatic.      Mouth/Throat:      Mouth: No oral lesions.   Eyes:      Conjunctiva/sclera: Conjunctivae normal.      Comments: Right upper eyelid twitch   Neck:      Thyroid: No thyromegaly.   Cardiovascular:      Rate and Rhythm: Normal rate and regular rhythm.      Heart sounds: Normal heart sounds. No murmur heard.  Pulmonary:      Breath sounds: Normal breath sounds. No wheezing or rales.   Abdominal:      General: There is no distension.      Palpations: Abdomen is soft. There is no hepatomegaly, splenomegaly or mass.      Tenderness: There is no abdominal tenderness.   Lymphadenopathy:      Cervical: No cervical adenopathy.      Right cervical: No deep cervical adenopathy.     Left cervical: No deep cervical adenopathy.   Skin:     Findings: No rash.   Neurological:      Mental Status: He is alert and oriented to person, place, and time.      Cranial Nerves: No cranial nerve deficit.      Coordination: Coordination normal.      Deep Tendon Reflexes: Reflexes are normal and symmetric.       LAB:   Results for orders placed or performed in visit on 12/15/23   CBC Auto Differential   Result Value Ref Range    WBC 3.06 (L) 3.90 - 12.70 K/uL    RBC 4.43 (L) 4.60 - 6.20 M/uL    Hemoglobin 14.0 14.0 - 18.0 g/dL    Hematocrit 40.4 40.0 - 54.0 %    MCV 91 82 - 98 fL    MCH 31.6 (H) 27.0 - 31.0 pg    MCHC 34.7 32.0 - 36.0 g/dL    RDW 13.4 11.5 - 14.5 %    Platelets 122 (L) 150 - 450 K/uL    MPV 10.4 9.2 - 12.9 fL    Immature Granulocytes 0.3 0.0 - 0.5 %    Gran # (ANC) 1.7 (L) 1.8 - 7.7 K/uL    Immature Grans (Abs) 0.01 0.00 - 0.04 K/uL    Lymph # 1.1 1.0 - 4.8 K/uL    Mono # 0.2 (L) 0.3 - 1.0 K/uL    Eos # 0.1 0.0 - 0.5 K/uL    Baso # 0.03 0.00 - 0.20 K/uL    nRBC 0 " 0 /100 WBC    Gran % 54.6 38.0 - 73.0 %    Lymph % 35.0 18.0 - 48.0 %    Mono % 6.2 4.0 - 15.0 %    Eosinophil % 2.9 0.0 - 8.0 %    Basophil % 1.0 0.0 - 1.9 %    Differential Method Automated    Comprehensive Metabolic Panel   Result Value Ref Range    Sodium 142 136 - 145 mmol/L    Potassium 3.7 3.5 - 5.1 mmol/L    Chloride 106 95 - 110 mmol/L    CO2 28 23 - 29 mmol/L    Glucose 112 (H) 70 - 110 mg/dL    BUN 13 6 - 20 mg/dL    Creatinine 0.9 0.5 - 1.4 mg/dL    Calcium 8.8 8.7 - 10.5 mg/dL    Total Protein 6.4 6.0 - 8.4 g/dL    Albumin 3.5 3.5 - 5.2 g/dL    Total Bilirubin 0.7 0.1 - 1.0 mg/dL    Alkaline Phosphatase 88 55 - 135 U/L    AST 21 10 - 40 U/L    ALT 21 10 - 44 U/L    eGFR >60.0 >60 mL/min/1.73 m^2    Anion Gap 8 8 - 16 mmol/L   Protein Electrophoresis, Serum   Result Value Ref Range    Protein, Serum 6.0 6.0 - 8.4 g/dL   Immunoglobulins (IgG, IgA, IgM) Quantitative   Result Value Ref Range    IgG 828 650 - 1600 mg/dL    IgA 245 40 - 350 mg/dL    IgM 17 (L) 50 - 300 mg/dL       PROBLEMS ASSESSED THIS VISIT:    1. Multiple myeloma in remission    2. Leukopenia due to antineoplastic chemotherapy    3. Thrombocytopenia    4. H/O autologous stem cell transplant      PLAN:       Multiple myeloma    SPEP and JAYESH are pending, previously negative. Kappa/lambda ratio pending, though previously there was a slight increase in kappa free light chains and kappa/lambda ratio. We reviewed that this may suggest relapsing disease if the trend continues. We will monitor closely.     Continue maintenance lenalidomide and low dose apixaban for thromboprophylaxis.     History of autologous stem cell transplant    He is 6 years, 2 months post ASCT. Current disease status is pending serologic reassessment. No evidence of clinical relapse to date since ASCT.    Cytopenias  Mild. Monitor on lenalidomide.     Follow-up in 3 months    Italo Bryant MD  Hematology and Stem Cell Transplant

## 2023-12-19 LAB
PATHOLOGIST INTERPRETATION IFE: NORMAL
PATHOLOGIST INTERPRETATION SPE: NORMAL

## 2023-12-27 DIAGNOSIS — C90.01 MULTIPLE MYELOMA IN REMISSION: ICD-10-CM

## 2023-12-28 RX ORDER — LENALIDOMIDE 10 MG/1
CAPSULE ORAL
Qty: 21 EACH | Refills: 0 | Status: SHIPPED | OUTPATIENT
Start: 2023-12-28 | End: 2024-01-26 | Stop reason: SDUPTHER

## 2024-01-26 DIAGNOSIS — C90.01 MULTIPLE MYELOMA IN REMISSION: ICD-10-CM

## 2024-01-26 RX ORDER — LENALIDOMIDE 10 MG/1
CAPSULE ORAL
Qty: 21 EACH | Refills: 0 | Status: SHIPPED | OUTPATIENT
Start: 2024-01-26 | End: 2024-03-12 | Stop reason: SDUPTHER

## 2024-03-12 DIAGNOSIS — C90.01 MULTIPLE MYELOMA IN REMISSION: ICD-10-CM

## 2024-03-13 RX ORDER — LENALIDOMIDE 10 MG/1
CAPSULE ORAL
Qty: 21 EACH | Refills: 0 | Status: SHIPPED | OUTPATIENT
Start: 2024-03-13 | End: 2024-04-16

## 2024-03-14 NOTE — TELEPHONE ENCOUNTER
----- Message from Shoshana Phillips sent at 3/13/2024  8:21 AM CDT -----  Regarding: Confirm in for Rx  Contact: Juan  Regarding: Rx refill        Name Of Caller: Juan        Contact Preference: [phone]    Ref#[id number]     Nature of call:  [facility] intervention dept,  is calling to confirm info for the following medication     lenalidomide 10 mg Cap      Requesting a call back

## 2024-03-15 ENCOUNTER — OFFICE VISIT (OUTPATIENT)
Dept: HEMATOLOGY/ONCOLOGY | Facility: CLINIC | Age: 59
End: 2024-03-15
Payer: COMMERCIAL

## 2024-03-15 ENCOUNTER — LAB VISIT (OUTPATIENT)
Dept: LAB | Facility: HOSPITAL | Age: 59
End: 2024-03-15
Payer: COMMERCIAL

## 2024-03-15 VITALS
HEART RATE: 53 BPM | BODY MASS INDEX: 37.82 KG/M2 | WEIGHT: 249.56 LBS | TEMPERATURE: 98 F | DIASTOLIC BLOOD PRESSURE: 75 MMHG | HEIGHT: 68 IN | OXYGEN SATURATION: 98 % | SYSTOLIC BLOOD PRESSURE: 141 MMHG

## 2024-03-15 DIAGNOSIS — D70.1 LEUKOPENIA DUE TO ANTINEOPLASTIC CHEMOTHERAPY: ICD-10-CM

## 2024-03-15 DIAGNOSIS — Z94.84 H/O AUTOLOGOUS STEM CELL TRANSPLANT: ICD-10-CM

## 2024-03-15 DIAGNOSIS — C90.01 MULTIPLE MYELOMA IN REMISSION: Primary | ICD-10-CM

## 2024-03-15 DIAGNOSIS — T45.1X5A LEUKOPENIA DUE TO ANTINEOPLASTIC CHEMOTHERAPY: ICD-10-CM

## 2024-03-15 DIAGNOSIS — D69.6 THROMBOCYTOPENIA: ICD-10-CM

## 2024-03-15 DIAGNOSIS — D61.818 OTHER PANCYTOPENIA: ICD-10-CM

## 2024-03-15 DIAGNOSIS — C90.01 MULTIPLE MYELOMA IN REMISSION: ICD-10-CM

## 2024-03-15 DIAGNOSIS — E66.01 SEVERE OBESITY (BMI 35.0-39.9) WITH COMORBIDITY: ICD-10-CM

## 2024-03-15 LAB
ALBUMIN SERPL BCP-MCNC: 3.5 G/DL (ref 3.5–5.2)
ALP SERPL-CCNC: 86 U/L (ref 55–135)
ALT SERPL W/O P-5'-P-CCNC: 16 U/L (ref 10–44)
ANION GAP SERPL CALC-SCNC: 8 MMOL/L (ref 8–16)
AST SERPL-CCNC: 20 U/L (ref 10–40)
BASOPHILS # BLD AUTO: 0.04 K/UL (ref 0–0.2)
BASOPHILS NFR BLD: 1.5 % (ref 0–1.9)
BILIRUB SERPL-MCNC: 0.7 MG/DL (ref 0.1–1)
BUN SERPL-MCNC: 14 MG/DL (ref 6–20)
CALCIUM SERPL-MCNC: 9.4 MG/DL (ref 8.7–10.5)
CHLORIDE SERPL-SCNC: 107 MMOL/L (ref 95–110)
CO2 SERPL-SCNC: 28 MMOL/L (ref 23–29)
CREAT SERPL-MCNC: 1 MG/DL (ref 0.5–1.4)
DIFFERENTIAL METHOD BLD: ABNORMAL
EOSINOPHIL # BLD AUTO: 0 K/UL (ref 0–0.5)
EOSINOPHIL NFR BLD: 1.5 % (ref 0–8)
ERYTHROCYTE [DISTWIDTH] IN BLOOD BY AUTOMATED COUNT: 13.2 % (ref 11.5–14.5)
EST. GFR  (NO RACE VARIABLE): >60 ML/MIN/1.73 M^2
GLUCOSE SERPL-MCNC: 119 MG/DL (ref 70–110)
HCT VFR BLD AUTO: 38.2 % (ref 40–54)
HGB BLD-MCNC: 12.7 G/DL (ref 14–18)
IGA SERPL-MCNC: 215 MG/DL (ref 40–350)
IGG SERPL-MCNC: 807 MG/DL (ref 650–1600)
IGM SERPL-MCNC: 26 MG/DL (ref 50–300)
IMM GRANULOCYTES # BLD AUTO: 0 K/UL (ref 0–0.04)
IMM GRANULOCYTES NFR BLD AUTO: 0 % (ref 0–0.5)
LYMPHOCYTES # BLD AUTO: 0.9 K/UL (ref 1–4.8)
LYMPHOCYTES NFR BLD: 32.5 % (ref 18–48)
MCH RBC QN AUTO: 31.4 PG (ref 27–31)
MCHC RBC AUTO-ENTMCNC: 33.2 G/DL (ref 32–36)
MCV RBC AUTO: 94 FL (ref 82–98)
MONOCYTES # BLD AUTO: 0.2 K/UL (ref 0.3–1)
MONOCYTES NFR BLD: 9 % (ref 4–15)
NEUTROPHILS # BLD AUTO: 1.5 K/UL (ref 1.8–7.7)
NEUTROPHILS NFR BLD: 55.5 % (ref 38–73)
NRBC BLD-RTO: 0 /100 WBC
PLATELET # BLD AUTO: 141 K/UL (ref 150–450)
PMV BLD AUTO: 9.9 FL (ref 9.2–12.9)
POTASSIUM SERPL-SCNC: 3.6 MMOL/L (ref 3.5–5.1)
PROT SERPL-MCNC: 6.3 G/DL (ref 6–8.4)
RBC # BLD AUTO: 4.05 M/UL (ref 4.6–6.2)
SODIUM SERPL-SCNC: 143 MMOL/L (ref 136–145)
WBC # BLD AUTO: 2.68 K/UL (ref 3.9–12.7)

## 2024-03-15 PROCEDURE — 1159F MED LIST DOCD IN RCRD: CPT | Mod: CPTII,S$GLB,, | Performed by: INTERNAL MEDICINE

## 2024-03-15 PROCEDURE — 99215 OFFICE O/P EST HI 40 MIN: CPT | Mod: S$GLB,,, | Performed by: INTERNAL MEDICINE

## 2024-03-15 PROCEDURE — 3077F SYST BP >= 140 MM HG: CPT | Mod: CPTII,S$GLB,, | Performed by: INTERNAL MEDICINE

## 2024-03-15 PROCEDURE — 83521 IG LIGHT CHAINS FREE EACH: CPT | Mod: 59 | Performed by: INTERNAL MEDICINE

## 2024-03-15 PROCEDURE — 36415 COLL VENOUS BLD VENIPUNCTURE: CPT | Performed by: INTERNAL MEDICINE

## 2024-03-15 PROCEDURE — 84165 PROTEIN E-PHORESIS SERUM: CPT | Mod: 26,,, | Performed by: PATHOLOGY

## 2024-03-15 PROCEDURE — 82784 ASSAY IGA/IGD/IGG/IGM EACH: CPT | Mod: 59 | Performed by: INTERNAL MEDICINE

## 2024-03-15 PROCEDURE — 80053 COMPREHEN METABOLIC PANEL: CPT | Performed by: INTERNAL MEDICINE

## 2024-03-15 PROCEDURE — 1160F RVW MEDS BY RX/DR IN RCRD: CPT | Mod: CPTII,S$GLB,, | Performed by: INTERNAL MEDICINE

## 2024-03-15 PROCEDURE — 3008F BODY MASS INDEX DOCD: CPT | Mod: CPTII,S$GLB,, | Performed by: INTERNAL MEDICINE

## 2024-03-15 PROCEDURE — 86334 IMMUNOFIX E-PHORESIS SERUM: CPT | Performed by: INTERNAL MEDICINE

## 2024-03-15 PROCEDURE — 85025 COMPLETE CBC W/AUTO DIFF WBC: CPT | Performed by: INTERNAL MEDICINE

## 2024-03-15 PROCEDURE — 4010F ACE/ARB THERAPY RXD/TAKEN: CPT | Mod: CPTII,S$GLB,, | Performed by: INTERNAL MEDICINE

## 2024-03-15 PROCEDURE — 99999 PR PBB SHADOW E&M-EST. PATIENT-LVL III: CPT | Mod: PBBFAC,,, | Performed by: INTERNAL MEDICINE

## 2024-03-15 PROCEDURE — 3078F DIAST BP <80 MM HG: CPT | Mod: CPTII,S$GLB,, | Performed by: INTERNAL MEDICINE

## 2024-03-15 PROCEDURE — 84165 PROTEIN E-PHORESIS SERUM: CPT | Performed by: INTERNAL MEDICINE

## 2024-03-15 PROCEDURE — 86334 IMMUNOFIX E-PHORESIS SERUM: CPT | Mod: 26,,, | Performed by: PATHOLOGY

## 2024-03-15 NOTE — PROGRESS NOTES
HEMATOLOGIC MALIGNANCIES PROGRESS NOTE    IDENTIFYING STATEMENT   Arik Bobo (Arik) is a 58 y.o. male with a  of 1965 from [place] with the diagnosis of multiple myeloma.      ONCOLOGY HISTORY:    1. Multiple myeloma s/p autologous stem cell transplant   A. Early : Progressive anemia. K/L ratio > 100, and was treated with fina-dex   B. 12/3/2015: M-protein 1.02 g/dl, IgG-kappa by JAYESH. Kappa 2.98 mg/dl, lambda 0.5 mg/dl, ratio 5.96. BMBx shows 60-70% cellular marrow with 20% plasma cells, consistent with plasma cell myeloma. Hgb 11.8 g/dl, Calcium 9.44 (corrected). Creatinine 0.8 mg/dl.    C. 2016: M-protein 2.23 g/dl. Kappa 4.36 mg/dl, lambda 0.08 mg/dl, ratio 54.5. Progressive disease.   D. VCd therapy locally.    E. 2017: M-protein 1.23 g/dl.    F. 2017: M-protein 0.41 g/dl. Kappa 2.33 mg/dl, lambda 0.5 mg/dl, ratio 4.66, consistent with partial response.    G. 2017: Autologous stem cell transplant with melphalan conditioning.   H. 2018: Day +100 restaging - M-protein 0.19 g/dl, kappa 0.76 mg/dl, lambda 0.53 mg/dl, ratio 1.43. BMBx shows no definitive evidence of myeloma. Consistent with very good partial response.    I. 3/8/2018: M-protein 0.18 g/dl   J. 2018: M-protein 0.16 g/dl   K. 2018: M-protein 0.2 g/dl   L. 2018: M-protein 0.14 g/dl; BMBx for 1-year re-evaluation shows 40% cellular marrow with 6-7% plasma cells and no evidence of clonality; thus, no plasma cell neoplasm evident. Findings consistent with ongoing VGPR.   M. 4/3/2019: M-protein < 0.1 g/dl.     N. 7/10/2019: M-protein 0.18 g/dl.   O. 10/30/2019: M-protein 0.14 g/dl.    P. 2020: M-protein negative; JAYESH negative; findings consistent with complete response to therapy.     2. Depression  3. HTN  4. History of DVT in 2015 and 2017    INTERVAL HISTORY:      Mr. Bobo returns to clinic 6 years, 5 months (day +2361) after  autologous transplant for myeloma.     He is tolerating  lenalidomide well. He has no complaints. He has been enjoying time with his family.     Past Medical History, Past Social History and Past Family History have been reviewed and are unchanged except as noted in the interval history.    MEDICATIONS:     Current Outpatient Medications on File Prior to Visit   Medication Sig Dispense Refill    amLODIPine (NORVASC) 10 MG tablet       apixaban 2.5 mg Tab Take 1 tablet (2.5 mg total) by mouth 2 (two) times daily. 60 tablet 3    baclofen (LIORESAL) 20 MG tablet Take 20 mg by mouth 2 (two) times daily.      citalopram (CELEXA) 40 MG tablet Take 40 mg by mouth.      lenalidomide 10 mg Cap TAKE 1 CAPSULE DAILY ON DAYS 1 THROUGH 21 OF A 28 DAY CYCLE. 21 each 0    lisinopriL 10 MG tablet       simvastatin (ZOCOR) 20 MG tablet   3     No current facility-administered medications on file prior to visit.       ALLERGIES:     Review of patient's allergies indicates:   Allergen Reactions    Pcn [penicillins]      Unknown last dose as an infant         ROS:       Review of Systems   Constitutional:  Negative for diaphoresis, fatigue, fever and unexpected weight change.   HENT:   Negative for lump/mass and sore throat.    Eyes:  Negative for icterus.   Respiratory:  Negative for cough and shortness of breath.    Cardiovascular:  Negative for chest pain and palpitations.   Gastrointestinal:  Negative for abdominal distention, constipation, diarrhea, nausea and vomiting.   Genitourinary:  Negative for dysuria and frequency.    Musculoskeletal:  Negative for arthralgias, gait problem and myalgias.   Skin:  Negative for rash.   Neurological:  Negative for dizziness, gait problem and headaches.        Facial twitching   Hematological:  Negative for adenopathy. Does not bruise/bleed easily.   Psychiatric/Behavioral:  The patient is not nervous/anxious.        PHYSICAL EXAM:  Vitals:    03/15/24 1011   BP: (!) 141/75   Pulse: (!) 53   Temp: 98.3 °F (36.8 °C)   TempSrc: Oral   SpO2: 98%  "  Weight: 113.2 kg (249 lb 9 oz)   Height: 5' 8" (1.727 m)   PainSc: 0-No pain   Body mass index is 37.95 kg/m².      Physical Exam  Constitutional:       General: He is not in acute distress.     Appearance: He is well-developed.   HENT:      Head: Normocephalic and atraumatic.      Mouth/Throat:      Mouth: No oral lesions.   Eyes:      Conjunctiva/sclera: Conjunctivae normal.      Comments: Right upper eyelid twitch   Neck:      Thyroid: No thyromegaly.   Cardiovascular:      Rate and Rhythm: Normal rate and regular rhythm.      Heart sounds: Normal heart sounds. No murmur heard.  Pulmonary:      Breath sounds: Normal breath sounds. No wheezing or rales.   Abdominal:      General: There is no distension.      Palpations: Abdomen is soft. There is no hepatomegaly, splenomegaly or mass.      Tenderness: There is no abdominal tenderness.   Lymphadenopathy:      Cervical: No cervical adenopathy.      Right cervical: No deep cervical adenopathy.     Left cervical: No deep cervical adenopathy.   Skin:     Findings: No rash.   Neurological:      Mental Status: He is alert and oriented to person, place, and time.      Cranial Nerves: No cranial nerve deficit.      Coordination: Coordination normal.      Deep Tendon Reflexes: Reflexes are normal and symmetric.       LAB:   Results for orders placed or performed in visit on 03/15/24   CBC Auto Differential   Result Value Ref Range    WBC 2.68 (L) 3.90 - 12.70 K/uL    RBC 4.05 (L) 4.60 - 6.20 M/uL    Hemoglobin 12.7 (L) 14.0 - 18.0 g/dL    Hematocrit 38.2 (L) 40.0 - 54.0 %    MCV 94 82 - 98 fL    MCH 31.4 (H) 27.0 - 31.0 pg    MCHC 33.2 32.0 - 36.0 g/dL    RDW 13.2 11.5 - 14.5 %    Platelets 141 (L) 150 - 450 K/uL    MPV 9.9 9.2 - 12.9 fL    Immature Granulocytes 0.0 0.0 - 0.5 %    Gran # (ANC) 1.5 (L) 1.8 - 7.7 K/uL    Immature Grans (Abs) 0.00 0.00 - 0.04 K/uL    Lymph # 0.9 (L) 1.0 - 4.8 K/uL    Mono # 0.2 (L) 0.3 - 1.0 K/uL    Eos # 0.0 0.0 - 0.5 K/uL    Baso # 0.04 " 0.00 - 0.20 K/uL    nRBC 0 0 /100 WBC    Gran % 55.5 38.0 - 73.0 %    Lymph % 32.5 18.0 - 48.0 %    Mono % 9.0 4.0 - 15.0 %    Eosinophil % 1.5 0.0 - 8.0 %    Basophil % 1.5 0.0 - 1.9 %    Differential Method Automated    Comprehensive Metabolic Panel   Result Value Ref Range    Sodium 143 136 - 145 mmol/L    Potassium 3.6 3.5 - 5.1 mmol/L    Chloride 107 95 - 110 mmol/L    CO2 28 23 - 29 mmol/L    Glucose 119 (H) 70 - 110 mg/dL    BUN 14 6 - 20 mg/dL    Creatinine 1.0 0.5 - 1.4 mg/dL    Calcium 9.4 8.7 - 10.5 mg/dL    Total Protein 6.3 6.0 - 8.4 g/dL    Albumin 3.5 3.5 - 5.2 g/dL    Total Bilirubin 0.7 0.1 - 1.0 mg/dL    Alkaline Phosphatase 86 55 - 135 U/L    AST 20 10 - 40 U/L    ALT 16 10 - 44 U/L    eGFR >60.0 >60 mL/min/1.73 m^2    Anion Gap 8 8 - 16 mmol/L   Protein Electrophoresis, Serum   Result Value Ref Range    Protein, Serum 6.0 6.0 - 8.4 g/dL   Immunoglobulins (IgG, IgA, IgM) Quantitative   Result Value Ref Range    IgG 807 650 - 1600 mg/dL    IgA 215 40 - 350 mg/dL    IgM 26 (L) 50 - 300 mg/dL       PROBLEMS ASSESSED THIS VISIT:    1. Multiple myeloma in remission    2. H/O autologous stem cell transplant    3. Severe obesity (BMI 35.0-39.9) with comorbidity    4. Other pancytopenia    5. Leukopenia due to antineoplastic chemotherapy    6. Thrombocytopenia        PLAN:       Multiple myeloma    SPEP and JAYESH are pending, previously negative. Kappa/lambda ratio pending, though previously there was a slight increase in kappa free light chains but normal kappa/lambda ratio. Findings have been consistent with ongoing response to therapy. We will monitor closely.     Continue maintenance lenalidomide and low dose apixaban for thromboprophylaxis.     History of autologous stem cell transplant    He is 6 years, 5 months post ASCT. Current disease status is pending serologic reassessment. No evidence of clinical relapse to date since ASCT.    Cytopenias  Mild. Monitor on lenalidomide.     Obesity  Body mass  index is 37.95 kg/m². Obesity complicates all aspects of disease management. Recommend follow-up with PCP to discuss weight loss.     Follow-up in 3 months    Italo Bryant MD  Hematology and Stem Cell Transplant

## 2024-03-18 LAB
ALBUMIN SERPL ELPH-MCNC: 3.63 G/DL (ref 3.35–5.55)
ALPHA1 GLOB SERPL ELPH-MCNC: 0.28 G/DL (ref 0.17–0.41)
ALPHA2 GLOB SERPL ELPH-MCNC: 0.58 G/DL (ref 0.43–0.99)
B-GLOBULIN SERPL ELPH-MCNC: 0.76 G/DL (ref 0.5–1.1)
GAMMA GLOB SERPL ELPH-MCNC: 0.76 G/DL (ref 0.67–1.58)
INTERPRETATION SERPL IFE-IMP: NORMAL
KAPPA LC SER QL IA: 1.73 MG/DL (ref 0.33–1.94)
KAPPA LC/LAMBDA SER IA: 1.48 (ref 0.26–1.65)
LAMBDA LC SER QL IA: 1.17 MG/DL (ref 0.57–2.63)
PROT SERPL-MCNC: 6 G/DL (ref 6–8.4)

## 2024-03-19 LAB
PATHOLOGIST INTERPRETATION IFE: NORMAL
PATHOLOGIST INTERPRETATION SPE: NORMAL

## 2024-04-11 DIAGNOSIS — C90.01 MULTIPLE MYELOMA IN REMISSION: ICD-10-CM

## 2024-04-16 RX ORDER — LENALIDOMIDE 10 MG/1
CAPSULE ORAL
Qty: 21 EACH | Refills: 0 | Status: SHIPPED | OUTPATIENT
Start: 2024-04-16 | End: 2024-05-16 | Stop reason: SDUPTHER

## 2024-05-16 DIAGNOSIS — C90.01 MULTIPLE MYELOMA IN REMISSION: ICD-10-CM

## 2024-05-17 RX ORDER — LENALIDOMIDE 10 MG/1
CAPSULE ORAL
Qty: 21 EACH | Refills: 0 | Status: SHIPPED | OUTPATIENT
Start: 2024-05-17 | End: 2024-06-14

## 2024-06-13 DIAGNOSIS — C90.01 MULTIPLE MYELOMA IN REMISSION: ICD-10-CM

## 2024-06-14 ENCOUNTER — LAB VISIT (OUTPATIENT)
Dept: LAB | Facility: HOSPITAL | Age: 59
End: 2024-06-14
Payer: COMMERCIAL

## 2024-06-14 DIAGNOSIS — C90.01 MULTIPLE MYELOMA IN REMISSION: ICD-10-CM

## 2024-06-14 LAB
ALBUMIN SERPL BCP-MCNC: 3.2 G/DL (ref 3.5–5.2)
ALP SERPL-CCNC: 87 U/L (ref 55–135)
ALT SERPL W/O P-5'-P-CCNC: 25 U/L (ref 10–44)
ANION GAP SERPL CALC-SCNC: 8 MMOL/L (ref 8–16)
ANISOCYTOSIS BLD QL SMEAR: SLIGHT
AST SERPL-CCNC: 21 U/L (ref 10–40)
BASOPHILS # BLD AUTO: 0.04 K/UL (ref 0–0.2)
BASOPHILS NFR BLD: 1.7 % (ref 0–1.9)
BILIRUB SERPL-MCNC: 0.8 MG/DL (ref 0.1–1)
BUN SERPL-MCNC: 11 MG/DL (ref 6–20)
CALCIUM SERPL-MCNC: 8.2 MG/DL (ref 8.7–10.5)
CHLORIDE SERPL-SCNC: 105 MMOL/L (ref 95–110)
CO2 SERPL-SCNC: 27 MMOL/L (ref 23–29)
CREAT SERPL-MCNC: 1.1 MG/DL (ref 0.5–1.4)
DIFFERENTIAL METHOD BLD: ABNORMAL
EOSINOPHIL # BLD AUTO: 0.2 K/UL (ref 0–0.5)
EOSINOPHIL NFR BLD: 8.3 % (ref 0–8)
ERYTHROCYTE [DISTWIDTH] IN BLOOD BY AUTOMATED COUNT: 13.5 % (ref 11.5–14.5)
EST. GFR  (NO RACE VARIABLE): >60 ML/MIN/1.73 M^2
GLUCOSE SERPL-MCNC: 79 MG/DL (ref 70–110)
HCT VFR BLD AUTO: 38.7 % (ref 40–54)
HGB BLD-MCNC: 13.2 G/DL (ref 14–18)
HYPOCHROMIA BLD QL SMEAR: ABNORMAL
IGA SERPL-MCNC: 232 MG/DL (ref 40–350)
IGG SERPL-MCNC: 749 MG/DL (ref 650–1600)
IGM SERPL-MCNC: 18 MG/DL (ref 50–300)
IMM GRANULOCYTES # BLD AUTO: 0.01 K/UL (ref 0–0.04)
IMM GRANULOCYTES NFR BLD AUTO: 0.4 % (ref 0–0.5)
LYMPHOCYTES # BLD AUTO: 1 K/UL (ref 1–4.8)
LYMPHOCYTES NFR BLD: 40.5 % (ref 18–48)
MCH RBC QN AUTO: 32 PG (ref 27–31)
MCHC RBC AUTO-ENTMCNC: 34.1 G/DL (ref 32–36)
MCV RBC AUTO: 94 FL (ref 82–98)
MONOCYTES # BLD AUTO: 0.3 K/UL (ref 0.3–1)
MONOCYTES NFR BLD: 11.2 % (ref 4–15)
NEUTROPHILS # BLD AUTO: 0.9 K/UL (ref 1.8–7.7)
NEUTROPHILS NFR BLD: 37.9 % (ref 38–73)
NRBC BLD-RTO: 0 /100 WBC
PLATELET # BLD AUTO: 121 K/UL (ref 150–450)
PLATELET BLD QL SMEAR: ABNORMAL
PMV BLD AUTO: 10.2 FL (ref 9.2–12.9)
POTASSIUM SERPL-SCNC: 3.6 MMOL/L (ref 3.5–5.1)
PROT SERPL-MCNC: 6.2 G/DL (ref 6–8.4)
RBC # BLD AUTO: 4.12 M/UL (ref 4.6–6.2)
SODIUM SERPL-SCNC: 140 MMOL/L (ref 136–145)
WBC # BLD AUTO: 2.42 K/UL (ref 3.9–12.7)

## 2024-06-14 PROCEDURE — 86334 IMMUNOFIX E-PHORESIS SERUM: CPT | Performed by: INTERNAL MEDICINE

## 2024-06-14 PROCEDURE — 36415 COLL VENOUS BLD VENIPUNCTURE: CPT | Performed by: INTERNAL MEDICINE

## 2024-06-14 PROCEDURE — 85025 COMPLETE CBC W/AUTO DIFF WBC: CPT | Performed by: INTERNAL MEDICINE

## 2024-06-14 PROCEDURE — 86334 IMMUNOFIX E-PHORESIS SERUM: CPT | Mod: 26,,, | Performed by: PATHOLOGY

## 2024-06-14 PROCEDURE — 84165 PROTEIN E-PHORESIS SERUM: CPT | Performed by: INTERNAL MEDICINE

## 2024-06-14 PROCEDURE — 80053 COMPREHEN METABOLIC PANEL: CPT | Performed by: INTERNAL MEDICINE

## 2024-06-14 PROCEDURE — 82784 ASSAY IGA/IGD/IGG/IGM EACH: CPT | Mod: 59 | Performed by: INTERNAL MEDICINE

## 2024-06-14 PROCEDURE — 83521 IG LIGHT CHAINS FREE EACH: CPT | Performed by: INTERNAL MEDICINE

## 2024-06-14 PROCEDURE — 84165 PROTEIN E-PHORESIS SERUM: CPT | Mod: 26,,, | Performed by: PATHOLOGY

## 2024-06-14 RX ORDER — LENALIDOMIDE 10 MG/1
CAPSULE ORAL
Qty: 21 EACH | Refills: 0 | Status: SHIPPED | OUTPATIENT
Start: 2024-06-14

## 2024-06-17 LAB
ALBUMIN SERPL ELPH-MCNC: 3.54 G/DL (ref 3.35–5.55)
ALPHA1 GLOB SERPL ELPH-MCNC: 0.26 G/DL (ref 0.17–0.41)
ALPHA2 GLOB SERPL ELPH-MCNC: 0.57 G/DL (ref 0.43–0.99)
B-GLOBULIN SERPL ELPH-MCNC: 0.75 G/DL (ref 0.5–1.1)
GAMMA GLOB SERPL ELPH-MCNC: 0.68 G/DL (ref 0.67–1.58)
INTERPRETATION SERPL IFE-IMP: NORMAL
KAPPA LC SER QL IA: 2.36 MG/DL (ref 0.33–1.94)
KAPPA LC/LAMBDA SER IA: 1.47 (ref 0.26–1.65)
LAMBDA LC SER QL IA: 1.61 MG/DL (ref 0.57–2.63)
PROT SERPL-MCNC: 5.8 G/DL (ref 6–8.4)

## 2024-06-18 LAB
PATHOLOGIST INTERPRETATION IFE: NORMAL
PATHOLOGIST INTERPRETATION SPE: NORMAL

## 2024-07-13 DIAGNOSIS — C90.01 MULTIPLE MYELOMA IN REMISSION: ICD-10-CM

## 2024-07-16 RX ORDER — LENALIDOMIDE 10 MG/1
CAPSULE ORAL
Qty: 21 EACH | Refills: 0 | Status: SHIPPED | OUTPATIENT
Start: 2024-07-16

## 2024-08-20 DIAGNOSIS — C90.01 MULTIPLE MYELOMA IN REMISSION: ICD-10-CM

## 2024-08-22 RX ORDER — LENALIDOMIDE 10 MG/1
CAPSULE ORAL
Qty: 21 EACH | Refills: 0 | Status: SHIPPED | OUTPATIENT
Start: 2024-08-22

## 2024-09-16 ENCOUNTER — TELEPHONE (OUTPATIENT)
Dept: HEMATOLOGY/ONCOLOGY | Facility: CLINIC | Age: 59
End: 2024-09-16
Payer: COMMERCIAL

## 2024-09-16 NOTE — TELEPHONE ENCOUNTER
----- Message from Nell Maynard sent at 9/16/2024 12:25 PM CDT -----  Contact: :Arik Bobo  Reschedule Existing Appointment     Current appt date:06/28     Type of appt :EP     Physician:Manny     Reason for rescheduling:Patient is calling to reschedule due to out of town. Requesting a call back     Caller:Arik Bobo      Contact Preference:[phone] (home)

## 2024-09-19 DIAGNOSIS — C90.01 MULTIPLE MYELOMA IN REMISSION: ICD-10-CM

## 2024-09-26 RX ORDER — LENALIDOMIDE 10 MG/1
CAPSULE ORAL
Qty: 21 EACH | Refills: 0 | Status: SHIPPED | OUTPATIENT
Start: 2024-09-26

## 2024-10-23 DIAGNOSIS — C90.01 MULTIPLE MYELOMA IN REMISSION: ICD-10-CM

## 2024-10-24 RX ORDER — LENALIDOMIDE 10 MG/1
CAPSULE ORAL
Qty: 21 EACH | Refills: 0 | Status: SHIPPED | OUTPATIENT
Start: 2024-10-24

## 2024-10-25 DIAGNOSIS — C90.01 MULTIPLE MYELOMA IN REMISSION: ICD-10-CM

## 2024-10-31 RX ORDER — LENALIDOMIDE 10 MG/1
CAPSULE ORAL
Qty: 21 EACH | Refills: 0 | Status: SHIPPED | OUTPATIENT
Start: 2024-10-31

## 2024-11-05 ENCOUNTER — OFFICE VISIT (OUTPATIENT)
Dept: HEMATOLOGY/ONCOLOGY | Facility: CLINIC | Age: 59
End: 2024-11-05
Payer: MEDICARE

## 2024-11-05 ENCOUNTER — LAB VISIT (OUTPATIENT)
Dept: LAB | Facility: HOSPITAL | Age: 59
End: 2024-11-05
Attending: INTERNAL MEDICINE
Payer: COMMERCIAL

## 2024-11-05 VITALS
HEART RATE: 56 BPM | BODY MASS INDEX: 37.04 KG/M2 | TEMPERATURE: 98 F | RESPIRATION RATE: 18 BRPM | OXYGEN SATURATION: 99 % | SYSTOLIC BLOOD PRESSURE: 131 MMHG | DIASTOLIC BLOOD PRESSURE: 76 MMHG | WEIGHT: 244.38 LBS | HEIGHT: 68 IN

## 2024-11-05 DIAGNOSIS — Z94.84 H/O AUTOLOGOUS STEM CELL TRANSPLANT: ICD-10-CM

## 2024-11-05 DIAGNOSIS — T45.1X5A ANEMIA ASSOCIATED WITH CHEMOTHERAPY: ICD-10-CM

## 2024-11-05 DIAGNOSIS — C90.01 MULTIPLE MYELOMA IN REMISSION: Primary | ICD-10-CM

## 2024-11-05 DIAGNOSIS — C90.01 MULTIPLE MYELOMA IN REMISSION: ICD-10-CM

## 2024-11-05 DIAGNOSIS — D64.81 ANEMIA ASSOCIATED WITH CHEMOTHERAPY: ICD-10-CM

## 2024-11-05 LAB
ALBUMIN SERPL BCP-MCNC: 3.6 G/DL (ref 3.5–5.2)
ALP SERPL-CCNC: 89 U/L (ref 40–150)
ALT SERPL W/O P-5'-P-CCNC: 15 U/L (ref 10–44)
ANION GAP SERPL CALC-SCNC: 10 MMOL/L (ref 8–16)
AST SERPL-CCNC: 17 U/L (ref 10–40)
BASOPHILS # BLD AUTO: 0.05 K/UL (ref 0–0.2)
BASOPHILS NFR BLD: 1.8 % (ref 0–1.9)
BILIRUB SERPL-MCNC: 0.6 MG/DL (ref 0.1–1)
BUN SERPL-MCNC: 18 MG/DL (ref 6–20)
CALCIUM SERPL-MCNC: 9.1 MG/DL (ref 8.7–10.5)
CHLORIDE SERPL-SCNC: 106 MMOL/L (ref 95–110)
CO2 SERPL-SCNC: 27 MMOL/L (ref 23–29)
CREAT SERPL-MCNC: 1 MG/DL (ref 0.5–1.4)
DIFFERENTIAL METHOD BLD: ABNORMAL
EOSINOPHIL # BLD AUTO: 0 K/UL (ref 0–0.5)
EOSINOPHIL NFR BLD: 1.1 % (ref 0–8)
ERYTHROCYTE [DISTWIDTH] IN BLOOD BY AUTOMATED COUNT: 14.3 % (ref 11.5–14.5)
EST. GFR  (NO RACE VARIABLE): >60 ML/MIN/1.73 M^2
GLUCOSE SERPL-MCNC: 104 MG/DL (ref 70–110)
HCT VFR BLD AUTO: 40 % (ref 40–54)
HGB BLD-MCNC: 13.3 G/DL (ref 14–18)
IGA SERPL-MCNC: 249 MG/DL (ref 40–350)
IGG SERPL-MCNC: 692 MG/DL (ref 650–1600)
IGM SERPL-MCNC: 25 MG/DL (ref 50–300)
IMM GRANULOCYTES # BLD AUTO: 0.01 K/UL (ref 0–0.04)
IMM GRANULOCYTES NFR BLD AUTO: 0.4 % (ref 0–0.5)
LYMPHOCYTES # BLD AUTO: 0.9 K/UL (ref 1–4.8)
LYMPHOCYTES NFR BLD: 31.9 % (ref 18–48)
MCH RBC QN AUTO: 32 PG (ref 27–31)
MCHC RBC AUTO-ENTMCNC: 33.3 G/DL (ref 32–36)
MCV RBC AUTO: 96 FL (ref 82–98)
MONOCYTES # BLD AUTO: 0.3 K/UL (ref 0.3–1)
MONOCYTES NFR BLD: 9.9 % (ref 4–15)
NEUTROPHILS # BLD AUTO: 1.6 K/UL (ref 1.8–7.7)
NEUTROPHILS NFR BLD: 54.9 % (ref 38–73)
NRBC BLD-RTO: 0 /100 WBC
PLATELET # BLD AUTO: 169 K/UL (ref 150–450)
PMV BLD AUTO: 10.2 FL (ref 9.2–12.9)
POTASSIUM SERPL-SCNC: 4 MMOL/L (ref 3.5–5.1)
PROT SERPL-MCNC: 6.5 G/DL (ref 6–8.4)
RBC # BLD AUTO: 4.16 M/UL (ref 4.6–6.2)
SODIUM SERPL-SCNC: 143 MMOL/L (ref 136–145)
WBC # BLD AUTO: 2.82 K/UL (ref 3.9–12.7)

## 2024-11-05 PROCEDURE — 83521 IG LIGHT CHAINS FREE EACH: CPT | Mod: 59 | Performed by: INTERNAL MEDICINE

## 2024-11-05 PROCEDURE — 99214 OFFICE O/P EST MOD 30 MIN: CPT | Mod: S$PBB,,, | Performed by: PHYSICIAN ASSISTANT

## 2024-11-05 PROCEDURE — 99999 PR PBB SHADOW E&M-EST. PATIENT-LVL V: CPT | Mod: PBBFAC,,, | Performed by: PHYSICIAN ASSISTANT

## 2024-11-05 PROCEDURE — 84165 PROTEIN E-PHORESIS SERUM: CPT | Mod: 26,,, | Performed by: PATHOLOGY

## 2024-11-05 PROCEDURE — 86334 IMMUNOFIX E-PHORESIS SERUM: CPT | Mod: 26,,, | Performed by: PATHOLOGY

## 2024-11-05 PROCEDURE — 36415 COLL VENOUS BLD VENIPUNCTURE: CPT | Performed by: INTERNAL MEDICINE

## 2024-11-05 PROCEDURE — 85025 COMPLETE CBC W/AUTO DIFF WBC: CPT | Performed by: INTERNAL MEDICINE

## 2024-11-05 PROCEDURE — 80053 COMPREHEN METABOLIC PANEL: CPT | Performed by: INTERNAL MEDICINE

## 2024-11-05 PROCEDURE — 84165 PROTEIN E-PHORESIS SERUM: CPT | Performed by: INTERNAL MEDICINE

## 2024-11-05 PROCEDURE — 86334 IMMUNOFIX E-PHORESIS SERUM: CPT | Performed by: INTERNAL MEDICINE

## 2024-11-05 PROCEDURE — 99215 OFFICE O/P EST HI 40 MIN: CPT | Mod: PBBFAC | Performed by: PHYSICIAN ASSISTANT

## 2024-11-05 PROCEDURE — 82784 ASSAY IGA/IGD/IGG/IGM EACH: CPT | Mod: 59 | Performed by: INTERNAL MEDICINE

## 2024-11-05 NOTE — PROGRESS NOTES
HEMATOLOGIC MALIGNANCIES PROGRESS NOTE    IDENTIFYING STATEMENT   Arik Hoang) is a 59 y.o. male with a  of 1965 from [place] with the diagnosis of multiple myeloma.      ONCOLOGY HISTORY:    1. Multiple myeloma s/p autologous stem cell transplant   A. Early : Progressive anemia. K/L ratio > 100, and was treated with fina-dex   B. 12/3/2015: M-protein 1.02 g/dl, IgG-kappa by JAYESH. Kappa 2.98 mg/dl, lambda 0.5 mg/dl, ratio 5.96. BMBx shows 60-70% cellular marrow with 20% plasma cells, consistent with plasma cell myeloma. Hgb 11.8 g/dl, Calcium 9.44 (corrected). Creatinine 0.8 mg/dl.    C. 2016: M-protein 2.23 g/dl. Kappa 4.36 mg/dl, lambda 0.08 mg/dl, ratio 54.5. Progressive disease.   D. VCd therapy locally.    E. 2017: M-protein 1.23 g/dl.    F. 2017: M-protein 0.41 g/dl. Kappa 2.33 mg/dl, lambda 0.5 mg/dl, ratio 4.66, consistent with partial response.    G. 2017: Autologous stem cell transplant with melphalan conditioning.   H. 2018: Day +100 restaging - M-protein 0.19 g/dl, kappa 0.76 mg/dl, lambda 0.53 mg/dl, ratio 1.43. BMBx shows no definitive evidence of myeloma. Consistent with very good partial response.    I. 3/8/2018: M-protein 0.18 g/dl   J. 2018: M-protein 0.16 g/dl   K. 2018: M-protein 0.2 g/dl   L. 2018: M-protein 0.14 g/dl; BMBx for 1-year re-evaluation shows 40% cellular marrow with 6-7% plasma cells and no evidence of clonality; thus, no plasma cell neoplasm evident. Findings consistent with ongoing VGPR.   M. 4/3/2019: M-protein < 0.1 g/dl.     N. 7/10/2019: M-protein 0.18 g/dl.   O. 10/30/2019: M-protein 0.14 g/dl.    P. 2020: M-protein negative; JAYESH negative; findings consistent with complete response to therapy.     2. Depression  3. HTN  4. History of DVT in 2015 and 2017    INTERVAL HISTORY:      Mr. Bobo returns to clinic 7 years, 1 months (day +2361) after  autologous transplant for myeloma.     He is tolerating  lenalidomide well. He has no complaints. He has been enjoying time with his family, recently learned he is expecting another grandchild.     Past Medical History, Past Social History and Past Family History have been reviewed and are unchanged except as noted in the interval history.    MEDICATIONS:     Current Outpatient Medications on File Prior to Visit   Medication Sig Dispense Refill    amLODIPine (NORVASC) 10 MG tablet       apixaban 2.5 mg Tab Take 1 tablet (2.5 mg total) by mouth 2 (two) times daily. 60 tablet 3    baclofen (LIORESAL) 20 MG tablet Take 20 mg by mouth 2 (two) times daily.      citalopram (CELEXA) 40 MG tablet Take 40 mg by mouth.      lenalidomide 10 mg Cap TAKE 1 CAPSULE DAILY ON DAYS 1 THROUGH 21 OF A 28 DAY CYCLE Auth # [id number] 10/25/2024. 21 each 0    lisinopriL 10 MG tablet       simvastatin (ZOCOR) 20 MG tablet   3     No current facility-administered medications on file prior to visit.       ALLERGIES:     Review of patient's allergies indicates:   Allergen Reactions    Pcn [penicillins]      Unknown last dose as an infant         ROS:       Review of Systems   Constitutional:  Negative for diaphoresis, fatigue, fever and unexpected weight change.   HENT:   Negative for lump/mass and sore throat.    Eyes:  Negative for icterus.   Respiratory:  Negative for cough and shortness of breath.    Cardiovascular:  Negative for chest pain and palpitations.   Gastrointestinal:  Negative for abdominal distention, constipation, diarrhea, nausea and vomiting.   Genitourinary:  Negative for dysuria and frequency.    Musculoskeletal:  Negative for arthralgias, gait problem and myalgias.   Skin:  Negative for rash.   Neurological:  Negative for dizziness, gait problem and headaches.        Facial twitching   Hematological:  Negative for adenopathy. Does not bruise/bleed easily.   Psychiatric/Behavioral:  The patient is not nervous/anxious.        PHYSICAL EXAM:  Vitals:    11/05/24 1518   BP:  "131/76   Pulse: (!) 56   Resp: 18   Temp: 98.3 °F (36.8 °C)   TempSrc: Oral   SpO2: 99%   Weight: 110.9 kg (244 lb 6.1 oz)   Height: 5' 8" (1.727 m)   PainSc: 0-No pain   Body mass index is 37.16 kg/m².      Physical Exam  Constitutional:       General: He is not in acute distress.     Appearance: He is well-developed.   HENT:      Head: Normocephalic and atraumatic.      Mouth/Throat:      Mouth: No oral lesions.   Eyes:      Conjunctiva/sclera: Conjunctivae normal.      Comments: Right upper eyelid twitch   Neck:      Thyroid: No thyromegaly.   Cardiovascular:      Rate and Rhythm: Normal rate and regular rhythm.      Heart sounds: Normal heart sounds. No murmur heard.  Pulmonary:      Breath sounds: Normal breath sounds. No wheezing or rales.   Abdominal:      General: There is no distension.      Palpations: Abdomen is soft. There is no hepatomegaly, splenomegaly or mass.      Tenderness: There is no abdominal tenderness.   Lymphadenopathy:      Cervical: No cervical adenopathy.      Right cervical: No deep cervical adenopathy.     Left cervical: No deep cervical adenopathy.   Skin:     Findings: No rash.   Neurological:      Mental Status: He is alert and oriented to person, place, and time.      Cranial Nerves: No cranial nerve deficit.      Coordination: Coordination normal.      Deep Tendon Reflexes: Reflexes are normal and symmetric.     LAB:   Results for orders placed or performed in visit on 11/05/24   CBC Auto Differential    Collection Time: 11/05/24  2:30 PM   Result Value Ref Range    WBC 2.82 (L) 3.90 - 12.70 K/uL    RBC 4.16 (L) 4.60 - 6.20 M/uL    Hemoglobin 13.3 (L) 14.0 - 18.0 g/dL    Hematocrit 40.0 40.0 - 54.0 %    MCV 96 82 - 98 fL    MCH 32.0 (H) 27.0 - 31.0 pg    MCHC 33.3 32.0 - 36.0 g/dL    RDW 14.3 11.5 - 14.5 %    Platelets 169 150 - 450 K/uL    MPV 10.2 9.2 - 12.9 fL    Immature Granulocytes 0.4 0.0 - 0.5 %    Gran # (ANC) 1.6 (L) 1.8 - 7.7 K/uL    Immature Grans (Abs) 0.01 0.00 - 0.04 " K/uL    Lymph # 0.9 (L) 1.0 - 4.8 K/uL    Mono # 0.3 0.3 - 1.0 K/uL    Eos # 0.0 0.0 - 0.5 K/uL    Baso # 0.05 0.00 - 0.20 K/uL    nRBC 0 0 /100 WBC    Gran % 54.9 38.0 - 73.0 %    Lymph % 31.9 18.0 - 48.0 %    Mono % 9.9 4.0 - 15.0 %    Eosinophil % 1.1 0.0 - 8.0 %    Basophil % 1.8 0.0 - 1.9 %    Differential Method Automated    Protein Electrophoresis, Serum    Collection Time: 11/05/24  2:30 PM   Result Value Ref Range    Protein, Serum 5.9 (L) 6.0 - 8.4 g/dL   Immunoglobulins (IgG, IgA, IgM) Quantitative    Collection Time: 11/05/24  2:30 PM   Result Value Ref Range    IgG 692 650 - 1600 mg/dL    IgA 249 40 - 350 mg/dL    IgM 25 (L) 50 - 300 mg/dL       PROBLEMS ASSESSED THIS VISIT:    No diagnosis found.      PLAN:       Multiple myeloma  SPEP and JAYESH are negative. Kappa lambda light chains, including ratio, all normal. Findings have been consistent with ongoing response to therapy. We will monitor closely.     Continue maintenance lenalidomide and low dose apixaban for thromboprophylaxis.     History of autologous stem cell transplant  He is 7 years, 1 months post ASCT. Current disease status is pending serologic reassessment. No evidence of clinical relapse to date since ASCT.    Cytopenias  Mild. Monitor on lenalidomide.     Obesity  Body mass index is 37.16 kg/m². Obesity complicates all aspects of disease management. Recommend follow-up with PCP to discuss weight loss.     Follow-up in 3 months    BMT Chart Routing      Follow up with physician 3 months. virtual visit with tobias in 3 months (labs 1 week before close to home please)   Follow up with [facility] . SEND LETTER TO CONFIRM VISIT.   Provider visit type    Infusion scheduling note    Injection scheduling note    Labs CBC, CMP, SPEP, immunoglobulins, immunofixation and free light chains   Scheduling:  Preferred lab:  Lab interval:     Imaging    Pharmacy appointment    Other referrals                Nydia Ivan PA-C  Hematology and Stem Cell  Transplant

## 2024-11-06 LAB
ALBUMIN SERPL ELPH-MCNC: 3.61 G/DL (ref 3.35–5.55)
ALPHA1 GLOB SERPL ELPH-MCNC: 0.28 G/DL (ref 0.17–0.41)
ALPHA2 GLOB SERPL ELPH-MCNC: 0.58 G/DL (ref 0.43–0.99)
B-GLOBULIN SERPL ELPH-MCNC: 0.79 G/DL (ref 0.5–1.1)
GAMMA GLOB SERPL ELPH-MCNC: 0.64 G/DL (ref 0.67–1.58)
INTERPRETATION SERPL IFE-IMP: NORMAL
KAPPA LC SER QL IA: 1.35 MG/DL (ref 0.33–1.94)
KAPPA LC/LAMBDA SER IA: 1.36 (ref 0.26–1.65)
LAMBDA LC SER QL IA: 0.99 MG/DL (ref 0.57–2.63)
PATHOLOGIST INTERPRETATION IFE: NORMAL
PATHOLOGIST INTERPRETATION SPE: NORMAL
PROT SERPL-MCNC: 5.9 G/DL (ref 6–8.4)

## 2024-11-26 DIAGNOSIS — C90.01 MULTIPLE MYELOMA IN REMISSION: ICD-10-CM

## 2024-12-02 RX ORDER — LENALIDOMIDE 10 MG/1
CAPSULE ORAL
Qty: 21 CAPSULE | Refills: 0 | Status: SHIPPED | OUTPATIENT
Start: 2024-12-02

## 2024-12-26 DIAGNOSIS — C90.01 MULTIPLE MYELOMA IN REMISSION: ICD-10-CM

## 2024-12-27 RX ORDER — LENALIDOMIDE 10 MG/1
CAPSULE ORAL
Qty: 21 CAPSULE | Refills: 0 | Status: SHIPPED | OUTPATIENT
Start: 2024-12-27

## 2025-01-28 DIAGNOSIS — C90.01 MULTIPLE MYELOMA IN REMISSION: ICD-10-CM

## 2025-01-29 RX ORDER — LENALIDOMIDE 10 MG/1
CAPSULE ORAL
Qty: 21 CAPSULE | Refills: 0 | Status: SHIPPED | OUTPATIENT
Start: 2025-01-29

## 2025-02-04 DIAGNOSIS — C90.01 MULTIPLE MYELOMA IN REMISSION: ICD-10-CM

## 2025-02-04 RX ORDER — LENALIDOMIDE 10 MG/1
CAPSULE ORAL
Qty: 21 CAPSULE | Refills: 0 | Status: ACTIVE | OUTPATIENT
Start: 2025-02-04 | End: 2025-03-02

## 2025-02-04 NOTE — TELEPHONE ENCOUNTER
----- Message from Lexie sent at 2/4/2025 12:39 PM CST -----  Regarding: Rx Issues  Type:  Pharmacy Calling to Clarify an RX    Name of Caller:Karie     [facility] [id number] [facility]      Prescription Name:lenalidomide 10 mg Cap    What do they need to clarify?:Prior authorization is needed.    Best Call Back Number:[phone]    Additional Information:

## 2025-02-06 ENCOUNTER — LAB VISIT (OUTPATIENT)
Dept: LAB | Facility: HOSPITAL | Age: 60
End: 2025-02-06
Payer: COMMERCIAL

## 2025-02-06 DIAGNOSIS — C90.01 MULTIPLE MYELOMA IN REMISSION: ICD-10-CM

## 2025-02-06 LAB
ALBUMIN SERPL BCP-MCNC: 3.3 G/DL (ref 3.5–5.2)
ALP SERPL-CCNC: 85 U/L (ref 40–150)
ALT SERPL W/O P-5'-P-CCNC: 21 U/L (ref 10–44)
ANION GAP SERPL CALC-SCNC: 6 MMOL/L (ref 8–16)
AST SERPL-CCNC: 21 U/L (ref 10–40)
BASOPHILS # BLD AUTO: 0.05 K/UL (ref 0–0.2)
BASOPHILS NFR BLD: 1.3 % (ref 0–1.9)
BILIRUB SERPL-MCNC: 0.8 MG/DL (ref 0.1–1)
BUN SERPL-MCNC: 17 MG/DL (ref 6–20)
CALCIUM SERPL-MCNC: 8.5 MG/DL (ref 8.7–10.5)
CHLORIDE SERPL-SCNC: 108 MMOL/L (ref 95–110)
CO2 SERPL-SCNC: 27 MMOL/L (ref 23–29)
CREAT SERPL-MCNC: 0.8 MG/DL (ref 0.5–1.4)
DIFFERENTIAL METHOD BLD: ABNORMAL
EOSINOPHIL # BLD AUTO: 0.1 K/UL (ref 0–0.5)
EOSINOPHIL NFR BLD: 1.3 % (ref 0–8)
ERYTHROCYTE [DISTWIDTH] IN BLOOD BY AUTOMATED COUNT: 13.2 % (ref 11.5–14.5)
EST. GFR  (NO RACE VARIABLE): >60 ML/MIN/1.73 M^2
GLUCOSE SERPL-MCNC: 89 MG/DL (ref 70–110)
HCT VFR BLD AUTO: 37.2 % (ref 40–54)
HGB BLD-MCNC: 12.8 G/DL (ref 14–18)
IGA SERPL-MCNC: 219 MG/DL (ref 40–350)
IGG SERPL-MCNC: 690 MG/DL (ref 650–1600)
IGM SERPL-MCNC: 25 MG/DL (ref 50–300)
IMM GRANULOCYTES # BLD AUTO: 0.01 K/UL (ref 0–0.04)
IMM GRANULOCYTES NFR BLD AUTO: 0.3 % (ref 0–0.5)
LYMPHOCYTES # BLD AUTO: 1.1 K/UL (ref 1–4.8)
LYMPHOCYTES NFR BLD: 29.3 % (ref 18–48)
MCH RBC QN AUTO: 31.9 PG (ref 27–31)
MCHC RBC AUTO-ENTMCNC: 34.4 G/DL (ref 32–36)
MCV RBC AUTO: 93 FL (ref 82–98)
MONOCYTES # BLD AUTO: 0.6 K/UL (ref 0.3–1)
MONOCYTES NFR BLD: 14.1 % (ref 4–15)
NEUTROPHILS # BLD AUTO: 2.1 K/UL (ref 1.8–7.7)
NEUTROPHILS NFR BLD: 53.7 % (ref 38–73)
NRBC BLD-RTO: 0 /100 WBC
PLATELET # BLD AUTO: 166 K/UL (ref 150–450)
PMV BLD AUTO: 9.9 FL (ref 9.2–12.9)
POTASSIUM SERPL-SCNC: 3.5 MMOL/L (ref 3.5–5.1)
PROT SERPL-MCNC: 6.3 G/DL (ref 6–8.4)
RBC # BLD AUTO: 4.01 M/UL (ref 4.6–6.2)
SODIUM SERPL-SCNC: 141 MMOL/L (ref 136–145)
WBC # BLD AUTO: 3.89 K/UL (ref 3.9–12.7)

## 2025-02-06 PROCEDURE — 84165 PROTEIN E-PHORESIS SERUM: CPT | Mod: 26,,, | Performed by: PATHOLOGY

## 2025-02-06 PROCEDURE — 80053 COMPREHEN METABOLIC PANEL: CPT | Performed by: INTERNAL MEDICINE

## 2025-02-06 PROCEDURE — 86334 IMMUNOFIX E-PHORESIS SERUM: CPT | Mod: 26,,, | Performed by: PATHOLOGY

## 2025-02-06 PROCEDURE — 84165 PROTEIN E-PHORESIS SERUM: CPT | Performed by: INTERNAL MEDICINE

## 2025-02-06 PROCEDURE — 86334 IMMUNOFIX E-PHORESIS SERUM: CPT | Performed by: INTERNAL MEDICINE

## 2025-02-06 PROCEDURE — 83521 IG LIGHT CHAINS FREE EACH: CPT | Mod: 59 | Performed by: INTERNAL MEDICINE

## 2025-02-06 PROCEDURE — 85025 COMPLETE CBC W/AUTO DIFF WBC: CPT | Performed by: INTERNAL MEDICINE

## 2025-02-06 PROCEDURE — 82784 ASSAY IGA/IGD/IGG/IGM EACH: CPT | Performed by: INTERNAL MEDICINE

## 2025-02-07 ENCOUNTER — TELEPHONE (OUTPATIENT)
Dept: HEMATOLOGY/ONCOLOGY | Facility: CLINIC | Age: 60
End: 2025-02-07
Payer: COMMERCIAL

## 2025-02-07 LAB
ALBUMIN SERPL ELPH-MCNC: 3.67 G/DL (ref 3.35–5.55)
ALPHA1 GLOB SERPL ELPH-MCNC: 0.28 G/DL (ref 0.17–0.41)
ALPHA2 GLOB SERPL ELPH-MCNC: 0.58 G/DL (ref 0.43–0.99)
B-GLOBULIN SERPL ELPH-MCNC: 0.82 G/DL (ref 0.5–1.1)
GAMMA GLOB SERPL ELPH-MCNC: 0.65 G/DL (ref 0.67–1.58)
INTERPRETATION SERPL IFE-IMP: NORMAL
KAPPA LC SER QL IA: 1.49 MG/DL (ref 0.33–1.94)
KAPPA LC/LAMBDA SER IA: 1.26 (ref 0.26–1.65)
LAMBDA LC SER QL IA: 1.18 MG/DL (ref 0.57–2.63)
PROT SERPL-MCNC: 6 G/DL (ref 6–8.4)

## 2025-02-10 LAB
PATHOLOGIST INTERPRETATION IFE: NORMAL
PATHOLOGIST INTERPRETATION SPE: NORMAL

## 2025-02-13 ENCOUNTER — OFFICE VISIT (OUTPATIENT)
Dept: HEMATOLOGY/ONCOLOGY | Facility: CLINIC | Age: 60
End: 2025-02-13
Payer: COMMERCIAL

## 2025-02-13 DIAGNOSIS — D70.1 LEUKOPENIA DUE TO ANTINEOPLASTIC CHEMOTHERAPY: ICD-10-CM

## 2025-02-13 DIAGNOSIS — T45.1X5A ANEMIA ASSOCIATED WITH CHEMOTHERAPY: ICD-10-CM

## 2025-02-13 DIAGNOSIS — Z94.84 H/O AUTOLOGOUS STEM CELL TRANSPLANT: ICD-10-CM

## 2025-02-13 DIAGNOSIS — D64.81 ANEMIA ASSOCIATED WITH CHEMOTHERAPY: ICD-10-CM

## 2025-02-13 DIAGNOSIS — C90.01 MULTIPLE MYELOMA IN REMISSION: Primary | ICD-10-CM

## 2025-02-13 DIAGNOSIS — T45.1X5A LEUKOPENIA DUE TO ANTINEOPLASTIC CHEMOTHERAPY: ICD-10-CM

## 2025-02-13 NOTE — PROGRESS NOTES
HEMATOLOGIC MALIGNANCIES PROGRESS NOTE    IDENTIFYING STATEMENT   Arik Bobo (Arik) is a 59 y.o. male with a  of 1965 from [place], [place] with the diagnosis of multiple myeloma.      The patient location is: [place], [place]  The chief complaint leading to consultation is: Hx of MM f/u    Visit type: audiovisual    Face to Face time with patient: 10 minutes  35 minutes of total time spent on the encounter, which includes face to face time and non-face to face time preparing to see the patient (eg, review of tests), Obtaining and/or reviewing separately obtained history, Documenting clinical information in the electronic or other health record, Independently interpreting results (not separately reported) and communicating results to the patient/family/caregiver, or Care coordination (not separately reported).         Each patient to whom he or she provides medical services by telemedicine is:  (1) informed of the relationship between the physician and patient and the respective role of any other health care provider with respect to management of the patient; and (2) notified that he or she may decline to receive medical services by telemedicine and may withdraw from such care at any time.    Notes:       ONCOLOGY HISTORY:    1. Multiple myeloma s/p autologous stem cell transplant   A. Early : Progressive anemia. K/L ratio > 100, and was treated with fina-dex   B. 12/3/2015: M-protein 1.02 g/dl, IgG-kappa by JAYESH. Kappa 2.98 mg/dl, lambda 0.5 mg/dl, ratio 5.96. BMBx shows 60-70% cellular marrow with 20% plasma cells, consistent with plasma cell myeloma. Hgb 11.8 g/dl, Calcium 9.44 (corrected). Creatinine 0.8 mg/dl.    C. 2016: M-protein 2.23 g/dl. Kappa 4.36 mg/dl, lambda 0.08 mg/dl, ratio 54.5. Progressive disease.   D. VCd therapy locally.    E. 2017: M-protein 1.23 g/dl.    F. 2017: M-protein 0.41 g/dl. Kappa 2.33 mg/dl, lambda 0.5 mg/dl, ratio 4.66, consistent with partial  response.    G. 9/27/2017: Autologous stem cell transplant with melphalan conditioning.   H. 1/8/2018: Day +100 restaging - M-protein 0.19 g/dl, kappa 0.76 mg/dl, lambda 0.53 mg/dl, ratio 1.43. BMBx shows no definitive evidence of myeloma. Consistent with very good partial response.    I. 3/8/2018: M-protein 0.18 g/dl   J. 5/8/2018: M-protein 0.16 g/dl   K. 7/9/2018: M-protein 0.2 g/dl   L. 9/25/2018: M-protein 0.14 g/dl; BMBx for 1-year re-evaluation shows 40% cellular marrow with 6-7% plasma cells and no evidence of clonality; thus, no plasma cell neoplasm evident. Findings consistent with ongoing VGPR.   M. 4/3/2019: M-protein < 0.1 g/dl.     N. 7/10/2019: M-protein 0.18 g/dl.   O. 10/30/2019: M-protein 0.14 g/dl.    P. 1/28/2020: M-protein negative; JAYESH negative; findings consistent with complete response to therapy.     2. Depression  3. HTN  4. History of DVT in 7/2015 and 2/2017    INTERVAL HISTORY:      Mr. Bobo returns to clinic 7 years, 4 months (day +2702) after  autologous transplant for myeloma.     He is tolerating lenalidomide well. No complaints related to therapy. No bone pain. No concerns at this time and feeling well.     Past Medical History, Past Social History and Past Family History have been reviewed and are unchanged except as noted in the interval history.    MEDICATIONS:     Current Outpatient Medications on File Prior to Visit   Medication Sig Dispense Refill    amLODIPine (NORVASC) 10 MG tablet       apixaban 2.5 mg Tab Take 1 tablet (2.5 mg total) by mouth 2 (two) times daily. 60 tablet 3    baclofen (LIORESAL) 20 MG tablet Take 20 mg by mouth 2 (two) times daily.      citalopram (CELEXA) 40 MG tablet Take 40 mg by mouth.      lenalidomide 10 mg Cap TAKE 1 CAPSULE DAILY ON DAYS 1 THROUGH 21 OF A 28 DAY CYCLE. Auth #[id number] 1/29/25. 21 capsule 0    lisinopriL 10 MG tablet       simvastatin (ZOCOR) 20 MG tablet   3     No current facility-administered medications on file prior to  visit.       ALLERGIES:     Review of patient's allergies indicates:   Allergen Reactions    Pcn [penicillins]      Unknown last dose as an infant         ROS:       Review of Systems   Constitutional:  Negative for diaphoresis, fatigue, fever and unexpected weight change.   HENT:   Negative for lump/mass and sore throat.    Eyes:  Negative for icterus.   Respiratory:  Negative for cough and shortness of breath.    Cardiovascular:  Negative for chest pain and palpitations.   Gastrointestinal:  Negative for abdominal distention, constipation, diarrhea, nausea and vomiting.   Genitourinary:  Negative for dysuria and frequency.    Musculoskeletal:  Negative for arthralgias, gait problem and myalgias.   Skin:  Negative for rash.   Neurological:  Negative for dizziness, gait problem and headaches.        Facial twitching   Hematological:  Negative for adenopathy. Does not bruise/bleed easily.   Psychiatric/Behavioral:  The patient is not nervous/anxious.        PHYSICAL EXAM:  Vitals:    02/13/25 0805   PainSc:   1   There is no height or weight on file to calculate BMI.    Physical exam limited due to virtual visit. Via camera, the patient appears well and in no acute distress. Speaking full sentences with ease, with normal mood and thought content.       LAB:   Results for orders placed or performed in visit on 02/06/25   Comprehensive Metabolic Panel    Collection Time: 02/06/25  9:58 AM   Result Value Ref Range    Sodium 141 136 - 145 mmol/L    Potassium 3.5 3.5 - 5.1 mmol/L    Chloride 108 95 - 110 mmol/L    CO2 27 23 - 29 mmol/L    Glucose 89 70 - 110 mg/dL    BUN 17 6 - 20 mg/dL    Creatinine 0.8 0.5 - 1.4 mg/dL    Calcium 8.5 (L) 8.7 - 10.5 mg/dL    Total Protein 6.3 6.0 - 8.4 g/dL    Albumin 3.3 (L) 3.5 - 5.2 g/dL    Total Bilirubin 0.8 0.1 - 1.0 mg/dL    Alkaline Phosphatase 85 40 - 150 U/L    AST 21 10 - 40 U/L    ALT 21 10 - 44 U/L    eGFR >60.0 >60 mL/min/1.73 m^2    Anion Gap 6 (L) 8 - 16 mmol/L   CBC Auto  Differential    Collection Time: 02/06/25  9:58 AM   Result Value Ref Range    WBC 3.89 (L) 3.90 - 12.70 K/uL    RBC 4.01 (L) 4.60 - 6.20 M/uL    Hemoglobin 12.8 (L) 14.0 - 18.0 g/dL    Hematocrit 37.2 (L) 40.0 - 54.0 %    MCV 93 82 - 98 fL    MCH 31.9 (H) 27.0 - 31.0 pg    MCHC 34.4 32.0 - 36.0 g/dL    RDW 13.2 11.5 - 14.5 %    Platelets 166 150 - 450 K/uL    MPV 9.9 9.2 - 12.9 fL    Immature Granulocytes 0.3 0.0 - 0.5 %    Gran # (ANC) 2.1 1.8 - 7.7 K/uL    Immature Grans (Abs) 0.01 0.00 - 0.04 K/uL    Lymph # 1.1 1.0 - 4.8 K/uL    Mono # 0.6 0.3 - 1.0 K/uL    Eos # 0.1 0.0 - 0.5 K/uL    Baso # 0.05 0.00 - 0.20 K/uL    nRBC 0 0 /100 WBC    Gran % 53.7 38.0 - 73.0 %    Lymph % 29.3 18.0 - 48.0 %    Mono % 14.1 4.0 - 15.0 %    Eosinophil % 1.3 0.0 - 8.0 %    Basophil % 1.3 0.0 - 1.9 %    Differential Method Automated    Protein Electrophoresis, Serum    Collection Time: 02/06/25  9:58 AM   Result Value Ref Range    Protein, Serum 6.0 6.0 - 8.4 g/dL    Albumin 3.67 3.35 - 5.55 g/dL    Alpha-1 0.28 0.17 - 0.41 g/dL    Alpha-2 0.58 0.43 - 0.99 g/dL    Beta 0.82 0.50 - 1.10 g/dL    Gamma 0.65 (L) 0.67 - 1.58 g/dL   Immunoglobulins (IgG, IgA, IgM) Quantitative    Collection Time: 02/06/25  9:58 AM   Result Value Ref Range    IgG 690 650 - 1600 mg/dL    IgA 219 40 - 350 mg/dL    IgM 25 (L) 50 - 300 mg/dL   Immunoglobulin Free LT Chains Blood    Collection Time: 02/06/25  9:58 AM   Result Value Ref Range    Kappa Free Light Chains 1.49 0.33 - 1.94 mg/dL    Lambda Free Light Chains 1.18 0.57 - 2.63 mg/dL    Kappa/Lambda FLC Ratio 1.26 0.26 - 1.65   Immunofixation Electrophoresis    Collection Time: 02/06/25  9:58 AM   Result Value Ref Range    Immunofix Interp. SEE COMMENT    Pathologist Interpretation JAYESH    Collection Time: 02/06/25  9:58 AM   Result Value Ref Range    Pathologist Interpretation JAYESH REVIEWED    Pathologist Interpretation SPE    Collection Time: 02/06/25  9:58 AM   Result Value Ref Range    Pathologist  Interpretation SPE REVIEWED        PROBLEMS ASSESSED THIS VISIT:    1. Multiple myeloma in remission    2. H/O autologous stem cell transplant    3. Anemia associated with chemotherapy    4. Leukopenia due to antineoplastic chemotherapy          PLAN:       Multiple myeloma  SPEP and JAYESH are negative. Kappa lambda light chains, including ratio, all normal. Findings have been consistent with ongoing response to therapy. We will monitor closely.     Continue maintenance lenalidomide and low dose apixaban for thromboprophylaxis.     History of autologous stem cell transplant  He is 7 years, 4 months post ASCT.  No evidence of clinical relapse to date since ASCT.    Cytopenias  Mild. Monitor on lenalidomide.     Obesity  There is no height or weight on file to calculate BMI. Obesity complicates all aspects of disease management. Recommend follow-up with PCP to discuss weight loss.     Follow-up in 4 months      BMT Chart Routing      Follow up with physician 4 months. f/u with Dr. Bryant in 4 months   Follow up with ROQUE    Provider visit type    Infusion scheduling note    Injection scheduling note    Labs CBC, CMP, SPEP, immunoglobulins, immunofixation and free light chains   Scheduling:  Preferred lab:  Lab interval:  Labs 1 week before f/u   Imaging    Pharmacy appointment    Other referrals                    Nydia Ivan PA-C  Hematology and Stem Cell Transplant

## 2025-02-28 DIAGNOSIS — C90.01 MULTIPLE MYELOMA IN REMISSION: ICD-10-CM

## 2025-03-02 RX ORDER — LENALIDOMIDE 10 MG/1
CAPSULE ORAL
Qty: 21 CAPSULE | Refills: 0 | Status: SHIPPED | OUTPATIENT
Start: 2025-03-02

## 2025-03-31 DIAGNOSIS — C90.01 MULTIPLE MYELOMA IN REMISSION: ICD-10-CM

## 2025-03-31 RX ORDER — LENALIDOMIDE 10 MG/1
CAPSULE ORAL
Qty: 21 CAPSULE | Refills: 0 | Status: SHIPPED | OUTPATIENT
Start: 2025-03-31 | End: 2025-03-31 | Stop reason: SDUPTHER

## 2025-03-31 NOTE — TELEPHONE ENCOUNTER
----- Message from Dania sent at 3/31/2025  3:19 PM CDT -----  Regarding: Consult/Advisory    Name Of Caller:     Octavio echeverria/ [facility] Contact Preference?:   [phone], Reference #: [id number] Provider Name:     Manny Does patient feel the need to be seen today? No What is the nature of the call?:     Pharmacy needs a new celgene number for the pt's lenalidomide 10 mg Cap.

## 2025-04-02 RX ORDER — LENALIDOMIDE 10 MG/1
CAPSULE ORAL
Qty: 21 CAPSULE | Refills: 0 | Status: SHIPPED | OUTPATIENT
Start: 2025-04-02

## 2025-04-29 DIAGNOSIS — C90.01 MULTIPLE MYELOMA IN REMISSION: ICD-10-CM

## 2025-05-01 RX ORDER — LENALIDOMIDE 10 MG/1
CAPSULE ORAL
Qty: 21 CAPSULE | Refills: 0 | Status: SHIPPED | OUTPATIENT
Start: 2025-05-01

## 2025-05-28 DIAGNOSIS — C90.01 MULTIPLE MYELOMA IN REMISSION: ICD-10-CM

## 2025-06-02 RX ORDER — LENALIDOMIDE 10 MG/1
CAPSULE ORAL
Qty: 21 CAPSULE | Refills: 0 | Status: SHIPPED | OUTPATIENT
Start: 2025-06-02

## 2025-07-01 DIAGNOSIS — C90.01 MULTIPLE MYELOMA IN REMISSION: ICD-10-CM

## 2025-07-01 RX ORDER — LENALIDOMIDE 10 MG/1
CAPSULE ORAL
Qty: 21 CAPSULE | Refills: 0 | Status: SHIPPED | OUTPATIENT
Start: 2025-07-01

## 2025-07-31 DIAGNOSIS — C90.01 MULTIPLE MYELOMA IN REMISSION: ICD-10-CM

## 2025-07-31 RX ORDER — LENALIDOMIDE 10 MG/1
CAPSULE ORAL
Qty: 21 CAPSULE | Refills: 0 | Status: SHIPPED | OUTPATIENT
Start: 2025-07-31

## 2025-08-27 DIAGNOSIS — C90.01 MULTIPLE MYELOMA IN REMISSION: ICD-10-CM

## 2025-08-28 DIAGNOSIS — C90.01 MULTIPLE MYELOMA IN REMISSION: ICD-10-CM

## 2025-09-04 RX ORDER — LENALIDOMIDE 10 MG/1
CAPSULE ORAL
Qty: 21 CAPSULE | Refills: 0 | Status: SHIPPED | OUTPATIENT
Start: 2025-09-04

## 2025-09-05 ENCOUNTER — OFFICE VISIT (OUTPATIENT)
Dept: HEMATOLOGY/ONCOLOGY | Facility: CLINIC | Age: 60
End: 2025-09-05
Payer: COMMERCIAL

## 2025-09-05 VITALS
WEIGHT: 237.31 LBS | TEMPERATURE: 99 F | DIASTOLIC BLOOD PRESSURE: 74 MMHG | SYSTOLIC BLOOD PRESSURE: 145 MMHG | OXYGEN SATURATION: 97 % | HEIGHT: 68 IN | BODY MASS INDEX: 35.97 KG/M2 | HEART RATE: 56 BPM

## 2025-09-05 DIAGNOSIS — Z94.84 H/O AUTOLOGOUS STEM CELL TRANSPLANT: ICD-10-CM

## 2025-09-05 DIAGNOSIS — T45.1X5A ANEMIA ASSOCIATED WITH CHEMOTHERAPY: ICD-10-CM

## 2025-09-05 DIAGNOSIS — D64.81 ANEMIA ASSOCIATED WITH CHEMOTHERAPY: ICD-10-CM

## 2025-09-05 DIAGNOSIS — C90.01 MULTIPLE MYELOMA IN REMISSION: Primary | ICD-10-CM

## 2025-09-05 PROCEDURE — 99999 PR PBB SHADOW E&M-EST. PATIENT-LVL III: CPT | Mod: PBBFAC,,, | Performed by: INTERNAL MEDICINE

## (undated) DEVICE — SYR SLIP TIP 10ML SHIELD

## (undated) DEVICE — NDL SPINAL 20GX3.5 HUB

## (undated) DEVICE — SEE MEDLINE ITEM 157128

## (undated) DEVICE — DRESSING LEUKOPLAST FLEX 1X3IN

## (undated) DEVICE — SEE MEDLINE ITEM 152680